# Patient Record
Sex: MALE | Race: WHITE | NOT HISPANIC OR LATINO | Employment: OTHER | URBAN - METROPOLITAN AREA
[De-identification: names, ages, dates, MRNs, and addresses within clinical notes are randomized per-mention and may not be internally consistent; named-entity substitution may affect disease eponyms.]

---

## 2017-04-13 ENCOUNTER — HOSPITAL ENCOUNTER (OUTPATIENT)
Dept: RADIOLOGY | Facility: HOSPITAL | Age: 62
Discharge: HOME | End: 2017-04-13
Attending: UROLOGY
Payer: COMMERCIAL

## 2017-04-13 DIAGNOSIS — C64.1 RENAL CELL CARCINOMA, RIGHT: ICD-10-CM

## 2017-04-13 PROCEDURE — 74176 CT ABD & PELVIS W/O CONTRAST: CPT | Mod: 26,,, | Performed by: RADIOLOGY

## 2017-04-13 PROCEDURE — 71020 XR CHEST PA AND LATERAL: CPT | Mod: TC

## 2017-04-13 PROCEDURE — 71020 XR CHEST PA AND LATERAL: CPT | Mod: 26,,, | Performed by: RADIOLOGY

## 2017-04-13 PROCEDURE — 74176 CT ABD & PELVIS W/O CONTRAST: CPT | Mod: TC

## 2017-04-20 ENCOUNTER — OFFICE VISIT (OUTPATIENT)
Dept: UROLOGY | Facility: CLINIC | Age: 62
End: 2017-04-20
Payer: COMMERCIAL

## 2017-04-20 VITALS
HEIGHT: 67 IN | RESPIRATION RATE: 16 BRPM | DIASTOLIC BLOOD PRESSURE: 70 MMHG | WEIGHT: 253 LBS | BODY MASS INDEX: 39.71 KG/M2 | SYSTOLIC BLOOD PRESSURE: 112 MMHG | HEART RATE: 68 BPM

## 2017-04-20 DIAGNOSIS — R31.0 GROSS HEMATURIA: ICD-10-CM

## 2017-04-20 DIAGNOSIS — N20.0 NEPHROLITHIASIS: ICD-10-CM

## 2017-04-20 DIAGNOSIS — N40.1 BENIGN NON-NODULAR PROSTATIC HYPERPLASIA WITH LOWER URINARY TRACT SYMPTOMS: ICD-10-CM

## 2017-04-20 DIAGNOSIS — C64.1 RENAL CELL CARCINOMA OF RIGHT KIDNEY: Primary | ICD-10-CM

## 2017-04-20 PROCEDURE — 3078F DIAST BP <80 MM HG: CPT | Mod: S$GLB,,, | Performed by: UROLOGY

## 2017-04-20 PROCEDURE — 3074F SYST BP LT 130 MM HG: CPT | Mod: S$GLB,,, | Performed by: UROLOGY

## 2017-04-20 PROCEDURE — 1160F RVW MEDS BY RX/DR IN RCRD: CPT | Mod: S$GLB,,, | Performed by: UROLOGY

## 2017-04-20 PROCEDURE — 99214 OFFICE O/P EST MOD 30 MIN: CPT | Mod: S$GLB,,, | Performed by: UROLOGY

## 2017-04-20 PROCEDURE — 99999 PR PBB SHADOW E&M-EST. PATIENT-LVL III: CPT | Mod: PBBFAC,,, | Performed by: UROLOGY

## 2017-04-20 NOTE — PROGRESS NOTES
Subjective:       Nicolas Flaherty is a 61 y.o. male who is an established patient who was seen for evaluation of nephrolithiasis and hematuria.      Last seen by Dr Christian 7/15. He has a h/o stones and is s/p ESWL and URS in the past. He has had issues with gross hematuria and has been worked up for this. He does take Plavix and ASA regularly. He has significant cardiac issues, especially when off Plavix.     He also has BPH with some LUTS. Nocturia x 4. He takes Flomax and Proscar.     R URS 6/15 for stones. Stone analysis - CaOx mono. 24hr stone risk analysis - hyperoxalaturia. He is taking allopurinol currently. Unsure efficacy of this.    KAREN 4/15 - normal, no hydro. 1.5cm simple cyst RUP. 4mm R MP stone. PVR 51cc.  KAREN 1/16 - no stone seen. 2.9cm lesion in R LP concerning for neoplasm. Not seen on previous study.   CT scan shows 3.2cm enhancing mass in R kidney, endophytic.    He is s/p R URS to r/o UC. No tumor seen therefore now s/p R lap radical nephrectomy on 3/18/16 for renal mass. 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 Cr 1.4 at baseline. Post-op Cr was 2.1 at discharge. He is followed by nephrology (Dr Short).     Initial post-operative check was fine. Staples were removed at last visit. He presents today after reporting some redness and drainage from extraction site. No fevers or other complaints.     Unfortunately he was noted to have R sided hernia at the area of nephrectomy. He has had this repaired by Dr Lewis in 8/16. He has healed well from this.    CT 10/16 is clear of recurrence. Hernia repair noted. Cr 2.1.      The following portions of the patient's history were reviewed and updated as appropriate: allergies, current medications, past family history, past medical history, past social history, past surgical history and problem list.    Review of Systems  Constitutional: no fever or chills  ENT: no nasal congestion or sore throat  Respiratory: no  "cough or shortness of breath  Cardiovascular: no chest pain or palpitations  Gastrointestinal: no nausea or vomiting, tolerating diet  Genitourinary: as per HPI  Hematologic/Lymphatic: no easy bruising or lymphadenopathy  Musculoskeletal: no arthralgias or myalgias  Skin: no rashes or lesions  Neurological: no seizures or tremors  Behavioral/Psych: no auditory or visual hallucinations       Objective:    Vitals:   /70  Pulse 68  Resp 16  Ht 5' 7" (1.702 m)  Wt 114.8 kg (253 lb)  BMI 39.63 kg/m2    Physical Exam   General: well developed, well nourished in no acute distress  Head: normocephalic, atraumatic  Neck: supple, trachea midline, no obvious enlargement of thyroid  HEENT: EOMI, mucus membranes moist, sclera anicteric, no hearing impairment  Lungs: symmetric expansion, non-labored breathing  Cardiovascular: regular rate and rhythm, normal pulses  Abdomen: soft, non tender, non distended, no palpable masses, no hepatosplenomegaly, no hernias, bladder nonpalpable. No CVA tenderness.  Musculoskeletal: no peripheral edema, normal ROM in bilateral upper and lower extremities  Lymphatics: no cervical or inguinal lymphadenopathy  Skin: no rashes or lesions  Neuro: alert and oriented x 3, no gross deficits  Psych: normal judgment and insight, normal mood/affect and non-anxious  Genitourinary: (last visit)  Penis -  circumcised penis without plaques, lesions, or scarring.  Urethra - orthotopic location without stenosis.  Scrotum  - no lesions or rashes, no hydrocele or hernia.  Testes - descended bilaterally, symmetric without masses, non tender.  Epididymides - no cysts or lesions, no spermatocele, symmetric   No evidence of any testicular/scrotal infection   ILDA: patient declined exam    Inc: healing well, small scab in extraction site incision (s/p hernia repair)    Lab Review   Urine analysis today in clinic shows - negative    Lab Results   Component Value Date    WBC 12.29 10/07/2016    HGB 12.3 (L) " 10/07/2016    HCT 38.3 (L) 10/07/2016    MCV 88 10/07/2016     10/07/2016     Lab Results   Component Value Date    CREATININE 2.2 (H) 10/07/2016    BUN 28 (H) 10/07/2016     No results found for: PSA  Lab Results   Component Value Date    PSADIAG 0.23 04/21/2015       Imaging  KAREN/CT reviewed  Reports and images were personally reviewed by me and discussed with the patient today         Assessment/Plan:      1. Renal cell carcinoma    - Baseline Cr 1.4, new baseline 2.2. Glucosuria today.   - s/p R lap nephrectomy 3/18/16, pT3aNx, FG2, ccRCC   - CT a/p and CXR clear 4/17   - CT a/p (non-con) and CXR in 6mths   - Follow up plan: CT q6m x 3y, q1y to year 5. CXR q6m x 5yrs   - s/p hernia repair by Dr Lewis at kidney extraction site (8/16)     2. Nephrolithiasis    - Punctate stone in R LP   - CaOx mono stones   - 24hr urine collection - hyperoxalaturia. Recommend Ca intake when oxalate taken PO. Low oxalate diet.    - He is taking allopurinol per RCA. Unsure efficacy of this. Instructed him he can stop this.    - Discussed low oxalate diet   - No stones on CT 10/16     3. Hematuria, gross    - Negative cysto 6/15, 3/16   - RCC -suspect reason for hematuria     4. Benign non-nodular prostatic hyperplasia with lower urinary tract symptoms    - Continue Flomax           Follow up in 6 months with CT scan/CXR

## 2017-04-20 NOTE — MR AVS SNAPSHOT
[facility]  [address] [facility] [address]  [place] [address]  Phone: [phone]                  Nicolas Flaherty   2017 9:00 AM   Office Visit    Description:  Male : 1955   Provider:  Rosalia Acosta MD   Department:  [facility]           Reason for Visit     Follow-up           Diagnoses this Visit        Comments    Renal cell carcinoma of right kidney    -  Primary            To Do List           Goals (5 Years of Data)     None      Follow-Up and Disposition     Return in about 6 months (around 10/20/2017) for Follow up on symptoms, CT scan/CXR follow up.      [facility]     [facility] Nurse Care Line -  Assistance  Unless otherwise directed by your provider, please contact [facility], our nurse care line that is available for  assistance.     Registered nurses in the [facility] provide: appointment scheduling, clinical advisement, health education, and other advisory services.  Call: [phone] (toll free)               Medications           STOP taking these medications     amlodipine (NORVASC) 10 MG tablet Take 5 mg by mouth every morning.            Verify that the below list of medications is an accurate representation of the medications you are currently taking.  If none reported, the list may be blank. If incorrect, please contact your healthcare provider. Carry this list with you in case of emergency.           Current Medications     aspirin (ECOTRIN) 81 MG EC tablet Take 81 mg by mouth once daily. LAST TAKEN 3/2/16    clopidogrel (PLAVIX) 75 mg tablet Take 75 mg by mouth every morning. LAST DOSE 3/2/16    fenofibrate (TRICOR) 145 MG tablet Take 145 mg by mouth nightly.     finasteride (PROSCAR) 5 mg tablet Take 1 tablet (5 mg total) by mouth every morning.    furosemide (LASIX) 40 MG tablet Take 40 mg by mouth 2 (two) times daily. ALTERNATES 1 TABLET ONE DAY AND 2 TABLETS THE NEXT--ALTERNATING DAYS    gabapentin  "(NEURONTIN) 300 MG capsule     glipiZIDE (GLUCOTROL) 5 MG tablet Take 5 mg by mouth 2 (two) times daily with meals.     hydrocodone-acetaminophen 10-325mg (NORCO)  mg Tab Take 1 tablet by mouth every 4 to 6 hours as needed.    isosorbide mononitrate (IMDUR) 60 MG 24 hr tablet Take 60 mg by mouth every morning.     latanoprost 0.005 % ophthalmic solution Place 1 drop into both eyes every evening.     lisinopril (PRINIVIL,ZESTRIL) 40 MG tablet Take 40 mg by mouth once daily.     metoprolol succinate (TOPROL-XL) 100 MG 24 hr tablet Take 100 mg by mouth every morning.     NITROSTAT 0.4 mg SL tablet Place 0.4 mg under the tongue every 5 (five) minutes as needed.     PROAIR HFA 90 mcg/actuation inhaler INHALE 2 PUFFS DEEP INTO THE LUNGS Q 6 H PRN    rosuvastatin (CRESTOR) 10 MG tablet Take 10 mg by mouth every evening.    tamsulosin (FLOMAX) 0.4 mg Cp24 Take 1 capsule (0.4 mg total) by mouth once daily.    TRAVATAN Z 0.004 % Drop 1 drop every evening.            Clinical Reference Information           Your Vitals Were     BP Pulse Resp Height Weight BMI    112/70 68 16 5' 7" (1.702 m) 114.8 kg (253 lb) 39.63 kg/m2      Blood Pressure          Most Recent Value    BP  112/70      Allergies as of 4/20/2017     No Known Allergies      Immunizations Administered on Date of Encounter - 4/20/2017     None      Orders Placed During Today's Visit     Future Labs/Procedures Expected by Expires    CT Renal Stone Study ABD Pelvis WO  10/20/2017 4/20/2018    X-Ray Chest PA And Lateral  10/20/2017 4/20/2018      [facility] Sign-Up     Activating your [facility] account is as easy as 1-2-3!     1) Visit [email or web address], select Sign Up Now, enter this activation code and your date of birth, then select Next.  Activation code not generated  Current Patient Portal Status: Account disabled      2) Create a username and password to use when you visit [facility] in the future and select a security question in case you lose your password " and select Next.    3) Enter your e-mail address and click Sign Up!    Additional Information  If you have questions, please e-mail [email or web address] or call [phone] to talk to our [facility] staff. Remember, [facility] is NOT to be used for urgent needs. For medical emergencies, dial 911.         Language Assistance Services     ATTENTION: Language assistance services are available, free of charge. Please call [phone].      ATENCIÓN: Si habla chellyingrid, tiene a warren disposición servicios gratuitos de asistencia lingüística. Llame al [phone].     [facility] Ý: N?u b?n nói Ti?ng Vi?t, có các d?ch v? h? tr? ngôn ng? mi?n phí dành cho b?n. G?i s? [phone].         [facility] complies with applicable Federal civil rights laws and does not discriminate on the basis of race, color, national origin, age, disability, or sex.

## 2017-05-01 RX ORDER — TAMSULOSIN HYDROCHLORIDE 0.4 MG/1
CAPSULE ORAL
Qty: 90 CAPSULE | Refills: 3 | Status: SHIPPED | OUTPATIENT
Start: 2017-05-01 | End: 2018-04-24 | Stop reason: SDUPTHER

## 2017-06-22 ENCOUNTER — OFFICE VISIT (OUTPATIENT)
Dept: UROLOGY | Facility: CLINIC | Age: 62
End: 2017-06-22
Payer: COMMERCIAL

## 2017-06-22 VITALS
WEIGHT: 256.19 LBS | HEIGHT: 67 IN | BODY MASS INDEX: 40.21 KG/M2 | DIASTOLIC BLOOD PRESSURE: 76 MMHG | SYSTOLIC BLOOD PRESSURE: 124 MMHG | RESPIRATION RATE: 16 BRPM

## 2017-06-22 DIAGNOSIS — R31.0 GROSS HEMATURIA: ICD-10-CM

## 2017-06-22 DIAGNOSIS — N20.0 NEPHROLITHIASIS: ICD-10-CM

## 2017-06-22 DIAGNOSIS — N40.1 BENIGN NON-NODULAR PROSTATIC HYPERPLASIA WITH LOWER URINARY TRACT SYMPTOMS: Primary | ICD-10-CM

## 2017-06-22 DIAGNOSIS — C64.1 RENAL CELL CARCINOMA OF RIGHT KIDNEY: ICD-10-CM

## 2017-06-22 PROCEDURE — 99214 OFFICE O/P EST MOD 30 MIN: CPT | Mod: S$GLB,,, | Performed by: UROLOGY

## 2017-06-22 PROCEDURE — 99999 PR PBB SHADOW E&M-EST. PATIENT-LVL III: CPT | Mod: PBBFAC,,, | Performed by: UROLOGY

## 2017-06-22 RX ORDER — VALSARTAN 80 MG/1
80 TABLET ORAL DAILY
COMMUNITY
Start: 2017-05-16 | End: 2018-08-24

## 2017-06-22 RX ORDER — NATEGLINIDE 60 MG/1
60 TABLET ORAL
COMMUNITY
Start: 2017-04-19 | End: 2018-07-25 | Stop reason: SDUPTHER

## 2017-06-22 RX ORDER — AMOXICILLIN AND CLAVULANATE POTASSIUM 875; 125 MG/1; MG/1
TABLET, FILM COATED ORAL
COMMUNITY
Start: 2017-06-16 | End: 2017-07-03

## 2017-06-22 NOTE — PROGRESS NOTES
Subjective:       Nicolas Flaherty is a 61 y.o. male who is an established patient who was seen for evaluation of nephrolithiasis and hematuria.      Last seen by Dr Christian 7/15. He has a h/o stones and is s/p ESWL and URS in the past. He has had issues with gross hematuria and has been worked up for this. He does take Plavix and ASA regularly. He has significant cardiac issues, especially when off Plavix.     He also has BPH with some LUTS. Nocturia x 4. He takes Flomax and Proscar.     R URS 6/15 for stones. Stone analysis - CaOx mono. 24hr stone risk analysis - hyperoxalaturia. He is taking allopurinol currently. Unsure efficacy of this.    KAREN 4/15 - normal, no hydro. 1.5cm simple cyst RUP. 4mm R MP stone. PVR 51cc.  KAREN 1/16 - no stone seen. 2.9cm lesion in R LP concerning for neoplasm. Not seen on previous study.   CT scan shows 3.2cm enhancing mass in R kidney, endophytic.    He is s/p R URS to r/o UC. No tumor seen therefore now s/p R lap radical nephrectomy on 3/18/16 for renal mass. 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 Cr 1.4 at baseline. Post-op Cr was 2.1 at discharge. He is followed by nephrology (Dr Short).     Initial post-operative check was fine. Staples were removed at last visit. He presents today after reporting some redness and drainage from extraction site. No fevers or other complaints.     He was noted to have R sided hernia at the area of nephrectomy. He has had this repaired by Dr Lewis in 8/16. He has healed well from this.    CT 10/16 is clear of recurrence. Hernia repair noted. Cr 2.1.    He returns now with c/o pain and swelling at incision site (s/p lap nephrectomy 3/16 and hernia repair 8/16).       The following portions of the patient's history were reviewed and updated as appropriate: allergies, current medications, past family history, past medical history, past social history, past surgical history and problem list.    Review of  "Systems  Constitutional: no fever or chills  ENT: no nasal congestion or sore throat  Respiratory: no cough or shortness of breath  Cardiovascular: no chest pain or palpitations  Gastrointestinal: no nausea or vomiting, tolerating diet  Genitourinary: as per HPI  Hematologic/Lymphatic: no easy bruising or lymphadenopathy  Musculoskeletal: no arthralgias or myalgias  Skin: no rashes or lesions  Neurological: no seizures or tremors  Behavioral/Psych: no auditory or visual hallucinations       Objective:    Vitals:   /76   Resp 16   Ht 5' 7" (1.702 m)   Wt 116.2 kg (256 lb 2.8 oz)   BMI 40.12 kg/m²     Physical Exam   General: well developed, well nourished in no acute distress  Head: normocephalic, atraumatic  Neck: supple, trachea midline, no obvious enlargement of thyroid  HEENT: EOMI, mucus membranes moist, sclera anicteric, no hearing impairment  Lungs: symmetric expansion, non-labored breathing  Cardiovascular: regular rate and rhythm, normal pulses  Abdomen: soft, non tender, non distended, no palpable masses, no hepatosplenomegaly, no hernias, bladder nonpalpable. No CVA tenderness.  Musculoskeletal: no peripheral edema, normal ROM in bilateral upper and lower extremities  Lymphatics: no cervical or inguinal lymphadenopathy  Skin: no rashes or lesions  Neuro: alert and oriented x 3, no gross deficits  Psych: normal judgment and insight, normal mood/affect and non-anxious  Genitourinary: (last visit)  Penis -  circumcised penis without plaques, lesions, or scarring.  Urethra - orthotopic location without stenosis.  Scrotum  - no lesions or rashes, no hydrocele or hernia.  Testes - descended bilaterally, symmetric without masses, non tender.  Epididymides - no cysts or lesions, no spermatocele, symmetric   No evidence of any testicular/scrotal infection   ILDA: patient declined exam    Inc: well healed, s/p hernia repair. No defect in fascia appreciated.     Lab Review   Urine analysis today in clinic " shows - no urine given    Lab Results   Component Value Date    WBC 12.29 10/07/2016    HGB 12.3 (L) 10/07/2016    HCT 38.3 (L) 10/07/2016    MCV 88 10/07/2016     10/07/2016     Lab Results   Component Value Date    CREATININE 2.2 (H) 10/07/2016    BUN 28 (H) 10/07/2016     No results found for: PSA  Lab Results   Component Value Date    PSADIAG 0.23 04/21/2015       Imaging  KAREN/CT reviewed  Reports and images were personally reviewed by me and discussed with the patient today         Assessment/Plan:      1. Renal cell carcinoma    - Baseline Cr 1.4, new baseline 2.2.   - s/p R lap nephrectomy 3/18/16, pT3aNx, FG2, ccRCC   - CT a/p and CXR clear 4/17   - CT a/p (non-con) and CXR in 6mths   - Follow up plan: CT q6m x 3y, q1y to year 5. CXR q6m x 5yrs   - s/p hernia repair by Dr Lewis at kidney extraction site (8/16) - recommend to see Joshua (non-urgently) for recheck of hernia site.      2. Nephrolithiasis    - Punctate stone in R LP   - CaOx mono stones   - 24hr urine collection - hyperoxalaturia. Recommend Ca intake when oxalate taken PO. Low oxalate diet.    - He is taking allopurinol per RCA. Unsure efficacy of this. Instructed him he can stop this.    - Discussed low oxalate diet   - No stones on CT 10/16     3. Hematuria, gross    - Negative cysto 6/15, 3/16   - RCC -suspect reason for hematuria     4. Benign non-nodular prostatic hyperplasia with lower urinary tract symptoms    - Continue Flomax           Follow up in 4 months with CT scan/CXR

## 2017-07-20 RX ORDER — FINASTERIDE 5 MG/1
TABLET, FILM COATED ORAL
Qty: 90 TABLET | Refills: 3 | Status: SHIPPED | OUTPATIENT
Start: 2017-07-20 | End: 2018-07-25 | Stop reason: SDUPTHER

## 2017-07-25 ENCOUNTER — OFFICE VISIT (OUTPATIENT)
Dept: FAMILY MEDICINE | Facility: CLINIC | Age: 62
End: 2017-07-25
Payer: COMMERCIAL

## 2017-07-25 VITALS
TEMPERATURE: 99 F | BODY MASS INDEX: 41.87 KG/M2 | SYSTOLIC BLOOD PRESSURE: 110 MMHG | WEIGHT: 266.75 LBS | HEART RATE: 78 BPM | HEIGHT: 67 IN | OXYGEN SATURATION: 97 % | DIASTOLIC BLOOD PRESSURE: 74 MMHG

## 2017-07-25 DIAGNOSIS — E11.22 CONTROLLED TYPE 2 DIABETES MELLITUS WITH STAGE 3 CHRONIC KIDNEY DISEASE, WITHOUT LONG-TERM CURRENT USE OF INSULIN: ICD-10-CM

## 2017-07-25 DIAGNOSIS — I10 ESSENTIAL HYPERTENSION: ICD-10-CM

## 2017-07-25 DIAGNOSIS — I25.119 CORONARY ARTERY DISEASE INVOLVING NATIVE CORONARY ARTERY OF NATIVE HEART WITH ANGINA PECTORIS: ICD-10-CM

## 2017-07-25 DIAGNOSIS — Z90.5 STATUS POST NEPHRECTOMY: ICD-10-CM

## 2017-07-25 DIAGNOSIS — Z12.11 COLON CANCER SCREENING: Primary | ICD-10-CM

## 2017-07-25 DIAGNOSIS — N18.30 CONTROLLED TYPE 2 DIABETES MELLITUS WITH STAGE 3 CHRONIC KIDNEY DISEASE, WITHOUT LONG-TERM CURRENT USE OF INSULIN: ICD-10-CM

## 2017-07-25 DIAGNOSIS — J30.9 ALLERGIC SINUSITIS: ICD-10-CM

## 2017-07-25 DIAGNOSIS — C64.1 RENAL CELL CARCINOMA OF RIGHT KIDNEY: ICD-10-CM

## 2017-07-25 DIAGNOSIS — N18.30 CKD (CHRONIC KIDNEY DISEASE), STAGE III: ICD-10-CM

## 2017-07-25 PROCEDURE — 99999 PR PBB SHADOW E&M-EST. PATIENT-LVL III: CPT | Mod: PBBFAC,,, | Performed by: FAMILY MEDICINE

## 2017-07-25 PROCEDURE — 99203 OFFICE O/P NEW LOW 30 MIN: CPT | Mod: S$GLB,,, | Performed by: FAMILY MEDICINE

## 2017-07-25 PROCEDURE — 4010F ACE/ARB THERAPY RXD/TAKEN: CPT | Mod: S$GLB,,, | Performed by: FAMILY MEDICINE

## 2017-07-25 RX ORDER — AZELASTINE 1 MG/ML
1 SPRAY, METERED NASAL 2 TIMES DAILY
Qty: 30 ML | Refills: 5 | Status: SHIPPED | OUTPATIENT
Start: 2017-07-25 | End: 2018-02-07

## 2017-07-25 RX ORDER — DOCUSATE SODIUM 100 MG/1
100 CAPSULE, LIQUID FILLED ORAL 2 TIMES DAILY
COMMUNITY
End: 2017-07-27 | Stop reason: SDUPTHER

## 2017-07-25 NOTE — PROGRESS NOTES
Subjective:       Patient ID: Nicolas Flaherty is a 61 y.o. male.    Chief Complaint: Establish Care    Patient is 61 year old male who presents as a new patietn to me today. He has hyperlipidemia, diabetes, chronic kidney disease, TIA, and coronary artery disease. He presents today  for sinus issues. He was given a course of prednsione x 2 and antibiotics. He is treating him with mucinex and coricidin HBP. He has cough with clear phlegm, post nasal drip. He denies fever or chills. [facility] he had his labs drawn for Dr. Campa there.     He has never had a colonoscopy.     He also has CKD and so he can't use contrast. He is followed by nephrology, Dr. Mejia,  and put him off for 6 months.     He sees Dr. Betts, his cardiologist,  every 6 months. He is due for follow-up in November 2017.      He gets his eye exams every 3 months with Dr. Ellen Cortez    Past Medical History:  No date: Anticoagulant long-term use  No date: Arthritis  No date: Cancer of kidney, right  No date: Coronary artery disease  No date: Diabetes mellitus  No date: Hypertension  No date: Kidney stone  No date: Sleep apnea  No date: Slurred speech  No date: Stroke      Comment: MINI STROKE  No date: TIA (transient ischemic attack)  No date: Wears glasses   Past Surgical History:  No date: BACK SURGERY      Comment: lower back  No date: CARDIAC SURGERY  No date: heart stents      Comment: stents 4 total.  2010, 2011 and 2013  No date: HERNIA REPAIR      Comment: incisional  No date: KIDNEY STONE SURGERY  No date: LAPAROSCOPIC NEPHRECTOMY, HAND ASSISTED Right  No date: LITHOTRIPSY  No date: VASCULAR SURGERY  Review of patient's family history indicates:    Heart disease                  Father                    Hypertension                   Mother                    Parkinsonism                   Mother                    Social History    Marital status:              Spouse name:                        "Years of education:                 Number of children:               Occupational History    None on file    Social History Main Topics    Smoking status: Never Smoker                                                                Smokeless tobacco: Current User                      Comment: 20 plus years    Alcohol use: Yes                Comment: occas    Drug use: No              Sexual activity: Yes               Partners with: Female    Other Topics            Concern    None on file    Social History Narrative    None on file            Review of Systems   Constitutional: Negative for fatigue.   Respiratory: Negative for cough, chest tightness, shortness of breath and wheezing.    Cardiovascular: Negative for chest pain, palpitations and leg swelling.   Gastrointestinal: Negative for abdominal pain, nausea and vomiting.   Endocrine: Negative for polydipsia, polyphagia and polyuria.   Neurological: Negative for dizziness, syncope, light-headedness and headaches.       Objective:       Vitals:    07/25/17 0816   BP: 110/74   Pulse: 78   Temp: 99 °F (37.2 °C)   TempSrc: Oral   SpO2: 97%   Weight: 121 kg (266 lb 12.1 oz)   Height: 5' 7" (1.702 m)       Physical Exam   Constitutional: He is oriented to person, place, and time. He appears well-developed and well-nourished. No distress.   HENT:   Head: Normocephalic and atraumatic.   Eyes: Conjunctivae are normal.   Neck: Normal range of motion. Neck supple. Carotid bruit is not present.   Cardiovascular: Normal rate, regular rhythm and normal heart sounds.  Exam reveals no gallop and no friction rub.    No murmur heard.  Pulmonary/Chest: Effort normal and breath sounds normal. No respiratory distress. He has no wheezes. He has no rales.   Musculoskeletal: He exhibits no edema.   Lymphadenopathy:     He has no cervical adenopathy.   Neurological: He is alert and oriented to person, place, and time.   Skin: He is not diaphoretic.   Psychiatric: He has a normal mood " and affect.       Assessment:       1. Colon cancer screening    2. CKD (chronic kidney disease), stage III    3. Coronary artery disease involving native coronary artery of native heart with angina pectoris    4. Controlled type 2 diabetes mellitus with stage 3 chronic kidney disease, without long-term current use of insulin    5. Essential hypertension    6. Renal cell carcinoma of right kidney    7. Status post nephrectomy    8. Allergic sinusitis        Plan:       Nicolas was seen today for establish care.    Diagnoses and all orders for this visit:    Colon cancer screening  -     Occult blood x 1, stool; Future  -     Occult blood x 1, stool; Future  -     Occult blood x 1, stool; Future    CKD (chronic kidney disease), stage III    Coronary artery disease involving native coronary artery of native heart with angina pectoris  Stable on Dr. Betts; will get labs done at [facility]    Controlled type 2 diabetes mellitus with stage 3 chronic kidney disease, without long-term current use of insulin    Essential hypertension  Blood pressure is controlled on valsartan or metoprolol    Renal cell carcinoma of right kidney    Status post nephrectomy    Allergic sinusitis  -     azelastine (ASTELIN) 137 mcg (0.1 %) nasal spray; 1 spray (137 mcg total) by Nasal route 2 (two) times daily.

## 2017-07-27 RX ORDER — DOCUSATE SODIUM 100 MG/1
100 CAPSULE, LIQUID FILLED ORAL 2 TIMES DAILY
Qty: 30 CAPSULE | Refills: 11 | Status: SHIPPED | OUTPATIENT
Start: 2017-07-27 | End: 2017-08-07 | Stop reason: SDUPTHER

## 2017-08-07 ENCOUNTER — TELEPHONE (OUTPATIENT)
Dept: UROLOGY | Facility: CLINIC | Age: 62
End: 2017-08-07

## 2017-08-07 RX ORDER — DOCUSATE SODIUM 100 MG/1
100 CAPSULE, LIQUID FILLED ORAL 2 TIMES DAILY
Qty: 180 CAPSULE | Refills: 3 | Status: SHIPPED | OUTPATIENT
Start: 2017-08-07

## 2017-08-07 NOTE — TELEPHONE ENCOUNTER
Pt is asking for a 90 day supply which would be #180 of  Docusate sodium (stool softener) pt states this will be cheaper for him./yifan

## 2017-10-20 ENCOUNTER — HOSPITAL ENCOUNTER (OUTPATIENT)
Dept: RADIOLOGY | Facility: HOSPITAL | Age: 62
Discharge: HOME | End: 2017-10-20
Attending: UROLOGY
Payer: COMMERCIAL

## 2017-10-20 DIAGNOSIS — C64.1 RENAL CELL CARCINOMA OF RIGHT KIDNEY: ICD-10-CM

## 2017-10-20 PROCEDURE — 74176 CT ABD & PELVIS W/O CONTRAST: CPT | Mod: TC

## 2017-10-20 PROCEDURE — 74176 CT ABD & PELVIS W/O CONTRAST: CPT | Mod: 26,,, | Performed by: RADIOLOGY

## 2017-10-20 PROCEDURE — 71020 XR CHEST PA AND LATERAL: CPT | Mod: 26,,, | Performed by: RADIOLOGY

## 2017-10-20 PROCEDURE — 71020 XR CHEST PA AND LATERAL: CPT | Mod: TC

## 2017-10-26 ENCOUNTER — OFFICE VISIT (OUTPATIENT)
Dept: UROLOGY | Facility: CLINIC | Age: 62
End: 2017-10-26
Payer: COMMERCIAL

## 2017-10-26 VITALS
DIASTOLIC BLOOD PRESSURE: 62 MMHG | BODY MASS INDEX: 42.22 KG/M2 | WEIGHT: 269 LBS | RESPIRATION RATE: 14 BRPM | SYSTOLIC BLOOD PRESSURE: 100 MMHG | HEART RATE: 68 BPM | HEIGHT: 67 IN

## 2017-10-26 DIAGNOSIS — N40.1 BENIGN NON-NODULAR PROSTATIC HYPERPLASIA WITH LOWER URINARY TRACT SYMPTOMS: ICD-10-CM

## 2017-10-26 DIAGNOSIS — R31.0 GROSS HEMATURIA: ICD-10-CM

## 2017-10-26 DIAGNOSIS — C64.1 RENAL CELL CARCINOMA OF RIGHT KIDNEY: Primary | ICD-10-CM

## 2017-10-26 DIAGNOSIS — N20.0 NEPHROLITHIASIS: ICD-10-CM

## 2017-10-26 PROCEDURE — 99214 OFFICE O/P EST MOD 30 MIN: CPT | Mod: S$GLB,,, | Performed by: UROLOGY

## 2017-10-26 PROCEDURE — 99999 PR PBB SHADOW E&M-EST. PATIENT-LVL III: CPT | Mod: PBBFAC,,, | Performed by: UROLOGY

## 2017-10-26 NOTE — PROGRESS NOTES
Subjective:       Nicolas Flaherty is a 62 y.o. male who is an established patient who was seen for evaluation of nephrolithiasis and hematuria.      Last seen by Dr Christian 7/15. He has a h/o stones and is s/p ESWL and URS in the past. He has had issues with gross hematuria and has been worked up for this. He does take Plavix and ASA regularly. He has significant cardiac issues, especially when off Plavix.     He also has BPH with some LUTS. Nocturia x 4. He takes Flomax and Proscar.     R URS 6/15 for stones. Stone analysis - CaOx mono. 24hr stone risk analysis - hyperoxalaturia. He is taking allopurinol currently. Unsure efficacy of this.    KAREN 4/15 - normal, no hydro. 1.5cm simple cyst RUP. 4mm R MP stone. PVR 51cc.  KAREN 1/16 - no stone seen. 2.9cm lesion in R LP concerning for neoplasm. Not seen on previous study.   CT scan shows 3.2cm enhancing mass in R kidney, endophytic.    He is s/p R URS to r/o UC. No tumor seen therefore now s/p R lap radical nephrectomy on 3/18/16 for renal mass. 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 Cr 1.4 at baseline. Post-op Cr was 2.1 at discharge. He is followed by nephrology (Dr Short).     Initial post-operative check was fine. Staples were removed.    He was noted to have R sided hernia at the area of nephrectomy. He has had this repaired by Dr Lewis in 8/16. He has healed well from this.    CT 10/16 is clear of recurrence. Hernia repair noted 8/16. Cr 2.1.    CT and CXR 4/17, 10/17 - clear of recurrence    He continues to c/o R flank pain and swelling.       The following portions of the patient's history were reviewed and updated as appropriate: allergies, current medications, past family history, past medical history, past social history, past surgical history and problem list.    Review of Systems  Constitutional: no fever or chills  ENT: no nasal congestion or sore throat  Respiratory: no cough or shortness of  "breath  Cardiovascular: no chest pain or palpitations  Gastrointestinal: no nausea or vomiting, tolerating diet  Genitourinary: as per HPI  Hematologic/Lymphatic: no easy bruising or lymphadenopathy  Musculoskeletal: no arthralgias or myalgias  Skin: no rashes or lesions  Neurological: no seizures or tremors  Behavioral/Psych: no auditory or visual hallucinations       Objective:    Vitals:   /62   Pulse 68   Resp 14   Ht 5' 7" (1.702 m)   Wt 122 kg (269 lb)   BMI 42.13 kg/m²     Physical Exam   General: well developed, well nourished in no acute distress  Head: normocephalic, atraumatic  Neck: supple, trachea midline, no obvious enlargement of thyroid  HEENT: EOMI, mucus membranes moist, sclera anicteric, no hearing impairment  Lungs: symmetric expansion, non-labored breathing  Cardiovascular: regular rate and rhythm, normal pulses  Abdomen: soft, non tender, non distended, no palpable masses, no hepatosplenomegaly, no hernias, bladder nonpalpable. No CVA tenderness.  Musculoskeletal: no peripheral edema, normal ROM in bilateral upper and lower extremities  Lymphatics: no cervical or inguinal lymphadenopathy  Skin: no rashes or lesions  Neuro: alert and oriented x 3, no gross deficits  Psych: normal judgment and insight, normal mood/affect and non-anxious  Genitourinary: (last visit)  Penis -  circumcised penis without plaques, lesions, or scarring.  Urethra - orthotopic location without stenosis.  Scrotum  - no lesions or rashes, no hydrocele or hernia.  Testes - descended bilaterally, symmetric without masses, non tender.  Epididymides - no cysts or lesions, no spermatocele, symmetric   No evidence of any testicular/scrotal infection   ILDA: patient declined exam    Inc: well healed, s/p hernia repair. No defect in fascia appreciated.     Lab Review   Urine analysis today in clinic shows - no urine given    Lab Results   Component Value Date    WBC 12.29 10/07/2016    HGB 12.3 (L) 10/07/2016    HCT 38.3 " (L) 10/07/2016    MCV 88 10/07/2016     10/07/2016     Lab Results   Component Value Date    CREATININE 2.2 (H) 10/07/2016    BUN 28 (H) 10/07/2016     No results found for: PSA  Lab Results   Component Value Date    PSADIAG 0.23 04/21/2015       Imaging  KAREN/CT reviewed  Reports and images were personally reviewed by me and discussed with the patient today         Assessment/Plan:      1. Renal cell carcinoma    - Baseline Cr 1.4, new baseline 2.2.   - s/p R lap nephrectomy 3/18/16, pT3aNx, FG2, ccRCC   - CT a/p and CXR clear 4/17, 10/17   - CT a/p (non-con) and CXR in 6mths   - Follow up plan: CT q6m x 3y, q1y to year 5 (3/21). CXR q6m x 5yrs   - s/p hernia repair by Dr Lewis at kidney extraction site (8/16) - recommend to see Joshua (non-urgently) for recheck of hernia site.      2. Nephrolithiasis    - Punctate stone in R LP   - CaOx mono stones   - 24hr urine collection - hyperoxalaturia. Recommend Ca intake when oxalate taken PO. Low oxalate diet.    - He is taking allopurinol per RCA. Unsure efficacy of this. Instructed him he can stop this.    - Discussed low oxalate diet   - No stones on CT     3. Hematuria, gross    - Negative cysto 6/15, 3/16   - RCC -suspect reason for hematuria     4. Benign non-nodular prostatic hyperplasia with lower urinary tract symptoms    - Continue Flomax   - PVD - urethral milking           Follow up in 6 months with CT scan/CXR

## 2017-10-30 DIAGNOSIS — E11.9 TYPE 2 DIABETES MELLITUS WITHOUT COMPLICATION: ICD-10-CM

## 2017-11-03 ENCOUNTER — OFFICE VISIT (OUTPATIENT)
Dept: FAMILY MEDICINE | Facility: CLINIC | Age: 62
End: 2017-11-03
Payer: COMMERCIAL

## 2017-11-03 VITALS
HEART RATE: 73 BPM | RESPIRATION RATE: 12 BRPM | OXYGEN SATURATION: 96 % | DIASTOLIC BLOOD PRESSURE: 80 MMHG | TEMPERATURE: 99 F | WEIGHT: 268.31 LBS | HEIGHT: 67 IN | BODY MASS INDEX: 42.11 KG/M2 | SYSTOLIC BLOOD PRESSURE: 112 MMHG

## 2017-11-03 DIAGNOSIS — A08.4 VIRAL ENTERITIS: Primary | ICD-10-CM

## 2017-11-03 PROCEDURE — 99999 PR PBB SHADOW E&M-EST. PATIENT-LVL III: CPT | Mod: PBBFAC,,, | Performed by: FAMILY MEDICINE

## 2017-11-03 PROCEDURE — 99213 OFFICE O/P EST LOW 20 MIN: CPT | Mod: S$GLB,,, | Performed by: FAMILY MEDICINE

## 2017-11-03 NOTE — PROGRESS NOTES
"Subjective:       Patient ID: Nicolas Flaherty is a 62 y.o. male.    Chief Complaint: Diarrhea and Chills    Diarrhea    This is a new problem. The current episode started in the past 7 days (3 days). The problem occurs 5 to 10 times per day. The problem has been unchanged. The stool consistency is described as watery. The patient states that diarrhea awakens him from sleep. Associated symptoms include chills, coughing and sweats. Pertinent negatives include no fever, myalgias or vomiting. There are no known risk factors. He has tried anti-motility drug (immodium) for the symptoms. The treatment provided no relief.   he states his blood sugars have been better and states it was 80 this morning.   Review of Systems   Constitutional: Positive for chills. Negative for fever.   Respiratory: Positive for cough.    Gastrointestinal: Positive for diarrhea. Negative for vomiting.   Musculoskeletal: Negative for myalgias.       Objective:       Vitals:    11/03/17 0802   BP: 112/80   Pulse: 73   Resp: 12   Temp: 98.6 °F (37 °C)   TempSrc: Oral   SpO2: 96%   Weight: 121.7 kg (268 lb 4.8 oz)   Height: 5' 7" (1.702 m)       Physical Exam   Constitutional: He is oriented to person, place, and time. He appears well-developed and well-nourished. No distress.   HENT:   Head: Normocephalic and atraumatic.   Cardiovascular: Normal rate, regular rhythm and normal heart sounds.  Exam reveals no gallop and no friction rub.    No murmur heard.  Pulmonary/Chest: Effort normal and breath sounds normal. No respiratory distress. He has no wheezes. He has no rales.   Abdominal: Soft. Bowel sounds are normal. He exhibits no distension and no mass. There is no tenderness. There is no rebound and no guarding.   Neurological: He is alert and oriented to person, place, and time.   Skin: He is not diaphoretic.       Assessment:       1. Viral enteritis        Plan:       Nicolas was seen today for diarrhea and chills.    Diagnoses and all " orders for this visit:    Viral enteritis      The 'BRAT' diet is suggested, then progress to diet as tolerated as symptoms julio. Call if bloody stools, persistent diarrhea, vomiting, fever or abdominal pain.

## 2018-01-02 NOTE — TELEPHONE ENCOUNTER
----- Message from Coleen Reed sent at 1/2/2018 11:23 AM CST -----  Contact: self  Refill request for--- gabapentin (NEURONTIN) 300 MG capsule--- 90 day supply. To [facility] on [facility]. Pt call back [phone].

## 2018-01-03 RX ORDER — GABAPENTIN 300 MG/1
300 CAPSULE ORAL 3 TIMES DAILY
Qty: 270 CAPSULE | Refills: 0 | Status: SHIPPED | OUTPATIENT
Start: 2018-01-03 | End: 2018-04-11

## 2018-01-09 ENCOUNTER — OFFICE VISIT (OUTPATIENT)
Dept: FAMILY MEDICINE | Facility: CLINIC | Age: 63
End: 2018-01-09
Payer: COMMERCIAL

## 2018-01-09 VITALS
HEART RATE: 67 BPM | OXYGEN SATURATION: 97 % | BODY MASS INDEX: 42.43 KG/M2 | WEIGHT: 270.31 LBS | HEIGHT: 67 IN | TEMPERATURE: 99 F | DIASTOLIC BLOOD PRESSURE: 68 MMHG | SYSTOLIC BLOOD PRESSURE: 124 MMHG

## 2018-01-09 DIAGNOSIS — Z23 NEED FOR PROPHYLACTIC VACCINATION AND INOCULATION AGAINST INFLUENZA: ICD-10-CM

## 2018-01-09 DIAGNOSIS — N18.30 CKD (CHRONIC KIDNEY DISEASE), STAGE III: ICD-10-CM

## 2018-01-09 DIAGNOSIS — R41.82 ALTERED MENTAL STATUS, UNSPECIFIED ALTERED MENTAL STATUS TYPE: Primary | ICD-10-CM

## 2018-01-09 DIAGNOSIS — I25.119 CORONARY ARTERY DISEASE INVOLVING NATIVE CORONARY ARTERY OF NATIVE HEART WITH ANGINA PECTORIS: ICD-10-CM

## 2018-01-09 DIAGNOSIS — E11.22 TYPE 2 DIABETES MELLITUS WITH STAGE 3 CHRONIC KIDNEY DISEASE, WITHOUT LONG-TERM CURRENT USE OF INSULIN: ICD-10-CM

## 2018-01-09 DIAGNOSIS — N18.30 TYPE 2 DIABETES MELLITUS WITH STAGE 3 CHRONIC KIDNEY DISEASE, WITHOUT LONG-TERM CURRENT USE OF INSULIN: ICD-10-CM

## 2018-01-09 PROCEDURE — 99999 PR PBB SHADOW E&M-EST. PATIENT-LVL IV: CPT | Mod: PBBFAC,,, | Performed by: FAMILY MEDICINE

## 2018-01-09 PROCEDURE — 90471 IMMUNIZATION ADMIN: CPT | Mod: S$GLB,,, | Performed by: FAMILY MEDICINE

## 2018-01-09 PROCEDURE — 90670 PCV13 VACCINE IM: CPT | Mod: S$GLB,,, | Performed by: FAMILY MEDICINE

## 2018-01-09 PROCEDURE — 90686 IIV4 VACC NO PRSV 0.5 ML IM: CPT | Mod: S$GLB,,, | Performed by: FAMILY MEDICINE

## 2018-01-09 PROCEDURE — 99214 OFFICE O/P EST MOD 30 MIN: CPT | Mod: 25,S$GLB,, | Performed by: FAMILY MEDICINE

## 2018-01-09 PROCEDURE — 90472 IMMUNIZATION ADMIN EACH ADD: CPT | Mod: S$GLB,,, | Performed by: FAMILY MEDICINE

## 2018-01-09 NOTE — PROGRESS NOTES
Subjective:       Patient ID: Nicolas Flaherty is a 62 y.o. male.    Chief Complaint: Fall (twice) and Dizziness    Patient presents for a fall x 2 at home. He states he feels dizzy and off balance, which causes him to fall. Incidentally, he also feels like his speech has worsened. He has felt that way for about a week.   He has diabetes and has been managed by Dr. Campa. He would like me to take over. His last A1C was 7.2.    He also has a spot on his right hip that is painful. It has been present for years. He would like me to check this. He denies drainage. He states he is only concerned about this now as it has become painful.     Past Medical History:  No date: Anticoagulant long-term use  No date: Arthritis  No date: Cancer of kidney, right  No date: Coronary artery disease  No date: Diabetes mellitus  No date: Hypertension  No date: Kidney stone  No date: Sleep apnea  No date: Slurred speech  No date: Stroke      Comment: MINI STROKE  No date: TIA (transient ischemic attack)  No date: Wears glasses   Past Surgical History:  No date: BACK SURGERY      Comment: lower back  No date: CARDIAC SURGERY  No date: GLAUCOMA SURGERY  No date: heart stents      Comment: stents 4 total.  2010, 2011 and 2013  No date: HERNIA REPAIR      Comment: incisional  2015: KIDNEY STONE SURGERY  No date: LAPAROSCOPIC NEPHRECTOMY, HAND ASSISTED Right  No date: LITHOTRIPSY  No date: VASCULAR SURGERY  Review of patient's family history indicates:    Heart disease                  Father                    Hypertension                   Mother                    Parkinsonism                   Mother                    Social History    Marital status:              Spouse name:                       Years of education:                 Number of children:               Occupational History    None on file    Social History Main Topics    Smoking status: Never Smoker                                                                 Smokeless tobacco: Current User                      Comment: 20 plus years    Alcohol use: Yes                Comment: occas    Drug use: No              Sexual activity: Yes               Partners with: Female    Other Topics            Concern    None on file    Social History Narrative    None on file          Past Medical History:  No date: Anticoagulant long-term use  No date: Arthritis  No date: Cancer of kidney, right  No date: Coronary artery disease  No date: Diabetes mellitus  No date: Hypertension  No date: Kidney stone  No date: Sleep apnea  No date: Slurred speech  No date: Stroke      Comment: MINI STROKE  No date: TIA (transient ischemic attack)  No date: Wears glasses   Past Surgical History:  No date: BACK SURGERY      Comment: lower back  No date: CARDIAC SURGERY  No date: GLAUCOMA SURGERY  No date: heart stents      Comment: stents 4 total.  2010, 2011 and 2013  No date: HERNIA REPAIR      Comment: incisional  2015: KIDNEY STONE SURGERY  No date: LAPAROSCOPIC NEPHRECTOMY, HAND ASSISTED Right  No date: LITHOTRIPSY  No date: VASCULAR SURGERY  Review of patient's family history indicates:    Heart disease                  Father                    Hypertension                   Mother                    Parkinsonism                   Mother                    Social History    Marital status:              Spouse name:                       Years of education:                 Number of children:               Occupational History    None on file    Social History Main Topics    Smoking status: Never Smoker                                                                Smokeless tobacco: Current User                      Comment: 20 plus years    Alcohol use: Yes                Comment: occas    Drug use: No              Sexual activity: Yes               Partners with: Female    Other Topics            Concern    None on file    Social History Narrative    None on file             Fall  "      Review of Systems   Neurological: Positive for dizziness.       Past Medical History:   Diagnosis Date    Anticoagulant long-term use     Arthritis     Cancer of kidney, right     Coronary artery disease     Diabetes mellitus     Hypertension     Kidney stone     Sleep apnea     Slurred speech     Stroke     MINI STROKE    TIA (transient ischemic attack)     Wears glasses       Past Surgical History:   Procedure Laterality Date    BACK SURGERY      lower back    CARDIAC SURGERY      GLAUCOMA SURGERY      heart stents      stents 4 total.  2010, 2011 and 2013    HERNIA REPAIR      incisional    KIDNEY STONE SURGERY  2015    LAPAROSCOPIC NEPHRECTOMY, HAND ASSISTED Right     LITHOTRIPSY      VASCULAR SURGERY       Family History   Problem Relation Age of Onset    Heart disease Father     Hypertension Mother     Parkinsonism Mother      Social History     Social History    Marital status:      Spouse name: N/A    Number of children: N/A    Years of education: N/A     Occupational History    Not on file.     Social History Main Topics    Smoking status: Never Smoker    Smokeless tobacco: Current User      Comment: 20 plus years    Alcohol use Yes      Comment: occas    Drug use: No    Sexual activity: Yes     Partners: Female     Other Topics Concern    Not on file     Social History Narrative    No narrative on file       Objective:       Vitals:    01/09/18 1037   BP: 124/68   Pulse: 67   Temp: 98.7 °F (37.1 °C)   TempSrc: Oral   SpO2: 97%   Weight: 122.6 kg (270 lb 4.5 oz)   Height: 5' 7" (1.702 m)       Physical Exam   Constitutional: He is oriented to person, place, and time. He appears well-developed and well-nourished. No distress.   HENT:   Head: Normocephalic and atraumatic.   Right Ear: External ear normal.   Left Ear: External ear normal.   Mouth/Throat: Oropharynx is clear and moist.   Eyes: Conjunctivae and EOM are normal. Pupils are equal, round, and reactive " to light.   Neck: Normal range of motion. Neck supple. No JVD present. Carotid bruit is not present. No thyromegaly present.   Cardiovascular: Normal rate, regular rhythm and normal heart sounds.  Exam reveals no gallop and no friction rub.    No murmur heard.  Pulmonary/Chest: Effort normal and breath sounds normal. No respiratory distress. He has no wheezes. He has no rales.   Musculoskeletal: He exhibits no edema.   Hand  5/5 bilaterally  ambulates with a walker.    Hip flexion 4/5 right hip and 5/5 left hip   Neurological: He is alert and oriented to person, place, and time.   Skin: He is not diaphoretic.            Assessment:       1. Altered mental status, unspecified altered mental status type    2. Type 2 diabetes mellitus with stage 3 chronic kidney disease, without long-term current use of insulin    3. CKD (chronic kidney disease), stage III    4. Coronary artery disease involving native coronary artery of native heart with angina pectoris    5. Need for prophylactic vaccination and inoculation against influenza        Plan:       Nicolas was seen today for fall and dizziness.    Diagnoses and all orders for this visit:    Altered mental status, unspecified altered mental status type  -     Cancel: Influenza - Quadrivalent (3 years & older)  -     (In Office Administered) Pneumococcal Conjugate Vaccine (13 Valent) (IM)  -     Hemoglobin A1c; Future  -     Ambulatory referral to Optometry  -     Cancel: Comprehensive metabolic panel; Future  -     CBC auto differential; Future  -     Microalbumin/creatinine urine ratio; Future  -     Urinalysis; Future  -     Urine culture; Future  -     TSH; Future  -     CT Head Without Contrast; Future  -     Hemoglobin A1c  -     CBC auto differential  -     Microalbumin/creatinine urine ratio  -     Urinalysis  -     Urine culture  -     TSH  Will order labs AND ct OF HEAD TO ASSESS ALTERED MENTAL STATUS. CHECKING FOR ANOTHER STROKE, ELECTROLYTE IMBALANCES, AND  UTI    Type 2 diabetes mellitus with stage 3 chronic kidney disease, without long-term current use of insulin  -     (In Office Administered) Pneumococcal Conjugate Vaccine (13 Valent) (IM)  -     Hemoglobin A1c; Future  -     Ambulatory referral to Optometry  -     CBC auto differential; Future  -     Microalbumin/creatinine urine ratio; Future  -     Urinalysis; Future  -     Urine culture; Future    CKD (chronic kidney disease), stage III    Coronary artery disease involving native coronary artery of native heart with angina pectoris    Need for prophylactic vaccination and inoculation against influenza  -     Flu Vaccine - Quadrivalent (PF) (3 years & older)

## 2018-01-11 ENCOUNTER — TELEPHONE (OUTPATIENT)
Dept: FAMILY MEDICINE | Facility: CLINIC | Age: 63
End: 2018-01-11

## 2018-01-11 DIAGNOSIS — R41.82 ALTERED MENTAL STATUS, UNSPECIFIED ALTERED MENTAL STATUS TYPE: Primary | ICD-10-CM

## 2018-01-11 LAB
ALBUMIN/CREAT UR: 82.2 MG/G CREAT (ref 0–30)
APPEARANCE UR: CLEAR
BACTERIA UR CULT: NO GROWTH
BACTERIA UR CULT: NORMAL
BASOPHILS # BLD AUTO: 0.1 X10E3/UL (ref 0–0.2)
BASOPHILS NFR BLD AUTO: 2 %
BILIRUB UR QL STRIP: NEGATIVE
COLOR UR: YELLOW
CREAT UR-MCNC: 69.6 MG/DL
EOSINOPHIL # BLD AUTO: 0.8 X10E3/UL (ref 0–0.4)
EOSINOPHIL NFR BLD AUTO: 9 %
ERYTHROCYTE [DISTWIDTH] IN BLOOD BY AUTOMATED COUNT: 14.6 % (ref 12.3–15.4)
GLUCOSE UR QL: NEGATIVE
HBA1C MFR BLD: 7.6 % (ref 4.8–5.6)
HCT VFR BLD AUTO: 41 % (ref 37.5–51)
HGB BLD-MCNC: 13.3 G/DL (ref 13–17.7)
HGB UR QL STRIP: NEGATIVE
IMM GRANULOCYTES # BLD: 0 X10E3/UL (ref 0–0.1)
IMM GRANULOCYTES NFR BLD: 1 %
KETONES UR QL STRIP: NEGATIVE
LEUKOCYTE ESTERASE UR QL STRIP: NEGATIVE
LYMPHOCYTES # BLD AUTO: 2.6 X10E3/UL (ref 0.7–3.1)
LYMPHOCYTES NFR BLD AUTO: 32 %
MCH RBC QN AUTO: 27.1 PG (ref 26.6–33)
MCHC RBC AUTO-ENTMCNC: 32.4 G/DL (ref 31.5–35.7)
MCV RBC AUTO: 84 FL (ref 79–97)
MICRO URNS: NORMAL
MICROALBUMIN UR-MCNC: 57.2 UG/ML
MONOCYTES # BLD AUTO: 0.5 X10E3/UL (ref 0.1–0.9)
MONOCYTES NFR BLD AUTO: 6 %
NEUTROPHILS # BLD AUTO: 4.2 X10E3/UL (ref 1.4–7)
NEUTROPHILS NFR BLD AUTO: 50 %
NITRITE UR QL STRIP: NEGATIVE
PH UR STRIP: 6 [PH] (ref 5–7.5)
PLATELET # BLD AUTO: 232 X10E3/UL (ref 150–379)
PROT UR QL STRIP: NEGATIVE
RBC # BLD AUTO: 4.9 X10E6/UL (ref 4.14–5.8)
SP GR UR: 1.01 (ref 1–1.03)
TSH SERPL DL<=0.005 MIU/L-ACNC: 2.96 UIU/ML (ref 0.45–4.5)
UROBILINOGEN UR STRIP-MCNC: 0.2 MG/DL (ref 0.2–1)
WBC # BLD AUTO: 8.3 X10E3/UL (ref 3.4–10.8)

## 2018-01-11 NOTE — TELEPHONE ENCOUNTER
Need CMP all other labs and urine normal     I ordered it for [facility]. Confirm that is where he goes

## 2018-01-11 NOTE — TELEPHONE ENCOUNTER
Patient informed of lab results and need for CMP.  Patient verbalized understanding, verified that he does go to [facility] for labs.

## 2018-01-12 ENCOUNTER — TELEPHONE (OUTPATIENT)
Dept: FAMILY MEDICINE | Facility: CLINIC | Age: 63
End: 2018-01-12

## 2018-01-12 ENCOUNTER — HOSPITAL ENCOUNTER (OUTPATIENT)
Dept: RADIOLOGY | Facility: HOSPITAL | Age: 63
Discharge: HOME | End: 2018-01-12
Attending: FAMILY MEDICINE
Payer: COMMERCIAL

## 2018-01-12 DIAGNOSIS — R41.82 ALTERED MENTAL STATUS, UNSPECIFIED ALTERED MENTAL STATUS TYPE: ICD-10-CM

## 2018-01-12 PROCEDURE — 70450 CT HEAD/BRAIN W/O DYE: CPT | Mod: 26,,, | Performed by: RADIOLOGY

## 2018-01-12 PROCEDURE — 70450 CT HEAD/BRAIN W/O DYE: CPT | Mod: TC

## 2018-01-12 NOTE — TELEPHONE ENCOUNTER
----- Message from Rossi Herrera sent at 1/12/2018 10:06 AM CST -----  Contact: Ellie (wife)[phone]  Pt was told he needed to do additional blood work but his orders haven't been sent to [facility] Please call  at your earliest convenience.  Thanks !

## 2018-01-19 ENCOUNTER — TELEPHONE (OUTPATIENT)
Dept: FAMILY MEDICINE | Facility: CLINIC | Age: 63
End: 2018-01-19

## 2018-01-19 DIAGNOSIS — R41.82 ALTERED MENTAL STATUS, UNSPECIFIED ALTERED MENTAL STATUS TYPE: Primary | ICD-10-CM

## 2018-01-19 LAB
ALBUMIN SERPL-MCNC: 4.8 G/DL (ref 3.6–4.8)
ALBUMIN/GLOB SERPL: 1.8 {RATIO} (ref 1.2–2.2)
ALP SERPL-CCNC: 40 IU/L (ref 39–117)
ALT SERPL-CCNC: 14 IU/L (ref 0–44)
AST SERPL-CCNC: 19 IU/L (ref 0–40)
BILIRUB SERPL-MCNC: 0.3 MG/DL (ref 0–1.2)
BUN SERPL-MCNC: 25 MG/DL (ref 8–27)
BUN/CREAT SERPL: 14 (ref 10–24)
CALCIUM SERPL-MCNC: 10 MG/DL (ref 8.6–10.2)
CHLORIDE SERPL-SCNC: 97 MMOL/L (ref 96–106)
CO2 SERPL-SCNC: 24 MMOL/L (ref 18–29)
CREAT SERPL-MCNC: 1.84 MG/DL (ref 0.76–1.27)
EGFR IF AFRICAN AMERICAN: 44 ML/MIN/1.73
EST. GFR  (NON AFRICAN AMERICAN): 38 ML/MIN/1.73
GLOBULIN SER CALC-MCNC: 2.7 G/DL (ref 1.5–4.5)
GLUCOSE SERPL-MCNC: 183 MG/DL (ref 65–99)
POTASSIUM SERPL-SCNC: 4.6 MMOL/L (ref 3.5–5.2)
PROT SERPL-MCNC: 7.5 G/DL (ref 6–8.5)
SODIUM SERPL-SCNC: 140 MMOL/L (ref 134–144)

## 2018-01-25 ENCOUNTER — TELEPHONE (OUTPATIENT)
Dept: FAMILY MEDICINE | Facility: CLINIC | Age: 63
End: 2018-01-25

## 2018-01-25 ENCOUNTER — HOSPITAL ENCOUNTER (OUTPATIENT)
Dept: RADIOLOGY | Facility: HOSPITAL | Age: 63
Discharge: HOME | End: 2018-01-25
Attending: PHYSICIAN ASSISTANT
Payer: COMMERCIAL

## 2018-01-25 DIAGNOSIS — G31.9 BRAIN ATROPHY: Primary | ICD-10-CM

## 2018-01-25 DIAGNOSIS — R41.82 ALTERED MENTAL STATUS, UNSPECIFIED ALTERED MENTAL STATUS TYPE: ICD-10-CM

## 2018-01-25 PROCEDURE — 70551 MRI BRAIN STEM W/O DYE: CPT | Mod: 26,,, | Performed by: RADIOLOGY

## 2018-01-25 PROCEDURE — 70551 MRI BRAIN STEM W/O DYE: CPT | Mod: TC

## 2018-01-25 NOTE — TELEPHONE ENCOUNTER
----- Message from Aviva Singh sent at 1/25/2018  3:50 PM CST -----  Contact: laxmi [phone]  Pt is requesting his results. Please call at your earliest convenience.

## 2018-01-25 NOTE — TELEPHONE ENCOUNTER
There is another call. He has old strokes so Dr. Alfredo wanted to know if he has seen a neurologist before. If not we need to send him to one because he does also have some atrophy or shrinkage of part of this brain that could affect his balance

## 2018-01-26 NOTE — TELEPHONE ENCOUNTER
Patient informed of JONATHAN Mejia's note and recommendations regarding MRI results. Patient verbalized understanding, stated he does not remember if he has seen a neurologist in the past.  Requesting to be referred to a neurologist.   Please advise.

## 2018-01-26 NOTE — TELEPHONE ENCOUNTER
Patient informed of referral placed and that Referral Department will call to schedule appointment.

## 2018-02-06 ENCOUNTER — TELEPHONE (OUTPATIENT)
Dept: FAMILY MEDICINE | Facility: CLINIC | Age: 63
End: 2018-02-06

## 2018-02-06 RX ORDER — MECLIZINE HYDROCHLORIDE 25 MG/1
25 TABLET ORAL 3 TIMES DAILY PRN
Qty: 30 TABLET | Refills: 0 | Status: SHIPPED | OUTPATIENT
Start: 2018-02-06 | End: 2018-04-11 | Stop reason: ALTCHOICE

## 2018-02-06 NOTE — TELEPHONE ENCOUNTER
Patient's wife requesting medication for patient for dizziness.  Stated patient has been experiencing dizziness for the past 3 - 4 days.  Wife stated that she cannot think of anything that would cause the patient to be dizziness.  Stated that his blood pressure has been fine.  Please advise.

## 2018-02-06 NOTE — TELEPHONE ENCOUNTER
----- Message from Laverne Strickland sent at 2/6/2018  7:47 AM CST -----  Contact: wife   Ester   [phone]  Pt is requesting a script for being dizzy , Pls call [facility] [phone].  Pls call leonard Argueta [phone] Thanks.....Linn

## 2018-02-15 ENCOUNTER — TELEPHONE (OUTPATIENT)
Dept: FAMILY MEDICINE | Facility: CLINIC | Age: 63
End: 2018-02-15

## 2018-02-15 NOTE — TELEPHONE ENCOUNTER
----- Message from Anibal Gonzales sent at 2/14/2018  3:57 PM CST -----  Contact: SPOUSE [phone]  Calling TO speak with nurse to get lab work results faxed to Cardiologist

## 2018-02-26 ENCOUNTER — HOSPITAL ENCOUNTER (OUTPATIENT)
Dept: PREADMISSION TESTING | Facility: HOSPITAL | Age: 63
Discharge: HOME | End: 2018-02-26
Attending: SURGERY
Payer: COMMERCIAL

## 2018-02-26 VITALS
HEART RATE: 90 BPM | SYSTOLIC BLOOD PRESSURE: 142 MMHG | TEMPERATURE: 97 F | WEIGHT: 269 LBS | DIASTOLIC BLOOD PRESSURE: 74 MMHG | OXYGEN SATURATION: 96 % | RESPIRATION RATE: 18 BRPM | HEIGHT: 67 IN | BODY MASS INDEX: 42.22 KG/M2

## 2018-02-26 DIAGNOSIS — Z01.818 PRE-OP TESTING: Primary | ICD-10-CM

## 2018-02-26 LAB
ANION GAP SERPL CALC-SCNC: 8 MMOL/L
BASOPHILS # BLD AUTO: 0.08 K/UL
BASOPHILS NFR BLD: 1.1 %
BUN SERPL-MCNC: 32 MG/DL
CALCIUM SERPL-MCNC: 9.6 MG/DL
CHLORIDE SERPL-SCNC: 102 MMOL/L
CO2 SERPL-SCNC: 27 MMOL/L
CREAT SERPL-MCNC: 2.3 MG/DL
DIFFERENTIAL METHOD: ABNORMAL
EOSINOPHIL # BLD AUTO: 0.4 K/UL
EOSINOPHIL NFR BLD: 4.9 %
ERYTHROCYTE [DISTWIDTH] IN BLOOD BY AUTOMATED COUNT: 13.3 %
EST. GFR  (AFRICAN AMERICAN): 34 ML/MIN/1.73 M^2
EST. GFR  (NON AFRICAN AMERICAN): 29 ML/MIN/1.73 M^2
GLUCOSE SERPL-MCNC: 282 MG/DL
HCT VFR BLD AUTO: 38.9 %
HGB BLD-MCNC: 13.1 G/DL
LYMPHOCYTES # BLD AUTO: 2.1 K/UL
LYMPHOCYTES NFR BLD: 27.7 %
MCH RBC QN AUTO: 28.1 PG
MCHC RBC AUTO-ENTMCNC: 33.7 G/DL
MCV RBC AUTO: 83 FL
MONOCYTES # BLD AUTO: 0.5 K/UL
MONOCYTES NFR BLD: 6.3 %
NEUTROPHILS # BLD AUTO: 4.4 K/UL
NEUTROPHILS NFR BLD: 59.2 %
PLATELET # BLD AUTO: 221 K/UL
PMV BLD AUTO: 11.2 FL
POTASSIUM SERPL-SCNC: 4.3 MMOL/L
RBC # BLD AUTO: 4.67 M/UL
SODIUM SERPL-SCNC: 137 MMOL/L
WBC # BLD AUTO: 7.5 K/UL

## 2018-02-26 PROCEDURE — 85025 COMPLETE CBC W/AUTO DIFF WBC: CPT

## 2018-02-26 PROCEDURE — 80048 BASIC METABOLIC PNL TOTAL CA: CPT

## 2018-02-26 PROCEDURE — 36415 COLL VENOUS BLD VENIPUNCTURE: CPT

## 2018-02-26 NOTE — DISCHARGE INSTRUCTIONS
Your surgery is scheduled for___3/5/2018______________.    Call [phone] between 2 pm and 5 pm ___3/2/2018_________ to find out your arrival time for the day of surgery.    Report to SAME DAY SURGERY UNIT at _______am on the 2nd floor of the hospital.  Use the front entrance of the hospital before 6 am.  If you need wheelchair assistance, call [phone] from your cell phone,  or call 0 from the courtesy phone in the hospital lobby.    Important instructions:   Do not eat or drink after 12 midnight, including water.  It is okay to brush your teeth.  Do not have gum, candy or mints.     Take only these medications with a small swallow of water on the morning of your surgery.__isosorbide, metoprolol, gabapentin___________         Please shower the night before and the morning of your surgery.       Use Hibiclens soap to your surgery site if instructed by your pre op nurse.    Be sure to rinse off Hibiclens after it is on your skin for several minutes.  Do not use Hibiclens on your face or genitals.      You may wear deodorant only.      Do not wear powder, body lotion or cologne.     Do not wear any jewelry or have any metal on your body.     Please bring any documents given to you by your doctor.     If you are going home on the same day of surgery, you must have arrangements for a ride home.  You will not be able to drive home if you were given anesthesia or sedation.     Do not take any diabetic medication on the morning of surgery unless instructed to do so by your doctor or pre op nurse.     Stop taking Aspirin, Ibuprofen, Motrin and Aleve at least 7 days before your surgery. You may use Tylenol.     Stop taking fish oil and vitamin E for least 7 days before surgery.     Wear loose fitting clothes allowing for bandages.     Please leave money and valuables home.       You may bring your cell phone.     Call the doctor if fever or illness should occur before your surgery.    Call [phone] to  contact us here at [facility] if needed.

## 2018-03-01 ENCOUNTER — OFFICE VISIT (OUTPATIENT)
Dept: NEUROLOGY | Facility: CLINIC | Age: 63
End: 2018-03-01
Payer: COMMERCIAL

## 2018-03-01 VITALS
SYSTOLIC BLOOD PRESSURE: 142 MMHG | DIASTOLIC BLOOD PRESSURE: 70 MMHG | HEIGHT: 67 IN | BODY MASS INDEX: 42.21 KG/M2 | WEIGHT: 268.94 LBS | HEART RATE: 82 BPM

## 2018-03-01 DIAGNOSIS — R42 VERTIGO: Primary | ICD-10-CM

## 2018-03-01 DIAGNOSIS — G11.9 ATAXIA DUE TO CEREBELLAR DEGENERATION: ICD-10-CM

## 2018-03-01 PROCEDURE — 99999 PR PBB SHADOW E&M-EST. PATIENT-LVL III: CPT | Mod: PBBFAC,,, | Performed by: NEUROLOGICAL SURGERY

## 2018-03-01 PROCEDURE — 99204 OFFICE O/P NEW MOD 45 MIN: CPT | Mod: S$GLB,,, | Performed by: NEUROLOGICAL SURGERY

## 2018-03-01 RX ORDER — PROCHLORPERAZINE MALEATE 10 MG
10 TABLET ORAL 3 TIMES DAILY PRN
Qty: 270 TABLET | Refills: 1 | Status: SHIPPED | OUTPATIENT
Start: 2018-03-01 | End: 2018-12-03

## 2018-03-01 NOTE — PROGRESS NOTES
Chief Complaint   Patient presents with    Neurologic Problem     Brain Atrophy         Nicolas Flaherty is a 62 y.o. male with a history of multiple medical diagnoses as listed below that presents for evaluation of an abnormal MRI.  The patient had MRI of the brain which was reported to have some cerebellar atrophy which she was sent to clinic today for further investigation.  He is accompanied today by his wife.  He has had a long-standing history of various difficulties that have been progressive over the years.  The first that they notice has been a change in his speech.  The patient has a very slow methodical speech that has been progressively getting worse over the last several years.  Also during the last several years they have noticed that his handwriting and coordination seems to be getting worse as well.  He has been having difficulty with his walking and seems to be on balance and that he tries to take steps.  The patient has been relying only using a rolling walker or a Rollator in order to ambulate.  He feels unsteady whenever he does not have something in his hand or is not able to hold on.  He has not had any falls because he says that he keeps an assistive device around at all times to keep himself steady.  He says he has no family history of any neurologic diseases.  The patient has no history of stroke, but says he has been told he is has had at least 2 TIAs in the past.    PAST MEDICAL HISTORY:  Past Medical History:   Diagnosis Date    Anticoagulant long-term use     Arthritis     Cancer of kidney, right     Coronary artery disease     Diabetes mellitus     Hypertension     Kidney stone     Sleep apnea     Slurred speech     Stroke     MINI STROKE    TIA (transient ischemic attack)     Wears glasses        PAST SURGICAL HISTORY:  Past Surgical History:   Procedure Laterality Date    BACK SURGERY      lower back    CARDIAC SURGERY      GLAUCOMA SURGERY      heart stents       stents 4 total.  2010, 2011 and 2013    HERNIA REPAIR      incisional    KIDNEY STONE SURGERY  2015    LAPAROSCOPIC NEPHRECTOMY, HAND ASSISTED Right     LITHOTRIPSY      VASCULAR SURGERY         SOCIAL HISTORY:  Social History     Social History    Marital status:      Spouse name: N/A    Number of children: N/A    Years of education: N/A     Occupational History    Not on file.     Social History Main Topics    Smoking status: Never Smoker    Smokeless tobacco: Current User     Types: Snuff      Comment: 20 plus years    Alcohol use Yes      Comment: occas    Drug use: No    Sexual activity: Yes     Partners: Female     Other Topics Concern    Not on file     Social History Narrative    No narrative on file       FAMILY HISTORY:  Family History   Problem Relation Age of Onset    Heart disease Father     Hypertension Mother     Parkinsonism Mother        ALLERGIES AND MEDICATIONS: updated and reviewed.  Review of patient's allergies indicates:  No Known Allergies  Current Outpatient Prescriptions   Medication Sig Dispense Refill    aspirin (ECOTRIN) 81 MG EC tablet Take 81 mg by mouth once daily. LAST TAKEN 3/2/16      clopidogrel (PLAVIX) 75 mg tablet Take 75 mg by mouth every morning. LAST DOSE 3/2/16      docusate sodium (STOOL SOFTENER) 100 MG capsule Take 1 capsule (100 mg total) by mouth 2 (two) times daily. 180 capsule 3    fenofibrate (TRICOR) 145 MG tablet Take 145 mg by mouth nightly.       finasteride (PROSCAR) 5 mg tablet TAKE ONE TABLET BY MOUTH ONCE DAILY IN THE MORNING 90 tablet 3    furosemide (LASIX) 40 MG tablet Take 40 mg by mouth 2 (two) times daily. ALTERNATES 1 TABLET ONE DAY AND 2 TABLETS THE NEXT--ALTERNATING DAYS      gabapentin (NEURONTIN) 300 MG capsule Take 1 capsule (300 mg total) by mouth 3 (three) times daily. 270 capsule 0    glipiZIDE (GLUCOTROL) 5 MG tablet Take 5 mg by mouth 2 (two) times daily with meals.       isosorbide mononitrate (IMDUR)  60 MG 24 hr tablet Take 60 mg by mouth every morning.       meclizine (ANTIVERT) 25 mg tablet Take 1 tablet (25 mg total) by mouth 3 (three) times daily as needed. 30 tablet 0    metoprolol succinate (TOPROL-XL) 100 MG 24 hr tablet Take 100 mg by mouth every morning.       nateglinide (STARLIX) 60 MG tablet 60 mg 2 (two) times daily before meals.       NITROSTAT 0.4 mg SL tablet Place 0.4 mg under the tongue every 5 (five) minutes as needed.       prochlorperazine (COMPAZINE) 10 MG tablet Take 1 tablet (10 mg total) by mouth 3 (three) times daily as needed. 270 tablet 1    rosuvastatin (CRESTOR) 10 MG tablet Take 10 mg by mouth every evening.      tamsulosin (FLOMAX) 0.4 mg Cp24 TAKE ONE CAPSULE BY MOUTH ONCE DAILY 90 capsule 3    TRAVATAN Z 0.004 % Drop 1 drop every evening.       valsartan (DIOVAN) 80 MG tablet        Current Facility-Administered Medications   Medication Dose Route Frequency Provider Last Rate Last Dose    lidocaine (PF) 10 mg/ml (1%) injection 10 mg  1 mL Intradermal Once Jose Lewis MD           Review of Systems   Constitutional: Negative for activity change, fatigue and unexpected weight change.   HENT: Negative for trouble swallowing and voice change.    Eyes: Negative for photophobia, pain and visual disturbance.   Respiratory: Negative for apnea and shortness of breath.    Cardiovascular: Negative for chest pain and palpitations.   Gastrointestinal: Negative for constipation, nausea and vomiting.   Genitourinary: Negative for difficulty urinating.   Musculoskeletal: Positive for back pain and gait problem. Negative for arthralgias, myalgias and neck pain.   Skin: Negative for color change and rash.   Neurological: Positive for speech difficulty and weakness. Negative for dizziness, seizures, syncope, light-headedness, numbness and headaches.   Psychiatric/Behavioral: Negative for agitation, behavioral problems and confusion.       Neurologic Exam     Mental Status    Oriented to person, place, and time.   Registration: recalls 3 of 3 objects.   Attention: normal. Concentration: normal.   Speech: slurred   Level of consciousness: alert  Knowledge: good.     Cranial Nerves     CN II   Visual fields full to confrontation.   Right visual field deficit: none  Left visual field deficit: none     CN III, IV, VI   Pupils are equal, round, and reactive to light.  Extraocular motions are normal.   Right pupil: Size: 3 mm. Shape: regular. Accommodation: intact.   Left pupil: Size: 3 mm. Shape: regular. Accommodation: intact.   CN III: no CN III palsy  CN VI: no CN VI palsy  Nystagmus: none   Diplopia: none  Ophthalmoparesis: none  Upgaze: normal  Downgaze: normal  Conjugate gaze: present    CN V   Facial sensation intact.   Right facial sensation deficit: none  Left facial sensation deficit: none    CN VII   Facial expression full, symmetric.   Right facial weakness: none  Left facial weakness: none    CN VIII   CN VIII normal.     CN IX, X   CN IX normal.   CN X normal.   Palate: symmetric    CN XI   CN XI normal.   Right sternocleidomastoid strength: normal  Left sternocleidomastoid strength: normal  Right trapezius strength: normal  Left trapezius strength: normal    CN XII   CN XII normal.   Tongue deviation: none    Motor Exam   Muscle bulk: normal  Overall muscle tone: normal  Right arm tone: normal  Left arm tone: normal  Right leg tone: normal  Left leg tone: normal    Strength   Strength 5/5 throughout.     Sensory Exam   Right arm light touch: normal  Left arm light touch: normal  Right leg light touch: normal  Left leg light touch: normal  Right arm vibration: normal  Left arm vibration: normal  Right leg vibration: normal  Left leg vibration: normal  Right arm proprioception: normal  Left arm proprioception: normal  Right leg proprioception: normal  Left leg proprioception: normal  Right arm pinprick: normal  Left arm pinprick: normal  Right leg pinprick: normal  Left leg  "pinprick: normal    Gait, Coordination, and Reflexes     Gait  Gait: wide-based    Coordination   Romberg: positive  Finger to nose coordination: abnormal  Heel to shin coordination: abnormal  Tandem walking coordination: abnormal    Tremor   Resting tremor: absent    Reflexes   Right brachioradialis: 2+  Left brachioradialis: 2+  Right biceps: 2+  Left biceps: 2+  Right triceps: 2+  Left triceps: 2+  Right patellar: 2+  Left patellar: 2+  Right achilles: 2+  Left achilles: 2+  Right plantar: normal  Left plantar: normalPatient is ataxic speech.       Physical Exam   Constitutional: He is oriented to person, place, and time. He appears well-developed and well-nourished.   HENT:   Head: Normocephalic and atraumatic.   Eyes: EOM are normal. Pupils are equal, round, and reactive to light.   Cardiovascular: Intact distal pulses.    Pulmonary/Chest: Effort normal. No respiratory distress.   Musculoskeletal: Normal range of motion.   Neurological: He is alert and oriented to person, place, and time. He has normal strength. He has an abnormal Finger-Nose-Finger Test, an abnormal Heel to Olea Test, an abnormal Romberg Test and an abnormal Tandem Gait Test.   Reflex Scores:       Tricep reflexes are 2+ on the right side and 2+ on the left side.       Bicep reflexes are 2+ on the right side and 2+ on the left side.       Brachioradialis reflexes are 2+ on the right side and 2+ on the left side.       Patellar reflexes are 2+ on the right side and 2+ on the left side.       Achilles reflexes are 2+ on the right side and 2+ on the left side.  Skin: Skin is warm and dry.   Psychiatric: He has a normal mood and affect. His behavior is normal. His speech is slurred.       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         Current Assessment & Plan     Continue symptomatic treatment         Relevant Medications    prochlorperazine (COMPAZINE) 10 MG tablet      Other Visit Diagnoses     Vertigo    -  Primary    Relevant Medications    prochlorperazine (COMPAZINE) 10 MG tablet          Follow-up: Follow-up in about 3 months (around 6/1/2018).  More than 50% of this 45 minute encounter was spent in counseling and coordinating care.

## 2018-03-01 NOTE — LETTER
March 1, 2018      JONATHAN Severino  [address] [address]           [facility] [address] [facility] [address]  [facility] [address]  Phone: [phone]  Fax: [phone]          Patient: Nicolas Flaherty   MR Number: [id number]   YOB: 1955   Date of Visit: 3/1/2018       Dear Karen Mejia:    Thank you for referring Nicolas Flaherty to me for evaluation. Attached you will find relevant portions of my assessment and plan of care.    If you have questions, please do not hesitate to call me. I look forward to following Nicolas Flaherty along with you.    Sincerely,    Roge Arora MD    Enclosure  CC:  No Recipients    If you would like to receive this communication electronically, please contact [email or web address] or [phone] to request more information on [facility] access.    For providers and/or their staff who would like to refer a patient to [facility], please contact us through our one-stop-shop provider referral line, [facility] , at [phone].    If you feel you have received this communication in error or would no longer like to receive these types of communications, please e-mail [email or web address]

## 2018-03-04 ENCOUNTER — ANESTHESIA EVENT (OUTPATIENT)
Dept: SURGERY | Facility: HOSPITAL | Age: 63
End: 2018-03-04
Payer: COMMERCIAL

## 2018-03-05 ENCOUNTER — HOSPITAL ENCOUNTER (OUTPATIENT)
Facility: HOSPITAL | Age: 63
Discharge: HOME | End: 2018-03-05
Attending: SURGERY | Admitting: SURGERY
Payer: COMMERCIAL

## 2018-03-05 ENCOUNTER — SURGERY (OUTPATIENT)
Age: 63
End: 2018-03-05

## 2018-03-05 ENCOUNTER — ANESTHESIA (OUTPATIENT)
Dept: SURGERY | Facility: HOSPITAL | Age: 63
End: 2018-03-05
Payer: COMMERCIAL

## 2018-03-05 VITALS
HEIGHT: 67 IN | SYSTOLIC BLOOD PRESSURE: 136 MMHG | BODY MASS INDEX: 42.22 KG/M2 | DIASTOLIC BLOOD PRESSURE: 79 MMHG | OXYGEN SATURATION: 97 % | TEMPERATURE: 98 F | HEART RATE: 67 BPM | RESPIRATION RATE: 18 BRPM | WEIGHT: 269 LBS

## 2018-03-05 DIAGNOSIS — R22.41 MASS OF THIGH, RIGHT: Primary | ICD-10-CM

## 2018-03-05 LAB — POCT GLUCOSE: 157 MG/DL (ref 70–110)

## 2018-03-05 PROCEDURE — 37000008 HC ANESTHESIA 1ST 15 MINUTES: Performed by: SURGERY

## 2018-03-05 PROCEDURE — 88305 TISSUE EXAM BY PATHOLOGIST: CPT | Mod: 26,,, | Performed by: PATHOLOGY

## 2018-03-05 PROCEDURE — 27201423 OPTIME MED/SURG SUP & DEVICES STERILE SUPPLY: Performed by: SURGERY

## 2018-03-05 PROCEDURE — 71000015 HC POSTOP RECOV 1ST HR: Performed by: SURGERY

## 2018-03-05 PROCEDURE — D9220A PRA ANESTHESIA: Mod: ,,, | Performed by: ANESTHESIOLOGY

## 2018-03-05 PROCEDURE — 37000009 HC ANESTHESIA EA ADD 15 MINS: Performed by: SURGERY

## 2018-03-05 PROCEDURE — 63600175 PHARM REV CODE 636 W HCPCS: Performed by: NURSE ANESTHETIST, CERTIFIED REGISTERED

## 2018-03-05 PROCEDURE — 25000003 PHARM REV CODE 250: Performed by: ANESTHESIOLOGY

## 2018-03-05 PROCEDURE — 36000706: Performed by: SURGERY

## 2018-03-05 PROCEDURE — 63600175 PHARM REV CODE 636 W HCPCS: Performed by: SURGERY

## 2018-03-05 PROCEDURE — 36000707: Performed by: SURGERY

## 2018-03-05 PROCEDURE — 25000003 PHARM REV CODE 250: Performed by: SURGERY

## 2018-03-05 PROCEDURE — 82962 GLUCOSE BLOOD TEST: CPT | Performed by: SURGERY

## 2018-03-05 PROCEDURE — 88305 TISSUE EXAM BY PATHOLOGIST: CPT | Performed by: PATHOLOGY

## 2018-03-05 RX ORDER — SODIUM CHLORIDE 9 MG/ML
INJECTION, SOLUTION INTRAVENOUS CONTINUOUS
Status: DISCONTINUED | OUTPATIENT
Start: 2018-03-05 | End: 2018-03-05 | Stop reason: HOSPADM

## 2018-03-05 RX ORDER — MORPHINE SULFATE 4 MG/ML
3 INJECTION, SOLUTION INTRAMUSCULAR; INTRAVENOUS
Status: DISCONTINUED | OUTPATIENT
Start: 2018-03-05 | End: 2018-03-05 | Stop reason: HOSPADM

## 2018-03-05 RX ORDER — ACETAMINOPHEN 10 MG/ML
1000 INJECTION, SOLUTION INTRAVENOUS ONCE
Status: DISCONTINUED | OUTPATIENT
Start: 2018-03-05 | End: 2018-03-05 | Stop reason: HOSPADM

## 2018-03-05 RX ORDER — LIDOCAINE HYDROCHLORIDE 10 MG/ML
1 INJECTION, SOLUTION EPIDURAL; INFILTRATION; INTRACAUDAL; PERINEURAL ONCE
Status: DISCONTINUED | OUTPATIENT
Start: 2018-03-05 | End: 2018-03-05 | Stop reason: HOSPADM

## 2018-03-05 RX ORDER — DIPHENHYDRAMINE HYDROCHLORIDE 50 MG/ML
25 INJECTION INTRAMUSCULAR; INTRAVENOUS EVERY 6 HOURS PRN
Status: DISCONTINUED | OUTPATIENT
Start: 2018-03-05 | End: 2018-03-05 | Stop reason: HOSPADM

## 2018-03-05 RX ORDER — FENTANYL CITRATE 50 UG/ML
INJECTION, SOLUTION INTRAMUSCULAR; INTRAVENOUS
Status: DISCONTINUED | OUTPATIENT
Start: 2018-03-05 | End: 2018-03-05

## 2018-03-05 RX ORDER — LIDOCAINE HCL/PF 100 MG/5ML
SYRINGE (ML) INTRAVENOUS
Status: DISCONTINUED | OUTPATIENT
Start: 2018-03-05 | End: 2018-03-05

## 2018-03-05 RX ORDER — HYDROMORPHONE HYDROCHLORIDE 2 MG/ML
0.2 INJECTION, SOLUTION INTRAMUSCULAR; INTRAVENOUS; SUBCUTANEOUS EVERY 5 MIN PRN
Status: DISCONTINUED | OUTPATIENT
Start: 2018-03-05 | End: 2018-03-05 | Stop reason: HOSPADM

## 2018-03-05 RX ORDER — SODIUM CHLORIDE 0.9 % (FLUSH) 0.9 %
3 SYRINGE (ML) INJECTION
Status: DISCONTINUED | OUTPATIENT
Start: 2018-03-05 | End: 2018-03-05 | Stop reason: HOSPADM

## 2018-03-05 RX ORDER — CEFAZOLIN SODIUM 2 G/50ML
2 SOLUTION INTRAVENOUS
Status: COMPLETED | OUTPATIENT
Start: 2018-03-05 | End: 2018-03-05

## 2018-03-05 RX ORDER — METOCLOPRAMIDE HYDROCHLORIDE 5 MG/ML
10 INJECTION INTRAMUSCULAR; INTRAVENOUS EVERY 10 MIN PRN
Status: DISCONTINUED | OUTPATIENT
Start: 2018-03-05 | End: 2018-03-05 | Stop reason: HOSPADM

## 2018-03-05 RX ORDER — ONDANSETRON 2 MG/ML
4 INJECTION INTRAMUSCULAR; INTRAVENOUS EVERY 6 HOURS PRN
Status: DISCONTINUED | OUTPATIENT
Start: 2018-03-05 | End: 2018-03-05 | Stop reason: HOSPADM

## 2018-03-05 RX ORDER — HYDROCODONE BITARTRATE AND ACETAMINOPHEN 5; 325 MG/1; MG/1
1 TABLET ORAL EVERY 4 HOURS PRN
Status: DISCONTINUED | OUTPATIENT
Start: 2018-03-05 | End: 2018-03-05 | Stop reason: HOSPADM

## 2018-03-05 RX ORDER — OXYCODONE AND ACETAMINOPHEN 5; 325 MG/1; MG/1
1 TABLET ORAL EVERY 4 HOURS PRN
Qty: 30 TABLET | Refills: 0 | Status: SHIPPED | OUTPATIENT
Start: 2018-03-05 | End: 2018-08-24

## 2018-03-05 RX ORDER — SODIUM CHLORIDE, SODIUM LACTATE, POTASSIUM CHLORIDE, CALCIUM CHLORIDE 600; 310; 30; 20 MG/100ML; MG/100ML; MG/100ML; MG/100ML
INJECTION, SOLUTION INTRAVENOUS CONTINUOUS
Status: DISCONTINUED | OUTPATIENT
Start: 2018-03-05 | End: 2018-03-05 | Stop reason: HOSPADM

## 2018-03-05 RX ORDER — LIDOCAINE HYDROCHLORIDE 10 MG/ML
INJECTION, SOLUTION EPIDURAL; INFILTRATION; INTRACAUDAL; PERINEURAL
Status: DISCONTINUED | OUTPATIENT
Start: 2018-03-05 | End: 2018-03-05 | Stop reason: HOSPADM

## 2018-03-05 RX ORDER — PROPOFOL 10 MG/ML
VIAL (ML) INTRAVENOUS
Status: DISCONTINUED | OUTPATIENT
Start: 2018-03-05 | End: 2018-03-05

## 2018-03-05 RX ORDER — BUPIVACAINE HYDROCHLORIDE 2.5 MG/ML
INJECTION, SOLUTION EPIDURAL; INFILTRATION; INTRACAUDAL
Status: DISCONTINUED | OUTPATIENT
Start: 2018-03-05 | End: 2018-03-05 | Stop reason: HOSPADM

## 2018-03-05 RX ORDER — MEPERIDINE HYDROCHLORIDE 50 MG/ML
12.5 INJECTION INTRAMUSCULAR; INTRAVENOUS; SUBCUTANEOUS ONCE AS NEEDED
Status: DISCONTINUED | OUTPATIENT
Start: 2018-03-05 | End: 2018-03-05 | Stop reason: HOSPADM

## 2018-03-05 RX ADMIN — CEFAZOLIN SODIUM 2 G: 2 SOLUTION INTRAVENOUS at 07:03

## 2018-03-05 RX ADMIN — PROPOFOL 20 MG: 10 INJECTION, EMULSION INTRAVENOUS at 07:03

## 2018-03-05 RX ADMIN — SODIUM CHLORIDE, SODIUM LACTATE, POTASSIUM CHLORIDE, AND CALCIUM CHLORIDE: .6; .31; .03; .02 INJECTION, SOLUTION INTRAVENOUS at 06:03

## 2018-03-05 RX ADMIN — FENTANYL CITRATE 50 MCG: 50 INJECTION INTRAMUSCULAR; INTRAVENOUS at 07:03

## 2018-03-05 RX ADMIN — LIDOCAINE HYDROCHLORIDE 60 MG: 20 INJECTION, SOLUTION INTRAVENOUS at 07:03

## 2018-03-05 RX ADMIN — BUPIVACAINE HYDROCHLORIDE 30 ML: 2.5 INJECTION, SOLUTION EPIDURAL; INFILTRATION; INTRACAUDAL; PERINEURAL at 07:03

## 2018-03-05 RX ADMIN — LIDOCAINE HYDROCHLORIDE 30 ML: 10 INJECTION, SOLUTION EPIDURAL; INFILTRATION; INTRACAUDAL; PERINEURAL at 07:03

## 2018-03-05 NOTE — OP NOTE
DATE OF PROCEDURE:  03/05/2018    PREOPERATIVE DIAGNOSIS:  Right thigh mass.    POSTOPERATIVE DIAGNOSIS:  Right thigh mass.    PROCEDURE PERFORMED:  Excision of right thigh mass.    SURGEON:  Jose Lewis M.D.    ASSISTANT:  Ovidio.    ANESTHESIA:  Local with MAC.    ESTIMATED BLOOD LOSS:  Minimal.    DESCRIPTION OF OPERATION:  The patient was  brought to the Operating Room,   placed on operating table in supine position.  Under adequate IV sedation,   prepped and draped around his right upper thigh in the usual sterile fashion.    Incision was made overlying this area all the way down to the mass, which was   circumferentially excised, removed and sent to Pathology for analysis.    Hemostasis was obtained with cautery and then the wound was closed in layers   with absorbable sutures.  Steri-Strips were applied as well as bandage.  The   patient was awakened and transported to his room in satisfactory condition.      RONAN/IN  dd: 03/05/2018 07:53:39 (CST)  td: 03/05/2018 08:11:53 (CST)  Doc ID   #[id number]  Job ID #[id number]    CC:

## 2018-03-05 NOTE — BRIEF OP NOTE
[facility]  Brief Operative Note     SUMMARY     Surgery Date: 3/5/2018     Surgeon(s) and Role:     * Jose Lewis MD - Primary     * Jass Nolan MD - Resident - Assisting        Pre-op Diagnosis:  Lump of thigh, right [R22.41]    Post-op Diagnosis:  Post-Op Diagnosis Codes:     * Lump of thigh, right [R22.41]    Procedure(s) (LRB):  EXCISION-MASS-THIGH (Right)    Anesthesia: Monitor Anesthesia Care    Description of the findings of the procedure: mass of upper thigh    Findings/Key Components: same    Estimated Blood Loss: minimal         Specimens:   Specimen (12h ago through future)    Start     Ordered    03/05/18 0749  Specimen to Pathology - Surgery  Once     Comments:  Right Thigh Mass      03/05/18 0748          Discharge Note    SUMMARY     Admit Date: 3/5/2018    Discharge Date and Time:  03/05/2018 7:50 AM    Hospital Course (synopsis of major diagnoses, care, treatment, and services provided during the course of the hospital stay): uneventful post op course     Final Diagnosis: Post-Op Diagnosis Codes:     * Lump of thigh, right [R22.41]    Disposition: Home or Self Care    Follow Up/Patient Instructions:     Medications:  Reconciled Home Medications:   Current Discharge Medication List      START taking these medications    Details   oxyCODONE-acetaminophen (PERCOCET) 5-325 mg per tablet Take 1 tablet by mouth every 4 (four) hours as needed for Pain.  Qty: 30 tablet, Refills: 0         CONTINUE these medications which have NOT CHANGED    Details   docusate sodium (STOOL SOFTENER) 100 MG capsule Take 1 capsule (100 mg total) by mouth 2 (two) times daily.  Qty: 180 capsule, Refills: 3      fenofibrate (TRICOR) 145 MG tablet Take 145 mg by mouth nightly.       finasteride (PROSCAR) 5 mg tablet TAKE ONE TABLET BY MOUTH ONCE DAILY IN THE MORNING  Qty: 90 tablet, Refills: 3      furosemide (LASIX) 40 MG tablet Take 40 mg by mouth 2 (two) times daily. ALTERNATES 1 TABLET ONE DAY AND  2 TABLETS THE NEXT--ALTERNATING DAYS      gabapentin (NEURONTIN) 300 MG capsule Take 1 capsule (300 mg total) by mouth 3 (three) times daily.  Qty: 270 capsule, Refills: 0      glipiZIDE (GLUCOTROL) 5 MG tablet Take 5 mg by mouth 2 (two) times daily with meals.       isosorbide mononitrate (IMDUR) 60 MG 24 hr tablet Take 60 mg by mouth every morning.       meclizine (ANTIVERT) 25 mg tablet Take 1 tablet (25 mg total) by mouth 3 (three) times daily as needed.  Qty: 30 tablet, Refills: 0      metoprolol succinate (TOPROL-XL) 100 MG 24 hr tablet Take 100 mg by mouth every morning.       nateglinide (STARLIX) 60 MG tablet 60 mg 2 (two) times daily before meals.       prochlorperazine (COMPAZINE) 10 MG tablet Take 1 tablet (10 mg total) by mouth 3 (three) times daily as needed.  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      tamsulosin (FLOMAX) 0.4 mg Cp24 TAKE ONE CAPSULE BY MOUTH ONCE DAILY  Qty: 90 capsule, Refills: 3      TRAVATAN Z 0.004 % Drop 1 drop every evening.       valsartan (DIOVAN) 80 MG tablet       aspirin (ECOTRIN) 81 MG EC tablet Take 81 mg by mouth once daily. LAST TAKEN 3/2/16      clopidogrel (PLAVIX) 75 mg tablet Take 75 mg by mouth every morning. LAST DOSE 3/2/16      NITROSTAT 0.4 mg SL tablet Place 0.4 mg under the tongue every 5 (five) minutes as needed.              Discharge Procedure Orders  Diet general     Activity as tolerated     Shower on day dressing removed (No bath)     Call MD for:  temperature >100.4     Call MD for:  persistent nausea and vomiting     Call MD for:  severe uncontrolled pain     Call MD for:  difficulty breathing, headache or visual disturbances     Call MD for:  redness, tenderness, or signs of infection (pain, swelling, redness, odor or green/yellow discharge around incision site)     Call MD for:  hives     Remove dressing in 24  hours       Follow-up Information     Jose Lewis MD In 1 week.    Specialty:  General Surgery  Contact information:  [address] [place] [address]  [id number]  [place] [address] [phone]

## 2018-03-05 NOTE — TRANSFER OF CARE
"Anesthesia Transfer of Care Note    Patient: Nicolas Flaherty    Procedure(s) Performed: Procedure(s) (LRB):  EXCISION-MASS-THIGH (Right)    Patient location: [facility]    Anesthesia Type: MAC    Transport from OR: Transported from OR on room air with adequate spontaneous ventilation    Post pain: adequate analgesia    Post assessment: no apparent anesthetic complications and tolerated procedure well    Post vital signs: stable    Level of consciousness: awake, alert and oriented    Nausea/Vomiting: no nausea/vomiting    Complications: none    Transfer of care protocol was followed      Last vitals:   Visit Vitals  /74 (BP Location: Left arm)   Pulse 67   Temp 36.4 °C (97.5 °F) (Oral)   Resp 18   Ht 5' 7" (1.702 m)   Wt 122 kg (269 lb)   SpO2 97%   BMI 42.13 kg/m²     "

## 2018-03-05 NOTE — H&P (VIEW-ONLY)
Subjective:       Patient ID: Nicolas Flaherty is a 62 y.o. male.    Chief Complaint: Cyst (right leg)    HPI 61 yo male with history of a hernia repair and now with a mass of his R buttock and thigh  Review of Systems   Constitutional: Negative.    HENT: Negative.    Eyes: Negative.    Respiratory: Negative.    Cardiovascular: Negative.    Gastrointestinal: Negative.    Endocrine: Negative.    Musculoskeletal: Negative.    Skin: Negative.    Allergic/Immunologic: Negative.    Neurological: Negative.    Hematological: Negative.    Psychiatric/Behavioral: Negative.    All other systems reviewed and are negative.      Objective:      Physical Exam   Constitutional: He is oriented to person, place, and time. He appears well-developed and well-nourished.   HENT:   Head: Normocephalic and atraumatic.   Right Ear: External ear normal.   Left Ear: External ear normal.   Nose: Nose normal.   Mouth/Throat: Oropharynx is clear and moist.   Eyes: Conjunctivae and EOM are normal. Pupils are equal, round, and reactive to light.   Neck: Normal range of motion. Neck supple.   Cardiovascular: Normal rate, regular rhythm, normal heart sounds and intact distal pulses.    Pulmonary/Chest: Effort normal and breath sounds normal.   Abdominal: Soft. Bowel sounds are normal.   Musculoskeletal: Normal range of motion.   Neurological: He is alert and oriented to person, place, and time. He has normal reflexes.   Skin: Skin is warm and dry.        Psychiatric: He has a normal mood and affect. His behavior is normal. Thought content normal.   Vitals reviewed.      Assessment:       1. Mass of thigh, right        Plan:       To the OR for excision of right thigh mass.  He will need clearance from cardiology and he agrees to proceed

## 2018-03-05 NOTE — DISCHARGE INSTRUCTIONS
Dr. Lewis   Office # [phone]     Discharge Instructions for Same Day Surgery     Call the office for and appointment if one has not already been made.     Diet: Drink plenty of fluids the first 48 hours and you may resume your   usual diet.     Activity: No heavy lifting (over 10 pounds), pushing or pulling until your   post op visit. Your doctor's office may have told you to limit your lifting to less weight, or even no weight.  Be sure to follow those instructions.    Note: You may ride in a car and you may drive when comfortable.     Do not drive, drink alcohol, or sign legal documents for 24 hours, or if taking narcotic pain medication.    Dressings: Remove the dressing 24 hours after surgery. You may shower 24 hours after surgery and you may wash your hair.     If you have steri strips ( appears to be strips of white tape) on   your incision, leave them on.     Drains: If you have a drain, measure and record the drainage. Bring this information with you on your office visit.     Medical: Call the doctor for any of the following problems: fever above 101,   severe pain, bleeding, or abdominal distention (swelling).   If constipated you may take any stool softener you choose.     Occasionally small areas of skin numbness or an unpleasant skin sensation can result. Also, you may find that your incision is swollen and tender for a few days.  Some redness around sutures and staples is a normal reaction, but if the discomfort persists or worsens, call you doctor.     Fall Prevention  Millions of people fall every year and injure themselves. You may have had anesthesia or sedation which may increase your risk of falling. You may have health issues that put you at an increased risk of falling.     Here are ways to reduce your risk of falling.  ·   · Make your home safe by keeping walkways clear of objects you may trip over.  · Use non-slip pads under rugs. Do not use area rugs or small throw rugs.  · Use non-slip  mats in bathtubs and showers.  · Install handrails and lights on staircases.  · Do not walk in poorly lit areas.  · Do not stand on chairs or wobbly ladders.  · Use caution when reaching overhead or looking upward. This position can cause a loss of balance.  · Be sure your shoes fit properly, have non-slip bottoms and are in good condition.   · Wear shoes both inside and out. Avoid going barefoot or wearing slippers.  · Be cautious when going up and down stairs, curbs, and when walking on uneven sidewalks.  · If your balance is poor, consider using a cane or walker.  · If your fall was related to alcohol use, stop or limit alcohol intake.   · If your fall was related to use of sleeping medicines, talk to your doctor about this. You may need to reduce your dosage at bedtime if you awaken during the night to go to the bathroom.    · To reduce the need for nighttime bathroom trips:  ¨ Avoid drinking fluids for several hours before going to bed  ¨ Empty your bladder before going to bed  ¨ Men can keep a urinal at the bedside  · Stay as active as you can. Balance, flexibility, strength, and endurance all come from exercise. They all play a role in preventing falls. Ask your healthcare provider which types of activity are right for you.  · Get your vision checked on a regular basis.  · If you have pets, know where they are before you stand up or walk so you don't trip over them.  · Use night lights.

## 2018-03-05 NOTE — ANESTHESIA PREPROCEDURE EVALUATION
03/05/2018  Nicolas Flaherty is a 62 y.o., male.    Anesthesia Evaluation    I have reviewed the Patient Summary Reports.     I have reviewed the Medications.     Review of Systems  Anesthesia Hx:  No problems with previous Anesthesia   Denies Personal Hx of Anesthesia complications.   Hematology/Oncology:  Hematology Normal   Oncology Normal     EENT/Dental:EENT/Dental Normal   Cardiovascular:   Exercise tolerance: good Hypertension CAD   Angina    Pulmonary:   Sleep Apnea    Renal/:   Chronic Renal Disease    Hepatic/GI:  Hepatic/GI Normal    Musculoskeletal:  Musculoskeletal Normal    Neurological:   TIA, CVA    Endocrine:   Diabetes    Dermatological:  Skin Normal    Psych:  Psychiatric Normal           Physical Exam  General:  Well nourished, Obesity    Airway/Jaw/Neck:  Airway Findings: Mouth Opening: Normal Tongue: Normal  Mallampati: III      Dental:  Dental Findings: In tact, Edentulous   Chest/Lungs:  Chest/Lungs Clear    Heart/Vascular:  Heart Findings: Normal Heart murmur: negative       Mental Status:  Mental Status Findings:  Cooperative, Alert and Oriented         Anesthesia Plan  Type of Anesthesia, risks & benefits discussed:  Anesthesia Type:  general, MAC, regional  Patient's Preference:   Intra-op Monitoring Plan: standard ASA monitors  Intra-op Monitoring Plan Comments:   Post Op Pain Control Plan: multimodal analgesia  Post Op Pain Control Plan Comments:   Induction:   IV  Beta Blocker:  Patient is not currently on a Beta-Blocker (No further documentation required).       Informed Consent: Patient understands risks and agrees with Anesthesia plan.  Questions answered. Anesthesia consent signed with patient.  ASA Score: 3     Day of Surgery Review of History & Physical:            Ready For Surgery From Anesthesia Perspective.

## 2018-03-06 NOTE — ANESTHESIA POSTPROCEDURE EVALUATION
"Anesthesia Post Evaluation    Patient: Nicolas Flaherty    Procedure(s) Performed: Procedure(s) (LRB):  EXCISION-MASS-THIGH (Right)    Final Anesthesia Type: general  Patient location during evaluation: PACU  Patient participation: Yes- Able to Participate  Level of consciousness: awake and alert, oriented and awake  Post-procedure vital signs: reviewed and stable  Pain management: adequate  Airway patency: patent  PONV status at discharge: No PONV  Anesthetic complications: no      Cardiovascular status: blood pressure returned to baseline  Respiratory status: unassisted and spontaneous ventilation  Hydration status: euvolemic  Follow-up not needed.        Visit Vitals  /79 (BP Location: Right arm, Patient Position: Sitting)   Pulse 67   Temp 36.6 °C (97.8 °F) (Oral)   Resp 18   Ht 5' 7" (1.702 m)   Wt 122 kg (269 lb)   SpO2 97%   BMI 42.13 kg/m²       Pain/Christiano Score: Pain Assessment Performed: Yes (3/5/2018  9:00 AM)  Presence of Pain: denies (3/5/2018  9:00 AM)  Christiano Score: 10 (3/5/2018  9:00 AM)  Modified Christiano Score: 20 (3/5/2018  9:00 AM)      "

## 2018-04-11 ENCOUNTER — OFFICE VISIT (OUTPATIENT)
Dept: FAMILY MEDICINE | Facility: CLINIC | Age: 63
End: 2018-04-11
Payer: COMMERCIAL

## 2018-04-11 VITALS
HEIGHT: 67 IN | DIASTOLIC BLOOD PRESSURE: 80 MMHG | WEIGHT: 273.38 LBS | SYSTOLIC BLOOD PRESSURE: 120 MMHG | BODY MASS INDEX: 42.91 KG/M2 | HEART RATE: 79 BPM | OXYGEN SATURATION: 97 % | TEMPERATURE: 99 F

## 2018-04-11 DIAGNOSIS — N18.4 CKD (CHRONIC KIDNEY DISEASE) STAGE 4, GFR 15-29 ML/MIN: ICD-10-CM

## 2018-04-11 DIAGNOSIS — J01.00 ACUTE NON-RECURRENT MAXILLARY SINUSITIS: ICD-10-CM

## 2018-04-11 DIAGNOSIS — R25.2 LEG CRAMPS: ICD-10-CM

## 2018-04-11 PROCEDURE — 99214 OFFICE O/P EST MOD 30 MIN: CPT | Mod: S$GLB,,, | Performed by: FAMILY MEDICINE

## 2018-04-11 PROCEDURE — 99999 PR PBB SHADOW E&M-EST. PATIENT-LVL IV: CPT | Mod: PBBFAC,,, | Performed by: FAMILY MEDICINE

## 2018-04-11 RX ORDER — LEVOCETIRIZINE DIHYDROCHLORIDE 5 MG/1
5 TABLET, FILM COATED ORAL NIGHTLY
Qty: 30 TABLET | Refills: 5 | Status: SHIPPED | OUTPATIENT
Start: 2018-04-11 | End: 2018-08-24

## 2018-04-11 RX ORDER — GABAPENTIN 600 MG/1
600 TABLET ORAL 3 TIMES DAILY
Qty: 270 TABLET | Refills: 3 | Status: SHIPPED | OUTPATIENT
Start: 2018-04-11 | End: 2018-07-25 | Stop reason: SDUPTHER

## 2018-04-11 RX ORDER — GABAPENTIN 600 MG/1
600 TABLET ORAL 3 TIMES DAILY
Qty: 90 TABLET | Refills: 11 | Status: SHIPPED | OUTPATIENT
Start: 2018-04-11 | End: 2018-04-11 | Stop reason: SDUPTHER

## 2018-04-11 RX ORDER — CEPHALEXIN 500 MG/1
500 CAPSULE ORAL EVERY 12 HOURS
Qty: 14 CAPSULE | Refills: 0 | Status: SHIPPED | OUTPATIENT
Start: 2018-04-11 | End: 2018-04-18

## 2018-04-11 NOTE — PROGRESS NOTES
"Subjective:       Patient ID: Nicolas Flaherty is a 62 y.o. male.    Chief Complaint: Sinus Problem and Discuss Medications    Sinusitis   This is a new problem. The current episode started 1 to 4 weeks ago (3 weeks). The problem is unchanged. There has been no fever. He is experiencing no pain. Associated symptoms include coughing, headaches, sinus pressure and sneezing. Pertinent negatives include no ear pain or sore throat. Past treatments include nothing. The treatment provided no relief.     Review of Systems   HENT: Positive for sinus pressure and sneezing. Negative for ear pain and sore throat.    Respiratory: Positive for cough.    Neurological: Positive for headaches.       Objective:       Vitals:    04/11/18 1106   BP: 120/80   Pulse: 79   Temp: 98.9 °F (37.2 °C)   TempSrc: Oral   SpO2: 97%   Weight: 124 kg (273 lb 5.9 oz)   Height: 5' 7" (1.702 m)       Physical Exam   Constitutional: He is oriented to person, place, and time. He appears well-developed and well-nourished. No distress.   HENT:   Head: Normocephalic and atraumatic.   Right Ear: External ear normal.   Left Ear: External ear normal.   Mouth/Throat: Oropharynx is clear and moist. No oropharyngeal exudate.   Neck: Normal range of motion. Neck supple.   Cardiovascular: Normal rate, regular rhythm and normal heart sounds.  Exam reveals no gallop and no friction rub.    No murmur heard.  Pulmonary/Chest: Effort normal and breath sounds normal. No respiratory distress. He has no wheezes. He has no rales. He exhibits no tenderness.   Lymphadenopathy:     He has cervical adenopathy.   Neurological: He is alert and oriented to person, place, and time.   Skin: He is not diaphoretic.       Assessment:       1. Uncontrolled type 2 diabetes mellitus with diabetic neuropathy, with long-term current use of insulin    2. Leg cramps    3. Acute non-recurrent maxillary sinusitis    4. CKD (chronic kidney disease) stage 4, GFR 15-29 ml/min        Plan: "       Nicolas was seen today for sinus problem and discuss medications.    Diagnoses and all orders for this visit:    Uncontrolled type 2 diabetes mellitus with diabetic neuropathy, with long-term current use of insulin  -     Hemoglobin A1c; Future  -     CK; Future  -     Comprehensive metabolic panel; Future  -     Hemoglobin A1c; Future  -     CK; Future  -     Vitamin B12; Future  -     Folate; Future  -     Lipid panel; Future  -     Microalbumin/creatinine urine ratio; Future  -     Comprehensive metabolic panel  -     Hemoglobin A1c  -     CK  -     Vitamin B12  -     Folate  -     Lipid panel  -     Microalbumin/creatinine urine ratio  Due for labs.   Leg cramps  -     CK; Future  -     CK; Future  -     Vitamin B12; Future  -     Folate; Future  -     CK  -     Vitamin B12  -     Folate    Acute non-recurrent maxillary sinusitis  -     cephALEXin (KEFLEX) 500 MG capsule; Take 1 capsule (500 mg total) by mouth every 12 (twelve) hours.  -     levocetirizine (XYZAL) 5 MG tablet; Take 1 tablet (5 mg total) by mouth every evening.  Given keflex and nd xyzal for his sinuses    CKD (chronic kidney disease) stage 4, GFR 15-29 ml/min  Avoiding nephrotoxic antibiotics  Other orders  -     Discontinue: gabapentin (NEURONTIN) 600 MG tablet; Take 1 tablet (600 mg total) by mouth 3 (three) times daily.

## 2018-04-11 NOTE — TELEPHONE ENCOUNTER
----- Message from Rossi Herrera sent at 4/11/2018  2:40 PM CDT -----  Contact: [phone]   Pt states his refill for the gabapentin (NEURONTIN) 600 MG tablet was supposed to be for a 90 day supply pt would like another script sent over for a 90 day supply Please call pt at your earliest convenience.  Thanks !

## 2018-04-12 DIAGNOSIS — E11.9 DIABETES MELLITUS WITHOUT COMPLICATION: ICD-10-CM

## 2018-04-17 LAB
ALBUMIN SERPL-MCNC: 4.7 G/DL (ref 3.6–4.8)
ALBUMIN/CREAT UR: 98.7 MG/G CREAT (ref 0–30)
ALBUMIN/GLOB SERPL: 1.7 {RATIO} (ref 1.2–2.2)
ALP SERPL-CCNC: 45 IU/L (ref 39–117)
ALT SERPL-CCNC: 22 IU/L (ref 0–44)
AST SERPL-CCNC: 21 IU/L (ref 0–40)
BILIRUB SERPL-MCNC: 0.2 MG/DL (ref 0–1.2)
BUN SERPL-MCNC: 22 MG/DL (ref 8–27)
BUN/CREAT SERPL: 11 (ref 10–24)
CALCIUM SERPL-MCNC: 10 MG/DL (ref 8.6–10.2)
CHLORIDE SERPL-SCNC: 99 MMOL/L (ref 96–106)
CHOLEST SERPL-MCNC: 180 MG/DL (ref 100–199)
CK SERPL-CCNC: 159 U/L (ref 24–204)
CO2 SERPL-SCNC: 25 MMOL/L (ref 18–29)
CREAT SERPL-MCNC: 2.09 MG/DL (ref 0.76–1.27)
CREAT UR-MCNC: 31.6 MG/DL
FOLATE SERPL-MCNC: 7.3 NG/ML
GFR SERPLBLD CREATININE-BSD FMLA CKD-EPI: 33 ML/MIN/1.73
GFR SERPLBLD CREATININE-BSD FMLA CKD-EPI: 38 ML/MIN/1.73
GLOBULIN SER CALC-MCNC: 2.8 G/DL (ref 1.5–4.5)
GLUCOSE SERPL-MCNC: 173 MG/DL (ref 65–99)
HBA1C MFR BLD: 9.2 % (ref 4.8–5.6)
HDLC SERPL-MCNC: 24 MG/DL
LDLC SERPL CALC-MCNC: ABNORMAL MG/DL (ref 0–99)
MICROALBUMIN UR-MCNC: 31.2 UG/ML
POTASSIUM SERPL-SCNC: 4.9 MMOL/L (ref 3.5–5.2)
PROT SERPL-MCNC: 7.5 G/DL (ref 6–8.5)
SODIUM SERPL-SCNC: 144 MMOL/L (ref 134–144)
TRIGL SERPL-MCNC: 552 MG/DL (ref 0–149)
VIT B12 SERPL-MCNC: 497 PG/ML (ref 232–1245)
VLDLC SERPL CALC-MCNC: ABNORMAL MG/DL (ref 5–40)

## 2018-04-18 ENCOUNTER — OFFICE VISIT (OUTPATIENT)
Dept: NEUROLOGY | Facility: CLINIC | Age: 63
End: 2018-04-18
Payer: COMMERCIAL

## 2018-04-18 VITALS
WEIGHT: 273.38 LBS | DIASTOLIC BLOOD PRESSURE: 82 MMHG | HEIGHT: 67 IN | SYSTOLIC BLOOD PRESSURE: 146 MMHG | BODY MASS INDEX: 42.91 KG/M2 | HEART RATE: 92 BPM

## 2018-04-18 DIAGNOSIS — G11.9 ATAXIA DUE TO CEREBELLAR DEGENERATION: Primary | ICD-10-CM

## 2018-04-18 PROCEDURE — 99999 PR PBB SHADOW E&M-EST. PATIENT-LVL III: CPT | Mod: PBBFAC,,, | Performed by: NEUROLOGICAL SURGERY

## 2018-04-18 PROCEDURE — 99214 OFFICE O/P EST MOD 30 MIN: CPT | Mod: S$GLB,,, | Performed by: NEUROLOGICAL SURGERY

## 2018-04-19 ENCOUNTER — TELEPHONE (OUTPATIENT)
Dept: FAMILY MEDICINE | Facility: CLINIC | Age: 63
End: 2018-04-19

## 2018-04-19 NOTE — TELEPHONE ENCOUNTER
----- Message from Patrica York sent at 4/19/2018  7:07 AM CDT -----  Contact: self  Patient requesting lab results. Please call [phone].    Thanks!

## 2018-04-19 NOTE — TELEPHONE ENCOUNTER
Advised patient that lab results have not been received in office.  Advised that office will call patient when results are received.  Patient verbalized understanding.

## 2018-04-20 NOTE — PROGRESS NOTES
Chief Complaint   Patient presents with    Fall        Nicolaschristopher Flaherty is a 62 y.o. male with a history of multiple medical diagnoses as listed below that presents for evaluation of an abnormal MRI.  The patient had MRI of the brain which was reported to have some cerebellar atrophy which she was sent to clinic today for further investigation.  He is accompanied today by his wife.  He has had a long-standing history of various difficulties that have been progressive over the years.  The first that they notice has been a change in his speech.  The patient has a very slow methodical speech that has been progressively getting worse over the last several years.  Also during the last several years they have noticed that his handwriting and coordination seems to be getting worse as well.  He has been having difficulty with his walking and seems to be on balance and that he tries to take steps.  The patient has been relying only using a rolling walker or a Rollator in order to ambulate.  He feels unsteady whenever he does not have something in his hand or is not able to hold on.  He has not had any falls because he says that he keeps an assistive device around at all times to keep himself steady.  He says he has no family history of any neurologic diseases.  The patient has no history of stroke, but says he has been told he is has had at least 2 TIAs in the past.    Interval History  04/18/2018  He has been getting worse since he was last seen in clinic.  He is accompanied to today's visit by his wife will supervise some of the history.  He says that he has had several falls since he was last seen in clinic and has been feeling that his gait has been getting progressively worse.  He feels unsteady on his feet.  His speaking has been getting progressively worse as well.    PAST MEDICAL HISTORY:  Past Medical History:   Diagnosis Date    Anticoagulant long-term use     Arthritis     Cancer of kidney, right      Coronary artery disease     Diabetes mellitus     Hypertension     Kidney stone     Sleep apnea     Slurred speech     Stroke     MINI STROKE    TIA (transient ischemic attack)     Wears glasses        PAST SURGICAL HISTORY:  Past Surgical History:   Procedure Laterality Date    BACK SURGERY      lower back    CARDIAC SURGERY      excision right thigh mass      GLAUCOMA SURGERY      heart stents      stents 4 total.  2010, 2011 and 2013    HERNIA REPAIR      incisional    KIDNEY STONE SURGERY  2015    LAPAROSCOPIC NEPHRECTOMY, HAND ASSISTED Right     LITHOTRIPSY      VASCULAR SURGERY         SOCIAL HISTORY:  Social History     Social History    Marital status:      Spouse name: N/A    Number of children: N/A    Years of education: N/A     Occupational History    Not on file.     Social History Main Topics    Smoking status: Never Smoker    Smokeless tobacco: Current User     Types: Snuff      Comment: 20 plus years    Alcohol use Yes      Comment: occas    Drug use: No    Sexual activity: Yes     Partners: Female     Other Topics Concern    Not on file     Social History Narrative    No narrative on file       FAMILY HISTORY:  Family History   Problem Relation Age of Onset    Heart disease Father     Hypertension Mother     Parkinsonism Mother        ALLERGIES AND MEDICATIONS: updated and reviewed.  Review of patient's allergies indicates:  No Known Allergies  Current Outpatient Prescriptions   Medication Sig Dispense Refill    aspirin (ECOTRIN) 81 MG EC tablet Take 81 mg by mouth once daily. LAST TAKEN 3/2/16      clopidogrel (PLAVIX) 75 mg tablet Take 75 mg by mouth every morning. LAST DOSE 3/2/16      docusate sodium (STOOL SOFTENER) 100 MG capsule Take 1 capsule (100 mg total) by mouth 2 (two) times daily. 180 capsule 3    fenofibrate (TRICOR) 145 MG tablet Take 145 mg by mouth nightly.       finasteride (PROSCAR) 5 mg tablet TAKE ONE TABLET BY MOUTH ONCE DAILY IN THE  MORNING 90 tablet 3    furosemide (LASIX) 40 MG tablet Take 40 mg by mouth 2 (two) times daily. ALTERNATES 1 TABLET ONE DAY AND 2 TABLETS THE NEXT--ALTERNATING DAYS      gabapentin (NEURONTIN) 600 MG tablet Take 1 tablet (600 mg total) by mouth 3 (three) times daily. 270 tablet 3    glipiZIDE (GLUCOTROL) 5 MG tablet Take 5 mg by mouth 2 (two) times daily with meals.       isosorbide mononitrate (IMDUR) 60 MG 24 hr tablet Take 60 mg by mouth every morning.       levocetirizine (XYZAL) 5 MG tablet Take 1 tablet (5 mg total) by mouth every evening. 30 tablet 5    metoprolol succinate (TOPROL-XL) 100 MG 24 hr tablet Take 100 mg by mouth every morning.       nateglinide (STARLIX) 60 MG tablet 60 mg 2 (two) times daily before meals.       NITROSTAT 0.4 mg SL tablet Place 0.4 mg under the tongue every 5 (five) minutes as needed.       oxyCODONE-acetaminophen (PERCOCET) 5-325 mg per tablet Take 1 tablet by mouth every 4 (four) hours as needed for Pain. 30 tablet 0    prochlorperazine (COMPAZINE) 10 MG tablet Take 1 tablet (10 mg total) by mouth 3 (three) times daily as needed. 270 tablet 1    rosuvastatin (CRESTOR) 10 MG tablet Take 10 mg by mouth every evening.      tamsulosin (FLOMAX) 0.4 mg Cp24 TAKE ONE CAPSULE BY MOUTH ONCE DAILY 90 capsule 3    TRAVATAN Z 0.004 % Drop 1 drop every evening.       valsartan (DIOVAN) 80 MG tablet        Current Facility-Administered Medications   Medication Dose Route Frequency Provider Last Rate Last Dose    lidocaine (PF) 10 mg/ml (1%) injection 10 mg  1 mL Intradermal Once Jose Lewis MD           Review of Systems   Constitutional: Negative for activity change, fatigue and unexpected weight change.   HENT: Negative for trouble swallowing and voice change.    Eyes: Negative for photophobia, pain and visual disturbance.   Respiratory: Negative for apnea and shortness of breath.    Cardiovascular: Negative for chest pain and palpitations.   Gastrointestinal:  Negative for constipation, nausea and vomiting.   Genitourinary: Negative for difficulty urinating.   Musculoskeletal: Positive for back pain and gait problem. Negative for arthralgias, myalgias and neck pain.   Skin: Negative for color change and rash.   Neurological: Positive for speech difficulty and weakness. Negative for dizziness, seizures, syncope, light-headedness, numbness and headaches.   Psychiatric/Behavioral: Negative for agitation, behavioral problems and confusion.       Neurologic Exam     Mental Status   Oriented to person, place, and time.   Registration: recalls 3 of 3 objects.   Attention: normal. Concentration: normal.   Speech: slurred   Level of consciousness: alert  Knowledge: good.     Cranial Nerves     CN II   Visual fields full to confrontation.   Right visual field deficit: none  Left visual field deficit: none     CN III, IV, VI   Pupils are equal, round, and reactive to light.  Extraocular motions are normal.   Right pupil: Size: 3 mm. Shape: regular. Accommodation: intact.   Left pupil: Size: 3 mm. Shape: regular. Accommodation: intact.   CN III: no CN III palsy  CN VI: no CN VI palsy  Nystagmus: none   Diplopia: none  Ophthalmoparesis: none  Upgaze: normal  Downgaze: normal  Conjugate gaze: present    CN V   Facial sensation intact.   Right facial sensation deficit: none  Left facial sensation deficit: none    CN VII   Facial expression full, symmetric.   Right facial weakness: none  Left facial weakness: none    CN VIII   CN VIII normal.     CN IX, X   CN IX normal.   CN X normal.   Palate: symmetric    CN XI   CN XI normal.   Right sternocleidomastoid strength: normal  Left sternocleidomastoid strength: normal  Right trapezius strength: normal  Left trapezius strength: normal    CN XII   CN XII normal.   Tongue deviation: none    Motor Exam   Muscle bulk: normal  Overall muscle tone: normal  Right arm tone: normal  Left arm tone: normal  Right leg tone: normal  Left leg tone:  normal    Strength   Strength 5/5 throughout.     Sensory Exam   Right arm light touch: normal  Left arm light touch: normal  Right leg light touch: normal  Left leg light touch: normal  Right arm vibration: normal  Left arm vibration: normal  Right leg vibration: normal  Left leg vibration: normal  Right arm proprioception: normal  Left arm proprioception: normal  Right leg proprioception: normal  Left leg proprioception: normal  Right arm pinprick: normal  Left arm pinprick: normal  Right leg pinprick: normal  Left leg pinprick: normal    Gait, Coordination, and Reflexes     Gait  Gait: wide-based    Coordination   Romberg: positive  Finger to nose coordination: abnormal  Heel to shin coordination: abnormal  Tandem walking coordination: abnormal    Tremor   Resting tremor: absent    Reflexes   Right brachioradialis: 2+  Left brachioradialis: 2+  Right biceps: 2+  Left biceps: 2+  Right triceps: 2+  Left triceps: 2+  Right patellar: 2+  Left patellar: 2+  Right achilles: 2+  Left achilles: 2+  Right plantar: normal  Left plantar: normalPatient is ataxic speech.       Physical Exam   Constitutional: He is oriented to person, place, and time. He appears well-developed and well-nourished.   HENT:   Head: Normocephalic and atraumatic.   Eyes: EOM are normal. Pupils are equal, round, and reactive to light.   Cardiovascular: Intact distal pulses.    Pulmonary/Chest: Effort normal. No respiratory distress.   Musculoskeletal: Normal range of motion.   Neurological: He is alert and oriented to person, place, and time. He has normal strength. He has an abnormal Finger-Nose-Finger Test, an abnormal Heel to Olea Test, an abnormal Romberg Test and an abnormal Tandem Gait Test.   Reflex Scores:       Tricep reflexes are 2+ on the right side and 2+ on the left side.       Bicep reflexes are 2+ on the right side and 2+ on the left side.       Brachioradialis reflexes are 2+ on the right side and 2+ on the left side.       Patellar  "reflexes are 2+ on the right side and 2+ on the left side.       Achilles reflexes are 2+ on the right side and 2+ on the left side.  Skin: Skin is warm and dry.   Psychiatric: He has a normal mood and affect. His behavior is normal. His speech is slurred.       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 Tongue fasciculations are commonly seen in bulbar palsy as well, which is a diagnostic consideration.         Relevant Orders    Ambulatory Referral to Physical/Occupational Therapy          Follow-up: No Follow-up on file.  More than 50% of this 25 minute encounter was spent in counseling and coordinating care.    "

## 2018-04-20 NOTE — TELEPHONE ENCOUNTER
----- Message from Rayna Anglin sent at 4/20/2018  3:03 PM CDT -----  Contact: Self   Patient is requesting bloodwork results. Please call [phone].

## 2018-04-24 ENCOUNTER — TELEPHONE (OUTPATIENT)
Dept: FAMILY MEDICINE | Facility: CLINIC | Age: 63
End: 2018-04-24

## 2018-04-24 RX ORDER — TAMSULOSIN HYDROCHLORIDE 0.4 MG/1
CAPSULE ORAL
Qty: 90 CAPSULE | Refills: 3 | Status: SHIPPED | OUTPATIENT
Start: 2018-04-24 | End: 2018-07-25 | Stop reason: SDUPTHER

## 2018-04-24 NOTE — TELEPHONE ENCOUNTER
----- Message from Rayna Anglin sent at 4/24/2018 10:25 AM CDT -----  Contact: Self   Patient is calling to speak with someone about his lab results, Please call at [phone].

## 2018-04-26 ENCOUNTER — TELEPHONE (OUTPATIENT)
Dept: FAMILY MEDICINE | Facility: CLINIC | Age: 63
End: 2018-04-26

## 2018-04-26 ENCOUNTER — HOSPITAL ENCOUNTER (OUTPATIENT)
Dept: RADIOLOGY | Facility: HOSPITAL | Age: 63
Discharge: HOME | End: 2018-04-26
Attending: UROLOGY
Payer: COMMERCIAL

## 2018-04-26 DIAGNOSIS — C64.1 RENAL CELL CARCINOMA OF RIGHT KIDNEY: ICD-10-CM

## 2018-04-26 DIAGNOSIS — E78.1 HYPERGLYCERIDEMIA: Primary | ICD-10-CM

## 2018-04-26 DIAGNOSIS — N18.30 STAGE 3 CHRONIC KIDNEY DISEASE: ICD-10-CM

## 2018-04-26 PROCEDURE — 71046 X-RAY EXAM CHEST 2 VIEWS: CPT | Mod: 26,,, | Performed by: RADIOLOGY

## 2018-04-26 PROCEDURE — 74176 CT ABD & PELVIS W/O CONTRAST: CPT | Mod: TC

## 2018-04-26 PROCEDURE — 71046 X-RAY EXAM CHEST 2 VIEWS: CPT | Mod: TC,FY

## 2018-04-26 PROCEDURE — 74176 CT ABD & PELVIS W/O CONTRAST: CPT | Mod: 26,,, | Performed by: RADIOLOGY

## 2018-04-26 RX ORDER — GLIMEPIRIDE 4 MG/1
4 TABLET ORAL
Qty: 90 TABLET | Refills: 3 | Status: SHIPPED | OUTPATIENT
Start: 2018-04-26 | End: 2018-08-03 | Stop reason: SDUPTHER

## 2018-04-26 RX ORDER — AZITHROMYCIN 250 MG/1
TABLET, FILM COATED ORAL
Qty: 6 TABLET | Refills: 0 | Status: SHIPPED | OUTPATIENT
Start: 2018-04-26 | End: 2018-05-01

## 2018-04-26 RX ORDER — GEMFIBROZIL 600 MG/1
600 TABLET, FILM COATED ORAL
Qty: 180 TABLET | Refills: 3 | Status: SHIPPED | OUTPATIENT
Start: 2018-04-26 | End: 2018-07-25 | Stop reason: SDUPTHER

## 2018-04-26 NOTE — TELEPHONE ENCOUNTER
----- Message from Shyanne Alfredo MD sent at 4/26/2018  2:17 PM CDT -----  YES  ----- Message -----  From: JONATHAN Severino  Sent: 4/26/2018  11:08 AM  To: Shyanne Alfredo MD    Get him back in?

## 2018-04-26 NOTE — TELEPHONE ENCOUNTER
Spoke with patient/ states that he spoke with  today during his wife's office visit and was told to see her in 1 month/ please advise if this is correct

## 2018-04-30 ENCOUNTER — OFFICE VISIT (OUTPATIENT)
Dept: UROLOGY | Facility: CLINIC | Age: 63
End: 2018-04-30
Payer: COMMERCIAL

## 2018-04-30 VITALS
RESPIRATION RATE: 14 BRPM | BODY MASS INDEX: 43.25 KG/M2 | WEIGHT: 275.56 LBS | HEART RATE: 68 BPM | HEIGHT: 67 IN | SYSTOLIC BLOOD PRESSURE: 108 MMHG | DIASTOLIC BLOOD PRESSURE: 60 MMHG

## 2018-04-30 DIAGNOSIS — C64.1 RENAL CELL CARCINOMA OF RIGHT KIDNEY: Primary | ICD-10-CM

## 2018-04-30 DIAGNOSIS — N40.1 BENIGN NON-NODULAR PROSTATIC HYPERPLASIA WITH LOWER URINARY TRACT SYMPTOMS: ICD-10-CM

## 2018-04-30 DIAGNOSIS — N20.0 NEPHROLITHIASIS: ICD-10-CM

## 2018-04-30 DIAGNOSIS — K43.2 INCISIONAL HERNIA, WITHOUT OBSTRUCTION OR GANGRENE: ICD-10-CM

## 2018-04-30 DIAGNOSIS — R31.0 GROSS HEMATURIA: ICD-10-CM

## 2018-04-30 PROCEDURE — 99214 OFFICE O/P EST MOD 30 MIN: CPT | Mod: S$GLB,,, | Performed by: UROLOGY

## 2018-04-30 PROCEDURE — 99999 PR PBB SHADOW E&M-EST. PATIENT-LVL III: CPT | Mod: PBBFAC,,, | Performed by: UROLOGY

## 2018-04-30 NOTE — PROGRESS NOTES
Subjective:       Nicolas Flaherty is a 62 y.o. male who is an established patient who was seen for evaluation of nephrolithiasis and hematuria.      Last seen by Dr Christian 7/15. He has a h/o stones and is s/p ESWL and URS in the past. He has had issues with gross hematuria and has been worked up for this. He does take Plavix and ASA regularly. He has significant cardiac issues, especially when off Plavix.     He also has BPH with some LUTS. Nocturia x 4. He takes Flomax and Proscar.     R URS 6/15 for stones. Stone analysis - CaOx mono. 24hr stone risk analysis - hyperoxalaturia. He is taking allopurinol currently. Unsure efficacy of this.    KAREN 4/15 - normal, no hydro. 1.5cm simple cyst RUP. 4mm R MP stone. PVR 51cc.  KAREN 1/16 - no stone seen. 2.9cm lesion in R LP concerning for neoplasm. Not seen on previous study.   CT scan shows 3.2cm enhancing mass in R kidney, endophytic.    He is s/p R URS to r/o UC. No tumor seen therefore now s/p R lap radical nephrectomy on 3/18/16 for renal mass. 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 Cr 1.4 at baseline. Post-op Cr was 2.1 at discharge. He is followed by nephrology (Dr Short).     Initial post-operative check was fine. Staples were removed.    He was noted to have R sided hernia at the area of nephrectomy. He has had this repaired by Dr Lewis in 8/16. He has healed well from this.    CT 10/16 is clear of recurrence. Hernia repair noted 8/16. Cr 2.1.    CT and CXR 4/17, 10/17, 4/18 - clear of recurrence, Cr 4/18 - 2.0    He continues to c/o R flank pain and swelling.       The following portions of the patient's history were reviewed and updated as appropriate: allergies, current medications, past family history, past medical history, past social history, past surgical history and problem list.    Review of Systems  Constitutional: no fever or chills  ENT: no nasal congestion or sore throat  Respiratory: no cough or shortness of  "breath  Cardiovascular: no chest pain or palpitations  Gastrointestinal: no nausea or vomiting, tolerating diet  Genitourinary: as per HPI  Hematologic/Lymphatic: no easy bruising or lymphadenopathy  Musculoskeletal: no arthralgias or myalgias  Skin: no rashes or lesions  Neurological: no seizures or tremors  Behavioral/Psych: no auditory or visual hallucinations       Objective:    Vitals:   /60   Pulse 68   Resp 14   Ht 5' 7" (1.702 m)   Wt 125 kg (275 lb 9.2 oz)   BMI 43.16 kg/m²     Physical Exam   General: well developed, well nourished in no acute distress  Head: normocephalic, atraumatic  Neck: supple, trachea midline, no obvious enlargement of thyroid  HEENT: EOMI, mucus membranes moist, sclera anicteric, no hearing impairment  Lungs: symmetric expansion, non-labored breathing  Cardiovascular: regular rate and rhythm, normal pulses  Abdomen: soft, non tender, non distended, no palpable masses, no hepatosplenomegaly, no hernias, bladder nonpalpable. No CVA tenderness.  Musculoskeletal: no peripheral edema, normal ROM in bilateral upper and lower extremities  Lymphatics: no cervical or inguinal lymphadenopathy  Skin: no rashes or lesions  Neuro: alert and oriented x 3, no gross deficits  Psych: normal judgment and insight, normal mood/affect and non-anxious  Genitourinary: (last visit)  Penis -  circumcised penis without plaques, lesions, or scarring.  Urethra - orthotopic location without stenosis.  Scrotum  - no lesions or rashes, no hydrocele or hernia.  Testes - descended bilaterally, symmetric without masses, non tender.  Epididymides - no cysts or lesions, no spermatocele, symmetric   No evidence of any testicular/scrotal infection   ILDA: patient declined exam    Inc: well healed, s/p hernia repair. No defect in fascia appreciated.     Lab Review   Urine analysis today in clinic shows - negative    Lab Results   Component Value Date    WBC 7.50 02/26/2018    HGB 13.1 (L) 02/26/2018    HCT 38.9 " (L) 02/26/2018    MCV 83 02/26/2018     02/26/2018     Lab Results   Component Value Date    CREATININE 2.09 (H) 04/16/2018    BUN 22 04/16/2018     No results found for: PSA  Lab Results   Component Value Date    PSADIAG 0.23 04/21/2015       Imaging  KAREN/CT reviewed  Reports and images were personally reviewed by me and discussed with the patient today         Assessment/Plan:      1. Renal cell carcinoma    - Baseline Cr 1.4, new baseline 2.2.   - s/p R lap nephrectomy 3/18/16, pT3aNx, FG2, ccRCC   - CT a/p and CXR clear 4/17, 10/17, 4/18   - CT a/p (non-con) and CXR in 6mths   - Follow up plan: CT q6m x 3y (3/19), q1y to year 5 (3/21). CXR q6m x 5yrs   - s/p hernia repair by Dr Lewis at kidney extraction site (8/16)      2. Nephrolithiasis    - Punctate stone in R LP   - CaOx mono stones   - 24hr urine collection - hyperoxalaturia. Recommend Ca intake when oxalate taken PO. Low oxalate diet.    - He is taking allopurinol per RCA. Unsure efficacy of this. Instructed him he can stop this.    - Discussed low oxalate diet   - No stones on CT     3. Hematuria, gross    - Negative cysto 6/15, 3/16   - RCC - suspect reason for hematuria     4. Benign non-nodular prostatic hyperplasia with lower urinary tract symptoms    - Continue Flomax   - PVD - urethral milking           Follow up in 6 months with CT scan/CXR

## 2018-05-03 ENCOUNTER — TELEPHONE (OUTPATIENT)
Dept: FAMILY MEDICINE | Facility: CLINIC | Age: 63
End: 2018-05-03

## 2018-05-03 NOTE — TELEPHONE ENCOUNTER
----- Message from Coleen Reed sent at 5/3/2018  2:49 PM CDT -----  Contact: spouse- Ellie  Pt's spouse is asking for medication to be called in for sinus. She asked for appt tomorrow but schedule is booked until Monday. Pt declined to see another provider.    Pharmacy is [facility].    [phone].

## 2018-05-04 RX ORDER — AZELASTINE 1 MG/ML
1 SPRAY, METERED NASAL 2 TIMES DAILY
Qty: 30 ML | Refills: 2 | Status: SHIPPED | OUTPATIENT
Start: 2018-05-04 | End: 2018-06-17

## 2018-05-11 ENCOUNTER — CLINICAL SUPPORT (OUTPATIENT)
Dept: REHABILITATION | Facility: HOSPITAL | Age: 63
End: 2018-05-11
Attending: NEUROLOGICAL SURGERY
Payer: COMMERCIAL

## 2018-05-11 ENCOUNTER — TELEPHONE (OUTPATIENT)
Dept: FAMILY MEDICINE | Facility: CLINIC | Age: 63
End: 2018-05-11

## 2018-05-11 DIAGNOSIS — R27.8 DECREASED COORDINATION: ICD-10-CM

## 2018-05-11 DIAGNOSIS — Z74.09 IMPAIRED FUNCTIONAL MOBILITY, BALANCE, GAIT, AND ENDURANCE: ICD-10-CM

## 2018-05-11 DIAGNOSIS — M62.81 MUSCLE WEAKNESS OF LOWER EXTREMITY: ICD-10-CM

## 2018-05-11 DIAGNOSIS — G11.9 ATAXIA DUE TO CEREBELLAR DEGENERATION: Primary | ICD-10-CM

## 2018-05-11 PROCEDURE — G8979 MOBILITY GOAL STATUS: HCPCS | Mod: CK,PN

## 2018-05-11 PROCEDURE — 97161 PT EVAL LOW COMPLEX 20 MIN: CPT | Mod: PN

## 2018-05-11 PROCEDURE — G8978 MOBILITY CURRENT STATUS: HCPCS | Mod: CL,PN

## 2018-05-11 NOTE — TELEPHONE ENCOUNTER
----- Message from Roseann Naik sent at 5/11/2018 12:34 PM CDT -----  Contact: Adal/Spouse/[phone]  Adal would like to know if the patient need a lab appointment. Thank you.

## 2018-05-11 NOTE — PROGRESS NOTES
Physical Therapy Evaluation    [facility]  [facility] Number: [id number]      Diagnosis:   Encounter Diagnoses   Name Primary?    Impaired functional mobility, balance, gait, and endurance     Muscle weakness of lower extremity     Decreased coordination      Physician: Roge Arora MD  Treatment Orders: PT Eval and Treat    Past Medical History:   Diagnosis Date    Anticoagulant long-term use     Arthritis     Cancer of kidney, right     Coronary artery disease     Diabetes mellitus     Hypertension     Kidney stone     Sleep apnea     Slurred speech     Stroke     MINI STROKE    TIA (transient ischemic attack)     Wears glasses      Current Outpatient Prescriptions   Medication Sig    aspirin (ECOTRIN) 81 MG EC tablet Take 81 mg by mouth once daily. LAST TAKEN 3/2/16    azelastine (ASTELIN) 137 mcg (0.1 %) nasal spray 1 spray (137 mcg total) by Nasal route 2 (two) times daily.    clopidogrel (PLAVIX) 75 mg tablet Take 75 mg by mouth every morning. LAST DOSE 3/2/16    docusate sodium (STOOL SOFTENER) 100 MG capsule Take 1 capsule (100 mg total) by mouth 2 (two) times daily.    dulaglutide (TRULICITY) 1.5 mg/0.5 mL PnIj Inject 1.5 mg into the skin every 7 days.    finasteride (PROSCAR) 5 mg tablet TAKE ONE TABLET BY MOUTH ONCE DAILY IN THE MORNING    furosemide (LASIX) 40 MG tablet Take 40 mg by mouth 2 (two) times daily. ALTERNATES 1 TABLET ONE DAY AND 2 TABLETS THE NEXT--ALTERNATING DAYS    gabapentin (NEURONTIN) 600 MG tablet Take 1 tablet (600 mg total) by mouth 3 (three) times daily.    gemfibrozil (LOPID) 600 MG tablet Take 1 tablet (600 mg total) by mouth 2 (two) times daily before meals.    glimepiride (AMARYL) 4 MG tablet Take 1 tablet (4 mg total) by mouth before breakfast.    isosorbide mononitrate (IMDUR) 60 MG 24 hr tablet Take 60 mg by mouth every morning.     levocetirizine (XYZAL) 5 MG tablet Take 1 tablet (5 mg total) by mouth every evening.     "metoprolol succinate (TOPROL-XL) 100 MG 24 hr tablet Take 100 mg by mouth every morning.     nateglinide (STARLIX) 60 MG tablet 60 mg 2 (two) times daily before meals.     NITROSTAT 0.4 mg SL tablet Place 0.4 mg under the tongue every 5 (five) minutes as needed.     oxyCODONE-acetaminophen (PERCOCET) 5-325 mg per tablet Take 1 tablet by mouth every 4 (four) hours as needed for Pain.    prochlorperazine (COMPAZINE) 10 MG tablet Take 1 tablet (10 mg total) by mouth 3 (three) times daily as needed.    rosuvastatin (CRESTOR) 10 MG tablet Take 10 mg by mouth every evening.    tamsulosin (FLOMAX) 0.4 mg Cp24 TAKE ONE CAPSULE BY MOUTH ONCE DAILY    TRAVATAN Z 0.004 % Drop 1 drop every evening.     valsartan (DIOVAN) 80 MG tablet      Current Facility-Administered Medications   Medication    lidocaine (PF) 10 mg/ml (1%) injection 10 mg     Review of patient's allergies indicates:  No Known Allergies  Precautions: uncontrolled DM, fall risk    Evaluation Date: 18  Visit # authorized: 20  Authorization period: 18    Royal Valenzuela is a 62 y.o. 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     Pt reports having heart attack about 8 years ago  Falls about 1 every 2 weeks  Pt having difficulty swallowing and speech and decreased strength and coordination in UEs    Patient c/o: imbalance and difficulty walking  Onset: diagnosed with cerebellar degeneration about 1 year ago   Pain Scale: back pain currently 0/10, worst at 10/10, best at 0/10  Aggravating factors: standing  Relieving factors: sitting  Previous treatment: PT about 3 years ago at [facility] for back pain with little improvements  Imagin18 brain MRI revealed: "1. Cerebellar atrophy.  2. Periventricular white matter changes most likely chronic microvascular ischemia.  3. No acute intracranial processes."  18 head CT revealed: "No acute intracranial abnormality."  Past " "surgical history: 4 stent placements (last being 5 years ago), kidney removal about 2 years ago, lumbar surgery about 10 years ago  Functional deficits: walking long distances, prolonged standing, ADLs, household chores, stair ambulation  Prior level of function: independent with all ADLs  Occupation: retired  Environment: 1 story trailer with ramp steps to enter, has rollator, RW, and shower chair, lives with wife, 2 grandchildren  No cultural or spiritual barriers identified to treatment or learning.  Patient's goals: "to improve my walking"    Objective     Observation: pleasant and cooperative, dysphagia    Posture Alignment :slouched posture  Dominant hand:  left     ROM:   UPPER EXTREMITY--AROM/PROM  (R) UE: WNLs  (L) UE: WNLs     Lower Extremity Strength  Right LE  Left LE    Hip flexion:  5/5 Hip flexion: 4+/5   Knee extension: 5/5 Knee extension: 4+/5   Knee flexion: 5/5 Knee flexion: 5/5   Ankle dorsiflexion:  5/5 Ankle dorsiflexion: 5/5   Ankle plantarflexion:  5/5 Ankle plantarflexion: 5/5   Hip abduction: 4/5 Hip abduction: 4/5   Hip adduction: 4-/5 Hip adduction 5/5   Hip extension: 4/5 Hip extension: 4-/5     Upper extremity strength:     Right Left   Shoulder Flexion: 4+/5 5/5   Shoulder Abduction: 4+/5 4+/5   Elbow Flexion: 5/5 5/5   Elbow Extension: 5/5 5/5    5/5 5/5     30 sec sit to stand: 7 with use of UEs on chair    Bed mobility: mod I with all for increased time    Transfers: sit <> stand with CGA and use of UE support    TUG w RW and UE use for sit to stand: 37 seconds    SANDERS Assessment  1. Sitting to Standin - able to stand using hands after several tries  2. Standing Unsupported: 1 - needs several tries to stand 30 sec unsupported  3. Sitting Unsupported: 4 - able to sit safely and securely 2 minutes  4. Standing to Sittin - uses back of legs against chair to control descent  5. Pivot Transfer: 2 - able to transfer with verbal cuing and/or supervision  6. Standing with Eyes " Closed: 3 - able to stand 10 seconds with supervision  7. Standing with Feet Together: 0 - needs help to attain position and unable to hold for 15 seconds  8. Reaching Forward with Outstretched Arm: 3 - can reach forward 12 cm/5 inches safely  9. Retrieving Object from Floor: 0 - loses balance while trying, requires external support  10. Turning to Look Behind: 1 - needs supervision when turning  11. Turning 360 Degrees: 0 - needs assistance while turning  12. Placing Alternate Foot on Step: 0 - needs assist to keep from falling/unable to try  13. Standing with One Foot in Front: 0 - Loses balance while stepping or standing  14. Standing on One Foot: 0 - unable to try or needs assistance to prevent fall  Total: 18  Maximum: 56    Coordination:   Point to Point:    -Finger to Nose: B mildly impaired   -Drawing square w foot: B mildly impaired    Balance Assessment:-Single Leg Stance:    Right : 0 seconds   Left:  0 seconds    Flexibility:    Ely's test: B WNL   Hamstring length 90/90: R = -25 degrees ; L = -25 degrees    Sensation: B LEs grossly intact to light touch    Gait Assessment:   · AD used: RW; Assistance: SBA; Distance: 100'    · GAIT DEVIATIONS:   [employer] displays the following deviations with ambulation:    Impairments contributing to deviations: impaired motor control, excessive forward trunk lean, decreased B heel strike, decreased B hip and knee flexion    Functional Limitations Reports:  CMS Impairment/Limitation/Restriction for FOTO Cerebrovascular Disorders Survey  Status Limitation G-Code CMS Severity Modifier  Intake 34% 66% Current Status CL - At least 60 percent but less than 80 percent  Predicted 49% 51% Goal Status+ CK - At least 40 percent but less than 60 percent  +Based on FOTO predicted change score    PT Evaluation Completed? Yes  Discussed Plan of Care with patient: Yes    Assessment     This is a 62 y.o. 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 Pt presents to clinic with LE and UE weakness, decreased coordination, postural imbalance, muscle tightness, and impaired gait, balance, functional mobility, and endurance. Quiroz balance score 18/56 indicating high fall risk, TUG w RW 37 seconds, and 30 second sit to stand test score 7. Pt will benefit from physcial therapy services in order to maximize functional independence. The following goals were discussed with the patient and patient is in agreement with them as to be addressed in the treatment plan. Pt demonstrating dysphagia and UE weakness/decreased coordination and could benefit from OT and speech referral.     History  Co-morbidities and personal factors that may impact the plan of care Examination  Body Structures and Functions, activity limitations and participation restrictions that may impact the plan of care Clinical Presentation   Decision Making/ Complexity Score   Co-morbidities:     BMI    CAD    Uncontrolled DM    Personal Factors:     Sedentary lifestyle Body Regions: B LEs and UEs    Body Systems: musculoskeletal (muscle weakness, muscle tightness, postural imbalance)    Neuromuscular (decreased coordination, impaired gait, balance, functional mobility, and endurance)    Activity limitations: walking long distances, prolonged standing, ADLs, household chores, stair ambulation    Participation Restrictions: ADLs, IADLs, domestic duties   Stable and predictable   Low     Prognosis: fair to good    Anticipated barriers to physical therapy: none    Medical necessity is demonstrated by the following IMPAIRMENTS/PROBLEM LIST:   1) Impaired functional mobility/balance   2) LE weakness   3) Difficulty walking   4) Decreased standing endurance   5) Lack of HEP    GOALS: Short Term Goals:  6 weeks  1. Pt will be able to perform sit <> stand transfers without use of UE support from elevated surface to  improve functional mobility and transfers.  2. Pt will be able to tolerate multi-directional LE strengthening in order to improve ability to perform household chores.  3. Pt will report 50% improvement in ability to walk long distances since start of care to indicate improved functional mobility.   4. Pt will be able to stand 1 minute without LOB and no UE support to indicate improved standing endurance.   5. Pt to tolerate HEP to improve ROM and independence with ADL's    Long Term Goals: 12 weeks  1. Pt will improved Quiroz score to 30/56 to indicate improved static and dynamic balance and decreased fall risk.   2. Pt will be able to perform 2 x 10 multi-directional LE strengthening without fatigue in order to improve ability to perform household chores.  3. Pt will report 80% improvement in ability to walk long distances since start of care to indicate improved functional mobility.   4. Pt will be able to perform therapeutic exercises in standing for 25% of treatment to indicate improved standing endurance.  5. Pt to be Independent with HEP to improve ROM and independence with ADL's    Plan     Pt will be treated by physical therapy 1-3 times a week for 12 weeks for Pt Education, HEP, therapeutic exercises, neuromuscular re-education, joint mobilizations, modalities prn to achieve established goals. Nicolas may at times be seen by a PTA as part of the Rehab Team. Cont PT for 12 weeks.     I certify the need for these services furnished under this plan of treatment and while under my care.______________________________ Physician/Referring Practitioner  Date of Signature

## 2018-05-11 NOTE — PLAN OF CARE
Physical Therapy Evaluation    [facility]  [facility] Number: [id number]      Diagnosis:   Encounter Diagnoses   Name Primary?    Impaired functional mobility, balance, gait, and endurance     Muscle weakness of lower extremity     Decreased coordination      Physician: Roge Arora MD  Treatment Orders: PT Eval and Treat    Past Medical History:   Diagnosis Date    Anticoagulant long-term use     Arthritis     Cancer of kidney, right     Coronary artery disease     Diabetes mellitus     Hypertension     Kidney stone     Sleep apnea     Slurred speech     Stroke     MINI STROKE    TIA (transient ischemic attack)     Wears glasses      Current Outpatient Prescriptions   Medication Sig    aspirin (ECOTRIN) 81 MG EC tablet Take 81 mg by mouth once daily. LAST TAKEN 3/2/16    azelastine (ASTELIN) 137 mcg (0.1 %) nasal spray 1 spray (137 mcg total) by Nasal route 2 (two) times daily.    clopidogrel (PLAVIX) 75 mg tablet Take 75 mg by mouth every morning. LAST DOSE 3/2/16    docusate sodium (STOOL SOFTENER) 100 MG capsule Take 1 capsule (100 mg total) by mouth 2 (two) times daily.    dulaglutide (TRULICITY) 1.5 mg/0.5 mL PnIj Inject 1.5 mg into the skin every 7 days.    finasteride (PROSCAR) 5 mg tablet TAKE ONE TABLET BY MOUTH ONCE DAILY IN THE MORNING    furosemide (LASIX) 40 MG tablet Take 40 mg by mouth 2 (two) times daily. ALTERNATES 1 TABLET ONE DAY AND 2 TABLETS THE NEXT--ALTERNATING DAYS    gabapentin (NEURONTIN) 600 MG tablet Take 1 tablet (600 mg total) by mouth 3 (three) times daily.    gemfibrozil (LOPID) 600 MG tablet Take 1 tablet (600 mg total) by mouth 2 (two) times daily before meals.    glimepiride (AMARYL) 4 MG tablet Take 1 tablet (4 mg total) by mouth before breakfast.    isosorbide mononitrate (IMDUR) 60 MG 24 hr tablet Take 60 mg by mouth every morning.     levocetirizine (XYZAL) 5 MG tablet Take 1 tablet (5 mg total) by mouth every evening.     "metoprolol succinate (TOPROL-XL) 100 MG 24 hr tablet Take 100 mg by mouth every morning.     nateglinide (STARLIX) 60 MG tablet 60 mg 2 (two) times daily before meals.     NITROSTAT 0.4 mg SL tablet Place 0.4 mg under the tongue every 5 (five) minutes as needed.     oxyCODONE-acetaminophen (PERCOCET) 5-325 mg per tablet Take 1 tablet by mouth every 4 (four) hours as needed for Pain.    prochlorperazine (COMPAZINE) 10 MG tablet Take 1 tablet (10 mg total) by mouth 3 (three) times daily as needed.    rosuvastatin (CRESTOR) 10 MG tablet Take 10 mg by mouth every evening.    tamsulosin (FLOMAX) 0.4 mg Cp24 TAKE ONE CAPSULE BY MOUTH ONCE DAILY    TRAVATAN Z 0.004 % Drop 1 drop every evening.     valsartan (DIOVAN) 80 MG tablet      Current Facility-Administered Medications   Medication    lidocaine (PF) 10 mg/ml (1%) injection 10 mg     Review of patient's allergies indicates:  No Known Allergies  Precautions: uncontrolled DM, fall risk    Evaluation Date: 18  Visit # authorized: 20  Authorization period: 18    Royal Valenzuela is a 62 y.o. 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     Pt reports having heart attack about 8 years ago  Falls about 1 every 2 weeks  Pt having difficulty swallowing and speech and decreased strength and coordination in UEs    Patient c/o: imbalance and difficulty walking  Onset: diagnosed with cerebellar degeneration about 1 year ago   Pain Scale: back pain currently 0/10, worst at 10/10, best at 0/10  Aggravating factors: standing  Relieving factors: sitting  Previous treatment: PT about 3 years ago at [facility] for back pain with little improvements  Imagin18 brain MRI revealed: "1. Cerebellar atrophy.  2. Periventricular white matter changes most likely chronic microvascular ischemia.  3. No acute intracranial processes."  18 head CT revealed: "No acute intracranial abnormality."  Past " "surgical history: 4 stent placements (last being 5 years ago), kidney removal about 2 years ago, lumbar surgery about 10 years ago  Functional deficits: walking long distances, prolonged standing, ADLs, household chores, stair ambulation  Prior level of function: independent with all ADLs  Occupation: retired  Environment: 1 story trailer with ramp steps to enter, has rollator, RW, and shower chair, lives with wife, 2 grandchildren  No cultural or spiritual barriers identified to treatment or learning.  Patient's goals: "to improve my walking"    Objective     Observation: pleasant and cooperative, dysphagia    Posture Alignment :slouched posture  Dominant hand:  left     ROM:   UPPER EXTREMITY--AROM/PROM  (R) UE: WNLs  (L) UE: WNLs     Lower Extremity Strength  Right LE  Left LE    Hip flexion:  5/5 Hip flexion: 4+/5   Knee extension: 5/5 Knee extension: 4+/5   Knee flexion: 5/5 Knee flexion: 5/5   Ankle dorsiflexion:  5/5 Ankle dorsiflexion: 5/5   Ankle plantarflexion:  5/5 Ankle plantarflexion: 5/5   Hip abduction: 4/5 Hip abduction: 4/5   Hip adduction: 4-/5 Hip adduction 5/5   Hip extension: 4/5 Hip extension: 4-/5     Upper extremity strength:     Right Left   Shoulder Flexion: 4+/5 5/5   Shoulder Abduction: 4+/5 4+/5   Elbow Flexion: 5/5 5/5   Elbow Extension: 5/5 5/5    5/5 5/5     30 sec sit to stand: 7 with use of UEs on chair    Bed mobility: mod I with all for increased time    Transfers: sit <> stand with CGA and use of UE support    TUG w RW and UE use for sit to stand: 37 seconds    SANDERS Assessment  1. Sitting to Standin - able to stand using hands after several tries  2. Standing Unsupported: 1 - needs several tries to stand 30 sec unsupported  3. Sitting Unsupported: 4 - able to sit safely and securely 2 minutes  4. Standing to Sittin - uses back of legs against chair to control descent  5. Pivot Transfer: 2 - able to transfer with verbal cuing and/or supervision  6. Standing with Eyes " Closed: 3 - able to stand 10 seconds with supervision  7. Standing with Feet Together: 0 - needs help to attain position and unable to hold for 15 seconds  8. Reaching Forward with Outstretched Arm: 3 - can reach forward 12 cm/5 inches safely  9. Retrieving Object from Floor: 0 - loses balance while trying, requires external support  10. Turning to Look Behind: 1 - needs supervision when turning  11. Turning 360 Degrees: 0 - needs assistance while turning  12. Placing Alternate Foot on Step: 0 - needs assist to keep from falling/unable to try  13. Standing with One Foot in Front: 0 - Loses balance while stepping or standing  14. Standing on One Foot: 0 - unable to try or needs assistance to prevent fall  Total: 18  Maximum: 56    Coordination:   Point to Point:    -Finger to Nose: B mildly impaired   -Drawing square w foot: B mildly impaired    Balance Assessment:-Single Leg Stance:    Right : 0 seconds   Left:  0 seconds    Flexibility:    Ely's test: B WNL   Hamstring length 90/90: R = -25 degrees ; L = -25 degrees    Sensation: B LEs grossly intact to light touch    Gait Assessment:   · AD used: RW; Assistance: SBA; Distance: 100'    · GAIT DEVIATIONS:   Smallaa displays the following deviations with ambulation:    Impairments contributing to deviations: impaired motor control, excessive forward trunk lean, decreased B heel strike, decreased B hip and knee flexion    Functional Limitations Reports:  CMS Impairment/Limitation/Restriction for FOTO Cerebrovascular Disorders Survey  Status Limitation G-Code CMS Severity Modifier  Intake 34% 66% Current Status CL - At least 60 percent but less than 80 percent  Predicted 49% 51% Goal Status+ CK - At least 40 percent but less than 60 percent  +Based on FOTO predicted change score    PT Evaluation Completed? Yes  Discussed Plan of Care with patient: Yes    Assessment     This is a 62 y.o. 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 Pt presents to clinic with LE and UE weakness, decreased coordination, postural imbalance, muscle tightness, and impaired gait, balance, functional mobility, and endurance. Quiroz balance score 18/56 indicating high fall risk, TUG w RW 37 seconds, and 30 second sit to stand test score 7. Pt will benefit from physcial therapy services in order to maximize functional independence. The following goals were discussed with the patient and patient is in agreement with them as to be addressed in the treatment plan. Pt demonstrating dysphagia and UE weakness/decreased coordination and could benefit from OT and speech referral.     History  Co-morbidities and personal factors that may impact the plan of care Examination  Body Structures and Functions, activity limitations and participation restrictions that may impact the plan of care Clinical Presentation   Decision Making/ Complexity Score   Co-morbidities:     BMI    CAD    Uncontrolled DM    Personal Factors:     Sedentary lifestyle Body Regions: B LEs and UEs    Body Systems: musculoskeletal (muscle weakness, muscle tightness, postural imbalance)    Neuromuscular (decreased coordination, impaired gait, balance, functional mobility, and endurance)    Activity limitations: walking long distances, prolonged standing, ADLs, household chores, stair ambulation    Participation Restrictions: ADLs, IADLs, domestic duties   Stable and predictable   Low     Prognosis: fair to good    Anticipated barriers to physical therapy: none    Medical necessity is demonstrated by the following IMPAIRMENTS/PROBLEM LIST:   1) Impaired functional mobility/balance   2) LE weakness   3) Difficulty walking   4) Decreased standing endurance   5) Lack of HEP    GOALS: Short Term Goals:  6 weeks  1. Pt will be able to perform sit <> stand transfers without use of UE support from elevated surface to  improve functional mobility and transfers.  2. Pt will be able to tolerate multi-directional LE strengthening in order to improve ability to perform household chores.  3. Pt will report 50% improvement in ability to walk long distances since start of care to indicate improved functional mobility.   4. Pt will be able to stand 1 minute without LOB and no UE support to indicate improved standing endurance.   5. Pt to tolerate HEP to improve ROM and independence with ADL's    Long Term Goals: 12 weeks  1. Pt will improved Quiroz score to 30/56 to indicate improved static and dynamic balance and decreased fall risk.   2. Pt will be able to perform 2 x 10 multi-directional LE strengthening without fatigue in order to improve ability to perform household chores.  3. Pt will report 80% improvement in ability to walk long distances since start of care to indicate improved functional mobility.   4. Pt will be able to perform therapeutic exercises in standing for 25% of treatment to indicate improved standing endurance.  5. Pt to be Independent with HEP to improve ROM and independence with ADL's    Plan     Pt will be treated by physical therapy 1-3 times a week for 12 weeks for Pt Education, HEP, therapeutic exercises, neuromuscular re-education, joint mobilizations, modalities prn to achieve established goals. Nicolas may at times be seen by a PTA as part of the Rehab Team. Cont PT for 12 weeks.     I certify the need for these services furnished under this plan of treatment and while under my care.______________________________ Physician/Referring Practitioner  Date of Signature

## 2018-05-11 NOTE — TELEPHONE ENCOUNTER
Returned call and informed patient that no labs are due as far as Health Maintenance but not to say that labs are not needed after the visit. Informed patient to just wait until she sees the provider to see if labs are needed. Voiced understanding.

## 2018-05-22 ENCOUNTER — CLINICAL SUPPORT (OUTPATIENT)
Dept: REHABILITATION | Facility: HOSPITAL | Age: 63
End: 2018-05-22
Attending: NEUROLOGICAL SURGERY
Payer: COMMERCIAL

## 2018-05-22 DIAGNOSIS — R27.8 DECREASED COORDINATION: ICD-10-CM

## 2018-05-22 DIAGNOSIS — M62.81 MUSCLE WEAKNESS OF LOWER EXTREMITY: ICD-10-CM

## 2018-05-22 DIAGNOSIS — Z74.09 IMPAIRED FUNCTIONAL MOBILITY, BALANCE, GAIT, AND ENDURANCE: Primary | ICD-10-CM

## 2018-05-22 PROCEDURE — 97116 GAIT TRAINING THERAPY: CPT | Mod: PN

## 2018-05-22 PROCEDURE — 97112 NEUROMUSCULAR REEDUCATION: CPT | Mod: PN

## 2018-05-22 PROCEDURE — 97110 THERAPEUTIC EXERCISES: CPT | Mod: PN

## 2018-05-22 NOTE — PROGRESS NOTES
Physical Therapy Daily Note     Name: Nicolas Flaherty  Clinic Number: [id number]  Diagnosis:   Encounter Diagnoses   Name Primary?    Impaired functional mobility, balance, gait, and endurance Yes    Muscle weakness of lower extremity     Decreased coordination      Physician: Roge Arora MD  Precautions: fall   Visit #: 2 of 20  PTA Visit #: 1  Time In: 0800  Time Out: 0856  Total Treatment Time:  56' ( 1:1 with PTA for duration of session)     Evaluation Date: 5/11/18  Visit # authorized: 20  Authorization period: 12/31/18    Subjective     Pt reports: that he is okay today.    Pain Scale: Nicolas rates pain on a scale of 0-10 to be 0 currently.    Objective     Nicolas received individual therapeutic exercises to develop strength, endurance, ROM, flexibility, posture and core stabilization for 35 minutes including:    Nustep x 8'   Standing HR 2 x 10   Standing marches x 20   Standing Hip abduction 1 x 10   Standing Hip extension 1 x 15   Bridges 2 x 10   BFKO 2 x 10   HL hip adduction 3' x 3''     Sit<>stand 2 x 5     Nicolas received the following Neuromuscular Reeducation for 20 minutes including:    NBOS 3 x 30''  Tandem Stance 2 x 30''  Modified SLS 3 x 30'' with unilat UE support  DLS 3 x 30''   Cone Taps 1 x 10       The patient received the following Gait training x 5'       100' /w RW and close SBA       Written Home Exercises Provided: none today  Pt demo good understanding of the education provided. Nicolas demonstrated good return demonstration of activities.     Education provided re:  Nicolas verbalized good understanding of education provided.   No spiritual or educational barriers to learning provided    Assessment         Patient tolerated treatment session well today.  Patient demonstrated poor- fair balance with most difficulty noted with decreased base of support activities.  Pt require BUE and unilateral UE assist to  maintain balance.  Patient with decreased neuromuscular control of BLE R>L with supine exercises.  Pt required CGA with all balance activities.  Plan to progress with core and hip strengthening, gait training and balance activities.   This is a 62 y.o. 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 Pt prognosis is Fair. Pt will continue to benefit from skilled outpatient physical therapy to address the deficits listed in the problem list, provide pt/family education and to maximize pt's level of independence in the home and community environment.     Goals as follows:  GOALS: Short Term Goals:  6 weeks  1. Pt will be able to perform sit <> stand transfers without use of UE support from elevated surface to improve functional mobility and transfers.  2. Pt will be able to tolerate multi-directional LE strengthening in order to improve ability to perform household chores.  3. Pt will report 50% improvement in ability to walk long distances since start of care to indicate improved functional mobility.   4. Pt will be able to stand 1 minute without LOB and no UE support to indicate improved standing endurance.   5. Pt to tolerate HEP to improve ROM and independence with ADL's     Plan     Continue with established Plan of Care towards PT goals.    Therapist: Johanne Herrera, PTA  5/22/2018

## 2018-05-23 ENCOUNTER — HOSPITAL ENCOUNTER (OUTPATIENT)
Dept: RADIOLOGY | Facility: HOSPITAL | Age: 63
Discharge: HOME | End: 2018-05-23
Attending: FAMILY MEDICINE
Payer: COMMERCIAL

## 2018-05-23 ENCOUNTER — OFFICE VISIT (OUTPATIENT)
Dept: FAMILY MEDICINE | Facility: CLINIC | Age: 63
End: 2018-05-23
Payer: COMMERCIAL

## 2018-05-23 VITALS
OXYGEN SATURATION: 96 % | HEIGHT: 67 IN | BODY MASS INDEX: 42.56 KG/M2 | WEIGHT: 271.19 LBS | HEART RATE: 86 BPM | DIASTOLIC BLOOD PRESSURE: 64 MMHG | SYSTOLIC BLOOD PRESSURE: 100 MMHG | TEMPERATURE: 99 F

## 2018-05-23 DIAGNOSIS — T17.320A CHOKING DUE TO FOOD (REGURGITATED), INITIAL ENCOUNTER: Primary | ICD-10-CM

## 2018-05-23 DIAGNOSIS — K21.9 GASTROESOPHAGEAL REFLUX DISEASE, ESOPHAGITIS PRESENCE NOT SPECIFIED: ICD-10-CM

## 2018-05-23 DIAGNOSIS — R29.818 FOCAL NEUROLOGICAL DEFICIT: ICD-10-CM
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
## 2018-05-23 PROCEDURE — 99214 OFFICE O/P EST MOD 30 MIN: CPT | Mod: S$GLB,,, | Performed by: FAMILY MEDICINE

## 2018-05-23 PROCEDURE — 70450 CT HEAD/BRAIN W/O DYE: CPT | Mod: TC

## 2018-05-23 PROCEDURE — 70450 CT HEAD/BRAIN W/O DYE: CPT | Mod: 26,,, | Performed by: RADIOLOGY

## 2018-05-23 PROCEDURE — 99999 PR PBB SHADOW E&M-EST. PATIENT-LVL III: CPT | Mod: PBBFAC,,, | Performed by: FAMILY MEDICINE

## 2018-05-23 NOTE — PROGRESS NOTES
"Subjective:       Patient ID: Nicolas Flaherty is a 62 y.o. male.    Chief Complaint: 1 month follow up; Discuss SOB/ Cough; and Diarrhea    62 year-old male with a history of stroke and ataxia, CKD STAGE 3, DIABETES MELLITUS TYPE 2, hypertension  states when he eats it feels like his food is getting stuck, he starts choking and his food comes up. He states he has been taking a sinus medication and sinus spray, but it doesn't help when he is eating. He is sneezing while he is eating as well. He chokes on liquids as well. He doesn't choke on pills.     He has diarrhea sine last night. He has taken immodium and it stopped. He had diarrhea again and now has diarrhea.       Review of Systems    Objective:     \  Vitals:    05/23/18 0945   BP: 100/64   Pulse: 86   Temp: 98.6 °F (37 °C)   TempSrc: Oral   SpO2: 96%   Weight: 123 kg (271 lb 2.7 oz)   Height: 5' 7" (1.702 m)       Physical Exam   Constitutional: He is oriented to person, place, and time. He appears well-developed and well-nourished. No distress.   HENT:   Head: Normocephalic and atraumatic.   Eyes: Conjunctivae are normal.   Neck: Normal range of motion. Neck supple.   Cardiovascular: Normal rate, regular rhythm and normal heart sounds.  Exam reveals no gallop and no friction rub.    No murmur heard.  Pulmonary/Chest: Effort normal and breath sounds normal. No respiratory distress. He has no wheezes. He has no rales.   Neurological: He is alert and oriented to person, place, and time.   Skin: He is not diaphoretic.       Assessment:       1. Choking due to food (regurgitated), initial encounter    2. Focal neurological deficit    3. Gastroesophageal reflux disease, esophagitis presence not specified        Plan:       Nicolas was seen today for 1 month follow up, discuss sob/ cough and diarrhea.    Diagnoses and all orders for this visit:    Choking due to food (regurgitated), initial encounter  -     Fl Modified Barium Swallow Speech; Future  -     " SLP video swallow; Future  Ordering swallow study to make sure devaughn the is not aspirating    Focal neurological deficit  -     CT Head Without Contrast; Future    Gastroesophageal reflux disease, esophagitis presence not specified  -     ranitidine (ZANTAC) 150 MG tablet; Take 1 tablet (150 mg total) by mouth 2 (two) times daily.  Ordering zantac as needed for reflux.

## 2018-05-25 ENCOUNTER — CLINICAL SUPPORT (OUTPATIENT)
Dept: REHABILITATION | Facility: HOSPITAL | Age: 63
End: 2018-05-25
Attending: NEUROLOGICAL SURGERY
Payer: COMMERCIAL

## 2018-05-25 DIAGNOSIS — M62.81 MUSCLE WEAKNESS OF LOWER EXTREMITY: ICD-10-CM

## 2018-05-25 DIAGNOSIS — Z74.09 IMPAIRED FUNCTIONAL MOBILITY, BALANCE, GAIT, AND ENDURANCE: ICD-10-CM

## 2018-05-25 DIAGNOSIS — R27.8 DECREASED COORDINATION: ICD-10-CM

## 2018-05-25 PROCEDURE — 97110 THERAPEUTIC EXERCISES: CPT | Mod: PN

## 2018-05-25 PROCEDURE — 97112 NEUROMUSCULAR REEDUCATION: CPT | Mod: PN

## 2018-05-25 NOTE — PROGRESS NOTES
"                                                    Physical Therapy Daily Note     Name: Nicolas Flaherty  Clinic Number: [id number]  Diagnosis:   Encounter Diagnoses   Name Primary?    Impaired functional mobility, balance, gait, and endurance     Muscle weakness of lower extremity     Decreased coordination      Physician: Roge Arora MD  Precautions: fall   Visit #: 3 of 20  PTA Visit #: 0  Time In: 0800  Time Out: 0850  Total Treatment Time:  50' ( 1:1 with PT for duration of session)     Evaluation Date: 5/11/18  Visit # authorized: 20  Authorization period: 12/31/18     Subjective     Pt reports: that he is okay today.  Patient indicates that he did not take his medication this morning due to having to take a fluid pill and he would be running back and forth to the bathroom during his session, which he does not want.     Pain Scale: Nicolas rates pain on a scale of 0-10 to be 0 currently.    Objective     Nicolas received individual therapeutic exercises to develop strength, endurance, ROM, flexibility, posture and core stabilization for 30 minutes including:    Nustep x 8'   Standing HR 2 x 10   Standing marches x 20   Standing Hip abduction 1 x 10   Standing Hip extension 1 x 15   Bridges 2 x 10   BFKO 2 x 10   HL hip adduction 3' x 3''   +Step ups 4" step BUE in // bars   Sit<>stand 2 x 5     Nicolas received the following Neuromuscular Reeducation for 15 minutes including:    NBOS 3 x 30''  Tandem Stance 2 x 30''  Modified SLS 3 x 30'' with unilat UE support  DLS 3 x 30''   Cone Taps 1 x 10     The patient received the following Gait training x 5'       100' /w RW and close SBA       Written Home Exercises Provided: none today  Pt demo good understanding of the education provided. Nicolas demonstrated good return demonstration of activities.     Education provided re:  Nicolas verbalized good understanding of education provided.   No spiritual or educational barriers to learning " provided    Assessment         Patient tolerated treatment session well today.  Patient continues to require CGA to min A with balance activities performed in clinic today. Patient able to engage in new task during today's treatment to improve strength and coordination. Patient demonstrated good strength with activity on his left LE when stepping up with concentric control but noted discrepancy with his right LE stepping up. Patient was able to achieve proper therapeutic effect with today's training session.      This is a 62 y.o. 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 Pt prognosis is Fair. Pt will continue to benefit from skilled outpatient physical therapy to address the deficits listed in the problem list, provide pt/family education and to maximize pt's level of independence in the home and community environment.     Goals as follows:  GOALS: Short Term Goals:  6 weeks  1. Pt will be able to perform sit <> stand transfers without use of UE support from elevated surface to improve functional mobility and transfers.  2. Pt will be able to tolerate multi-directional LE strengthening in order to improve ability to perform household chores.  3. Pt will report 50% improvement in ability to walk long distances since start of care to indicate improved functional mobility.   4. Pt will be able to stand 1 minute without LOB and no UE support to indicate improved standing endurance.   5. Pt to tolerate HEP to improve ROM and independence with ADL's     Plan     Continue with established Plan of Care towards PT goals.    Therapist: Wero Turner, PT  5/25/2018

## 2018-05-28 ENCOUNTER — TELEPHONE (OUTPATIENT)
Dept: FAMILY MEDICINE | Facility: CLINIC | Age: 63
End: 2018-05-28

## 2018-05-28 ENCOUNTER — CLINICAL SUPPORT (OUTPATIENT)
Dept: REHABILITATION | Facility: HOSPITAL | Age: 63
End: 2018-05-28
Attending: NEUROLOGICAL SURGERY
Payer: COMMERCIAL

## 2018-05-28 DIAGNOSIS — G11.9 ATAXIA DUE TO CEREBELLAR DEGENERATION: Primary | ICD-10-CM

## 2018-05-28 DIAGNOSIS — M62.81 MUSCLE WEAKNESS OF LOWER EXTREMITY: ICD-10-CM

## 2018-05-28 DIAGNOSIS — Z74.09 IMPAIRED FUNCTIONAL MOBILITY, BALANCE, GAIT, AND ENDURANCE: Primary | ICD-10-CM

## 2018-05-28 DIAGNOSIS — R27.8 DECREASED COORDINATION: ICD-10-CM

## 2018-05-28 PROCEDURE — 97112 NEUROMUSCULAR REEDUCATION: CPT | Mod: PN

## 2018-05-28 PROCEDURE — G8988 SELF CARE GOAL STATUS: HCPCS | Mod: CI,PN

## 2018-05-28 PROCEDURE — 97110 THERAPEUTIC EXERCISES: CPT | Mod: PN

## 2018-05-28 PROCEDURE — G8987 SELF CARE CURRENT STATUS: HCPCS | Mod: CI,PN

## 2018-05-28 PROCEDURE — G8989 SELF CARE D/C STATUS: HCPCS | Mod: CI,PN

## 2018-05-28 PROCEDURE — 97165 OT EVAL LOW COMPLEX 30 MIN: CPT | Mod: PN

## 2018-05-28 NOTE — TELEPHONE ENCOUNTER
----- Message from Tresa Ricardo sent at 5/28/2018 11:48 AM CDT -----  Contact: Self  Pt requesting CAT SCAN results. [phone].

## 2018-05-28 NOTE — PROGRESS NOTES
"                                                    Physical Therapy Daily Note     Name: Nicolas Flaherty  Clinic Number: [id number]  Diagnosis:   Encounter Diagnoses   Name Primary?    Impaired functional mobility, balance, gait, and endurance Yes    Muscle weakness of lower extremity     Decreased coordination      Physician: Roge Arora MD  Precautions: fall   Visit #: 4 of 20  PTA Visit #: 1  Time In: 1055  Time Out: 1155  Total Treatment Time:  60' ( 1:1 with PTA for duration of session)     Evaluation Date: 5/11/18  Visit # authorized: 20  Authorization period: 12/31/18     Subjective     Pt reports: that he is sore from a fall he had yesterday when he was trying to turn around.  Pt states that he has a bruise on his knee but he is ok.     Pain Scale: Nicolas rates pain on a scale of 0-10 to be 0 currently.    Objective     Nicolas received individual therapeutic exercises to develop strength, endurance, ROM, flexibility, posture and core stabilization for 40 minutes including:    Nustep x 8'   Standing HR 2 x 10   Standing marches x 20   Standing Hip abduction 1 x 10   Standing Hip extension 1 x 15   Bridges 2 x 10   BFKO 2 x 10   HL hip adduction 3' x 3''   Step ups 4" step BUE in // bars   Sit<>stand 2 x 5     Nicolas received the following Neuromuscular Reeducation for 15 minutes including:    NBOS 3 x 30''  Tandem Stance 2 x 30''  Modified SLS 3 x 30'' with unilat UE support  DLS 3 x 30''   Cone Taps 1 x 10     The patient received the following Gait training x 5'       100' /w RW and close SBA       Written Home Exercises Provided: none today  Pt demo good understanding of the education provided. Nicolas demonstrated good return demonstration of activities.     Education provided re:  Nicolas verbalized good understanding of education provided.   No spiritual or educational barriers to learning provided    Assessment     Patient tolerated treatment session well today.  Patient " responded fairly well to exercises with slight difficulty performing isolated hip strengthening exercises without compensation.  Patient continues to required CGA with balance activities, particularly with decreased base of support.  Plan to progress patient with strengthening and balance activities as tolerated.    This is a 62 y.o. 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 Pt prognosis is Fair. Pt will continue to benefit from skilled outpatient physical therapy to address the deficits listed in the problem list, provide pt/family education and to maximize pt's level of independence in the home and community environment.     Goals as follows:  GOALS: Short Term Goals:  6 weeks  1. Pt will be able to perform sit <> stand transfers without use of UE support from elevated surface to improve functional mobility and transfers.  2. Pt will be able to tolerate multi-directional LE strengthening in order to improve ability to perform household chores.  3. Pt will report 50% improvement in ability to walk long distances since start of care to indicate improved functional mobility.   4. Pt will be able to stand 1 minute without LOB and no UE support to indicate improved standing endurance.   5. Pt to tolerate HEP to improve ROM and independence with ADL's     Plan     Continue with established Plan of Care towards PT goals.    Therapist: Johanne Herrera, PTA  5/28/2018

## 2018-05-28 NOTE — PROGRESS NOTES
Occupational Therapy Evaluation    Name: Nicolas Flaherty  MRN: [id number]   Physician: Roge Arora MD     Diagnosis:   Encounter Diagnosis   Name Primary?    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 Yes      Onset date: this year    Date of service: 5/28/18  Start time: 3:05 pm  End time: 4:10 pm  Total treatment time 65 min    Orders: Eval and Treat    Subjective:  Patient goals: not sure  Chief Complaint: Sometimes can't get hands to hold onto things.  Sometimes he has problems with replacing bottle caps.  Chief Complaint   Patient presents with    OT Initial Evaluation      Past Medical History:   Diagnosis Date    Anticoagulant long-term use     Arthritis     Cancer of kidney, right     Coronary artery disease     Diabetes mellitus     Hypertension     Kidney stone     Sleep apnea     Slurred speech     Stroke     MINI STROKE    TIA (transient ischemic attack)     Wears glasses       Current Outpatient Prescriptions   Medication Sig    aspirin (ECOTRIN) 81 MG EC tablet Take 81 mg by mouth once daily. LAST TAKEN 3/2/16    azelastine (ASTELIN) 137 mcg (0.1 %) nasal spray 1 spray (137 mcg total) by Nasal route 2 (two) times daily.    clopidogrel (PLAVIX) 75 mg tablet Take 75 mg by mouth every morning. LAST DOSE 3/2/16    docusate sodium (STOOL SOFTENER) 100 MG capsule Take 1 capsule (100 mg total) by mouth 2 (two) times daily.    dulaglutide (TRULICITY) 1.5 mg/0.5 mL PnIj Inject 1.5 mg into the skin every 7 days.    finasteride (PROSCAR) 5 mg tablet TAKE ONE TABLET BY MOUTH ONCE DAILY IN THE MORNING    furosemide (LASIX) 40 MG tablet Take 40 mg by mouth 2 (two) times daily. ALTERNATES 1 TABLET ONE DAY AND 2 TABLETS THE NEXT--ALTERNATING DAYS    gabapentin (NEURONTIN) 600 MG tablet Take 1 tablet (600 mg total) by mouth 3 (three) times daily.    gemfibrozil (LOPID) 600 MG tablet Take 1 tablet (600 mg total) by mouth 2 (two) times daily before meals.    glimepiride (AMARYL) 4 MG tablet  Take 1 tablet (4 mg total) by mouth before breakfast.    isosorbide mononitrate (IMDUR) 60 MG 24 hr tablet Take 60 mg by mouth every morning.     levocetirizine (XYZAL) 5 MG tablet Take 1 tablet (5 mg total) by mouth every evening.    metoprolol succinate (TOPROL-XL) 100 MG 24 hr tablet Take 100 mg by mouth every morning.     nateglinide (STARLIX) 60 MG tablet 60 mg 2 (two) times daily before meals.     NITROSTAT 0.4 mg SL tablet Place 0.4 mg under the tongue every 5 (five) minutes as needed.     oxyCODONE-acetaminophen (PERCOCET) 5-325 mg per tablet Take 1 tablet by mouth every 4 (four) hours as needed for Pain.    prochlorperazine (COMPAZINE) 10 MG tablet Take 1 tablet (10 mg total) by mouth 3 (three) times daily as needed.    ranitidine (ZANTAC) 150 MG tablet Take 1 tablet (150 mg total) by mouth 2 (two) times daily.    rosuvastatin (CRESTOR) 10 MG tablet Take 10 mg by mouth every evening.    tamsulosin (FLOMAX) 0.4 mg Cp24 TAKE ONE CAPSULE BY MOUTH ONCE DAILY    TRAVATAN Z 0.004 % Drop 1 drop every evening.    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    Precautions:  Pain: pt is not having any pain in his UE's  Dominant hand: left     Occupation/hobbies/homemaking: playing on his phone and watch television  Job description retired [employer]   Driving status: inactive    Objective  Cognitive Exam  Oriented: Person, Place, Time and Situation  Behaviors: appropriate for situation  Follows  Commands/attention: ability to follow all directives during evaluation  Communication: slurred speech  Memory: not tested  Safety awareness/insight to disability: awareness  Coping skills/emotional control: Appropriate to situation    Visual/perceptual:  Tracking: intact  Acuity: wears glasses  R/L discrimination: intact  Visual field: intact  Motor Planning Praxis: intact  Comments:     Physical Exam:    Postural examination/scapula alignment: Rounded shoulder and Head forward  Joint integrity: intact   Skin integrity: Bruising of forearms and elbows as well as skin tears.  Edema: none noted    Sensation: Nicolas reports no numbness or tingling in UE's    Joint Evaluation AROM    Left and RIGHt   Shoulder flex 0-180 WFL   Shoulder Abd 0-180 WFL   Shoulder ER 0-90 WFL   Shoulder IR 0-90 WFL   Shoulder Extension 0-80 WFL   Shoulder Horizontal adduction 0-90 WFL   Elbow flex/ext 0-150 WFL   Wrist flex 0-80 WFL   Wrist ext 0-70 WFL   Supination 0-80 WFL   Pronation 0-80 WFL     Fist: able to make complete fist and thumb opposition to all tips       Strength      Left Right   Shoulder flex G G   Shoulder abd G G   Shoulder ER G G   Shoulder IR N N   Shoulder Extension G G   Shoulder Horizontal adduction N N   Elbow flex N N   Elbow ext N N   Wrist flex N N   Wrist ext N N   Supination N N   Pronation N N        Strength:  Jori setting #2 avg of 3 trials                   Right 65, 60, 70 avg 65#                   Left 100, 100, 100 avg 100#    Lateral Pinch strength: right 17,16,16 avg  16.3 psi                                        Left  24, 24, 24 avg 24 psi  3 point pinch strength: right 13, 14,12 avg 13 psi                                       Left 14, 14, 13 avg 13.6 psi      Fine motor coordination:  9 hole peg test: right hand 26 seconds no drops                          Left hand 24 seconds no drops  Gross motor coordination:    Tone:  Modified Ada Scale:   0 - No increase in muscle  tone    Comments:     Balance:   Static Sit  Normal   Dynamic sit normal  Static Stand  Not tested  Dynamic stand not tested    Functional Mobility:  Bed mobility: I  Supine to sit: I  Sit to supine: I  Transfers to bed: I  Transfers to toilet: Mod I raised toilet  Car transfers: I  Wheelchair mobility:n/a    ADL's:  Feeding: I  Grooming: I  Hygiene: I  UB Dressing: Mod I has worn pullovers for a long time  LB Dressing: Mod I has worn pull ons for a long time  Toileting: I  Bathing: Mod I shower chair    IADL's:  Homecare: helps with dishes some  Cooking: I  Laundry: does his own laundry, fold and put away  Yard work: stopped that years ago  Use of telephone: I  Money management: I  Medication management: I  Comments:     TODAY'S TREATMENT    1. Nicolas performed and was provided with a written copy of exercises to perform for hand / pinch strengthening. Exercises were reviewed and Nicolas was able to demonstrate them prior to the end of the session.     Treatment today also included: evaluation and provision of HEP.    ASSESSMENT:  OT diagnosis: ataxia    Assessment  Nicolas Flaherty is a 62 y.o. male with a medical diagnosis of   Encounter Diagnosis   Name Primary?   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 He presents with no interfering tremors or ataxia with activities at the present time. Pt's  manipulation skills are grossly symmetric, pt is dominant left hand presenting with greater left then right .  Pt is not presenting with any interference with personal or household tasks at this time. .      DASH G Code    Self care  Current Score  =  16% impaired   Goal at Discharge Score  16% impaired   Status at discharge  16% impaired   Score interpretation is as follows:     TEST SCORE  Modifier  Impairment Limitation Restriction    0%  CH  0 % impaired, limited or restricted    1-19%  CI  @ least 1% but less than 20% impaired,  limited or restricted    20-39%  CJ  @ least 20%<40% impaired, limited or restricted    40-59%  CK  @ least 40%<60% impaired, limited or restricted    60-79%  CL  @ least 60% <80% impaired, limited or restricted    80-99%  CM  @ least 80%<100% impaired limited or restricted    100%  CN  100% impaired, limited or restricted     PLAN:    Occupational therapy is not needed at this time.  Pt was instructed to contact therapist during other therapy treatments if any issues arise.

## 2018-05-28 NOTE — PATIENT INSTRUCTIONS
Lateral Pinch Strengthening (Resistive Putty)        Squeeze between thumb and side of each finger in turn.  Repeat _20___ times. Do __2__ sessions per day.    Copyright © I. All rights reserved.   Three Jaw Eduardo Pinch Strengthening (Resistive Putty)        Pull putty, using thumb, index and middle fingers.  Repeat __20__ times. Do __2__ sessions per day.    Copyright © I. All rights reserved.    Strengthening (Resistive Putty)        Squeeze putty using thumb and all fingers.  Repeat __20__ times. Do __2__ sessions per day.    Copyright © I. All rights reserved.

## 2018-05-30 ENCOUNTER — CLINICAL SUPPORT (OUTPATIENT)
Dept: REHABILITATION | Facility: HOSPITAL | Age: 63
End: 2018-05-30
Attending: NEUROLOGICAL SURGERY
Payer: COMMERCIAL

## 2018-05-30 ENCOUNTER — TELEPHONE (OUTPATIENT)
Dept: FAMILY MEDICINE | Facility: CLINIC | Age: 63
End: 2018-05-30

## 2018-05-30 ENCOUNTER — HOSPITAL ENCOUNTER (OUTPATIENT)
Dept: RADIOLOGY | Facility: HOSPITAL | Age: 63
Discharge: HOME | End: 2018-05-30
Attending: FAMILY MEDICINE
Payer: COMMERCIAL
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
## 2018-05-30 DIAGNOSIS — R27.8 DECREASED COORDINATION: ICD-10-CM

## 2018-05-30 DIAGNOSIS — R13.10 DYSPHAGIA, UNSPECIFIED TYPE: Primary | ICD-10-CM

## 2018-05-30 DIAGNOSIS — Z74.09 IMPAIRED FUNCTIONAL MOBILITY, BALANCE, GAIT, AND ENDURANCE: ICD-10-CM

## 2018-05-30 DIAGNOSIS — T17.320A CHOKING DUE TO FOOD (REGURGITATED), INITIAL ENCOUNTER: ICD-10-CM

## 2018-05-30 DIAGNOSIS — M62.81 MUSCLE WEAKNESS OF LOWER EXTREMITY: ICD-10-CM

## 2018-05-30 DIAGNOSIS — G11.9 ATAXIA DUE TO CEREBELLAR DEGENERATION: Primary | ICD-10-CM

## 2018-05-30 PROCEDURE — 92611 MOTION FLUOROSCOPY/SWALLOW: CPT

## 2018-05-30 PROCEDURE — G8997 SWALLOW GOAL STATUS: HCPCS | Mod: CI

## 2018-05-30 PROCEDURE — 74230 X-RAY XM SWLNG FUNCJ C+: CPT | Mod: 26,,, | Performed by: RADIOLOGY

## 2018-05-30 PROCEDURE — 97112 NEUROMUSCULAR REEDUCATION: CPT | Mod: PN

## 2018-05-30 PROCEDURE — 97110 THERAPEUTIC EXERCISES: CPT | Mod: PN

## 2018-05-30 PROCEDURE — 74230 X-RAY XM SWLNG FUNCJ C+: CPT | Mod: TC

## 2018-05-30 PROCEDURE — G8998 SWALLOW D/C STATUS: HCPCS | Mod: CI

## 2018-05-30 PROCEDURE — G8996 SWALLOW CURRENT STATUS: HCPCS | Mod: CI

## 2018-05-30 NOTE — PROGRESS NOTES
"                                                    Physical Therapy Daily Note     Name: Nicolas Flaherty  Clinic Number: [id number]  Diagnosis:   Encounter Diagnoses   Name Primary?   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     Pain Scale: Nicolas rates pain on a scale of 0-10 to be 0 currently.    Objective     Nicolas received individual therapeutic exercises to develop strength, endurance, ROM, flexibility, posture and core stabilization for 42 minutes including:    Nustep x 8'   Standing HR 2 x 10   Standing marches x 20   Standing Hip abduction 2 x 10   Standing Hip extension 2 x 10   Step ups 4" step BUE in // bars x 20   + Seated LAQ 1 x 15 x 3'' ea LE   Bridges 2 x 10   BFKO 2 x 10   HL hip adduction 3' x 3''   HL hip abduction with RTB 3'   Sit<>stand 2 x 10    Nicolas received the following Neuromuscular Reeducation for 15 minutes including:    NBOS 3 x 30''  Tandem Stance 2 x 30''  Modified SLS 3 x 30'' with unilat UE support  DLS 3 x 30'' - np   Cone Taps 1 x 10     The patient received the following Gait training x 5'     100' /w RW and close SBA       Written Home Exercises Provided: none today  Pt demo good understanding of the education provided. Nicolas demonstrated good return demonstration of activities.     Education provided re:  Nicolas verbalized good understanding of education provided.   No spiritual or educational barriers to learning provided    Assessment     Patient tolerated treatment session well today.  Patient " responded fairly well to exercises with slight difficulty performing isolated hip strengthening exercises without compensation.  Patient continues to required CGA with balance activities, particularly with decreased base of support.  Plan to progress patient with strengthening and balance activities as tolerated.    This is a 62 y.o. 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 Pt prognosis is Fair. Pt will continue to benefit from skilled outpatient physical therapy to address the deficits listed in the problem list, provide pt/family education and to maximize pt's level of independence in the home and community environment.     Goals as follows:  GOALS: Short Term Goals:  6 weeks  1. Pt will be able to perform sit <> stand transfers without use of UE support from elevated surface to improve functional mobility and transfers.  2. Pt will be able to tolerate multi-directional LE strengthening in order to improve ability to perform household chores.  3. Pt will report 50% improvement in ability to walk long distances since start of care to indicate improved functional mobility.   4. Pt will be able to stand 1 minute without LOB and no UE support to indicate improved standing endurance.   5. Pt to tolerate HEP to improve ROM and independence with ADL's     Plan     Continue with established Plan of Care towards PT goals.    Therapist: Johanne Herrera, PTA  5/30/2018

## 2018-05-30 NOTE — PROCEDURES
"Modified Barium Swallow    Patient Name:  Nicolas Flaherty   MRN:  [id number]      Recommendations:     Recommendations:                General Recommendations:  GI evaluation  Diet recommendations:  Regular, Thin   Aspiration Precautions: HOB to 90 degrees, Small bites/sips and Standard aspiration precautions   General Precautions: Standard,    Communication strategies:  none    Referral     Reason for Referral  Patient was referred for a Modified Barium Swallow Study to assess the efficiency of his/her swallow function, rule out aspiration and make recommendations regarding safe dietary consistencies, effective compensatory strategies, and safe eating environment. Pt reportedly feeling like food gets "stuck" and pt reporting "coughing up liquids." When asked to localize globus sensation, pt pointed to sternum/chest indicating esophageal region.     History:     Past Medical History:   Diagnosis Date    Anticoagulant long-term use     Arthritis     Cancer of kidney, right     Coronary artery disease     Diabetes mellitus     Hypertension     Kidney stone     Sleep apnea     Slurred speech     Stroke     MINI STROKE    TIA (transient ischemic attack)     Wears glasses        Objective:     Current Respiratory Status: 05/30/18    Alert: yes    Cooperative: yes    Follows Directions: yes    Visualization  · Patient was seen in the lateral view   · Pt's shoulder obstructing view of upper esophageal region    Oral Peripheral Examination  · Oral Musculature: WFL, general weakness  · Dentition: present and adequate  · Secretion Management: adequate  · Mucosal Quality: adequate  · Mandibular Strength and Mobility: WFL  · Oral Labial Strength and Mobility: WFL, impaired coordination  · Lingual Strength and Mobility: WFL  · Buccal Strength and Mobility: WFL    Consistencies Assessed  · Thin (x2 self-fed/self-regulated cup sips, x1 self-fed/self-regulated straw sip)  · Puree (x2 tsp bites)  · Solids (x1 1/2 " piece of scott cracker)    Oral Preparation/Oral Phase  · WFL- Pt with adequate bolus acceptance, containment, control and timely A-P transfer across consistencies     Pharyngeal Phase   WFL- Pt presenting with timely trigger of swallow and hyolaryngeal elevation/excursion. Piecemeal deglutition noted with purees and regular solids found to be WFL. Pt presenting with mild vallecular and pyriform residue after swallowing thin liquids--pt exhibited spontaneous multiple swallows independently clearing all pharyngeal residue.    Cervical Esophageal Phase  · UES appeared to accommodate all bolus types without stasis or retrograde movement observed during limited MBSS    Assessment:     Impressions  ·  Patient with oral and pharyngeal phases of swallowing deemed WFL    Prognosis: Excellent    Barriers:  · None    Plan  Given results of MBSS and pt's reported swallowing difficulties, recommend GI consultation in order to rule out esophageal component to reported dysphagia.    Education  Results were discussed with patient. Results were discussed with Medical Team who was in agreement with plan.       Time Tracking:   SLP Treatment Date:   05/30/18  Speech Start Time:  1045  Speech Stop Time:  1105     Speech Total Time (min):  20 min    Esther Morejon CCC-SLP  05/30/2018

## 2018-05-31 ENCOUNTER — TELEPHONE (OUTPATIENT)
Dept: FAMILY MEDICINE | Facility: CLINIC | Age: 63
End: 2018-05-31

## 2018-05-31 NOTE — TELEPHONE ENCOUNTER
----- Message from Roseann Naik sent at 5/31/2018 12:48 PM CDT -----  Contact: Ellie/Spouse/[phone]  Ellie would like to speak to the staff about patient's Swallow Study. Thank you.

## 2018-06-04 ENCOUNTER — CLINICAL SUPPORT (OUTPATIENT)
Dept: REHABILITATION | Facility: HOSPITAL | Age: 63
End: 2018-06-04
Attending: NEUROLOGICAL SURGERY
Payer: COMMERCIAL

## 2018-06-04 DIAGNOSIS — Z74.09 IMPAIRED FUNCTIONAL MOBILITY, BALANCE, GAIT, AND ENDURANCE: ICD-10-CM

## 2018-06-04 DIAGNOSIS — M62.81 MUSCLE WEAKNESS OF LOWER EXTREMITY: ICD-10-CM

## 2018-06-04 DIAGNOSIS — R27.8 DECREASED COORDINATION: ICD-10-CM

## 2018-06-04 PROCEDURE — 97112 NEUROMUSCULAR REEDUCATION: CPT | Mod: PN

## 2018-06-04 PROCEDURE — 97116 GAIT TRAINING THERAPY: CPT | Mod: PN

## 2018-06-04 PROCEDURE — 97110 THERAPEUTIC EXERCISES: CPT | Mod: PN

## 2018-06-04 NOTE — PROGRESS NOTES
"                                                    Physical Therapy Daily Note     Name: Nicolas Flaherty  Clinic Number: [id number]  Diagnosis:   Encounter Diagnoses   Name Primary?    Impaired functional mobility, balance, gait, and endurance     Muscle weakness of lower extremity     Decreased coordination      Physician: Roge Arora MD  Precautions: fall   Eval date: 5/11/18  Last PN: NA  Visit #: 6 of 20  PTA Visit #: NA  Time In: 0734  Time Out: 0836  Total Treatment Time: 62'( 1:1 with PT for duration of session)     Evaluation Date: 5/11/18  Visit # authorized: 20  Authorization period: 12/31/18     Subjective     Pt reports: he is falling to the R a lot. Pt fell a couple days ago while trying to get out of bed resulting a L elbow abrasion. He is feeling "alright."     Pain Scale: Nicolas rates pain on a scale of 0-10 to be 0 currently.    Objective     BOLD= performed today    Nicolas received individual therapeutic exercises to develop strength, endurance, ROM, flexibility, posture and core stabilization for 42 minutes including:    Nustep x 8' level 2  Standing HR 2 x 10   Standing marches w 1 UE x 20 ea   Standing Hip abduction 2 x 10   Standing Hip extension 2 x 10   Step ups 4" step BUE in // bars x 20   +Mini squats in // bars x 20  +SLR x 20 ea  +SL clams x 20 ea  Seated LAQ 1 x 15 x 3'' ea LE   Bridges 2 x 10   BFKO 2 x 10   HL hip adduction 3' x 3''   HL hip abduction with RTB 3'   Sit<>stand 2 x 10    Nicolas received the following Neuromuscular Reeducation for 10 minutes including:    NBOS 3 x 30''  Tandem Stance 3 x 30''  Modified SLS 3 x 30'' with unilat UE support and red disc  DLS 3 x 30'' - np   +Rockerboard x 20 ea way  Cone Taps 1 x 10     The patient received the following Gait training x 10'     +Sidestepping in // bars x 4 laps  2 x 100' w RW and close SBA     Written Home Exercises Provided: bridges, SLR, squats, LAQ, standing hip extension, standing hip abduction, " standing marches  Pt demo good understanding of the education provided. Nicolas demonstrated good return demonstration of activities.     Education provided re: use of vision when ambulating and importance to proper upright posture.   Nicolas verbalized good understanding of education provided.   No spiritual or educational barriers to learning provided    Assessment     Nicolas had good tolerance to treatment today with no adverse effects. Pt with tendency for increased forward trunk lean when ambulating with RW and in static standing requiring frequent verbal cueing for correction. He also required a need for manual assist with sidestepping to increase weight shift on standing limb for control with stepping limb. Pt was challenged with progression to modified SLS on red disc and decreased use of UEs with balance training.      This is a 62 y.o. 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 Pt prognosis is Fair. Pt will continue to benefit from skilled outpatient physical therapy to address the deficits listed in the problem list, provide pt/family education and to maximize pt's level of independence in the home and community environment.     Goals as follows:  GOALS: Short Term Goals:  6 weeks  1. Pt will be able to perform sit <> stand transfers without use of UE support from elevated surface to improve functional mobility and transfers.  2. Pt will be able to tolerate multi-directional LE strengthening in order to improve ability to perform household chores.  3. Pt will report 50% improvement in ability to walk long distances since start of care to indicate improved functional mobility.   4. Pt will be able to stand 1 minute without LOB and no UE support to indicate improved standing endurance.   5. Pt to tolerate HEP to improve ROM and independence with ADL's.    Long  Term Goals: 12 weeks  1. Pt will improved Quiroz score to 30/56 to indicate improved static and dynamic balance and decreased fall risk.   2. Pt will be able to perform 2 x 10 multi-directional LE strengthening without fatigue in order to improve ability to perform household chores.  3. Pt will report 80% improvement in ability to walk long distances since start of care to indicate improved functional mobility.   4. Pt will be able to perform therapeutic exercises in standing for 25% of treatment to indicate improved standing endurance.  5. Pt to be Independent with HEP to improve ROM and independence with ADL's.     Plan     Continue with established Plan of Care towards PT goals.    Therapist: Angelica Mejia, PT  6/4/2018

## 2018-06-05 ENCOUNTER — TELEPHONE (OUTPATIENT)
Dept: ADMINISTRATIVE | Facility: HOSPITAL | Age: 63
End: 2018-06-05

## 2018-06-06 ENCOUNTER — CLINICAL SUPPORT (OUTPATIENT)
Dept: REHABILITATION | Facility: HOSPITAL | Age: 63
End: 2018-06-06
Attending: NEUROLOGICAL SURGERY
Payer: COMMERCIAL

## 2018-06-06 DIAGNOSIS — G11.9 ATAXIA DUE TO CEREBELLAR DEGENERATION: ICD-10-CM

## 2018-06-06 DIAGNOSIS — Z74.09 IMPAIRED FUNCTIONAL MOBILITY, BALANCE, GAIT, AND ENDURANCE: ICD-10-CM

## 2018-06-06 DIAGNOSIS — R27.8 DECREASED COORDINATION: ICD-10-CM

## 2018-06-06 DIAGNOSIS — M62.81 MUSCLE WEAKNESS OF LOWER EXTREMITY: ICD-10-CM

## 2018-06-06 PROCEDURE — 97116 GAIT TRAINING THERAPY: CPT | Mod: PN

## 2018-06-06 PROCEDURE — 92507 TX SP LANG VOICE COMM INDIV: CPT | Mod: PN

## 2018-06-06 PROCEDURE — 97110 THERAPEUTIC EXERCISES: CPT | Mod: PN

## 2018-06-06 PROCEDURE — 92522 EVALUATE SPEECH PRODUCTION: CPT | Mod: PN

## 2018-06-06 NOTE — PROGRESS NOTES
"                                                    Physical Therapy Daily Note     Name: Nicolas Flaherty  Clinic Number: [id number]  Diagnosis:   Encounter Diagnoses   Name Primary?    Impaired functional mobility, balance, gait, and endurance     Muscle weakness of lower extremity     Decreased coordination      Physician: Roge Arora MD  Precautions: fall   Eval date: 5/11/18  Last PN: NA  Visit #: 7 of 20  PTA Visit #: NA  Time In: 1300  Time Out: 1357  Total Treatment Time: 57' (1:1 with PT for duration of session)     Evaluation Date: 5/11/18  Visit # authorized: 20  Authorization period: 12/31/18     Subjective     Pt reports: feeling worked after last treatment but he is recovered. He is complaint with HEP. Pt has appointment with GI MD on Tuesday.    Pain Scale: Nicolas rates pain on a scale of 0-10 to be 0 currently.    Objective     BOLD= performed today    Nicolas received individual therapeutic exercises to develop strength, endurance, ROM, flexibility, posture and core stabilization for 40 minutes including:    Nustep x 8' level 2  Standing HR 2 x 10   Standing marches w 1 UE x 20 ea   Standing Hip abduction 2 x 10   Standing Hip extension 2 x 10   Step ups 4" step BUE in // bars x 20   Mini squats in // bars x 20  SLR 2# x 20 ea    SL clams x 20 ea  Seated LAQ 2# x 20 ea  Bridges x 20   BFKO 2 x 10   HL hip adduction 3' x 3''   HL hip abduction with RTB 3'    Sit<>stand 2 x 10    Nicolas received the following Neuromuscular Reeducation for 5 minutes including:    NBOS 3 x 30''  Tandem Stance 3 x 30''  Modified SLS 3 x 30'' with unilat UE support and red disc  DLS 3 x 30'' - np   Rockerboard x 20 ea way  Cone Taps 1 x 10     The patient received the following Gait training x 15'     Sidestepping in // bars w ladder x 3 laps  +Fwd walking in // bars w ladder x 3 laps  2 x 100' w RW and close SBA     Written Home Exercises Provided: bridges, SLR, squats, LAQ, standing hip extension, " standing hip abduction, standing marches  Pt demo good understanding of the education provided. Nicolas demonstrated good return demonstration of activities.     Education provided re: use of vision when ambulating and importance to proper upright posture.   Nicolas verbalized good understanding of education provided.   No spiritual or educational barriers to learning provided    Assessment     Nicolas had good tolerance to treatment today with no adverse effects. Appropriate B LE fatigue achieved by end of session. Pt able to perform all standing therapeutic exercises and balance training in beginning of session with decreased rest breaks. He was challenged with ladder exercises emphasizing increased step length.      This is a 62 y.o. 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 Pt prognosis is Fair. Pt will continue to benefit from skilled outpatient physical therapy to address the deficits listed in the problem list, provide pt/family education and to maximize pt's level of independence in the home and community environment.     Goals as follows:  GOALS: Short Term Goals:  6 weeks  1. Pt will be able to perform sit <> stand transfers without use of UE support from elevated surface to improve functional mobility and transfers.  2. Pt will be able to tolerate multi-directional LE strengthening in order to improve ability to perform household chores.  3. Pt will report 50% improvement in ability to walk long distances since start of care to indicate improved functional mobility.   4. Pt will be able to stand 1 minute without LOB and no UE support to indicate improved standing endurance.   5. Pt to tolerate HEP to improve ROM and independence with ADL's.    Long Term Goals: 12 weeks  1. Pt will improved Quiroz score to 30/56 to indicate improved static and dynamic balance and  decreased fall risk.   2. Pt will be able to perform 2 x 10 multi-directional LE strengthening without fatigue in order to improve ability to perform household chores.  3. Pt will report 80% improvement in ability to walk long distances since start of care to indicate improved functional mobility.   4. Pt will be able to perform therapeutic exercises in standing for 25% of treatment to indicate improved standing endurance.  5. Pt to be Independent with HEP to improve ROM and independence with ADL's.     Plan     Continue with established Plan of Care towards PT goals.    Therapist: Angelica Mejia, PT  6/6/2018

## 2018-06-06 NOTE — PROGRESS NOTES
See Initial Evaluation.     VALENCIA Vilchis., CCC-SLP  Speech-Language Pathologist  06/06/2018

## 2018-06-06 NOTE — PLAN OF CARE
"Outpatient Neurological Rehabilitation  Speech and Language Therapy Evaluation  Date: 6/6/2018   Start Time:  1055  Stop Time:  1130     Name: Nicolas Flaherty   MRN: [id number]    Therapy Diagnosis: Ataxic Dysarthria  Physician: Roge Arora MD  Physician Orders: amb referral to speech therapy  Medical Diagnosis: Cerebellar Degeneration    Visit #:  1  Visits Authorized: 20  Date of Evaluation:  6/618  Insurance Authorization Period: 05/29/2018-12/31/2018   Plan of Care Certification:   6/6/2018-7/25/2018     Procedure Min.   Motor Speech Evaluation    35   Total Minutes: 35  Total Untimed Units: 1  Charges Billed/# of units: 1    Precautions: Standard and Fall  Subjective   Date of Onset: a couple of years ago per patient report  History of Current Condition:   Pt reports that people often tell him that they can not understand him when he speaks. Prior to the cerebellar degeneration, patient reports no difficulties with speech. In regards to his current speech level he stated "I think it could be better but I don't know because it is in and out." He expressed no difficulty with memory, attention, or comprehension. His only concern at this time is his expression.   Past Medical History: Nicolas Flaherty   has a past medical history of Anticoagulant long-term use; Arthritis; Cancer of kidney, right; Coronary artery disease; Diabetes mellitus; Hypertension; Kidney stone; Sleep apnea; Slurred speech; Stroke; TIA (transient ischemic attack); and Wears glasses.  Nicolas Flaherty  has a past surgical history that includes Lithotripsy; Kidney stone surgery (2015); heart stents; Back surgery; Cardiac surgery; Vascular surgery; Laparoscopic nephrectomy, hand assisted (Right); Hernia repair; Glaucoma surgery; and excision right thigh mass.  Medical Hx and Allergies: Nicolas has a current medication list which includes the following prescription(s): aspirin, azelastine, clopidogrel, docusate sodium, " dulaglutide, finasteride, furosemide, gabapentin, gemfibrozil, glimepiride, isosorbide mononitrate, levocetirizine, metoprolol succinate, nateglinide, nitrostat, oxycodone-acetaminophen, prochlorperazine, ranitidine, rosuvastatin, tamsulosin, travatan z, and valsartan, and the following Facility-Administered Medications: lidocaine (pf) 10 mg/ml (1%). Review of patient's allergies indicates:  No Known Allergies.  Imaging: CT scan completed 05/23/18, MBSS completed 05/30/18   Prior Level of Function: Independent  Current Level of Function: Assistance - patient requires walker to ambulate  Prior Therapy:  No prior ST reported by patient. Pt currently being seen by outpatient PT.  Pain:   0/10  Pain Location / Description: n/a  Nutrition:  Regular, thin  Social History:  Lives with his wife and two grandchildren.  Patient Therapy Goals:  Make speech clearer  Objective   Oral Motor Exam:    Oral Musculature: Generalized weakness  Structure Abnormalities: WFL  Dentition: present and adequate  Secretion Management: Pt reported minimal drooling occasionally.  Mucosal Quality: WFL  Mandibular Strength and Mobility: mild  Rest: WFL   Lateralization: Pt compensated for lingual weakness with jaw movements   Protrusion: Generalized weakness; Pt unable to protrude tongue without labial assistance  Retraction:  WFL  Involuntary Movement: absent  Oral Labial Strength and Mobility: mild; strength WFL, however, ROM was decreased  Rest: WFL   Pucker: WFL  Retraction: WFL  Alternating Pucker / Retraction: limited movement noted due to coordination, decreased ROM noted  Involuntary Movement: absent   Lingual Strength and Mobility: mild   Rest: WFL  Protrustion: Pt unable to protrude tongue without labial assistance  Retraction: WFL  Lateralization:  Pt compensated for lingual weakness with jaw movements  Involuntary Movement: absent   Velar Elevation: WFL  Rest: WFL  Symmetry: WFL  Elevation: WFL  Sustained Elevation: WFL  Involuntary  Movement: absent   Buccal Strength and Mobility: WFL  Volitional Cough: WFL  Volitional Swallow: WFL  Voice Prior to PO Intake: clear    Diadochokinetic (DDK) rates for rapid repetition of 1 - 3 syllable utterances were not WNL. Rates were slow and labored.      Speech Intelligibility:   The [place] Dysarthria Assessment-2nd Edition was administered to Mr. Valenzeula to assess his motor speech skills. This test assesses the patient's reflexes, respiration, lips, palate, laryngeal function, tongue, and overall intelligibility. Results are described in the following tables:    Reflexes Respiration Lips Palate Laryngeal Tongue Intelligibility   Normal for Age X X  X      Mild Abnormality   X    X   Abnormality obvious but can perform task with reasonable approximation     X X    Some production of task poor in quality, unable to sustain, or extremely labored          Unable to undertake task/ movement/ sound            Description: Mr. Valenzuela presented with no difficulty with cough and swallow reflex. Pt presented with clavicular breathing, however, did not effect speech production or vocal quality. Pt demonstrated appropriate labial seal at rest and ROM (i.e.retraction and protrusion) however, a decreased ROM and coordination was noted during structured alteranting vowel sounds and in conversation. Pt's palate was WFL in regards to ROM and function for speech production. Pt demonstrated abnormalities in laryngeal function c/b intermittent pitch breaks and limited change in volume when requested. Pt presented with decreased lingual coordination and ROM c/b labored and decreased ability to elevate and lateralize of tongue when requested and in conversation. No deviation or fasciculations were noted. Pt's intelligibility was interpreted by the clinician with 90% acc at the word level and 60% intelligible at the sentence level not in context. Within context the pt was intelligible 75% despite the occasional distorted  speech.     Awareness / Strategy Use:   Pt demonstrates adequate awareness of motor speech impairment. Pt was able to use strategies to improve intelligibility with minimal cues. Strategies introduced included: SOS: Speak Slowly, Over-articulate, Speak Loudly    Impact of Motor Speech Impairment on Functioning:   Comment on: general tasks and demands, household tasks, interpersonal interactions, education, employment, community, other. Pt reported that family members expressed concerns about his change in intelligibility effecting interpersonal interactions. He said that his motor speech deficits do not effect him completing necessary tasks.   Safety Risks: Pt utilizes walker to ambulate. Fall Risk.    Education: POC was discussed with pt. Pt educated on proper oral hygiene. Patient and family members expressed understanding.     Assessment     Nicolas Flaherty presents to [facility] s/p medical diagnosis of  Cerebellar degeneration. Demonstrates impairments including limitations as described in the problem list. Positive prognostic factors include family support. Negative prognostic factors include continued degeneration of the cerebellum. He presents with Ataxia of speech c/b slurred speech, inconsistent consonants production, decreased lingua ROM and coordination. Barriers to progress include trasnportation. Pt's speech was judged to be 90% intelligible at the word level and 60% intelligible at the sentence level in unknown contexts and 75% in known contexts. Patient will benefit from skilled, outpatient neurological rehabilitation speech therapy.    Rehab Potential: fair  Environmental Concerns/Cultural/Spiritual/Developmental/Educational Needs: Pt's spiritual, cultural and educational needs considered and pt agreeable to plan of care and goals.    Short Term Goals (4 weeks):   1. Pt will recall 3/4 motor speech strategies ind'ly.   2. Pt will complete OMEs 10x each 1-2x per  "session given supervision.  3. Pt will utilize motor speech strategies when reading multi-syllabic words with various phonemic contexts with 100% acc ind'ly.   4. Pt will utilize motor speech strategies while reading 5-7 word sentences with various phonemic contexts with 90% acc ind'ly.   5. Pt will utilize motor speech strategies during expressive speech tasks with 90% acc given min cues.   6. During spontaneous conversations, pt will utilize motor speech strategies ind'ly with 90% intelligibility.     Long Term Goals (8 weeks):   1. Pt will develop functional and intelligible speech and utilize compensatory strategies through the use of adequate labial and lingual function, increased articulatory precision and speech prosody.    Plan     Plan of Care Certification Period: 6/6/2018-7/25/2018    Recommended Treatment Plan:  Patient will participate in the [facility] neurological rehabilitation program for speech therapy 2 times per week to address his  Motor Speech deficits, to educate patient and their family, and to participate in a home exercise program.    Other Recommendations: Follow-up with PCP.     Therapist's Name:   DMITRY Davis.   Clinician  Speech-Language Pathology    "LUIS Doyle, CCC-SLP certify that I was present in the room directing the student and service delivery and guiding them using my skilled judgement. As the co-signing therapist, I have reviewed the student's documentation, and am responsible for the treatment, assessment and plan."     LUIS Vilchis, CCC-SLP  Speech-Language Pathologist   6/6/2018       "

## 2018-06-11 ENCOUNTER — CLINICAL SUPPORT (OUTPATIENT)
Dept: REHABILITATION | Facility: HOSPITAL | Age: 63
End: 2018-06-11
Attending: NEUROLOGICAL SURGERY
Payer: COMMERCIAL

## 2018-06-11 DIAGNOSIS — Z74.09 IMPAIRED FUNCTIONAL MOBILITY, BALANCE, GAIT, AND ENDURANCE: ICD-10-CM

## 2018-06-11 DIAGNOSIS — R27.8 DECREASED COORDINATION: ICD-10-CM

## 2018-06-11 DIAGNOSIS — M62.81 MUSCLE WEAKNESS OF LOWER EXTREMITY: ICD-10-CM

## 2018-06-11 PROCEDURE — 97110 THERAPEUTIC EXERCISES: CPT | Mod: PN

## 2018-06-11 PROCEDURE — G8978 MOBILITY CURRENT STATUS: HCPCS | Mod: CK,PN

## 2018-06-11 PROCEDURE — 97112 NEUROMUSCULAR REEDUCATION: CPT | Mod: PN

## 2018-06-11 PROCEDURE — G8979 MOBILITY GOAL STATUS: HCPCS | Mod: CK,PN

## 2018-06-11 NOTE — PROGRESS NOTES
"                                                    Physical Therapy Daily Note     Name: Nicolas Flaherty  Clinic Number: [id number]  Diagnosis:   Encounter Diagnoses   Name Primary?    Impaired functional mobility, balance, gait, and endurance     Muscle weakness of lower extremity     Decreased coordination      Physician: Roge Arora MD  Precautions: fall   Eval date: 5/11/18  Last PN: 6/11/18 (visit 8)  Visit #: 8 of 20  PTA Visit #: NA  Time In: 1057  Time Out: 1157  Total Treatment Time: 60' (1:1 with PT for 35' of session)     Evaluation Date: 5/11/18  Visit # authorized: 20  Authorization period: 12/31/18     Subjective     Pt reports: he sees his GI doctor tomorrow and eye doctor Wednesday. No new complaints. Still off balance on R. 80% improvement in ability to walk long distances since start of care.     Pain Scale: Nicolas rates pain on a scale of 0-10 to be 0 currently.    Objective     BOLD= performed today    Nicolas received individual therapeutic exercises to develop strength, endurance, ROM, flexibility, posture and core stabilization for 40 minutes including:    Nustep x 8' level 2  Standing HR 2 x 10   Standing marches w 1 UE x 20 ea   Standing Hip abduction w 1 UE 2 x 10   Standing Hip extension w 1 UE 2 x 10 ea  Step ups 4" step BUE in // bars x 20   Mini squats in // bars x 20  SLR 2# x 20 ea    SL clams x 20 ea   Seated LAQ 2# x 20 ea  Bridges x 20   BFKO 2 x 10   HL hip adduction 3' x 3''   HL hip abduction with RTB 3'    Sit<>stand 2 x 10  +Qpd alt UEs x 20 ea    Nicolas received the following Neuromuscular Reeducation for 10 minutes including:    NBOS 3 x 30''  Tandem Stance 3 x 30''  Modified SLS 3 x 30'' with unilat UE support and red disc  DLS 3 x 30'' - np   Rockerboard x 20 ea way  [facility] Taps 1 x 10     The patient received the following Gait training x 10'     Sidestepping in // bars w ladder x 3 laps  Fwd walking in // bars w ladder x 3 laps  2 x 100' w RW and close " SBA     Written Home Exercises Provided: continue with current HEP (bridges, SLR, squats, LAQ, standing hip extension, standing hip abduction, standing marches)  Pt demo good understanding of the education provided. Nicolas demonstrated good return demonstration of activities.     Education provided re: use of vision when ambulating and importance to proper upright posture.   Nicolas verbalized good understanding of education provided.   No spiritual or educational barriers to learning provided    Functional Limitation Report- G-CODE:  CMS Impairment/Limitation/Restriction for FOTO Cerebrovascular Disorders Survey  Status Limitation G-Code CMS Severity Modifier  Intake 34% 66%  Predicted 49% 51% Goal Status+ CK - At least 40 percent but less than 60 percent  6/11/2018 51% 49% Current Status CK - At least 40 percent but less than 60 percent  +Based on FOTO predicted change score    Assessment     Nicolas was re-assessed today with 5/5 STGs being met indicating improvements in functional strength, tolerance for B LE strengthening and HEP, ability to walk long distances, and static standing endurance since start of care. He continues with B LE weakness, decreased coordination, and impaired gait, balance, functional mobility, and endurance. Pt could benefit from continued physical therapy services to address deficits.     He had good tolerance to treatment today with no adverse effects. Appropriate post-treatment muscle fatigue achieved. He continues to be challenged with balance training and performing standing LE strengthening with decreased UE support. Good response to quadruped exercise. Will continue to progress strengthening and balance training to tolerance.      This is a 62 y.o. 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 Pt prognosis is Fair. Pt will continue to  benefit from skilled outpatient physical therapy to address the deficits listed in the problem list, provide pt/family education and to maximize pt's level of independence in the home and community environment.     Goals as follows:  GOALS: Short Term Goals:  6 weeks  1. Pt will be able to perform sit <> stand transfers without use of UE support from elevated surface to improve functional mobility and transfers.- met 6/11/18  2. Pt will be able to tolerate multi-directional LE strengthening in order to improve ability to perform household chores.- met 6/11/18  3. Pt will report 50% improvement in ability to walk long distances since start of care to indicate improved functional mobility.- met 6/11/18   4. Pt will be able to stand 1 minute without LOB and no UE support to indicate improved standing endurance.- met 6/11/18   5. Pt to tolerate HEP to improve ROM and independence with ADL's.- met 6/11/18    Long Term Goals: 12 weeks  1. Pt will improved Quiroz score to 30/56 to indicate improved static and dynamic balance and decreased fall risk.   2. Pt will be able to perform 2 x 10 multi-directional LE strengthening without fatigue in order to improve ability to perform household chores.  3. Pt will report 80% improvement in ability to walk long distances since start of care to indicate improved functional mobility.   4. Pt will be able to perform therapeutic exercises in standing for 25% of treatment to indicate improved standing endurance.  5. Pt to be Independent with HEP to improve ROM and independence with ADL's.     Plan     Continue with established Plan of Care towards PT goals.    Therapist: Angelica Mejia, PT  6/11/2018

## 2018-06-13 ENCOUNTER — CLINICAL SUPPORT (OUTPATIENT)
Dept: REHABILITATION | Facility: HOSPITAL | Age: 63
End: 2018-06-13
Attending: NEUROLOGICAL SURGERY
Payer: COMMERCIAL

## 2018-06-13 DIAGNOSIS — Z74.09 IMPAIRED FUNCTIONAL MOBILITY, BALANCE, GAIT, AND ENDURANCE: ICD-10-CM

## 2018-06-13 DIAGNOSIS — M62.81 MUSCLE WEAKNESS OF LOWER EXTREMITY: ICD-10-CM

## 2018-06-13 DIAGNOSIS — R27.8 DECREASED COORDINATION: ICD-10-CM

## 2018-06-13 PROCEDURE — 97116 GAIT TRAINING THERAPY: CPT | Mod: PN

## 2018-06-13 PROCEDURE — 97110 THERAPEUTIC EXERCISES: CPT | Mod: PN

## 2018-06-13 PROCEDURE — 97112 NEUROMUSCULAR REEDUCATION: CPT | Mod: PN

## 2018-06-13 NOTE — PROGRESS NOTES
"                                                    Physical Therapy Daily Note     Name: Nicolas Flaherty  Clinic Number: [id number]  Diagnosis:   Encounter Diagnoses   Name Primary?    Impaired functional mobility, balance, gait, and endurance     Muscle weakness of lower extremity     Decreased coordination      Physician: Roge Arora MD  Precautions: fall   Eval date: 5/11/18  Last PN: 6/11/18 (visit 8)  Visit #: 9 of 20  PTA Visit #: NA  Time In: 1100  Time Out: 1155  Total Treatment Time: 55' (1:1 with PT for 55' of session)     Evaluation Date: 5/11/18  Visit # authorized: 20  Authorization period: 12/31/18     Subjective     Pt reports: no recent falls and no new complaints. He plans to have an endoscopy and colonoscopy soon but needs clearance from his cardiologist.    Pain Scale: Nicolas rates pain on a scale of 0-10 to be 0 currently.    Objective     BOLD= performed today    Nicolas received individual therapeutic exercises to develop strength, endurance, ROM, flexibility, posture and core stabilization for 35 minutes including:    Nustep x 8' level 2  Standing HR 2 x 10   Standing marches w 1 UE x 20 ea   Standing hip abduction w 1 UE 2 x 10   Standing Hip extension w 1 UE 2 x 10 ea  Step ups 6" step BUE in // bars x 20   Mini squats in // bars x 20  SLR 2# x 20 ea    SL clams x 20 ea   Seated LAQ 2# x 20 ea  Bridges w GTB x 20   +Supine clams w GTB x 20  BFKO 2 x 10   HL hip adduction 3' x 3''   Sit<>stand 2 x 10  Qpd alt UEs x 20 ea    Nicolas received the following Neuromuscular Reeducation for 10 minutes including:    NBOS 3 x 30''  Tandem Stance 3 x 30''  Modified SLS 3 x 30'' with unilat UE support and red disc  DLS 3 x 30'' - np   Rockerboard x 20 ea way  [facility] Taps 1 x 10     The patient received the following Gait training x 10'     Sidestepping in // bars w ladder x 3 laps  Fwd walking in // bars w ladder x 3 laps  2 x 100' w RW and close SBA     Written Home Exercises " Provided: continue with current HEP (bridges, SLR, squats, LAQ, standing hip extension, standing hip abduction, standing marches)  Pt demo good understanding of the education provided. Nicolas demonstrated good return demonstration of activities.     Education provided re: use of vision when ambulating and importance to proper upright posture.   Nicolas verbalized good understanding of education provided.   No spiritual or educational barriers to learning provided    Assessment     Nicolas tolerated today's treatment well with no adverse effects. Pt was challenged with performing therapeutic exercises with hip control. He continues with tendency for forward trunk lean with gait training requiring frequent verbal reminders. Able to progress to 6 inches with step ups without difficulty. Improvement in technique and stability with sit <> stand from elevated surface.      This is a 62 y.o. 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 Pt prognosis is Fair. Pt will continue to benefit from skilled outpatient physical therapy to address the deficits listed in the problem list, provide pt/family education and to maximize pt's level of independence in the home and community environment.     Goals as follows:  GOALS: Short Term Goals:  6 weeks  1. Pt will be able to perform sit <> stand transfers without use of UE support from elevated surface to improve functional mobility and transfers.- met 6/11/18  2. Pt will be able to tolerate multi-directional LE strengthening in order to improve ability to perform household chores.- met 6/11/18  3. Pt will report 50% improvement in ability to walk long distances since start of care to indicate improved functional mobility.- met 6/11/18   4. Pt will be able to stand 1 minute without LOB and no UE support to indicate improved standing  endurance.- met 6/11/18   5. Pt to tolerate HEP to improve ROM and independence with ADL's.- met 6/11/18    Long Term Goals: 12 weeks  1. Pt will improved Quiroz score to 30/56 to indicate improved static and dynamic balance and decreased fall risk.   2. Pt will be able to perform 2 x 10 multi-directional LE strengthening without fatigue in order to improve ability to perform household chores.  3. Pt will report 80% improvement in ability to walk long distances since start of care to indicate improved functional mobility.   4. Pt will be able to perform therapeutic exercises in standing for 25% of treatment to indicate improved standing endurance.  5. Pt to be Independent with HEP to improve ROM and independence with ADL's.     Plan     Continue with established Plan of Care towards PT goals.    Therapist: Angelica Mejia, PT  6/13/2018

## 2018-06-17 ENCOUNTER — HOSPITAL ENCOUNTER (EMERGENCY)
Facility: HOSPITAL | Age: 63
Discharge: HOME | End: 2018-06-17
Attending: EMERGENCY MEDICINE
Payer: COMMERCIAL

## 2018-06-17 VITALS
WEIGHT: 268 LBS | HEIGHT: 67 IN | RESPIRATION RATE: 20 BRPM | OXYGEN SATURATION: 96 % | SYSTOLIC BLOOD PRESSURE: 134 MMHG | DIASTOLIC BLOOD PRESSURE: 72 MMHG | BODY MASS INDEX: 42.06 KG/M2 | TEMPERATURE: 98 F | HEART RATE: 83 BPM

## 2018-06-17 DIAGNOSIS — M25.561 RIGHT KNEE PAIN: ICD-10-CM

## 2018-06-17 DIAGNOSIS — S80.01XA CONTUSION OF RIGHT KNEE, INITIAL ENCOUNTER: Primary | ICD-10-CM

## 2018-06-17 LAB — POCT GLUCOSE: 250 MG/DL (ref 70–110)

## 2018-06-17 PROCEDURE — 82962 GLUCOSE BLOOD TEST: CPT

## 2018-06-17 PROCEDURE — 99283 EMERGENCY DEPT VISIT LOW MDM: CPT | Mod: 25

## 2018-06-17 NOTE — DISCHARGE INSTRUCTIONS
Your knee is bruised.     Please rest, apply ice intermittently, wear an ace wrap for compression and elevate your leg as much as possible.    You can take Tylenol as needed for pain.    Please continue going to physical therapy.    The can follow up with their primary care provider as needed.    Return to the emergency department for any new or concerning symptoms.

## 2018-06-17 NOTE — ED TRIAGE NOTES
Pt fell a month ago and hit his right knee. About two weeks ago the pt was getting out of bed and hit his right knee again which is causing the pain and swelling. The pt is currently receiving PT for his balance on Mondays and Wednesday.

## 2018-06-17 NOTE — ED PROVIDER NOTES
Encounter Date: 6/17/2018    SCRIBE #1 NOTE: IJoselo, am scribing for, and in the presence of,  Ana Velasco PA-C. I have scribed the following portions of the note - Other sections scribed: HPI, ROS.       History     Chief Complaint   Patient presents with    Knee Pain     fell 1 month ago, rolled out of bed 1 wk ago and reaggravated injury; goes to physical therapy     CC: Knee Pain    HPI: This 62 y.o. Male with arthritis, CAD, diabetes mellitus, HTN, kidney stone, sleep apnea and stroke presents to the ED c/o R knee pain after a fall x1 month ago and rolling out of bed x1 week ago. Pt reports going to physical therapy with relief. He has pain with bending his R knee. Pt walks with a walker. He complies with at home meds and has not taken any meds for his symptoms. Pt denies fever.      The history is provided by the patient. No  was used.     Review of patient's allergies indicates:  No Known Allergies  Past Medical History:   Diagnosis Date    Anticoagulant long-term use     Arthritis     Cancer of kidney, right     Coronary artery disease     Diabetes mellitus     Hypertension     Kidney stone     Sleep apnea     Slurred speech     Stroke     MINI STROKE    TIA (transient ischemic attack)     Wears glasses      Past Surgical History:   Procedure Laterality Date    BACK SURGERY      lower back    CARDIAC SURGERY      excision right thigh mass      heart stents      stents 4 total.  2010, 2011 and 2013    HERNIA REPAIR      incisional    KIDNEY STONE SURGERY  2015    LAPAROSCOPIC NEPHRECTOMY, HAND ASSISTED Right     LITHOTRIPSY      VASCULAR SURGERY       Family History   Problem Relation Age of Onset    Heart disease Father     Hypertension Mother     Parkinsonism Mother      Social History   Substance Use Topics    Smoking status: Never Smoker    Smokeless tobacco: Current User     Types: Snuff      Comment: 20 plus years    Alcohol use Yes       Comment: occas     Review of Systems   Constitutional: Negative for chills and fever.   HENT: Negative for ear pain, rhinorrhea and sore throat.    Eyes: Negative for redness.   Respiratory: Negative for shortness of breath.    Cardiovascular: Negative for chest pain.   Gastrointestinal: Negative for abdominal pain, diarrhea, nausea and vomiting.   Genitourinary: Negative for dysuria and hematuria.   Musculoskeletal: Positive for arthralgias (R knee). Negative for back pain and neck pain.   Skin: Negative for rash.   Neurological: Negative for weakness, numbness and headaches.   Hematological: Does not bruise/bleed easily.       Physical Exam     Initial Vitals [06/17/18 1633]   BP Pulse Resp Temp SpO2   (!) 170/95 96 20 98.3 °F (36.8 °C) 96 %      MAP       --         Physical Exam    Nursing note and vitals reviewed.  Constitutional: Vital signs are normal. He appears well-developed and well-nourished. He is not diaphoretic. He is cooperative.  Non-toxic appearance. He does not have a sickly appearance. He does not appear ill. No distress.   HENT:   Head: Normocephalic and atraumatic.   Right Ear: Tympanic membrane, external ear and ear canal normal.   Left Ear: Tympanic membrane, external ear and ear canal normal.   Nose: Nose normal.   Mouth/Throat: Uvula is midline, oropharynx is clear and moist and mucous membranes are normal. No trismus in the jaw. No uvula swelling. No oropharyngeal exudate, posterior oropharyngeal edema or posterior oropharyngeal erythema.   Eyes: Conjunctivae, EOM and lids are normal. Pupils are equal, round, and reactive to light.   Neck: Trachea normal, normal range of motion, full passive range of motion without pain and phonation normal. Neck supple.   Cardiovascular: Normal rate, regular rhythm, normal heart sounds and intact distal pulses. Exam reveals no gallop and no friction rub.    No murmur heard.  Pulses:       Dorsalis pedis pulses are 2+ on the right side, and 2+ on the left  side.   Capillary refill less than 2 sec in bilateral lower extremities.   Pulmonary/Chest: Effort normal and breath sounds normal. No respiratory distress. He has no decreased breath sounds. He has no wheezes. He has no rhonchi. He has no rales.   Abdominal: Soft. Normal appearance and bowel sounds are normal. He exhibits no distension. There is no tenderness.   Musculoskeletal: Normal range of motion.        Right knee: He exhibits swelling. He exhibits normal range of motion, no effusion, no ecchymosis, no deformity, no laceration, no erythema, normal alignment, no LCL laxity, normal patellar mobility, no bony tenderness, normal meniscus and no MCL laxity.        Left knee: Normal.        Legs:  There is subcutaneous swelling over the right patella consistent with contusion. No effusion. Full ROM of the right lower extremity including the knee. Patient ambulates with a walker. No bruising or ecchymosis. No obvious bony deformity. No ligament laxity. No erythema, warmth or drainage.   Neurological: He is alert and oriented to person, place, and time. He has normal strength. No cranial nerve deficit or sensory deficit. GCS eye subscore is 4. GCS verbal subscore is 5. GCS motor subscore is 6.   Skin: Skin is warm and dry. Capillary refill takes less than 2 seconds. No abrasion, no bruising, no burn, no ecchymosis, no laceration, no lesion, no rash and no abscess noted. No erythema.   Psychiatric: He has a normal mood and affect. His speech is normal and behavior is normal. Judgment and thought content normal. Cognition and memory are normal.         ED Course   Procedures  Labs Reviewed   POCT GLUCOSE - Abnormal; Notable for the following:        Result Value    POCT Glucose 250 (*)     All other components within normal limits   POCT GLUCOSE MONITORING CONTINUOUS          X-Ray Knee 3 View Right   Final Result      1. No acute displaced fracture or dislocation of the knee, noting mild subcutaneous edema in the  region of the patella.         Electronically signed by: Zackery Callahan MD   Date:    06/17/2018   Time:    17:50                 APC / Resident Notes:   This is an evaluation of a 62 y.o. Male with DM, HTN ataxia, unsteady gait, TIA, single kidney that presents to the Emergency Department for knee pain. Patient reports pain and swelling of the right knee following physical therapy several days ago. He reports prior injury 1 month ago secondary to falling out of bed onto right knee, but denies any prior imaging or assessment. He denies head injury or LOC from fall 1 month ago. He reports taking Tylenol prn pain. He denies any further symptoms.     Physical Exam shows a non-toxic, afebrile, and well appearing male. There is subcutaneous swelling over the patella consistent with contusion. No effusion. Full ROM of the right lower extremity including the knee. Patient ambulates with a walker. No bruising or ecchymosis. No obvious bony deformity. No ligament laxity. No erythema, warmth or drainage. +2 DP pulses bilaterally. Capillary refill < 2 seconds in bilateral lower extremities. Sensation and strength intact bilaterally.     Vital Signs Are Reassuring. If available, previous records reviewed.   RESULTS: POCT glucose 250.  X-ray right knee unrevealing for acute displaced fracture or dislocation.  There is mild subcutaneous edema in the region of the patella.    My overall impression is right knee contusion.  DDx: knee pain, contusion, effusion, fracture, dislocation, bursitis, other  I do not suspect emergent process at this time.     ED Course: ace wrap, ice.  I feel is patient supine for discharge. I will recommend rice therapy and Tylenol as needed.  The diagnosis, treatment plan, instructions for follow-up and reevaluation with PCP, as well as ED return precautions were discussed and understanding was verbalized. All questions or concerns have been addressed. Patient was discharged home with an instructional  sheet which gave not only information regarding the most likely diagnoses but also information regarding when to return to the emergency department for alarming symptoms and when to seek further care.    This case was discussed with and the patient has been examined by Dr. Asencio who is in agreement with my assessment and plan.     Ana Velasco PA-C         Scribe Attestation:   Scribe #1: I performed the above scribed service and the documentation accurately describes the services I performed. I attest to the accuracy of the note.    Attending Attestation:     Physician Attestation Statement for NP/PA:   I have conducted a face to face encounter with this patient in addition to the NP/PA, due to NP/PA Request    Other NP/PA Attestation Additions:    History of Present Illness: The patient presents with persistent pain to the anterior surface of the right knee.  The pain began following a mechanical fall on to the extremity.   Physical Exam: Mild swelling over the superior portion of the right patella with mild tenderness. No erythema, abrasion, laceration.  No joint effusion.  The joint is stable.  The patient has full active range of motion without difficulty or discomfort.   Medical Decision Making: Presentation and exam are consistent with a minor contusion to the anterior surface of the right knee.  I agree with the history, examination, assessment and plan as documented by the midlevel provider       Physician Attestation for Scribe:  Physician Attestation Statement for Scribe #1: I, Ana Velasco PA-C, reviewed documentation, as scribed by Joselo Guillory in my presence, and it is both accurate and complete.                    Clinical Impression:   The primary encounter diagnosis was Contusion of right knee, initial encounter. A diagnosis of Right knee pain was also pertinent to this visit.      Disposition:   Disposition: Discharged  Condition: Stable                        Ana Velasco  JERAD  06/17/18 1829       Adelso Asencio III, MD  06/29/18 [id number]

## 2018-06-18 ENCOUNTER — CLINICAL SUPPORT (OUTPATIENT)
Dept: REHABILITATION | Facility: HOSPITAL | Age: 63
End: 2018-06-18
Attending: NEUROLOGICAL SURGERY
Payer: COMMERCIAL

## 2018-06-18 DIAGNOSIS — M62.81 MUSCLE WEAKNESS OF LOWER EXTREMITY: ICD-10-CM

## 2018-06-18 DIAGNOSIS — R27.8 DECREASED COORDINATION: ICD-10-CM

## 2018-06-18 DIAGNOSIS — Z74.09 IMPAIRED FUNCTIONAL MOBILITY, BALANCE, GAIT, AND ENDURANCE: ICD-10-CM

## 2018-06-18 PROCEDURE — 97110 THERAPEUTIC EXERCISES: CPT | Mod: PN

## 2018-06-18 PROCEDURE — 97112 NEUROMUSCULAR REEDUCATION: CPT | Mod: PN

## 2018-06-18 PROCEDURE — 97116 GAIT TRAINING THERAPY: CPT | Mod: PN

## 2018-06-18 NOTE — PROGRESS NOTES
"                                                    Physical Therapy Daily Note     Name: Nicolas Flaherty  Clinic Number: [id number]  Diagnosis:   Encounter Diagnoses   Name Primary?    Impaired functional mobility, balance, gait, and endurance     Muscle weakness of lower extremity     Decreased coordination      Physician: Roge Arora MD  Precautions: fall   Eval date: 5/11/18  Last PN: 6/11/18 (visit 8)  Visit #: 10 of 20  PTA Visit #1  Time In: 0800  Time Out: 0900  Total Treatment Time: 60'  (1:1 with PTA for duration of session)     Evaluation Date: 5/11/18  Visit # authorized: 20  Authorization period: 12/31/18     Subjective     Pt reports: that he is doing okay today.  Pt states that he had increased knee pain and swelling after quadruped exercises last treatment session.  Patient reports that he went to the ER where they told him he had a contusion.      Pain Scale: Nicolas rates pain on a scale of 0-10 to be 0 currently.    Objective     BOLD= performed today    Nicolas received individual therapeutic exercises to develop strength, endurance, ROM, flexibility, posture and core stabilization for 40 minutes including:    Nustep x 8' level 2  Standing HR 2 x 10   Standing marches w 1 UE x 20 ea   Standing hip abduction w 1 UE 2 x 10   Standing Hip extension w 1 UE 2 x 10 ea  Step ups 6" step BUE in // bars x 20   Mini squats in // bars x 20  SLR 2# x 20 ea    SL clams x 20 ea   Seated LAQ 2# x 20 ea  Bridges w GTB x 20   Supine clams w GTB x 20  BFKO 2 x 10   HL hip adduction 3' x 3''   Sit<>stand 2 x 10  Qpd alt UEs x 20 ea    Nicolas received the following Neuromuscular Reeducation for 10 minutes including:    NBOS 3 x 30''  Tandem Stance 3 x 30''  Modified SLS 3 x 30'' with unilat UE support and red disc  DLS 3 x 30'' - np   Rockerboard x 20 ea way  Cone Taps 1 x 10     The patient received the following Gait training x 10'     Sidestepping in // bars w ladder x 3 laps  Fwd walking in // " bars w ladder x 3 laps  2 x 100' w RW and close SBA     Written Home Exercises Provided: continue with current HEP (bridges, SLR, squats, LAQ, standing hip extension, standing hip abduction, standing marches)  Pt demo good understanding of the education provided. Nicolas demonstrated good return demonstration of activities.     Education provided re: use of vision when ambulating and importance to proper upright posture.   Nicolas verbalized good understanding of education provided.   No spiritual or educational barriers to learning provided    Assessment     Nioclas tolerated treatment session well today.  Patient with Ace wrap on right knee this session due to exacerbation of knee pain.  Patient able to perform all exercises without complaints.  Patient continues with decreased core, trunk and hip weakness and lacks neuromuscular control of bilateral lower extremities.  Patient continues to require verbal cueing to decrease forward flexed posture.     This is a 62 y.o. 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 Pt prognosis is Fair. Pt will continue to benefit from skilled outpatient physical therapy to address the deficits listed in the problem list, provide pt/family education and to maximize pt's level of independence in the home and community environment.     Goals as follows:  GOALS: Short Term Goals:  6 weeks  1. Pt will be able to perform sit <> stand transfers without use of UE support from elevated surface to improve functional mobility and transfers.- met 6/11/18  2. Pt will be able to tolerate multi-directional LE strengthening in order to improve ability to perform household chores.- met 6/11/18  3. Pt will report 50% improvement in ability to walk long distances since start of care to indicate improved functional mobility.- met 6/11/18   4. Pt will be able  to stand 1 minute without LOB and no UE support to indicate improved standing endurance.- met 6/11/18   5. Pt to tolerate HEP to improve ROM and independence with ADL's.- met 6/11/18    Long Term Goals: 12 weeks  1. Pt will improved Quiroz score to 30/56 to indicate improved static and dynamic balance and decreased fall risk.   2. Pt will be able to perform 2 x 10 multi-directional LE strengthening without fatigue in order to improve ability to perform household chores.  3. Pt will report 80% improvement in ability to walk long distances since start of care to indicate improved functional mobility.   4. Pt will be able to perform therapeutic exercises in standing for 25% of treatment to indicate improved standing endurance.  5. Pt to be Independent with HEP to improve ROM and independence with ADL's.     Plan     Continue with established Plan of Care towards PT goals.    Therapist: Johanne Herrera, JIN  6/18/2018

## 2018-06-20 ENCOUNTER — CLINICAL SUPPORT (OUTPATIENT)
Dept: REHABILITATION | Facility: HOSPITAL | Age: 63
End: 2018-06-20
Attending: NEUROLOGICAL SURGERY
Payer: COMMERCIAL

## 2018-06-20 DIAGNOSIS — Z74.09 IMPAIRED FUNCTIONAL MOBILITY, BALANCE, GAIT, AND ENDURANCE: Primary | ICD-10-CM

## 2018-06-20 DIAGNOSIS — G11.9 ATAXIA DUE TO CEREBELLAR DEGENERATION: ICD-10-CM

## 2018-06-20 DIAGNOSIS — R47.1 ATAXIC DYSARTHRIA: ICD-10-CM

## 2018-06-20 DIAGNOSIS — R27.8 DECREASED COORDINATION: ICD-10-CM

## 2018-06-20 DIAGNOSIS — M62.81 MUSCLE WEAKNESS OF LOWER EXTREMITY: ICD-10-CM

## 2018-06-20 PROCEDURE — 97110 THERAPEUTIC EXERCISES: CPT | Mod: PN

## 2018-06-20 PROCEDURE — 92507 TX SP LANG VOICE COMM INDIV: CPT | Mod: PN

## 2018-06-20 NOTE — PROGRESS NOTES
Outpatient [facility] Rehabilitation   Speech and Language Therapy Daily Note  Date:  6/20/2018     Start Time:  0855  Stop Time:  0930       Name: Nicolas Flaherty   MRN: [id number]             Therapy Diagnosis: Ataxic Dysarthria  Physician: Roge Arora MD  Physician Orders: amb referral to speech therapy  Medical Diagnosis: Cerebellar Degeneration     Visit #:  2  Visits Authorized: 20  Visits Cancelled: 0  Visits No Showed:0  Date of Evaluation:  6/618  Insurance Authorization Period: 05/29/2018-12/31/2018    Plan of Care Certification:   6/6/2018-7/25/2018  *  G-CODE   2 /10    Precautions: Standard and Fall  History:   Current Medical History: Nicolas Flaherty presents to the [facility] with minimally impaired speech skills secondary to the medical diagnosis of cerebellar degeneration.  Subjective:   Pt reports: Pt reports that everything is going well at home and that his speech skills are relatively the same. Pt was compliant to home exercise program.   Response to previous treatment: first follow-up session   Pain Scale:  0/10 on VAS currently.   Pain Location: n/a  Objective:   UNTIMED  Procedure Min.   Motor Speech    35   Total Minutes: 35  Total Untimed Units: 1  Charges Billed/# of units: 1    Short Term Goals: (4 weeks) Current Progress:   1. Pt will recall 3/4 motor speech strategies ind'ly.   Motor speech strategies were reviewed with pt.    2. Pt will complete OMEs 10x each 1-2x per session given supervision. Not assessed during today's session.   3. Pt will utilize motor speech strategies when reading multi-syllabic words with various phonemic contexts with 100% acc ind'ly. Pt utilized motor speech strategies when reading multi-syllabic words with various phonemic contexts with 64% acc ind'ly, 88% acc given min cues.   4. Pt will utilize motor speech strategies while reading 5-7 word sentences with various phonemic contexts with  "90% acc ind'ly.  Pt utilized motor speech strategies when reading 5-7 word sentences with various phonemic contexts with 73% acc ind'ly, 93% acc given min cues.   5. Pt will utili.ze motor speech strategies during expressive speech tasks with 90% acc given min cues.   Pt utilized motor speech strategies during expressive speech tasks with 67% acc given min cues.   6. During spontaneous conversations, pt will utilize motor speech strategies ind'ly with 90% intelligibility.  Pt utilized motor speech strategies during spontaneous conversations ind'ly with 60% intelligibility.        Patient Education/Response:   Pt educated on progress and POC.  Home Program: n/a  Assessment:   Nicolas is progressing well towards his goals. Pt participated in all activities today utilizing the motor speech strategies when redirected by the SLP. Current goals remain appropriate. Goals to be updated as necessary.   Pt prognosis is Fair. Pt will continue to benefit from skilled outpatient speech and language therapy to address the deficits listed in the problem list on initial evaluation, provide pt/family education and to maximize pt's level of independence in the home and community environment.     Medical necessity is demonstrated by the following IMPAIRMENTS:  Improve pt's ability to utilize clear speech in order to express wants and needs for social and medical use.  Barriers to Therapy: transportation  Pt's spiritual, cultural and educational needs considered and pt agreeable to plan of care and goals.      Plan:   Continue POC with focus on increasing speech clarity.    DMITRY Davis.   Clinician  Speech-Language Pathology     "I VALENCIA Vilchis., CCC-SLP certify that I was present in the room directing the student and service delivery and guiding them using my skilled judgement. As the co-signing therapist, I have reviewed the student's documentation, and am responsible for the treatment, " "assessment and plan."     VALENCIA Vilchis., CCC-SLP  Speech-Language Pathologist    6/20/2018           "

## 2018-06-20 NOTE — PROGRESS NOTES
"                                                    Physical Therapy Daily Note     Name: Nicolas Flaherty  Clinic Number: [id number]  Diagnosis:   Encounter Diagnoses   Name Primary?   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       Pain Scale: Nicolas rates pain on a scale of 0-10 to be 0 currently.    Objective     BOLD= performed today    Nicolas received individual therapeutic exercises to develop strength, endurance, ROM, flexibility, posture and core stabilization for 40 minutes including:    Nustep x 8' level 2  Standing HR 2 x 10   Standing marches w 1 UE x 20 ea   Standing hip abduction w 1 UE 2 x 10   Standing Hip extension w 1 UE 2 x 10 ea  Step ups 6" step BUE in // bars x 20   Mini squats in // bars x 20  SLR 2# x 20 ea    SL clams x 20 ea   Seated LAQ 2# x 20 ea  Bridges w GTB x 20   Supine clams w GTB x 20  BFKO 2 x 10   HL hip adduction 3' x 3''   Sit<>stand 2 x 10  Qpd alt UEs x 20 ea - np     Nicolas received the following Neuromuscular Reeducation for 0 minutes including:    NBOS 3 x 30''  Tandem Stance 3 x 30''  Modified SLS 3 x 30'' with unilat UE support and red disc  DLS 3 x 30'' - np   Rockerboard x 20 ea way  Cone Taps 1 x 10     The patient received the following Gait training x 0'     Sidestepping in // bars w ladder x 3 laps  Fwd walking in // bars w ladder x 3 laps  2 x 100' w RW and close SBA     Written Home Exercises Provided: continue " with current HEP (bridges, SLR, squats, LAQ, standing hip extension, standing hip abduction, standing marches)  Pt demo good understanding of the education provided. Nicolas demonstrated good return demonstration of activities.     Education provided re: use of vision when ambulating and importance to proper upright posture.   Nicolas verbalized good understanding of education provided.   No spiritual or educational barriers to learning provided    Assessment     Nicolas tolerated treatment session fairly well today.  Patient with good tolerance to strengthening today, however patient continues with global weakness.  Patient also with decreased neuromuscular control of bilateral lower extremities.    This is a 62 y.o. 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 Pt prognosis is Fair. Pt will continue to benefit from skilled outpatient physical therapy to address the deficits listed in the problem list, provide pt/family education and to maximize pt's level of independence in the home and community environment.     Goals as follows:  GOALS: Short Term Goals:  6 weeks  1. Pt will be able to perform sit <> stand transfers without use of UE support from elevated surface to improve functional mobility and transfers.- met 6/11/18  2. Pt will be able to tolerate multi-directional LE strengthening in order to improve ability to perform household chores.- met 6/11/18  3. Pt will report 50% improvement in ability to walk long distances since start of care to indicate improved functional mobility.- met 6/11/18   4. Pt will be able to stand 1 minute without LOB and no UE support to indicate improved standing endurance.- met 6/11/18   5. Pt to tolerate HEP to improve ROM and independence with ADL's.- met 6/11/18    Long Term Goals: 12 weeks  1. Pt will improved Quiroz score to 30/56 to  indicate improved static and dynamic balance and decreased fall risk.   2. Pt will be able to perform 2 x 10 multi-directional LE strengthening without fatigue in order to improve ability to perform household chores.  3. Pt will report 80% improvement in ability to walk long distances since start of care to indicate improved functional mobility.   4. Pt will be able to perform therapeutic exercises in standing for 25% of treatment to indicate improved standing endurance.  5. Pt to be Independent with HEP to improve ROM and independence with ADL's.     Plan     Continue with established Plan of Care towards PT goals.    Therapist: Johanne Herrera, PTA  6/20/2018

## 2018-06-25 ENCOUNTER — CLINICAL SUPPORT (OUTPATIENT)
Dept: REHABILITATION | Facility: HOSPITAL | Age: 63
End: 2018-06-25
Attending: NEUROLOGICAL SURGERY
Payer: COMMERCIAL

## 2018-06-25 DIAGNOSIS — M62.81 MUSCLE WEAKNESS OF LOWER EXTREMITY: ICD-10-CM

## 2018-06-25 DIAGNOSIS — Z74.09 IMPAIRED FUNCTIONAL MOBILITY, BALANCE, GAIT, AND ENDURANCE: ICD-10-CM

## 2018-06-25 DIAGNOSIS — R27.8 DECREASED COORDINATION: ICD-10-CM

## 2018-06-25 DIAGNOSIS — R47.1 ATAXIC DYSARTHRIA: ICD-10-CM

## 2018-06-25 PROCEDURE — 92507 TX SP LANG VOICE COMM INDIV: CPT | Mod: PN

## 2018-06-25 PROCEDURE — 97110 THERAPEUTIC EXERCISES: CPT | Mod: PN

## 2018-06-25 NOTE — PROGRESS NOTES
"                                                    Physical Therapy Daily Note     Name: Nicolas Flaherty  Clinic Number: [id number]  Diagnosis:   Encounter Diagnoses   Name Primary?    Impaired functional mobility, balance, gait, and endurance     Muscle weakness of lower extremity     Decreased coordination      Physician: Roge Arora MD  Precautions: fall   Eval date: 5/11/18  Last PN: 6/11/18 (visit 8)  Visit #: 12 of 20  PTA Visit # NA  Time In: 1100  Time Out: 1140  Total Treatment Time: 40' (1:1 with PT for duration of session)   Authorization period: 12/31/18     Subjective     Pt reports: he fell about a month ago when getting out of his chair. His R knee is still bothering him. It feels better when he elevates but hurts when he rolls onto it. He plans to see PCP concerning knee pain. Pt is not icing knee at home. He denies fever and chills. History of gout about 20 years ago. Increase in pain when lifting knee up.     Pain Scale: Nicolas rates R knee pain on a scale of 0-10 to be 3 currently.    Objective     Taken 6/25/18:  Palpation: R knee mild-mod swelling (localized to lateral aspect), warm to touch    BOLD= performed today    Nicolas received individual therapeutic exercises to develop strength, endurance, ROM, flexibility, posture and core stabilization for 30 minutes including:    Nustep x 8' level 1  +Heel prop x 5 min w ankle pumps  +SAQ x 30  Standing HR 2 x 10   Standing marches w 1 UE x 20 ea   Standing hip abduction w 1 UE 2 x 10   Standing Hip extension w 1 UE 2 x 10 ea  Step ups 6" step BUE in // bars x 20   Mini squats in // bars x 20  SLR 2# x 20 ea    SL clams x 20 ea   Seated LAQ 2# x 20 ea  Bridges w GTB x 20   Supine clams w GTB x 20  BFKO 2 x 10   HL hip adduction 3' x 3''   Sit<>stand 2 x 10  Qpd alt UEs x 20 ea - np     Nicolas received the following Neuromuscular Reeducation for 0 minutes including:    NBOS 3 x 30''  Tandem Stance 3 x 30''  Modified SLS 3 x 30'' " with unilat UE support and red disc  DLS 3 x 30'' - np   Rockerboard x 20 ea way  Cone Taps 1 x 10     The patient received the following Gait training x 0'     Sidestepping in // bars w ladder x 3 laps  Fwd walking in // bars w ladder x 3 laps  2 x 100' w RW and close SBA     Pt received cryotherapy to R knee in supine with bolster for 10 minutes.    Written Home Exercises Provided: continue with current HEP (bridges, SLR, squats, LAQ, standing hip extension, standing hip abduction, standing marches)  Pt demo good understanding of the education provided. Nicolas demonstrated good return demonstration of activities.     Education provided re: use of vision when ambulating and importance to proper upright posture.   Nicolas verbalized good understanding of education provided.   No spiritual or educational barriers to learning provided    Assessment     Nicolas had good tolerance to shortened treatment today with no adverse effects. Treatment modified due to R knee swelling and pain. Pt presents to clinic with mild-mod R knee swelling, tenderness to touch, and increased warmth. Suspected gout considering pt's past history. Pt advised to schedule appointment with PCP or specialist. Slight improvement in symptoms following cryotherapy.    This is a 62 y.o. 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 Pt prognosis is Fair. Pt will continue to benefit from skilled outpatient physical therapy to address the deficits listed in the problem list, provide pt/family education and to maximize pt's level of independence in the home and community environment.     Goals as follows:  GOALS: Short Term Goals:  6 weeks  1. Pt will be able to perform sit <> stand transfers without use of UE support from elevated surface to improve functional mobility and transfers.- met 6/11/18  2. Pt will be  able to tolerate multi-directional LE strengthening in order to improve ability to perform household chores.- met 6/11/18  3. Pt will report 50% improvement in ability to walk long distances since start of care to indicate improved functional mobility.- met 6/11/18   4. Pt will be able to stand 1 minute without LOB and no UE support to indicate improved standing endurance.- met 6/11/18   5. Pt to tolerate HEP to improve ROM and independence with ADL's.- met 6/11/18    Long Term Goals: 12 weeks  1. Pt will improved Quiroz score to 30/56 to indicate improved static and dynamic balance and decreased fall risk.   2. Pt will be able to perform 2 x 10 multi-directional LE strengthening without fatigue in order to improve ability to perform household chores.  3. Pt will report 80% improvement in ability to walk long distances since start of care to indicate improved functional mobility.   4. Pt will be able to perform therapeutic exercises in standing for 25% of treatment to indicate improved standing endurance.  5. Pt to be Independent with HEP to improve ROM and independence with ADL's.     Plan     Continue with established Plan of Care towards PT goals.    Therapist: Angelica Mejia, PT  6/25/2018

## 2018-06-25 NOTE — PROGRESS NOTES
Outpatient Neurological Rehabilitation   Speech and Language Therapy Daily Note  Date:  6/25/2018     Start Time:  1345  Stop Time:  1430       Name: Nicolas Flaherty   MRN: [id number]             Therapy Diagnosis: Ataxic Dysarthria  Physician: Roge Arora MD  Physician Orders: amb referral to speech therapy  Medical Diagnosis: Cerebellar Degeneration     Visit #:  3  Visits Authorized: 20  Visits Cancelled: 0  Visits No Showed:0  Date of Evaluation:  6/618  Insurance Authorization Period: 05/29/2018-12/31/2018    Plan of Care Certification:   6/6/2018-7/25/2018  G-CODE   3 /10    Precautions: Standard and Fall  History:   Current Medical History: Nicolas Flaherty presents to the [facility] with minimally impaired speech skills secondary to the medical diagnosis of cerebellar degeneration.  Subjective:   Pt reports: Pt reports dizziness, leg pain, and swallowing problems. He had clear MBSS, but awaiting a GI appointment at [place]. Switching to Medicaid as primary insurance. Voice amplifier discussed.  Response to previous treatment: Did not recall motor speech strategies.  Pain Scale:  4/10 on VAS currently.   Pain Location: right knee  Objective:   UNTIMED  Procedure Min.   Motor Speech    45   Total Minutes: 45  Total Untimed Units: 1  Charges Billed/# of units: 1    Short Term Goals: (4 weeks) Current Progress:   1. Pt will recall 3/4 motor speech strategies ind'ly.  Recalled 1/4 motor speech strategies. Motor speech strategies were reviewed    2. Pt will complete OMEs 10x each 1-2x per session given supervision. Not assessed during today's session.   3. Pt will utilize motor speech strategies when reading multi-syllabic words with various phonemic contexts with 100% acc ind'ly. Pt utilized motor speech strategies when reading multi-syllabic initial /g/ words with 80% acc ind'ly, 88% acc given min cues.    Pt utilized motor speech  strategies when reading multi-syllabic initial /t/ words with 100% acc ind'ly, 92% acc given min cues.    Pt utilized motor speech strategies when reading multi-syllabic initial /d/ words with 100% acc ind'ly, 92% acc given min cues.    Pt utilized motor speech strategies when reading multi-syllabic initial /g/ words with 88% acc ind'ly, 96% acc given min cues.    Pt utilized motor speech strategies when reading multi-syllabic initial /l/ words with 92% acc ind'ly, 100% acc given min cues.   4. Pt will utilize motor speech strategies while reading 5-7 word sentences with various phonemic contexts with 90% acc ind'ly.  Pt utilized motor speech strategies when reading 5-7 /t/ word sentences with 80% acc ind'ly, 90% acc given min cues.    Pt utilized motor speech strategies when reading 5-7 /g/ word sentences with 60% acc ind'ly, 100% acc given min cues.    Pt utilized motor speech strategies when reading 5-7 /d/ word sentences with 90% acc ind'ly, 100% acc given min cues.   5. Pt will utili.ze motor speech strategies during expressive speech tasks with 90% acc given min cues.   Not formally addressed.   6. During spontaneous conversations, pt will utilize motor speech strategies ind'ly with 90% intelligibility.  Not formally addressed.       Patient Education/Response:   Pt educated on motor speech strategies and dysphagia. Pt verbalized understanding.  Home Program: n/a  Assessment:   Nicolas is progressing well towards his goals. Pt showed improved articulation with prompting on reading tasks. Focus for part of the session was on /v/, until it was seen that he was missing 4 front teeth, impeding his ability to make the buzz. Current goals remain appropriate. Goals to be updated as necessary.   Pt prognosis is Fair. Pt will continue to benefit from skilled outpatient speech and language therapy to address the deficits listed in the problem list on initial evaluation, provide pt/family education and to maximize pt's  level of independence in the home and community environment.     Medical necessity is demonstrated by the following IMPAIRMENTS:  Improve pt's ability to utilize clear speech in order to express wants and needs for social and medical use.  Barriers to Therapy: transportation  Pt's spiritual, cultural and educational needs considered and pt agreeable to plan of care and goals.      Plan:   Continue POC with focus on increasing speech intelligibility.    Christy Pablo  Master of Science Candidate  Communication Science and Disorders  [facility]    6/25/2018

## 2018-06-26 ENCOUNTER — OFFICE VISIT (OUTPATIENT)
Dept: FAMILY MEDICINE | Facility: CLINIC | Age: 63
End: 2018-06-26
Payer: COMMERCIAL

## 2018-06-26 VITALS
WEIGHT: 275.38 LBS | BODY MASS INDEX: 43.22 KG/M2 | DIASTOLIC BLOOD PRESSURE: 88 MMHG | HEART RATE: 85 BPM | HEIGHT: 67 IN | TEMPERATURE: 99 F | SYSTOLIC BLOOD PRESSURE: 126 MMHG | OXYGEN SATURATION: 97 %

## 2018-06-26 DIAGNOSIS — Z11.59 NEED FOR HEPATITIS C SCREENING TEST: ICD-10-CM

## 2018-06-26 DIAGNOSIS — I10 ESSENTIAL HYPERTENSION: ICD-10-CM

## 2018-06-26 DIAGNOSIS — M70.51 SUPRAPATELLAR BURSITIS OF RIGHT KNEE: Primary | ICD-10-CM

## 2018-06-26 PROCEDURE — 99999 PR PBB SHADOW E&M-EST. PATIENT-LVL III: CPT | Mod: PBBFAC,,, | Performed by: INTERNAL MEDICINE

## 2018-06-26 PROCEDURE — 99214 OFFICE O/P EST MOD 30 MIN: CPT | Mod: S$GLB,,, | Performed by: INTERNAL MEDICINE

## 2018-06-26 RX ORDER — DICLOFENAC SODIUM 10 MG/G
GEL TOPICAL
Qty: 100 G | Refills: 5 | Status: SHIPPED | OUTPATIENT
Start: 2018-06-26 | End: 2018-08-24

## 2018-06-26 NOTE — PROGRESS NOTES
SUBJECTIVE     Chief Complaint   Patient presents with    Fall     3 times in the last month    Knee Pain     R knee throbbing pain. Swelling       HPI  Nicolas Flaherty is a 62 y.o. male with multiple medical diagnoses as listed in the medical history and problem list that presents for evaluation of R knee pain after a fall 1.5 months ago. Pt reports pain is throbbing, sharp, and aching at a 10/10 and intermittent in nature. He has not been taking any meds for his symptoms. Pain is better with rest and worsens with activity. He is undergoing PT currently, but was unable to do it yesterday because of edema and increased warmth to touch.     PAST MEDICAL HISTORY:  Past Medical History:   Diagnosis Date    Anticoagulant long-term use     Arthritis     Cancer of kidney, right     Coronary artery disease     Diabetes mellitus     Hypertension     Kidney stone     Sleep apnea     Slurred speech     Stroke     MINI STROKE    TIA (transient ischemic attack)     Wears glasses        PAST SURGICAL HISTORY:  Past Surgical History:   Procedure Laterality Date    BACK SURGERY      lower back    CARDIAC SURGERY      excision right thigh mass      heart stents      stents 4 total.  2010, 2011 and 2013    HERNIA REPAIR      incisional    KIDNEY STONE SURGERY  2015    LAPAROSCOPIC NEPHRECTOMY, HAND ASSISTED Right     LITHOTRIPSY      VASCULAR SURGERY         SOCIAL HISTORY:  Social History     Social History    Marital status:      Spouse name: N/A    Number of children: N/A    Years of education: N/A     Occupational History    Not on file.     Social History Main Topics    Smoking status: Never Smoker    Smokeless tobacco: Current User     Types: Snuff      Comment: 20 plus years    Alcohol use Yes      Comment: occas    Drug use: No    Sexual activity: Yes     Partners: Female     Other Topics Concern    Not on file     Social History Narrative    No narrative on file       FAMILY  HISTORY:  Family History   Problem Relation Age of Onset    Heart disease Father     Hypertension Mother     Parkinsonism Mother        ALLERGIES AND MEDICATIONS: updated and reviewed.  Review of patient's allergies indicates:  No Known Allergies  Current Outpatient Prescriptions   Medication Sig Dispense Refill    aspirin (ECOTRIN) 81 MG EC tablet Take 81 mg by mouth once daily. LAST TAKEN 3/2/16      clopidogrel (PLAVIX) 75 mg tablet Take 75 mg by mouth every morning. LAST DOSE 3/2/16      docusate sodium (STOOL SOFTENER) 100 MG capsule Take 1 capsule (100 mg total) by mouth 2 (two) times daily. 180 capsule 3    dulaglutide (TRULICITY) 1.5 mg/0.5 mL PnIj Inject 1.5 mg into the skin every 7 days. 2 mL 11    finasteride (PROSCAR) 5 mg tablet TAKE ONE TABLET BY MOUTH ONCE DAILY IN THE MORNING 90 tablet 3    furosemide (LASIX) 40 MG tablet Take 40 mg by mouth 2 (two) times daily. ALTERNATES 1 TABLET ONE DAY AND 2 TABLETS THE NEXT--ALTERNATING DAYS      gabapentin (NEURONTIN) 600 MG tablet Take 1 tablet (600 mg total) by mouth 3 (three) times daily. 270 tablet 3    gemfibrozil (LOPID) 600 MG tablet Take 1 tablet (600 mg total) by mouth 2 (two) times daily before meals. 180 tablet 3    glimepiride (AMARYL) 4 MG tablet Take 1 tablet (4 mg total) by mouth before breakfast. 90 tablet 3    isosorbide mononitrate (IMDUR) 60 MG 24 hr tablet Take 60 mg by mouth every morning.       levocetirizine (XYZAL) 5 MG tablet Take 1 tablet (5 mg total) by mouth every evening. 30 tablet 5    metoprolol succinate (TOPROL-XL) 100 MG 24 hr tablet Take 100 mg by mouth every morning.       nateglinide (STARLIX) 60 MG tablet 60 mg 2 (two) times daily before meals.       NITROSTAT 0.4 mg SL tablet Place 0.4 mg under the tongue every 5 (five) minutes as needed.       oxyCODONE-acetaminophen (PERCOCET) 5-325 mg per tablet Take 1 tablet by mouth every 4 (four) hours as needed for Pain. 30 tablet 0    prochlorperazine (COMPAZINE)  10 MG tablet Take 1 tablet (10 mg total) by mouth 3 (three) times daily as needed. 270 tablet 1    rosuvastatin (CRESTOR) 10 MG tablet Take 10 mg by mouth every evening.      tamsulosin (FLOMAX) 0.4 mg Cp24 TAKE ONE CAPSULE BY MOUTH ONCE DAILY 90 capsule 3    TRAVATAN Z 0.004 % Drop 1 drop every evening.       valsartan (DIOVAN) 80 MG tablet Take 80 mg by mouth once daily.       diclofenac sodium (VOLTAREN) 1 % Gel Lower extremities: Apply the gel (4 g) to the affected area 4 times daily. Do not apply more than 16 g daily to any one affected joint of the lower extremities. Upper extremities: Apply the gel (2 g) to the affected area 4 times daily. Do not apply more than 8 g daily to any one affected joint of the upper extremities. Total dose should not exceed 32 g per day, overall affected joints. 100 g 5     Current Facility-Administered Medications   Medication Dose Route Frequency Provider Last Rate Last Dose    lidocaine (PF) 10 mg/ml (1%) injection 10 mg  1 mL Intradermal Once Jose Lewis MD           ROS  Review of Systems   Constitutional: Negative for chills and fever.   HENT: Negative for hearing loss and sore throat.    Eyes: Negative for visual disturbance.   Respiratory: Negative for cough and shortness of breath.    Cardiovascular: Negative for chest pain, palpitations and leg swelling.   Gastrointestinal: Negative for abdominal pain, constipation, diarrhea, nausea and vomiting.   Genitourinary: Negative for dysuria, frequency and urgency.   Musculoskeletal: Positive for arthralgias (R knee pain) and joint swelling (R knee pain). Negative for myalgias.   Skin: Negative for rash and wound.   Neurological: Negative for headaches.   Psychiatric/Behavioral: Negative for agitation and confusion. The patient is not nervous/anxious.          OBJECTIVE     Physical Exam  Vitals:    06/26/18 0849   BP: 126/88   Pulse: 85   Temp: 98.8 °F (37.1 °C)    Body mass index is 43.13 kg/m².  Weight: 124.9 kg  "(275 lb 5.7 oz)   Height: 5' 7" (170.2 cm)     Physical Exam   Constitutional: He is oriented to person, place, and time. He appears well-developed and well-nourished. No distress.   HENT:   Head: Normocephalic and atraumatic.   Right Ear: External ear normal.   Left Ear: External ear normal.   Nose: Nose normal.   Mouth/Throat: Oropharynx is clear and moist.   Eyes: Conjunctivae and EOM are normal. Right eye exhibits no discharge. Left eye exhibits no discharge. No scleral icterus.   Neck: Normal range of motion. Neck supple. No JVD present. No tracheal deviation present.   Cardiovascular: Normal rate, regular rhythm, normal heart sounds and intact distal pulses.  Exam reveals no gallop and no friction rub.    No murmur heard.  Pulmonary/Chest: Effort normal and breath sounds normal. No respiratory distress. He has no wheezes.   Abdominal: Soft. Bowel sounds are normal. He exhibits no distension and no mass. There is no tenderness. There is no rebound and no guarding.   Musculoskeletal: Normal range of motion. He exhibits edema (non-pitting edema to R knee) and tenderness (R knee). He exhibits no deformity.   Neurological: He is alert and oriented to person, place, and time. He exhibits normal muscle tone. Coordination normal.   Skin: Skin is warm and dry. No rash noted. No erythema.   Psychiatric: He has a normal mood and affect. His behavior is normal. Judgment and thought content normal.         Health Maintenance       Date Due Completion Date    Hepatitis C Screening 1955 ---    Eye Exam 09/09/1964 ---    Colonoscopy 09/09/2004 ---    Pneumococcal PPSV23 (High Risk) (1) 03/06/2018 ---    Foot Exam 07/25/2018 7/25/2017 (Done)    Override on 7/25/2017: Done (done with Dr. Montaño)    Zoster Vaccine 05/25/2019 (Originally 9/9/2014) ---    TETANUS VACCINE 06/29/2019 (Originally 9/9/1972) ---    Influenza Vaccine 08/01/2018 1/9/2018    Hemoglobin A1c 10/16/2018 4/16/2018    Lipid Panel 04/16/2019 4/16/2018    " High Dose Statin 06/17/2019 6/17/2018            ASSESSMENT     62 y.o. male with     1. Suprapatellar bursitis of right knee    2. Essential hypertension    3. BMI 40.0-44.9, adult    4. Need for hepatitis C screening test        PLAN:     1. Suprapatellar bursitis of right knee  - Pt encouraged to apply ice packs 2-3 times daily at 10 minute intervals x 72 hours, then okay to change to heating compress with care not to burn his self; he  voiced understanding   - Pt advised to rest and elevate RLE  - diclofenac sodium (VOLTAREN) 1 % Gel; Lower extremities: Apply the gel (4 g) to the affected area 4 times daily. Do not apply more than 16 g daily to any one affected joint of the lower extremities. Upper extremities: Apply the gel (2 g) to the affected area 4 times daily. Do not apply more than 8 g daily to any one affected joint of the upper extremities. Total dose should not exceed 32 g per day, overall affected joints.  Dispense: 100 g; Refill: 5    2. Essential hypertension  - BP well controlled; at goal of <140/90  - The current medical regimen is effective;  continue present plan and medications.    3. BMI 40.0-44.9, adult  - Pt aware of importance of eating a prudent diet and exercising    4. Need for hepatitis C screening test  - Hepatitis C antibody; Future        RTC in 2 weeks for repeat assessment of current treatment plan       Gloria Sanchez MD  06/26/2018 9:11 AM        No Follow-up on file.

## 2018-06-27 ENCOUNTER — CLINICAL SUPPORT (OUTPATIENT)
Dept: REHABILITATION | Facility: HOSPITAL | Age: 63
End: 2018-06-27
Attending: NEUROLOGICAL SURGERY
Payer: COMMERCIAL

## 2018-06-27 DIAGNOSIS — R47.1 ATAXIC DYSARTHRIA: Primary | ICD-10-CM

## 2018-06-27 DIAGNOSIS — R27.8 DECREASED COORDINATION: ICD-10-CM

## 2018-06-27 DIAGNOSIS — G11.9 ATAXIA DUE TO CEREBELLAR DEGENERATION: ICD-10-CM

## 2018-06-27 DIAGNOSIS — Z74.09 IMPAIRED FUNCTIONAL MOBILITY, BALANCE, GAIT, AND ENDURANCE: ICD-10-CM

## 2018-06-27 DIAGNOSIS — M62.81 MUSCLE WEAKNESS OF LOWER EXTREMITY: ICD-10-CM

## 2018-06-27 PROCEDURE — 92507 TX SP LANG VOICE COMM INDIV: CPT | Mod: PN

## 2018-06-27 PROCEDURE — 97110 THERAPEUTIC EXERCISES: CPT | Mod: PN

## 2018-06-27 NOTE — PROGRESS NOTES
"                                                    Physical Therapy Daily Note     Name: Nicolas Flaherty  Clinic Number: [id number]  Diagnosis:   Encounter Diagnoses   Name Primary?   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  Pt reports that he has a pain cream that has helped but he still has some swelling.     Pain Scale: Nicolas rates R knee pain on a scale of 0-10 to be 3 currently.    Objective     Taken 6/25/18:  Palpation: R knee mild-mod swelling (localized to lateral aspect), warm to touch    BOLD= performed today    Nicolas received individual therapeutic exercises to develop strength, endurance, ROM, flexibility, posture and core stabilization for 45 minutes including:    Nustep x 8' level 1  Heel prop x 5 min w ankle pumps  SAQ x 30  SLR 2# x 20 ea    SL clams x 20 ea   Seated LAQ 2# x 20 ea  Bridges w GTB x 20   Supine clams w GTB x 20  BFKO 2 x 10   HL hip adduction 3' x 3''         Not performed:   Sit<>stand 2 x 10  Standing HR 2 x 10   Standing marches w 1 UE x 20 ea   Standing hip abduction w 1 UE 2 x 10   Standing Hip extension w 1 UE 2 x 10 ea  Step ups 6" step BUE in // bars x 20   Mini squats in // bars x 20   Qpd alt UEs x 20 ea - np     Nicolas received the following Neuromuscular Reeducation for 0 minutes including:    NBOS 3 x 30''  Tandem Stance 3 x 30''  Modified SLS 3 x 30'' with unilat UE support and red disc  DLS 3 x 30'' - np "   Rockerboard x 20 ea way  Cone Taps 1 x 10     The patient received the following Gait training x 0'     Sidestepping in // bars w ladder x 3 laps  Fwd walking in // bars w ladder x 3 laps  2 x 100' w RW and close SBA     Pt received cryotherapy to R knee in supine with wedge to elevate for 10 minutes.    Written Home Exercises Provided: continue with current HEP (bridges, SLR, squats, LAQ, standing hip extension, standing hip abduction, standing marches)  Pt demo good understanding of the education provided. Nicolas demonstrated good return demonstration of activities.     Education provided re: use of vision when ambulating and importance to proper upright posture.   Nicolas verbalized good understanding of education provided.   No spiritual or educational barriers to learning provided    Assessment     Nicolas received a modified treatment session due to MD recommendations for rest.  Patient with decreased neuromuscular control of bilateral lower extremities due to weakness.  Patient will benefit from continued therapy to increased strength and functional mobility.   This is a 62 y.o. 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 Pt prognosis is Fair. Pt will continue to benefit from skilled outpatient physical therapy to address the deficits listed in the problem list, provide pt/family education and to maximize pt's level of independence in the home and community environment.     Goals as follows:  GOALS: Short Term Goals:  6 weeks  1. Pt will be able to perform sit <> stand transfers without use of UE support from elevated surface to improve functional mobility and transfers.- met 6/11/18  2. Pt will be able to tolerate multi-directional LE strengthening in order to improve ability to perform household chores.- met 6/11/18  3. Pt will report 50% improvement in ability  to walk long distances since start of care to indicate improved functional mobility.- met 6/11/18   4. Pt will be able to stand 1 minute without LOB and no UE support to indicate improved standing endurance.- met 6/11/18   5. Pt to tolerate HEP to improve ROM and independence with ADL's.- met 6/11/18    Long Term Goals: 12 weeks  1. Pt will improved Quiroz score to 30/56 to indicate improved static and dynamic balance and decreased fall risk.   2. Pt will be able to perform 2 x 10 multi-directional LE strengthening without fatigue in order to improve ability to perform household chores.  3. Pt will report 80% improvement in ability to walk long distances since start of care to indicate improved functional mobility.   4. Pt will be able to perform therapeutic exercises in standing for 25% of treatment to indicate improved standing endurance.  5. Pt to be Independent with HEP to improve ROM and independence with ADL's.     Plan     Continue with established Plan of Care towards PT goals.    Therapist: Johanne Herrera, PTA  6/27/2018

## 2018-06-27 NOTE — PROGRESS NOTES
Outpatient Neurological Rehabilitation   Speech and Language Therapy Daily Note  Date:  6/27/2018     Start Time:  0745  Stop Time:  0830       Name: Nicolas Flaherty   MRN: [id number]             Therapy Diagnosis: Ataxic Dysarthria  Physician: Roge Arora MD  Physician Orders: amb referral to speech therapy  Medical Diagnosis: Cerebellar Degeneration     Visit #:  3  Visits Authorized: 20  Visits Cancelled: 0  Visits No Showed:0  Date of Evaluation:  6/618  Insurance Authorization Period: 05/29/2018-12/31/2018    Plan of Care Certification:   6/6/2018-7/25/2018  *  G-CODE   3 /10    Precautions: Standard and Fall  History:   Current Medical History: Nicolas Flaherty presents to the [facility] with minimally impaired speech skills secondary to the medical diagnosis of cerebellar degeneration.  Subjective:   Pt reports: Pt reports that everything is going well at home and that his speech skills are relatively the same. Pt reports that his nephew has the most difficulty understanding him. Pt was compliant to home exercise program.   Response to previous treatment: first follow-up session   Pain Scale:  0/10 on VAS currently.   Pain Location: n/a  Objective:   UNTIMED  Procedure Min.   Motor Speech    45   Total Minutes: 45  Total Untimed Units: 1  Charges Billed/# of units: 1    Short Term Goals: (4 weeks) Current Progress:   1. Pt will recall 3/4 motor speech strategies ind'ly.   Pt recalled 2/3 motor speech strategies ind'ly.   2. Pt will complete OMEs 10x each 1-2x per session given supervision. Pt completed OMEs 15x each 1x given supervision.   3. Pt will utilize motor speech strategies when reading multi-syllabic words with various phonemic contexts with 100% acc ind'ly. Pt utilized motor speech strategies when reading multi-syllabic words with various phonemic contexts with 66% acc ind'ly, 93% acc given min cues.   4. Pt will utilize motor speech  strategies while reading 5-7 word sentences with various phonemic contexts with 90% acc ind'ly.  Pt utilized motor speech strategies when reading 5-7 word sentences with various phonemic contexts with 70% acc ind'ly, 93% acc given min cues.   5. Pt will utili.ze motor speech strategies during expressive speech tasks with 90% acc given min cues.   Pt utilized motor speech strategies during expressive speech tasks with 60% acc given min cues.   6. During spontaneous conversations, pt will utilize motor speech strategies ind'ly with 90% intelligibility.  Pt utilized motor speech strategies during spontaneous conversations ind'ly with 60% intelligibility.        Patient Education/Response:   Pt educated on progress and POC.  Home Program: n/a  Assessment:   Nicolas is progressing well towards his goals. Pt participated in all activities today utilizing the motor speech strategies when redirected by the SLP. Current goals remain appropriate. Goals to be updated as necessary.   Pt prognosis is Fair. Pt will continue to benefit from skilled outpatient speech and language therapy to address the deficits listed in the problem list on initial evaluation, provide pt/family education and to maximize pt's level of independence in the home and community environment.     Medical necessity is demonstrated by the following IMPAIRMENTS:  Improve pt's ability to utilize clear speech in order to express wants and needs for social and medical use.  Barriers to Therapy: transportation  Pt's spiritual, cultural and educational needs considered and pt agreeable to plan of care and goals.      Plan:   Continue POC with focus on increasing speech clarity.    DMITRY Davis.   Clinician  Speech-Language Pathology   6/27/2018

## 2018-07-02 ENCOUNTER — CLINICAL SUPPORT (OUTPATIENT)
Dept: REHABILITATION | Facility: HOSPITAL | Age: 63
End: 2018-07-02
Attending: NEUROLOGICAL SURGERY
Payer: COMMERCIAL

## 2018-07-02 DIAGNOSIS — Z74.09 IMPAIRED FUNCTIONAL MOBILITY, BALANCE, GAIT, AND ENDURANCE: ICD-10-CM

## 2018-07-02 DIAGNOSIS — M62.81 MUSCLE WEAKNESS OF LOWER EXTREMITY: ICD-10-CM

## 2018-07-02 DIAGNOSIS — R27.8 DECREASED COORDINATION: ICD-10-CM

## 2018-07-02 PROCEDURE — 97112 NEUROMUSCULAR REEDUCATION: CPT | Mod: PN

## 2018-07-02 PROCEDURE — 97110 THERAPEUTIC EXERCISES: CPT | Mod: PN

## 2018-07-02 NOTE — PROGRESS NOTES
"                                                    Physical Therapy Daily Note     Name: Nicolas Flaherty  Clinic Number: [id number]  Diagnosis:   Encounter Diagnoses   Name Primary?    Impaired functional mobility, balance, gait, and endurance     Muscle weakness of lower extremity     Decreased coordination      Physician: Roge Arora MD  Precautions: fall   Eval date: 5/11/18  Last PN: 6/11/18 (visit 8)  Visit #: 13 of 20  PTA Visit # 1    Time In: 0800  Time Out: 0900  Total Treatment Time: 60'  (1:1 with PTA for duration of session)   Authorization period: 12/31/18     Subjective     Pt reports: that he is knee is feeling much better.  Patient states coming up to therapy is starting to take a toll on his wife and financially.  Pt reports that     Pain Scale: Nicolas rates R knee pain on a scale of 0-10 to be 3 currently.    Objective     Taken 6/25/18:  Palpation: R knee mild-mod swelling (localized to lateral aspect), warm to touch    BOLD= performed today    Nicolas received individual therapeutic exercises to develop strength, endurance, ROM, flexibility, posture and core stabilization for 45 minutes including:    Nustep x 8' level 1  Heel prop x 5 min w ankle pumps  SAQ x 30 x 2#   SLR 2# x 20 ea    SL clams x 20 ea   Seated LAQ 2# x 20 ea  Bridges w GTB x 20   Supine clams w GTB x 20  BFKO 2 x 10   HL hip adduction 3' x 3''         Sit<>stand 2 x 10  Standing HR 2 x 10   Standing marches w 1 UE x 20 ea   Standing hip abduction w 1 UE 2 x 10   Standing Hip extension w 1 UE 2 x 10 ea  Step ups 6" step BUE in // bars x 20   Mini squats in // bars x 20   Qpd alt UEs x 20 ea - np     Nicolas received the following Neuromuscular Reeducation for 15 minutes including:    NBOS 3 x 30''  Tandem Stance 3 x 30''  Modified SLS 3 x 30'' with unilat UE support and red disc  DLS 3 x 30'' - np   Rockerboard x 20 ea way  Cone Taps 1 x 10     The patient received the following Gait training x 0' "     Sidestepping in // bars w ladder x 3 laps  Fwd walking in // bars w ladder x 3 laps  2 x 100' w RW and close SBA     Pt received cryotherapy to R knee in supine with wedge to elevate for 10 minutes.    Written Home Exercises Provided: of exercises listed above  Pt demo good understanding of the education provided. Nicolas demonstrated good return demonstration of activities.     Education provided re: use of vision when ambulating and importance to proper upright posture.   Nicolas verbalized good understanding of education provided.   No spiritual or educational barriers to learning provided    Assessment     Nicolas tolerated treatment session well today.  Patient requests to hold therapy due to financial constrictions.  Patient will start going to the [facility] by his home.  Patient continues with fair balance with increased postural sway and required moderate external support to maintain balance.    This is a 62 y.o. 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 Pt prognosis is Fair. Pt will continue to benefit from skilled outpatient physical therapy to address the deficits listed in the problem list, provide pt/family education and to maximize pt's level of independence in the home and community environment.     Goals as follows:  GOALS: Short Term Goals:  6 weeks  1. Pt will be able to perform sit <> stand transfers without use of UE support from elevated surface to improve functional mobility and transfers.- met 6/11/18  2. Pt will be able to tolerate multi-directional LE strengthening in order to improve ability to perform household chores.- met 6/11/18  3. Pt will report 50% improvement in ability to walk long distances since start of care to indicate improved functional mobility.- met 6/11/18   4. Pt will be able to stand 1 minute without LOB and no UE support to  indicate improved standing endurance.- met 6/11/18   5. Pt to tolerate HEP to improve ROM and independence with ADL's.- met 6/11/18    Long Term Goals: 12 weeks  1. Pt will improved Qiuroz score to 30/56 to indicate improved static and dynamic balance and decreased fall risk.   2. Pt will be able to perform 2 x 10 multi-directional LE strengthening without fatigue in order to improve ability to perform household chores.  3. Pt will report 80% improvement in ability to walk long distances since start of care to indicate improved functional mobility.   4. Pt will be able to perform therapeutic exercises in standing for 25% of treatment to indicate improved standing endurance.  5. Pt to be Independent with HEP to improve ROM and independence with ADL's.     Plan     Continue with established Plan of Care towards PT goals.    Therapist: Johanne Herrera, PTA  7/2/2018

## 2018-07-13 ENCOUNTER — TELEPHONE (OUTPATIENT)
Dept: ADMINISTRATIVE | Facility: HOSPITAL | Age: 63
End: 2018-07-13

## 2018-07-13 NOTE — TELEPHONE ENCOUNTER
Received EGD/Colonoscopy report from [facility], updated health maintenance and scanned into chart.

## 2018-07-25 ENCOUNTER — TELEPHONE (OUTPATIENT)
Dept: UROLOGY | Facility: CLINIC | Age: 63
End: 2018-07-25

## 2018-07-25 DIAGNOSIS — N18.30 STAGE 3 CHRONIC KIDNEY DISEASE: ICD-10-CM

## 2018-07-25 DIAGNOSIS — E78.1 HYPERGLYCERIDEMIA: ICD-10-CM

## 2018-07-25 RX ORDER — GEMFIBROZIL 600 MG/1
600 TABLET, FILM COATED ORAL
Qty: 180 TABLET | Refills: 3 | Status: SHIPPED | OUTPATIENT
Start: 2018-07-25 | End: 2018-08-07 | Stop reason: SDUPTHER

## 2018-07-25 RX ORDER — GABAPENTIN 600 MG/1
600 TABLET ORAL 3 TIMES DAILY
Qty: 270 TABLET | Refills: 3 | Status: ON HOLD | OUTPATIENT
Start: 2018-07-25 | End: 2018-12-02 | Stop reason: SDUPTHER

## 2018-07-25 RX ORDER — TAMSULOSIN HYDROCHLORIDE 0.4 MG/1
1 CAPSULE ORAL DAILY
Qty: 90 CAPSULE | Refills: 3 | Status: SHIPPED | OUTPATIENT
Start: 2018-07-25 | End: 2018-10-30 | Stop reason: SDUPTHER

## 2018-07-25 RX ORDER — NATEGLINIDE 60 MG/1
60 TABLET ORAL
Qty: 90 TABLET | Refills: 1 | Status: SHIPPED | OUTPATIENT
Start: 2018-07-25 | End: 2018-10-08 | Stop reason: SDUPTHER

## 2018-07-25 RX ORDER — FINASTERIDE 5 MG/1
5 TABLET, FILM COATED ORAL EVERY MORNING
Qty: 90 TABLET | Refills: 3 | Status: SHIPPED | OUTPATIENT
Start: 2018-07-25 | End: 2018-10-30 | Stop reason: SDUPTHER

## 2018-07-25 NOTE — TELEPHONE ENCOUNTER
LOV  6/26/2018 Daniel  Last refill  Gabapentin 4/11/2018                   Nateglinide  Historical Med                   Dulaglutide  4/26/2018    Patient needs scripts sent to [facility].

## 2018-07-25 NOTE — TELEPHONE ENCOUNTER
----- Message from Roseann Naik sent at 7/25/2018  8:53 AM CDT -----  Contact: Rhonda/Howard/[phone]  Refill:  gabapentin (NEURONTIN) 600 MG tablet  Refill:  nateglinide (STARLIX) 60 MG tablet  Refill:  dulaglutide (TRULICITY) 1.5 mg/0.5 mL [facility] - [place], [place] - [address]  [address]  [facility] [address]  Phone: [phone] Fax: [phone]    Thank you.

## 2018-07-25 NOTE — TELEPHONE ENCOUNTER
----- Message from Ivet Mayberry sent at 7/25/2018  8:50 AM CDT -----  Can the clinic reply in [facility]: no      Please refill the medication(s) listed below. Please call the patient when the prescription(s) is ready for  at this phone number   [phone]        Medication #1 gemfibrozil (LOPID) 600 MG tablet    Medication #2 finasteride (PROSCAR) 5 mg tablet    Medication # tamsulosin (FLOMAX) 0.4 mg Cp24      Preferred Pharmacy: [facility] [address]  #

## 2018-07-27 PROBLEM — M62.81 MUSCLE WEAKNESS OF LOWER EXTREMITY: Status: RESOLVED | Noted: 2018-05-11 | Resolved: 2018-07-27

## 2018-07-27 PROBLEM — R27.8 DECREASED COORDINATION: Status: RESOLVED | Noted: 2018-05-11 | Resolved: 2018-07-27

## 2018-07-27 PROBLEM — Z74.09 IMPAIRED FUNCTIONAL MOBILITY, BALANCE, GAIT, AND ENDURANCE: Status: RESOLVED | Noted: 2018-05-11 | Resolved: 2018-07-27

## 2018-07-30 RX ORDER — DEXTROSE 4 G
TABLET,CHEWABLE ORAL
Qty: 1 EACH | Refills: 0 | Status: SHIPPED | OUTPATIENT
Start: 2018-07-30 | End: 2018-10-10 | Stop reason: SDUPTHER

## 2018-07-30 NOTE — TELEPHONE ENCOUNTER
LOV  6/26/2018  Last refill  4/26/2018  amaryl                    5/23/2018 ranitidine    HgA1c   9.2  4/16/2018

## 2018-07-30 NOTE — TELEPHONE ENCOUNTER
----- Message from Coleen gayatrijoséjon sent at 7/30/2018 10:48 AM CDT -----  Contact: spouse- Mrs. Flaherty  Refill request for----glimepiride (AMARYL) 4 MG tablet--- and ---- Ranitidine------- along with a --- Meter ---- and --- Test Strips---      Pharmacy is [facility]    She is asking that primary pharmacy is updated to [facility]    Call back # is [phone].

## 2018-08-03 DIAGNOSIS — N18.30 STAGE 3 CHRONIC KIDNEY DISEASE: ICD-10-CM

## 2018-08-03 NOTE — TELEPHONE ENCOUNTER
Last ov 05/23/18     Need prescription sent to [facility] instead of [facility]      Hemoglobin A1c   Order: [id number]   Status:  Final result   Visible to patient:  No (Not Released)   Next appt:  10/24/2018 at 09:00 AM in Radiology ([facility] CT1 LIMIT 400 LBS)   Notes recorded by JONATHAN Severino on 4/26/2018 at 11:08 AM CDT  Get him back in?    Ref Range & Units 3mo ago   Hemoglobin A1C 4.8 - 5.6 % 9.2     Comment:          Pre-diabetes: 5.7 - 6.4            Diabetes: >6.4            Glycemic control for adults with diabetes: <7.0    Resulting Agency  [facility]   Narrative     Performed at:  01 - [facility] [address]  [id number]  : Vance Olmstead MD, Phone:  [phone]      Specimen Collected: 04/16/18 08:13   Last Resulted: 04/17/18 14:12   Lab Flowsheet Order Details View Encounter Lab and Collection Details Routing               Result Notes     Notes recorded by JONATHAN Severino on 4/26/2018 at 11:08 AM CDT  Get him back in?          Reviewed By Arlene Alfredo MD on 4/26/2018 14:17   JONATHAN Sveerino on 4/26/2018 11:08    Lab Inquiry View Complete Results   Blood Administration      View: 72 Hours 4 Days Encounter Long term Sort by: Product Time Expand All  Collapse All         None          Order-Level Documents:     There are no order-level documents.   View tritrue Info     HEMOGLOBIN A1C (Order #[id number]) on 4/16/18   Chart Review Routing History     Recipient Method Report Sent By Sent Filed   Heber Aguirre MD   Fax: [phone]   Phone: [phone]    Fax Visit Summary Dionte Christian MD [[id number]] 3/10/2015  4:10 PM 03/10/2015   Myra Mejia MD   Phone: [phone]    In Basket Visit Summary Dionte Christian MD [[id number]] 3/10/2015  4:10 PM 03/10/2015   Heber Aguirre MD   Fax: [phone]   Phone: [phone]    Fax Visit Summary Dionte Christian MD [[id number]] 6/1/2015  2:50 PM 06/01/2015   Heber Aguirre MD   Fax:  [phone]   Phone: [phone]    Fax IP Encounter Summary Dionte Christian MD [[id number]] 6/16/2015  8:27 AM 06/16/2015   Heber Aguirre MD   Fax: [phone]   Phone: [phone]    Fax Visit Summary Dionte Christian MD [[id number]] 7/27/2015  1:27 PM 07/27/2015   Myra Mejia MD   Phone: [phone]    In Basket Visit Summary Dionte Christian MD [[id number]] 7/27/2015  1:27 PM 07/27/2015   Heber Aguirre MD   Fax: [phone]   Phone: [phone]    Fax ED Encounter Summary Rosalia Morales MD [[id number]] 3/26/2016  9:20 PM 03/26/2016   Heber Aguirre MD   Fax: [phone]   Phone: [phone]    Fax ED Encounter Summary Blanka Martin MD [[id number]] 6/27/2016 12:03 PM 06/27/2016   Shyanne Alfredo MD   Phone: [phone]    In Basket IP Auto Routed Notes Adelso Asencio III, MD [[id number]] 6/29/2018 11:04 PM 06/29/2018   Order Provider Info         Office phone Pager E-mail   Ordering User Dalton Guzman -- -- --   Authorizing Provider Shyanne Alfredo -[phone] -- --

## 2018-08-05 RX ORDER — GLIMEPIRIDE 4 MG/1
4 TABLET ORAL
Qty: 90 TABLET | Refills: 3 | Status: SHIPPED | OUTPATIENT
Start: 2018-08-05 | End: 2018-11-07

## 2018-08-07 ENCOUNTER — TELEPHONE (OUTPATIENT)
Dept: FAMILY MEDICINE | Facility: CLINIC | Age: 63
End: 2018-08-07

## 2018-08-07 DIAGNOSIS — E78.1 HYPERGLYCERIDEMIA: ICD-10-CM

## 2018-08-07 DIAGNOSIS — N18.30 STAGE 3 CHRONIC KIDNEY DISEASE: ICD-10-CM

## 2018-08-07 RX ORDER — GEMFIBROZIL 600 MG/1
600 TABLET, FILM COATED ORAL
Qty: 180 TABLET | Refills: 3 | Status: SHIPPED | OUTPATIENT
Start: 2018-08-07 | End: 2019-02-21 | Stop reason: SDUPTHER

## 2018-08-07 NOTE — TELEPHONE ENCOUNTER
----- Message from Jean Pierre Nash sent at 8/6/2018  4:05 PM CDT -----  Contact: Ade wife/[phone]  Patient wife called in stating [facility] refuse to fill the medication gemfibrozil (LOPID) 600 MG tablet because it's interacting with another medication. The patient wife would like someone to contact her regarding this message.    Thank you

## 2018-08-08 NOTE — TELEPHONE ENCOUNTER
----- Message from Laverne Strickland sent at 8/8/2018  9:34 AM CDT -----  Contact: wife  Ellie  [phone]  Pt wife Ellie is requesting to speak to you regarding pt meds. Pls call Ellie [phone]. Thanks.......Linn

## 2018-08-08 NOTE — TELEPHONE ENCOUNTER
"Patient's wife requesting that Dr. Alfredo calls [facility] to clarify issue with patient's medication.  Stated patient was informed by [facility] that his medication gemfibrozil interacts with another medication he is taking.  Wife also stated that medication regimen has been "screwed up" by a [facility] representative.  Stated [facility] rep advised that no medication will be refilled until they speak with patient's PCP.  Please advise.     Wife requesting 90 day supply refills on medications, per [facility].    [facility]  [phone]  "

## 2018-08-08 NOTE — TELEPHONE ENCOUNTER
Ellie/patient's wife called to inform Dr. Alfredo that [facility] will not fill medications due to Starlix and Glimepiride interactions.  Stated they need to speak with Dr. Alfredo prior to refills.  Please advise.

## 2018-08-14 DIAGNOSIS — K21.9 GASTROESOPHAGEAL REFLUX DISEASE, ESOPHAGITIS PRESENCE NOT SPECIFIED: ICD-10-CM

## 2018-08-14 NOTE — TELEPHONE ENCOUNTER
----- Message from Aviva Singh sent at 8/14/2018 11:28 AM CDT -----  Contact: LEVI [phone]  REFILL: RANITIDINE (#90 DAY SUPPLY#)    PHARMACY: [facility]

## 2018-08-24 ENCOUNTER — OFFICE VISIT (OUTPATIENT)
Dept: FAMILY MEDICINE | Facility: CLINIC | Age: 63
End: 2018-08-24
Payer: MEDICARE

## 2018-08-24 VITALS
WEIGHT: 270.06 LBS | TEMPERATURE: 99 F | SYSTOLIC BLOOD PRESSURE: 104 MMHG | HEART RATE: 96 BPM | OXYGEN SATURATION: 96 % | DIASTOLIC BLOOD PRESSURE: 70 MMHG | HEIGHT: 67 IN | BODY MASS INDEX: 42.39 KG/M2 | RESPIRATION RATE: 16 BRPM

## 2018-08-24 DIAGNOSIS — L03.90 CELLULITIS, UNSPECIFIED CELLULITIS SITE: Primary | ICD-10-CM

## 2018-08-24 PROCEDURE — 3008F BODY MASS INDEX DOCD: CPT | Mod: CPTII,S$GLB,, | Performed by: FAMILY MEDICINE

## 2018-08-24 PROCEDURE — 99999 PR PBB SHADOW E&M-EST. PATIENT-LVL IV: CPT | Mod: PBBFAC,,, | Performed by: FAMILY MEDICINE

## 2018-08-24 PROCEDURE — 3074F SYST BP LT 130 MM HG: CPT | Mod: CPTII,S$GLB,, | Performed by: FAMILY MEDICINE

## 2018-08-24 PROCEDURE — 99214 OFFICE O/P EST MOD 30 MIN: CPT | Mod: S$GLB,,, | Performed by: FAMILY MEDICINE

## 2018-08-24 PROCEDURE — 3078F DIAST BP <80 MM HG: CPT | Mod: CPTII,S$GLB,, | Performed by: FAMILY MEDICINE

## 2018-08-24 RX ORDER — LOSARTAN POTASSIUM 50 MG/1
50 TABLET ORAL DAILY
COMMUNITY
End: 2020-03-13 | Stop reason: SDUPTHER

## 2018-08-24 RX ORDER — DOXYCYCLINE HYCLATE 100 MG
100 TABLET ORAL EVERY 12 HOURS
Qty: 14 TABLET | Refills: 0 | Status: SHIPPED | OUTPATIENT
Start: 2018-08-24 | End: 2018-10-04 | Stop reason: SDUPTHER

## 2018-08-24 RX ORDER — ALLOPURINOL 100 MG/1
100 TABLET ORAL DAILY
COMMUNITY
End: 2020-03-13 | Stop reason: SDUPTHER

## 2018-08-24 RX ORDER — DOXYCYCLINE HYCLATE 100 MG
100 TABLET ORAL EVERY 12 HOURS
Qty: 14 TABLET | Refills: 0 | Status: SHIPPED | OUTPATIENT
Start: 2018-08-24 | End: 2018-08-24 | Stop reason: SDUPTHER

## 2018-08-24 NOTE — PROGRESS NOTES
Subjective:       Patient ID: Nicolas Flaherty is a 62 y.o. male.    Chief Complaint: Leg Pain (left calf with redness x 1 week)    Patient with gout, BPH, ataxia, dysarthria, diabetes type 2, hyperlipidemia presents for redness and tenderness to touch on his left lower leg. He has had redness for about a week. He has no fever or chills.     He was admitted to the hospital after his colonoscopy due to rectal bleeding after his colonoscopy. He had no procedures done. He had this done at [place]. His bleeding stopped and he is doing well. He is eating and drinking normal foods.     He is also seeing  specialist due to his esophagus as they are concerned about the peristalsis in his esophagus.       Past Medical History:  No date: Anticoagulant long-term use  No date: Arthritis  No date: Cancer of kidney, right  No date: Coronary artery disease  No date: Diabetes mellitus  No date: Hypertension  No date: Kidney stone  No date: Sleep apnea  No date: Slurred speech  No date: Stroke      Comment:  MINI STROKE  No date: TIA (transient ischemic attack)  No date: Wears glasses   Past Surgical History:  No date: BACK SURGERY      Comment:  lower back  No date: CARDIAC SURGERY  No date: excision right thigh mass  No date: heart stents      Comment:  stents 4 total.  2010, 2011 and 2013  No date: HERNIA REPAIR      Comment:  incisional  2015: KIDNEY STONE SURGERY  No date: LAPAROSCOPIC NEPHRECTOMY, HAND ASSISTED; Right  No date: LITHOTRIPSY  No date: VASCULAR SURGERY  Review of patient's family history indicates:  Problem: Heart disease      Relation: Father          Age of Onset: (Not Specified)  Problem: Hypertension      Relation: Mother          Age of Onset: (Not Specified)  Problem: Parkinsonism      Relation: Mother          Age of Onset: (Not Specified)    Social History    Socioeconomic History      Marital status:       Spouse name: Not on file      Number of children: Not on file      Years of  "education: Not on file      Highest education level: Not on file    Social Needs      Financial resource strain: Not on file      Food insecurity - worry: Not on file      Food insecurity - inability: Not on file      Transportation needs - medical: Not on file      Transportation needs - non-medical: Not on file    Occupational History      Not on file    Tobacco Use      Smoking status: Never Smoker      Smokeless tobacco: Current User        Types: Snuff      Tobacco comment: 20 plus years    Substance and Sexual Activity      Alcohol use: Yes        Comment: occas      Drug use: No      Sexual activity: Yes        Partners: Female    Other Topics      Concerns:        Not on file    Social History Narrative      Not on file          Review of Systems    Objective:       Vitals:    08/24/18 1327   BP: 104/70   Pulse: 96   Resp: 16   Temp: 98.5 °F (36.9 °C)   TempSrc: Oral   SpO2: 96%   Weight: 122.5 kg (270 lb 1 oz)   Height: 5' 7" (1.702 m)       Physical Exam   Constitutional: He is oriented to person, place, and time. He appears well-developed and well-nourished. No distress.   HENT:   Head: Normocephalic and atraumatic.   Cardiovascular: Normal rate, regular rhythm and normal heart sounds. Exam reveals no gallop and no friction rub.   No murmur heard.  Pulmonary/Chest: Effort normal and breath sounds normal. No stridor. No respiratory distress. He has no wheezes. He has no rales.   Musculoskeletal: He exhibits no edema.   Neurological: He is alert and oriented to person, place, and time.   Skin: Rash noted. He is not diaphoretic.            Assessment:       1. Cellulitis, unspecified cellulitis site        Plan:       Nicolas was seen today for leg pain.    Diagnoses and all orders for this visit:    Cellulitis, unspecified cellulitis site  -     doxycycline (VIBRA-TABS) 100 MG tablet; Take 1 tablet (100 mg total) by mouth every 12 (twelve) hours.           "

## 2018-08-28 ENCOUNTER — TELEPHONE (OUTPATIENT)
Dept: FAMILY MEDICINE | Facility: CLINIC | Age: 63
End: 2018-08-28

## 2018-08-28 DIAGNOSIS — L03.90 CELLULITIS, UNSPECIFIED CELLULITIS SITE: Primary | ICD-10-CM

## 2018-08-28 RX ORDER — CLINDAMYCIN HYDROCHLORIDE 150 MG/1
150 CAPSULE ORAL EVERY 12 HOURS
Qty: 14 CAPSULE | Refills: 0 | Status: SHIPPED | OUTPATIENT
Start: 2018-08-28 | End: 2018-10-30 | Stop reason: ALTCHOICE

## 2018-08-28 NOTE — TELEPHONE ENCOUNTER
----- Message from Coleen Reed sent at 8/28/2018  9:00 AM CDT -----  Contact: self / [phone]  Pt calling to speak to staff. He states Dr. Alfredo told him to call if his leg does not get better. He states it is now more red. He is asking for medication to be called to...       [facility] [place] [facility] [facility] [address] [address]  [address] [address] [address]  Phone: [phone] Fax: [phone]

## 2018-09-12 ENCOUNTER — TELEPHONE (OUTPATIENT)
Dept: GASTROENTEROLOGY | Facility: CLINIC | Age: 63
End: 2018-09-12

## 2018-09-12 NOTE — TELEPHONE ENCOUNTER
----- Message from Yohana Sheffield sent at 9/10/2018  9:43 AM CDT -----  Contact: Self- [phone]  [place]- pt called to check the status of his np appt- being referred by Dr. Sharp for motility issues- please contact pt at [phone]

## 2018-09-12 NOTE — TELEPHONE ENCOUNTER
Spoke with pt wife. Let her know that his records are up for review by Ophelia Ames NP and that I will contact it once I get them back.

## 2018-09-21 ENCOUNTER — OFFICE VISIT (OUTPATIENT)
Dept: FAMILY MEDICINE | Facility: CLINIC | Age: 63
End: 2018-09-21
Payer: MEDICARE

## 2018-09-21 VITALS
BODY MASS INDEX: 41.78 KG/M2 | DIASTOLIC BLOOD PRESSURE: 70 MMHG | SYSTOLIC BLOOD PRESSURE: 120 MMHG | OXYGEN SATURATION: 95 % | HEART RATE: 92 BPM | TEMPERATURE: 99 F | WEIGHT: 266.75 LBS

## 2018-09-21 DIAGNOSIS — M62.830 LUMBAR PARASPINAL MUSCLE SPASM: Primary | ICD-10-CM

## 2018-09-21 DIAGNOSIS — Z11.59 ENCOUNTER FOR HEPATITIS C SCREENING TEST FOR LOW RISK PATIENT: ICD-10-CM

## 2018-09-21 PROCEDURE — 3074F SYST BP LT 130 MM HG: CPT | Mod: CPTII,,, | Performed by: FAMILY MEDICINE

## 2018-09-21 PROCEDURE — 90686 IIV4 VACC NO PRSV 0.5 ML IM: CPT | Mod: PBBFAC,PO

## 2018-09-21 PROCEDURE — 99999 PR PBB SHADOW E&M-EST. PATIENT-LVL IV: CPT | Mod: PBBFAC,,, | Performed by: FAMILY MEDICINE

## 2018-09-21 PROCEDURE — 99214 OFFICE O/P EST MOD 30 MIN: CPT | Mod: PBBFAC,PO,25 | Performed by: FAMILY MEDICINE

## 2018-09-21 PROCEDURE — G0009 ADMIN PNEUMOCOCCAL VACCINE: HCPCS | Mod: PBBFAC,PO

## 2018-09-21 PROCEDURE — 3046F HEMOGLOBIN A1C LEVEL >9.0%: CPT | Mod: CPTII,,, | Performed by: FAMILY MEDICINE

## 2018-09-21 PROCEDURE — 3078F DIAST BP <80 MM HG: CPT | Mod: CPTII,,, | Performed by: FAMILY MEDICINE

## 2018-09-21 PROCEDURE — 3008F BODY MASS INDEX DOCD: CPT | Mod: CPTII,,, | Performed by: FAMILY MEDICINE

## 2018-09-21 PROCEDURE — 99214 OFFICE O/P EST MOD 30 MIN: CPT | Mod: S$PBB,,, | Performed by: FAMILY MEDICINE

## 2018-09-21 RX ORDER — CYCLOBENZAPRINE HCL 5 MG
5 TABLET ORAL 3 TIMES DAILY PRN
Qty: 45 TABLET | Refills: 0 | Status: SHIPPED | OUTPATIENT
Start: 2018-09-21 | End: 2018-10-01

## 2018-09-21 NOTE — PROGRESS NOTES
Subjective:       Patient ID: Nicolas Flaherty is a 63 y.o. male.    Chief Complaint: ER Follow Up    Patient presentts for follow-up. He was seen at urgent care as he had a lesion on his leg that he scratched and it wouldn't stop bleeding. He states he went to urgent care and was given an epinephrine shot to stop this.     He also has lower back pain. He states it is on the right side and it is painful and feels like he is having spasms. It is non-radiating. He has not been stretching. He has CKD so can't take NSAID's. He is taking tylenoll for his pain.       Review of Systems    Objective:       Vitals:    09/21/18 0854   BP: 120/70   Pulse: 92   Temp: 98.6 °F (37 °C)   TempSrc: Oral   SpO2: 95%   Weight: 121 kg (266 lb 12.1 oz)       Physical Exam   Constitutional: He is oriented to person, place, and time. He appears well-developed and well-nourished. No distress.   HENT:   Head: Normocephalic and atraumatic.   Neck: Normal range of motion. Neck supple.   Cardiovascular: Normal rate, regular rhythm and normal heart sounds. Exam reveals no gallop and no friction rub.   No murmur heard.  Pulmonary/Chest: Effort normal and breath sounds normal. No stridor. No respiratory distress. He has no wheezes. He has no rales.   Musculoskeletal:        Lumbar back: He exhibits decreased range of motion, tenderness, pain and spasm. He exhibits no bony tenderness, no swelling, no deformity and no laceration.        Back:    AMBULATES WITH A WALKER.    Neurological: He is alert and oriented to person, place, and time.   Skin: He is not diaphoretic.       Assessment:       1. Lumbar paraspinal muscle spasm    2. Uncontrolled type 2 diabetes mellitus with stage 3 chronic kidney disease, with long-term current use of insulin    3. Encounter for hepatitis C screening test for low risk patient        Plan:       Nicolas was seen today for er follow up.    Diagnoses and all orders for this visit:    Lumbar paraspinal muscle  spasm  -     cyclobenzaprine (FLEXERIL) 5 MG tablet; Take 1 tablet (5 mg total) by mouth 3 (three) times daily as needed for Muscle spasms.  Stable. Refilled meds.     Uncontrolled type 2 diabetes mellitus with stage 3 chronic kidney disease, with long-term current use of insulin  -     Hemoglobin A1c; Future  -     Comprehensive metabolic panel; Future  -     Lipid panel; Future  -     Microalbumin/creatinine urine ratio; Future  -     CBC auto differential; Future  -     Hemoglobin A1c  -     Comprehensive metabolic panel  -     Lipid panel  -     Microalbumin/creatinine urine ratio  -     CBC auto differential  Due for labs    Encounter for hepatitis C screening test for low risk patient  -     Hepatitis C antibody; Future  -     Hepatitis C antibody    Other orders  -     Influenza - Quadrivalent (3 years & older) (PF)  -     Pneumococcal Polysaccharide Vaccine (23 Valent) (SQ/IM)

## 2018-09-24 ENCOUNTER — TELEPHONE (OUTPATIENT)
Dept: GASTROENTEROLOGY | Facility: CLINIC | Age: 63
End: 2018-09-24

## 2018-09-24 NOTE — TELEPHONE ENCOUNTER
----- Message from Adina Dias sent at 9/19/2018  1:14 PM CDT -----  Contact: Holly clark/ Alycia SUNG [phone]  Holly called stating she sent a referral over for pt above to see Dr. Treviño and would like to know if Dr. Treviño received it    Contact: Holly clark/ Alycia SUNG [phone]

## 2018-09-24 NOTE — TELEPHONE ENCOUNTER
Attempted to call Ani SUNG at [phone]. No ext given so worker was not sure who I was looking for.

## 2018-10-04 ENCOUNTER — OFFICE VISIT (OUTPATIENT)
Dept: FAMILY MEDICINE | Facility: CLINIC | Age: 63
End: 2018-10-04
Payer: MEDICARE

## 2018-10-04 VITALS
OXYGEN SATURATION: 97 % | HEART RATE: 90 BPM | SYSTOLIC BLOOD PRESSURE: 118 MMHG | TEMPERATURE: 99 F | BODY MASS INDEX: 42.44 KG/M2 | WEIGHT: 270.94 LBS | DIASTOLIC BLOOD PRESSURE: 80 MMHG

## 2018-10-04 DIAGNOSIS — T14.8XXA ABRASION: ICD-10-CM

## 2018-10-04 DIAGNOSIS — L81.9 BLEEDING PIGMENTED SKIN LESION: ICD-10-CM

## 2018-10-04 DIAGNOSIS — D48.9 NEOPLASM, UNCERTAIN WHETHER BENIGN OR MALIGNANT: Primary | ICD-10-CM

## 2018-10-04 PROCEDURE — 3074F SYST BP LT 130 MM HG: CPT | Mod: CPTII,,, | Performed by: FAMILY MEDICINE

## 2018-10-04 PROCEDURE — 3079F DIAST BP 80-89 MM HG: CPT | Mod: CPTII,,, | Performed by: FAMILY MEDICINE

## 2018-10-04 PROCEDURE — 99999 PR PBB SHADOW E&M-EST. PATIENT-LVL V: CPT | Mod: PBBFAC,,, | Performed by: FAMILY MEDICINE

## 2018-10-04 PROCEDURE — 99215 OFFICE O/P EST HI 40 MIN: CPT | Mod: PBBFAC,PO | Performed by: FAMILY MEDICINE

## 2018-10-04 PROCEDURE — 99214 OFFICE O/P EST MOD 30 MIN: CPT | Mod: 25,S$PBB,, | Performed by: FAMILY MEDICINE

## 2018-10-04 PROCEDURE — 3008F BODY MASS INDEX DOCD: CPT | Mod: CPTII,,, | Performed by: FAMILY MEDICINE

## 2018-10-04 PROCEDURE — 88305 TISSUE EXAM BY PATHOLOGIST: CPT | Mod: 26,,, | Performed by: PATHOLOGY

## 2018-10-04 PROCEDURE — 88305 TISSUE EXAM BY PATHOLOGIST: CPT | Performed by: PATHOLOGY

## 2018-10-04 RX ORDER — DOXYCYCLINE 100 MG/1
CAPSULE ORAL
Refills: 0 | COMMUNITY
Start: 2018-08-24 | End: 2018-10-30 | Stop reason: ALTCHOICE

## 2018-10-04 RX ORDER — MUPIROCIN 20 MG/G
OINTMENT TOPICAL 3 TIMES DAILY
Qty: 30 G | Refills: 0 | Status: SHIPPED | OUTPATIENT
Start: 2018-10-04 | End: 2018-12-03

## 2018-10-04 NOTE — PROGRESS NOTES
Subjective:       Patient ID: Nicolas Flaherty is a 63 y.o. male with multiple medical diagnoses as listed in the medical history and problem list that presents for Spot (on L lower leg and knee)  .    Chief Complaint: Spot (on L lower leg and knee)      HPI   Pt here to follow up on lesion of his right lower leg. He was seen at [facility] and got a shot. Per Dr. Alfredo note epinephrine. He states last night he may have hit it and since then it continues to bleed. It is slow if he is laying down but a lot if he is sitting.   He has been using an ace wrap for compression. He is on both ASA and Plavix.     Review of Systems   Constitutional: Negative for chills and fever.   Skin: Positive for wound.   Neurological: Negative for dizziness, light-headedness and headaches.   Hematological: Bruises/bleeds easily.         PAST MEDICAL HISTORY:  Past Medical History:   Diagnosis Date    Anticoagulant long-term use     Arthritis     Cancer of kidney, right     Coronary artery disease     Diabetes mellitus     Hypertension     Kidney stone     Sleep apnea     Slurred speech     Stroke     MINI STROKE    TIA (transient ischemic attack)     Wears glasses        SOCIAL HISTORY:  Social History     Socioeconomic History    Marital status:      Spouse name: Not on file    Number of children: Not on file    Years of education: Not on file    Highest education level: Not on file   Social Needs    Financial resource strain: Not on file    Food insecurity - worry: Not on file    Food insecurity - inability: Not on file    Transportation needs - medical: Not on file    Transportation needs - non-medical: Not on file   Occupational History    Not on file   Tobacco Use    Smoking status: Never Smoker    Smokeless tobacco: Current User     Types: Snuff    Tobacco comment: 20 plus years   Substance and Sexual Activity    Alcohol use: Yes     Comment: occas    Drug use: No    Sexual  activity: Yes     Partners: Female   Other Topics Concern    Not on file   Social History Narrative    Not on file       ALLERGIES AND MEDICATIONS: updated and reviewed.  Review of patient's allergies indicates:  No Known Allergies  Current Outpatient Medications   Medication Sig Dispense Refill    allopurinol (ZYLOPRIM) 100 MG tablet Take 100 mg by mouth once daily.      aspirin (ECOTRIN) 81 MG EC tablet Take 81 mg by mouth once daily. LAST TAKEN 3/2/16      clindamycin (CLEOCIN) 150 MG capsule Take 1 capsule (150 mg total) by mouth every 12 (twelve) hours. 14 capsule 0    clopidogrel (PLAVIX) 75 mg tablet Take 75 mg by mouth every morning. LAST DOSE 3/2/16      docusate sodium (STOOL SOFTENER) 100 MG capsule Take 1 capsule (100 mg total) by mouth 2 (two) times daily. 180 capsule 3    doxycycline (VIBRAMYCIN) 100 MG Cap TAKE 1 CAPSULE(S) BY MOUTH EVERY 12 HOURS UNTIL FINISHED  0    dulaglutide (TRULICITY) 1.5 mg/0.5 mL PnIj Inject 1.5 mg into the skin every 7 days. 6 mL 3    finasteride (PROSCAR) 5 mg tablet Take 1 tablet (5 mg total) by mouth every morning. 90 tablet 3    furosemide (LASIX) 40 MG tablet Take 40 mg by mouth 2 (two) times daily. ALTERNATES 1 TABLET ONE DAY AND 2 TABLETS THE NEXT--ALTERNATING DAYS      gabapentin (NEURONTIN) 600 MG tablet Take 1 tablet (600 mg total) by mouth 3 (three) times daily. 270 tablet 3    gemfibrozil (LOPID) 600 MG tablet Take 1 tablet (600 mg total) by mouth 2 (two) times daily before meals. 180 tablet 3    glimepiride (AMARYL) 4 MG tablet Take 1 tablet (4 mg total) by mouth before breakfast. 90 tablet 3    isosorbide mononitrate (IMDUR) 60 MG 24 hr tablet Take 60 mg by mouth every morning.       losartan (COZAAR) 50 MG tablet Take 50 mg by mouth once daily.      metoprolol succinate (TOPROL-XL) 100 MG 24 hr tablet Take 100 mg by mouth every morning.       nateglinide (STARLIX) 60 MG tablet Take 1 tablet (60 mg total) by mouth 2 (two) times daily before  meals. 90 tablet 1    NITROSTAT 0.4 mg SL tablet Place 0.4 mg under the tongue every 5 (five) minutes as needed.       prochlorperazine (COMPAZINE) 10 MG tablet Take 1 tablet (10 mg total) by mouth 3 (three) times daily as needed. 270 tablet 1    ranitidine (ZANTAC) 150 MG tablet Take 1 tablet (150 mg total) by mouth 2 (two) times daily. 60 tablet 5    rosuvastatin (CRESTOR) 10 MG tablet Take 10 mg by mouth every evening.      tamsulosin (FLOMAX) 0.4 mg Cap Take 1 capsule (0.4 mg total) by mouth once daily. 90 capsule 3    TRAVATAN Z 0.004 % Drop 1 drop every evening.       blood sugar diagnostic Strp 1 strip by Misc.(Non-Drug; Combo Route) route 2 (two) times daily. 200 each 2    blood-glucose meter Misc Check BID 1 each 0    mupirocin (BACTROBAN) 2 % ointment Apply topically 3 (three) times daily. 30 g 0     Current Facility-Administered Medications   Medication Dose Route Frequency Provider Last Rate Last Dose    lidocaine (PF) 10 mg/ml (1%) injection 10 mg  1 mL Intradermal Once Jose Lewis MD             Objective:   /80   Pulse 90   Temp 98.6 °F (37 °C) (Oral)   Wt 122.9 kg (270 lb 15.1 oz)   SpO2 97%   BMI 42.44 kg/m²      Physical Exam      Verbal consent for silver nitrate. After old dressing removed, nitrate stick applied several times without success to stop bleeding.     Assessment:       1. Abrasion    2. Bleeding pigmented skin lesion        Plan:       Abrasion  -     mupirocin (BACTROBAN) 2 % ointment; Apply topically 3 (three) times daily.  Dispense: 30 g; Refill: 0    Bleeding pigmented skin lesion  -     Tissue Specimen To Pathology, Dermatology          See procedure note, unclear lesion  No Follow-up on file.

## 2018-10-04 NOTE — PROCEDURES
"Excision of Lesion  Date/Time: 10/4/2018 3:35 PM  Performed by: Elian Ulrich MD  Authorized by: Elian Ulrich MD     Consent Done?:  Yes (Verbal)  Time out: Immediately prior to procedure a "time out" was called to verify the correct patient, procedure, equipment, support staff and site/side marked as required.      STAFF:  Assistants?: Yes    List of assistants:  Karen Moreno was present for the entire procedure.  INDICATIONS:: 3 mm skin lesion, abnormal shape, nodular.  LOCATION:  Body area:  Lower leg / ankleleft    PREP:  Patient was prepped and draped in the normal sterile fashion.Position:  Supine    ANESTHESIA:  Anesthesia:  Local infiltration  Local anesthetic:  Lidocaine 1% with epinephrine    PROCEDURE DETAILS:  Excision type:  Skin  Excision depth:  Subcutaneous  Malignancy:  Malignant  Excision size (cm):  0.3  Scalpel size: dermal blade.  Incision type:  Single straight (followed by hyfrecator destruction to normal underlying tissue)  Specimens?: Yes    Specimens submitted to pathology.  Hemostasis was obtained.  Estimated blood loss (cc):  5  Sterile dressings:  Gauze  Patient tolerated the procedure well with no immediate complications.  Patient was discharged and will follow up for wound check and pathology results.      "

## 2018-10-08 RX ORDER — NATEGLINIDE 60 MG/1
TABLET ORAL
Qty: 180 TABLET | Refills: 0 | Status: SHIPPED | OUTPATIENT
Start: 2018-10-08 | End: 2018-10-30 | Stop reason: SDUPTHER

## 2018-10-12 ENCOUNTER — TELEPHONE (OUTPATIENT)
Dept: FAMILY MEDICINE | Facility: CLINIC | Age: 63
End: 2018-10-12

## 2018-10-12 NOTE — TELEPHONE ENCOUNTER
----- Message from Elizabeth Cheema sent at 10/12/2018  8:45 AM CDT -----  Contact: Self  Patient calling to discuss result of test on 10/04/2018. Please call to discuss at [phone]

## 2018-10-12 NOTE — TELEPHONE ENCOUNTER
Pt calling for results of biopsy done 10/4/18, informed him letter was sent, read him results from letter; pt verbalized understanding

## 2018-10-24 ENCOUNTER — HOSPITAL ENCOUNTER (OUTPATIENT)
Dept: RADIOLOGY | Facility: HOSPITAL | Age: 63
Discharge: HOME | End: 2018-10-24
Attending: UROLOGY
Payer: MEDICARE

## 2018-10-24 DIAGNOSIS — C64.1 RENAL CELL CARCINOMA OF RIGHT KIDNEY: ICD-10-CM

## 2018-10-24 PROCEDURE — 71046 X-RAY EXAM CHEST 2 VIEWS: CPT | Mod: TC,FY

## 2018-10-24 PROCEDURE — 74176 CT ABD & PELVIS W/O CONTRAST: CPT | Mod: 26,,, | Performed by: RADIOLOGY

## 2018-10-24 PROCEDURE — 71046 X-RAY EXAM CHEST 2 VIEWS: CPT | Mod: 26,,, | Performed by: RADIOLOGY

## 2018-10-24 PROCEDURE — 74176 CT ABD & PELVIS W/O CONTRAST: CPT | Mod: TC

## 2018-10-30 ENCOUNTER — OFFICE VISIT (OUTPATIENT)
Dept: UROLOGY | Facility: CLINIC | Age: 63
End: 2018-10-30
Payer: MEDICARE

## 2018-10-30 VITALS
HEART RATE: 60 BPM | WEIGHT: 268.94 LBS | RESPIRATION RATE: 14 BRPM | SYSTOLIC BLOOD PRESSURE: 122 MMHG | DIASTOLIC BLOOD PRESSURE: 78 MMHG | BODY MASS INDEX: 42.21 KG/M2 | HEIGHT: 67 IN

## 2018-10-30 DIAGNOSIS — R31.0 GROSS HEMATURIA: ICD-10-CM

## 2018-10-30 DIAGNOSIS — N20.0 NEPHROLITHIASIS: ICD-10-CM

## 2018-10-30 DIAGNOSIS — N40.1 BENIGN NON-NODULAR PROSTATIC HYPERPLASIA WITH LOWER URINARY TRACT SYMPTOMS: ICD-10-CM

## 2018-10-30 DIAGNOSIS — C64.1 RENAL CELL CARCINOMA OF RIGHT KIDNEY: Primary | ICD-10-CM

## 2018-10-30 DIAGNOSIS — K21.9 GASTROESOPHAGEAL REFLUX DISEASE, ESOPHAGITIS PRESENCE NOT SPECIFIED: ICD-10-CM

## 2018-10-30 PROCEDURE — 99213 OFFICE O/P EST LOW 20 MIN: CPT | Mod: PBBFAC | Performed by: UROLOGY

## 2018-10-30 PROCEDURE — 3074F SYST BP LT 130 MM HG: CPT | Mod: CPTII,,, | Performed by: UROLOGY

## 2018-10-30 PROCEDURE — 3008F BODY MASS INDEX DOCD: CPT | Mod: CPTII,,, | Performed by: UROLOGY

## 2018-10-30 PROCEDURE — 99999 PR PBB SHADOW E&M-EST. PATIENT-LVL III: CPT | Mod: PBBFAC,,, | Performed by: UROLOGY

## 2018-10-30 PROCEDURE — 3078F DIAST BP <80 MM HG: CPT | Mod: CPTII,,, | Performed by: UROLOGY

## 2018-10-30 PROCEDURE — 81002 URINALYSIS NONAUTO W/O SCOPE: CPT | Mod: PBBFAC | Performed by: UROLOGY

## 2018-10-30 PROCEDURE — 99213 OFFICE O/P EST LOW 20 MIN: CPT | Mod: S$PBB,25,, | Performed by: UROLOGY

## 2018-10-30 RX ORDER — TAMSULOSIN HYDROCHLORIDE 0.4 MG/1
1 CAPSULE ORAL DAILY
Qty: 90 CAPSULE | Refills: 3 | Status: SHIPPED | OUTPATIENT
Start: 2018-10-30 | End: 2019-01-22 | Stop reason: SDUPTHER

## 2018-10-30 RX ORDER — FINASTERIDE 5 MG/1
5 TABLET, FILM COATED ORAL EVERY MORNING
Qty: 90 TABLET | Refills: 3 | Status: SHIPPED | OUTPATIENT
Start: 2018-10-30 | End: 2019-01-22 | Stop reason: SDUPTHER

## 2018-10-30 RX ORDER — NATEGLINIDE 60 MG/1
TABLET ORAL
Qty: 180 TABLET | Refills: 0 | Status: SHIPPED | OUTPATIENT
Start: 2018-10-30 | End: 2018-11-29 | Stop reason: SDUPTHER

## 2018-10-30 NOTE — PROGRESS NOTES
Subjective:       Nicolas Flaherty is a 63 y.o. male who is an established patient who was seen for evaluation of nephrolithiasis and hematuria.      Last seen by Dr Christian 7/15. He has a h/o stones and is s/p ESWL and URS in the past. He has had issues with gross hematuria and has been worked up for this. He does take Plavix and ASA regularly. He has significant cardiac issues, especially when off Plavix.     He also has BPH with some LUTS. Nocturia x 4. He takes Flomax and Proscar.     R URS 6/15 for stones. Stone analysis - CaOx mono. 24hr stone risk analysis - hyperoxalaturia. He is taking allopurinol currently. Unsure efficacy of this.    KAREN 4/15 - normal, no hydro. 1.5cm simple cyst RUP. 4mm R MP stone. PVR 51cc.  KAREN 1/16 - no stone seen. 2.9cm lesion in R LP concerning for neoplasm. Not seen on previous study.   CT scan shows 3.2cm enhancing mass in R kidney, endophytic.    He is s/p R URS to r/o UC. No tumor seen therefore now s/p R lap radical nephrectomy on 3/18/16 for renal mass. 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 Cr 1.4 at baseline. Post-op Cr was 2.1 at discharge. He is followed by nephrology (Dr Short).     Initial post-operative check was fine. Staples were removed.    He was noted to have R sided hernia at the area of nephrectomy. He has had this repaired by Dr Lewis in 8/16. He has healed well from this.    CT 10/16 is clear of recurrence. Hernia repair noted 8/16. Cr 2.1.    CT and CXR 4/17, 10/17, 4/18 - clear of recurrence, Cr 4/18 - 2.0    CT and CXR 10/18 - essentially clear though increase in pulm nodules.     He continues to c/o R flank pain and swelling.       The following portions of the patient's history were reviewed and updated as appropriate: allergies, current medications, past family history, past medical history, past social history, past surgical history and problem list.    Review of Systems  Constitutional: no fever or chills  ENT:  "no nasal congestion or sore throat  Respiratory: no cough or shortness of breath  Cardiovascular: no chest pain or palpitations  Gastrointestinal: no nausea or vomiting, tolerating diet  Genitourinary: as per HPI  Hematologic/Lymphatic: no easy bruising or lymphadenopathy  Musculoskeletal: no arthralgias or myalgias  Skin: no rashes or lesions  Neurological: no seizures or tremors  Behavioral/Psych: no auditory or visual hallucinations       Objective:    Vitals:   /78   Pulse 60   Resp 14   Ht 5' 7" (1.702 m)   Wt 122 kg (268 lb 15.4 oz)   BMI 42.13 kg/m²     Physical Exam   General: well developed, well nourished in no acute distress  Head: normocephalic, atraumatic  Neck: supple, trachea midline, no obvious enlargement of thyroid  HEENT: EOMI, mucus membranes moist, sclera anicteric, no hearing impairment  Lungs: symmetric expansion, non-labored breathing  Cardiovascular: regular rate and rhythm, normal pulses  Abdomen: soft, non tender, non distended, no palpable masses, no hepatosplenomegaly, no hernias, bladder nonpalpable. No CVA tenderness.  Musculoskeletal: no peripheral edema, normal ROM in bilateral upper and lower extremities  Lymphatics: no cervical or inguinal lymphadenopathy  Skin: no rashes or lesions  Neuro: alert and oriented x 3, no gross deficits  Psych: normal judgment and insight, normal mood/affect and non-anxious  Genitourinary: (last visit)  Penis -  circumcised penis without plaques, lesions, or scarring.  Urethra - orthotopic location without stenosis.  Scrotum  - no lesions or rashes, no hydrocele or hernia.  Testes - descended bilaterally, symmetric without masses, non tender.  Epididymides - no cysts or lesions, no spermatocele, symmetric   No evidence of any testicular/scrotal infection   ILDA: patient declined exam    Inc: well healed, s/p hernia repair. No defect in fascia appreciated.     Lab Review   Urine analysis today in clinic shows - negative    Lab Results "   Component Value Date    WBC 7.50 02/26/2018    HGB 13.1 (L) 02/26/2018    HCT 38.9 (L) 02/26/2018    MCV 83 02/26/2018     02/26/2018     Lab Results   Component Value Date    CREATININE 2.09 (H) 04/16/2018    BUN 22 04/16/2018     No results found for: PSA  Lab Results   Component Value Date    PSADIAG 0.23 04/21/2015       Imaging  KAREN/CT reviewed  Reports and images were personally reviewed by me and discussed with the patient today         Assessment/Plan:      1. Renal cell carcinoma    - Baseline Cr 1.4, new baseline 2.2.   - s/p R lap nephrectomy 3/18/16, pT3aNx, FG2, ccRCC   - CT a/p and CXR clear 4/17, 10/17, 4/18   - Increased pulm nodules on CT, will do CT chest 6mths   - CT a/p (non-con) and CT chest in 6mths   - Follow up plan: CT q6m x 3y (3/19), q1y to year 5 (3/21). CXR q6m x 5yrs   - s/p hernia repair by Dr Lewis at kidney extraction site (8/16)      2. Nephrolithiasis    - Punctate stone in R LP   - CaOx mono stones   - 24hr urine collection - hyperoxalaturia. Recommend Ca intake when oxalate taken PO. Low oxalate diet.    - He is taking allopurinol per RCA. Unsure efficacy of this. Instructed him he can stop this.    - Discussed low oxalate diet   - No stones on CT     3. Hematuria, gross    - Negative cysto 6/15, 3/16   - RCC - suspect reason for hematuria     4. Benign non-nodular prostatic hyperplasia with lower urinary tract symptoms    - Continue Flomax/Proscar   - PVD - urethral milking           Follow up in 6 months with CT scan c/a/p

## 2018-10-31 LAB
BILIRUB SERPL-MCNC: NORMAL MG/DL
BLOOD URINE, POC: NORMAL
COLOR, POC UA: YELLOW
GLUCOSE UR QL STRIP: NORMAL
KETONES UR QL STRIP: NORMAL
LEUKOCYTE ESTERASE URINE, POC: NORMAL
NITRITE, POC UA: NORMAL
PH, POC UA: 6
PROTEIN, POC: NORMAL
SPECIFIC GRAVITY, POC UA: 1000
UROBILINOGEN, POC UA: NORMAL

## 2018-11-01 LAB
ALBUMIN SERPL-MCNC: 4.5 G/DL (ref 3.6–5.1)
ALBUMIN/CREAT UR: 251 MCG/MG CREAT
ALBUMIN/GLOB SERPL: 1.6 (CALC) (ref 1–2.5)
ALP SERPL-CCNC: 60 U/L (ref 40–115)
ALT SERPL-CCNC: 15 U/L (ref 9–46)
AST SERPL-CCNC: 16 U/L (ref 10–35)
BASOPHILS # BLD AUTO: 141 CELLS/UL (ref 0–200)
BASOPHILS NFR BLD AUTO: 2.2 %
BILIRUB SERPL-MCNC: 0.3 MG/DL (ref 0.2–1.2)
BUN SERPL-MCNC: 35 MG/DL (ref 7–25)
BUN/CREAT SERPL: 20 (CALC) (ref 6–22)
CALCIUM SERPL-MCNC: 9.4 MG/DL (ref 8.6–10.3)
CHLORIDE SERPL-SCNC: 104 MMOL/L (ref 98–110)
CHOLEST SERPL-MCNC: 174 MG/DL
CHOLEST/HDLC SERPL: 7 (CALC)
CO2 SERPL-SCNC: 26 MMOL/L (ref 20–32)
CREAT SERPL-MCNC: 1.72 MG/DL (ref 0.7–1.25)
CREAT UR-MCNC: 68 MG/DL (ref 20–320)
EOSINOPHIL # BLD AUTO: 698 CELLS/UL (ref 15–500)
EOSINOPHIL NFR BLD AUTO: 10.9 %
ERYTHROCYTE [DISTWIDTH] IN BLOOD BY AUTOMATED COUNT: 14.5 % (ref 11–15)
GFR SERPL CREATININE-BSD FRML MDRD: 41 ML/MIN/1.73M2
GLOBULIN SER CALC-MCNC: 2.8 G/DL (CALC) (ref 1.9–3.7)
GLUCOSE SERPL-MCNC: 175 MG/DL (ref 65–99)
HBA1C MFR BLD: 8.4 % OF TOTAL HGB
HCT VFR BLD AUTO: 38.9 % (ref 38.5–50)
HCV AB S/CO SERPL IA: 0.01
HCV AB SERPL QL IA: NORMAL
HDLC SERPL-MCNC: 25 MG/DL
HGB BLD-MCNC: 13 G/DL (ref 13.2–17.1)
LDLC SERPL CALC-MCNC: ABNORMAL MG/DL (CALC)
LYMPHOCYTES # BLD AUTO: 1389 CELLS/UL (ref 850–3900)
LYMPHOCYTES NFR BLD AUTO: 21.7 %
MCH RBC QN AUTO: 27.5 PG (ref 27–33)
MCHC RBC AUTO-ENTMCNC: 33.4 G/DL (ref 32–36)
MCV RBC AUTO: 82.4 FL (ref 80–100)
MICROALBUMIN UR-MCNC: 17.1 MG/DL
MONOCYTES # BLD AUTO: 627 CELLS/UL (ref 200–950)
MONOCYTES NFR BLD AUTO: 9.8 %
NEUTROPHILS # BLD AUTO: 3546 CELLS/UL (ref 1500–7800)
NEUTROPHILS NFR BLD AUTO: 55.4 %
NONHDLC SERPL-MCNC: 149 MG/DL (CALC)
PLATELET # BLD AUTO: 212 THOUSAND/UL (ref 140–400)
PMV BLD REES-ECKER: 11.6 FL (ref 7.5–12.5)
POTASSIUM SERPL-SCNC: 4.4 MMOL/L (ref 3.5–5.3)
PROT SERPL-MCNC: 7.3 G/DL (ref 6.1–8.1)
RBC # BLD AUTO: 4.72 MILLION/UL (ref 4.2–5.8)
SODIUM SERPL-SCNC: 141 MMOL/L (ref 135–146)
TRIGL SERPL-MCNC: 510 MG/DL
WBC # BLD AUTO: 6.4 THOUSAND/UL (ref 3.8–10.8)

## 2018-11-07 ENCOUNTER — OFFICE VISIT (OUTPATIENT)
Dept: FAMILY MEDICINE | Facility: CLINIC | Age: 63
End: 2018-11-07
Payer: MEDICARE

## 2018-11-07 VITALS
TEMPERATURE: 98 F | OXYGEN SATURATION: 96 % | SYSTOLIC BLOOD PRESSURE: 130 MMHG | DIASTOLIC BLOOD PRESSURE: 80 MMHG | BODY MASS INDEX: 42.4 KG/M2 | WEIGHT: 270.75 LBS | HEART RATE: 105 BPM

## 2018-11-07 DIAGNOSIS — G11.9 CEREBELLAR ATAXIA: ICD-10-CM

## 2018-11-07 DIAGNOSIS — R91.1 PULMONARY NODULE: Primary | ICD-10-CM

## 2018-11-07 DIAGNOSIS — E78.5 HYPERLIPIDEMIA, UNSPECIFIED HYPERLIPIDEMIA TYPE: ICD-10-CM

## 2018-11-07 DIAGNOSIS — N18.30 STAGE 3 CHRONIC KIDNEY DISEASE: ICD-10-CM

## 2018-11-07 PROCEDURE — 99214 OFFICE O/P EST MOD 30 MIN: CPT | Mod: HCNC,S$GLB,, | Performed by: FAMILY MEDICINE

## 2018-11-07 PROCEDURE — 3045F PR MOST RECENT HEMOGLOBIN A1C LEVEL 7.0-9.0%: CPT | Mod: CPTII,HCNC,S$GLB, | Performed by: FAMILY MEDICINE

## 2018-11-07 PROCEDURE — 3079F DIAST BP 80-89 MM HG: CPT | Mod: CPTII,HCNC,S$GLB, | Performed by: FAMILY MEDICINE

## 2018-11-07 PROCEDURE — 3008F BODY MASS INDEX DOCD: CPT | Mod: CPTII,HCNC,S$GLB, | Performed by: FAMILY MEDICINE

## 2018-11-07 PROCEDURE — 99999 PR PBB SHADOW E&M-EST. PATIENT-LVL II: CPT | Mod: PBBFAC,HCNC,, | Performed by: FAMILY MEDICINE

## 2018-11-07 PROCEDURE — 3075F SYST BP GE 130 - 139MM HG: CPT | Mod: CPTII,HCNC,S$GLB, | Performed by: FAMILY MEDICINE

## 2018-11-07 RX ORDER — ROSUVASTATIN CALCIUM 20 MG/1
20 TABLET, COATED ORAL DAILY
Qty: 90 TABLET | Refills: 3 | Status: SHIPPED | OUTPATIENT
Start: 2018-11-07 | End: 2019-07-10

## 2018-11-07 RX ORDER — GLIMEPIRIDE 4 MG/1
4 TABLET ORAL 2 TIMES DAILY
Qty: 180 TABLET | Refills: 1 | Status: SHIPPED | OUTPATIENT
Start: 2018-11-07 | End: 2019-02-21 | Stop reason: SDUPTHER

## 2018-11-07 NOTE — PROGRESS NOTES
Subjective:       Patient ID: Nicolas Flaherty is a 63 y.o. male.    Chief Complaint: Discuss frequent falls and Discuss medications and speech concerns    Patient presents with conernsa outg his gabapetnin. He states he feels it is making hsi speech slurred and making him more off balance. He has had a stroke that affects his part of the brain and he will alswasy have balance isues.       He also had a pyogenic granulomas that was cauterized and states it is healing well      He has diabetes. His labs are in and his a1c is elevated at 8.4, but improved from 9.2. He is on glimepiride 4 mg daily, trulicity 1.5 mg weekly, and starlix daily. His blood sugars are normally 120's in the morning. His sugars during the day are rising up to 200's. He states he has been snacking on wheat crackers and peanut butter.       Review of Systems    Objective:       Vitals:    11/07/18 1538   BP: 130/80   Pulse: 105   Temp: 97.7 °F (36.5 °C)   TempSrc: Oral   SpO2: 96%   Weight: 122.8 kg (270 lb 11.6 oz)       Physical Exam   Constitutional: He is oriented to person, place, and time. He appears well-developed and well-nourished. No distress.   HENT:   Head: Normocephalic and atraumatic.   Eyes: Conjunctivae are normal.   Neck: Normal range of motion. Neck supple. Carotid bruit is not present.   Cardiovascular: Normal rate, regular rhythm and normal heart sounds. Exam reveals no gallop and no friction rub.   No murmur heard.  Pulmonary/Chest: Effort normal and breath sounds normal. No stridor. No respiratory distress. He has no wheezes. He has no rales.   Musculoskeletal: He exhibits no edema.   Lymphadenopathy:     He has no cervical adenopathy.   Neurological: He is alert and oriented to person, place, and time.   Skin: He is not diaphoretic.   Psychiatric: He has a normal mood and affect.       Assessment:       1. Pulmonary nodule    2. Cerebellar ataxia    3. Hyperlipidemia, unspecified hyperlipidemia type    4. Stage 3  chronic kidney disease    5. Uncontrolled type 2 diabetes mellitus with stage 3 chronic kidney disease, without long-term current use of insulin        Plan:            Nicolas was seen today for discuss frequent falls and discuss medications and speech concerns.    Diagnoses and all orders for this visit:    Pulmonary nodule    Cerebellar ataxia    Hyperlipidemia, unspecified hyperlipidemia type  -     rosuvastatin (CRESTOR) 20 MG tablet; Take 1 tablet (20 mg total) by mouth once daily.  -     Comprehensive metabolic panel; Future  -     Lipid panel; Future  Cholesterol is elevated and increase crestor from 10 to 20 mg daily    Stage 3 chronic kidney disease  -     glimepiride (AMARYL) 4 MG tablet; Take 1 tablet (4 mg total) by mouth 2 (two) times daily.  Adding 1/2 tablet of glimepiride in the evening. Continue entire pill during the day, trulicity and starlix.    Uncontrolled type 2 diabetes mellitus with stage 3 chronic kidney disease, without long-term current use of insulin  -     glimepiride (AMARYL) 4 MG tablet; Take 1 tablet (4 mg total) by mouth 2 (two) times daily.

## 2018-11-13 ENCOUNTER — TELEPHONE (OUTPATIENT)
Dept: FAMILY MEDICINE | Facility: CLINIC | Age: 63
End: 2018-11-13

## 2018-11-13 NOTE — TELEPHONE ENCOUNTER
----- Message from Adina Montiel sent at 11/13/2018  7:59 AM CST -----  Contact: Shasha with [facility]/ [phone]/ [phone] Ext [phone]  Shasha calling to notify possible drug interaction between ROSUVASTATIN 20mg and GEMFIBROZIL 600mg. Please call to advise. Thank you.

## 2018-11-29 DIAGNOSIS — K21.9 GASTROESOPHAGEAL REFLUX DISEASE, ESOPHAGITIS PRESENCE NOT SPECIFIED: ICD-10-CM

## 2018-11-30 RX ORDER — NATEGLINIDE 60 MG/1
TABLET ORAL
Qty: 180 TABLET | Refills: 0 | Status: SHIPPED | OUTPATIENT
Start: 2018-11-30 | End: 2019-11-15 | Stop reason: SDUPTHER

## 2018-12-02 ENCOUNTER — HOSPITAL ENCOUNTER (OUTPATIENT)
Facility: HOSPITAL | Age: 63
Discharge: HOME | End: 2018-12-02
Attending: EMERGENCY MEDICINE | Admitting: EMERGENCY MEDICINE
Payer: MEDICARE

## 2018-12-02 VITALS
RESPIRATION RATE: 18 BRPM | HEART RATE: 76 BPM | SYSTOLIC BLOOD PRESSURE: 145 MMHG | OXYGEN SATURATION: 96 % | WEIGHT: 272.94 LBS | TEMPERATURE: 98 F | HEIGHT: 67 IN | DIASTOLIC BLOOD PRESSURE: 86 MMHG | BODY MASS INDEX: 42.84 KG/M2

## 2018-12-02 DIAGNOSIS — R07.9 CHEST PAIN: ICD-10-CM

## 2018-12-02 DIAGNOSIS — N18.30 CONTROLLED TYPE 2 DIABETES MELLITUS WITH STAGE 3 CHRONIC KIDNEY DISEASE, WITHOUT LONG-TERM CURRENT USE OF INSULIN: ICD-10-CM

## 2018-12-02 DIAGNOSIS — E11.22 CONTROLLED TYPE 2 DIABETES MELLITUS WITH STAGE 3 CHRONIC KIDNEY DISEASE, WITHOUT LONG-TERM CURRENT USE OF INSULIN: ICD-10-CM

## 2018-12-02 DIAGNOSIS — I20.0 UNSTABLE ANGINA: Primary | ICD-10-CM

## 2018-12-02 DIAGNOSIS — N18.30 CKD (CHRONIC KIDNEY DISEASE), STAGE III: ICD-10-CM

## 2018-12-02 DIAGNOSIS — I10 ESSENTIAL HYPERTENSION: ICD-10-CM

## 2018-12-02 DIAGNOSIS — R07.9 CHEST PAIN AT REST: ICD-10-CM

## 2018-12-02 LAB
ALBUMIN SERPL BCP-MCNC: 4 G/DL
ALP SERPL-CCNC: 67 U/L
ALT SERPL W/O P-5'-P-CCNC: 21 U/L
ANION GAP SERPL CALC-SCNC: 12 MMOL/L
AORTIC ROOT ANNULUS: 3.21 CM
AORTIC VALVE CUSP SEPERATION: 1.29 CM
ASCENDING AORTA: 3.23 CM
AST SERPL-CCNC: 18 U/L
AV INDEX (PROSTH): 0.37
AV MEAN GRADIENT: 12.32 MMHG
AV PEAK GRADIENT: 21.34 MMHG
AV VALVE AREA: 1.29 CM2
BASOPHILS # BLD AUTO: 0.1 K/UL
BASOPHILS NFR BLD: 1.5 %
BILIRUB SERPL-MCNC: 0.3 MG/DL
BNP SERPL-MCNC: 25 PG/ML
BSA FOR ECHO PROCEDURE: 2.46 M2
BUN SERPL-MCNC: 35 MG/DL
CALCIUM SERPL-MCNC: 10.2 MG/DL
CHLORIDE SERPL-SCNC: 107 MMOL/L
CO2 SERPL-SCNC: 21 MMOL/L
CREAT SERPL-MCNC: 1.8 MG/DL
CV ECHO LV RWT: 0.36 CM
DIFFERENTIAL METHOD: ABNORMAL
DOP CALC AO PEAK VEL: 2.31 M/S
DOP CALC AO VTI: 50.64 CM
DOP CALC LVOT AREA: 3.53 CM2
DOP CALC LVOT DIAMETER: 2.12 CM
DOP CALC LVOT STROKE VOLUME: 65.23 CM3
DOP CALCLVOT PEAK VEL VTI: 18.49 CM
E WAVE DECELERATION TIME: 154.74 MSEC
E/A RATIO: 0.73
E/E' RATIO: 11.82
ECHO LV POSTERIOR WALL: 0.95 CM (ref 0.6–1.1)
EOSINOPHIL # BLD AUTO: 0.6 K/UL
EOSINOPHIL NFR BLD: 9.1 %
ERYTHROCYTE [DISTWIDTH] IN BLOOD BY AUTOMATED COUNT: 14.8 %
EST. GFR  (AFRICAN AMERICAN): 45 ML/MIN/1.73 M^2
EST. GFR  (NON AFRICAN AMERICAN): 39 ML/MIN/1.73 M^2
FRACTIONAL SHORTENING: 24 % (ref 28–44)
GLUCOSE SERPL-MCNC: 158 MG/DL
HCT VFR BLD AUTO: 39.1 %
HGB BLD-MCNC: 13.1 G/DL
INTERVENTRICULAR SEPTUM: 1.07 CM (ref 0.6–1.1)
IVRT: 0.09 MSEC
LA MAJOR: 4.75 CM
LA MINOR: 4.86 CM
LA WIDTH: 4.17 CM
LEFT ATRIUM SIZE: 3.43 CM
LEFT ATRIUM VOLUME INDEX: 23.7 ML/M2
LEFT ATRIUM VOLUME: 58.41 CM3
LEFT INTERNAL DIMENSION IN SYSTOLE: 4.07 CM (ref 2.1–4)
LEFT VENTRICLE DIASTOLIC VOLUME INDEX: 55.75 ML/M2
LEFT VENTRICLE DIASTOLIC VOLUME: 137.14 ML
LEFT VENTRICLE MASS INDEX: 83.3 G/M2
LEFT VENTRICLE SYSTOLIC VOLUME INDEX: 29.6 ML/M2
LEFT VENTRICLE SYSTOLIC VOLUME: 72.75 ML
LEFT VENTRICULAR INTERNAL DIMENSION IN DIASTOLE: 5.33 CM (ref 3.5–6)
LEFT VENTRICULAR MASS: 204.98 G
LV LATERAL E/E' RATIO: 10.83
LV SEPTAL E/E' RATIO: 13
LYMPHOCYTES # BLD AUTO: 2.1 K/UL
LYMPHOCYTES NFR BLD: 31.8 %
MAGNESIUM SERPL-MCNC: 2.8 MG/DL
MCH RBC QN AUTO: 28.4 PG
MCHC RBC AUTO-ENTMCNC: 33.5 G/DL
MCV RBC AUTO: 85 FL
MONOCYTES # BLD AUTO: 0.6 K/UL
MONOCYTES NFR BLD: 8.4 %
MV PEAK A VEL: 0.89 M/S
MV PEAK E VEL: 0.65 M/S
NEUTROPHILS # BLD AUTO: 3.3 K/UL
NEUTROPHILS NFR BLD: 49.2 %
PISA TR MAX VEL: 2.44 M/S
PLATELET # BLD AUTO: 195 K/UL
PMV BLD AUTO: 11.3 FL
POTASSIUM SERPL-SCNC: 4 MMOL/L
PROT SERPL-MCNC: 8.1 G/DL
PULM VEIN S/D RATIO: 1.59
PV PEAK D VEL: 0.44 M/S
PV PEAK S VEL: 0.7 M/S
PV PEAK VELOCITY: 1.56 CM/S
RA MAJOR: 3.91 CM
RA PRESSURE: 3 MMHG
RA WIDTH: 3.8 CM
RBC # BLD AUTO: 4.62 M/UL
RIGHT VENTRICULAR END-DIASTOLIC DIMENSION: 3.8 CM
RV TISSUE DOPPLER FREE WALL SYSTOLIC VELOCITY 1 (APICAL 4 CHAMBER VIEW): 10.23 M/S
SINUS: 3.47 CM
SODIUM SERPL-SCNC: 140 MMOL/L
STJ: 2.53 CM
TDI LATERAL: 0.06
TDI SEPTAL: 0.05
TDI: 0.06
TR MAX PG: 23.81 MMHG
TRICUSPID ANNULAR PLANE SYSTOLIC EXCURSION: 2.47 CM
TROPONIN I SERPL DL<=0.01 NG/ML-MCNC: 0.01 NG/ML
TROPONIN I SERPL DL<=0.01 NG/ML-MCNC: 0.01 NG/ML
TV REST PULMONARY ARTERY PRESSURE: 26.81 MMHG
WBC # BLD AUTO: 6.7 K/UL

## 2018-12-02 PROCEDURE — 25000003 PHARM REV CODE 250: Mod: HCNC | Performed by: EMERGENCY MEDICINE

## 2018-12-02 PROCEDURE — 36415 COLL VENOUS BLD VENIPUNCTURE: CPT | Mod: HCNC

## 2018-12-02 PROCEDURE — S0028 INJECTION, FAMOTIDINE, 20 MG: HCPCS | Mod: HCNC | Performed by: EMERGENCY MEDICINE

## 2018-12-02 PROCEDURE — 83880 ASSAY OF NATRIURETIC PEPTIDE: CPT | Mod: HCNC

## 2018-12-02 PROCEDURE — 85025 COMPLETE CBC W/AUTO DIFF WBC: CPT | Mod: HCNC

## 2018-12-02 PROCEDURE — 84484 ASSAY OF TROPONIN QUANT: CPT | Mod: 91,HCNC

## 2018-12-02 PROCEDURE — 93005 ELECTROCARDIOGRAM TRACING: CPT | Mod: HCNC

## 2018-12-02 PROCEDURE — 96372 THER/PROPH/DIAG INJ SC/IM: CPT | Mod: HCNC

## 2018-12-02 PROCEDURE — G0378 HOSPITAL OBSERVATION PER HR: HCPCS | Mod: HCNC

## 2018-12-02 PROCEDURE — 84484 ASSAY OF TROPONIN QUANT: CPT | Mod: HCNC

## 2018-12-02 PROCEDURE — 63600175 PHARM REV CODE 636 W HCPCS: Mod: HCNC | Performed by: EMERGENCY MEDICINE

## 2018-12-02 PROCEDURE — 99285 EMERGENCY DEPT VISIT HI MDM: CPT | Mod: 25,HCNC

## 2018-12-02 PROCEDURE — 80053 COMPREHEN METABOLIC PANEL: CPT | Mod: HCNC

## 2018-12-02 PROCEDURE — 93010 ELECTROCARDIOGRAM REPORT: CPT | Mod: HCNC,,, | Performed by: INTERNAL MEDICINE

## 2018-12-02 PROCEDURE — 96374 THER/PROPH/DIAG INJ IV PUSH: CPT | Mod: 59

## 2018-12-02 PROCEDURE — 25000003 PHARM REV CODE 250: Mod: HCNC | Performed by: NURSE PRACTITIONER

## 2018-12-02 PROCEDURE — 83735 ASSAY OF MAGNESIUM: CPT | Mod: HCNC

## 2018-12-02 PROCEDURE — 99220 PR INITIAL OBSERVATION CARE,LEVL III: CPT | Mod: HCNC,,, | Performed by: INTERNAL MEDICINE

## 2018-12-02 RX ORDER — ASPIRIN 81 MG/1
81 TABLET ORAL DAILY
Status: DISCONTINUED | OUTPATIENT
Start: 2018-12-02 | End: 2018-12-02 | Stop reason: HOSPADM

## 2018-12-02 RX ORDER — TAMSULOSIN HYDROCHLORIDE 0.4 MG/1
1 CAPSULE ORAL DAILY
Status: DISCONTINUED | OUTPATIENT
Start: 2018-12-02 | End: 2018-12-02 | Stop reason: HOSPADM

## 2018-12-02 RX ORDER — GABAPENTIN 300 MG/1
600 CAPSULE ORAL DAILY
Status: DISCONTINUED | OUTPATIENT
Start: 2018-12-02 | End: 2018-12-02 | Stop reason: HOSPADM

## 2018-12-02 RX ORDER — NITROGLYCERIN 0.4 MG/1
0.4 TABLET SUBLINGUAL EVERY 5 MIN PRN
Status: DISCONTINUED | OUTPATIENT
Start: 2018-12-02 | End: 2018-12-02

## 2018-12-02 RX ORDER — ONDANSETRON 2 MG/ML
4 INJECTION INTRAMUSCULAR; INTRAVENOUS EVERY 8 HOURS PRN
Status: DISCONTINUED | OUTPATIENT
Start: 2018-12-02 | End: 2018-12-02 | Stop reason: HOSPADM

## 2018-12-02 RX ORDER — GABAPENTIN 600 MG/1
600 TABLET ORAL 2 TIMES DAILY
Qty: 270 TABLET | Refills: 3
Start: 2018-12-02 | End: 2019-02-28 | Stop reason: SDUPTHER

## 2018-12-02 RX ORDER — ROSUVASTATIN CALCIUM 10 MG/1
20 TABLET, COATED ORAL DAILY
Status: DISCONTINUED | OUTPATIENT
Start: 2018-12-02 | End: 2018-12-02 | Stop reason: HOSPADM

## 2018-12-02 RX ORDER — LOSARTAN POTASSIUM 25 MG/1
50 TABLET ORAL DAILY
Status: DISCONTINUED | OUTPATIENT
Start: 2018-12-02 | End: 2018-12-02 | Stop reason: HOSPADM

## 2018-12-02 RX ORDER — ISOSORBIDE MONONITRATE 30 MG/1
60 TABLET, EXTENDED RELEASE ORAL EVERY MORNING
Status: DISCONTINUED | OUTPATIENT
Start: 2018-12-02 | End: 2018-12-02 | Stop reason: HOSPADM

## 2018-12-02 RX ORDER — ASPIRIN 325 MG
325 TABLET ORAL
Status: COMPLETED | OUTPATIENT
Start: 2018-12-02 | End: 2018-12-02

## 2018-12-02 RX ORDER — METOPROLOL SUCCINATE 50 MG/1
100 TABLET, EXTENDED RELEASE ORAL EVERY MORNING
Status: DISCONTINUED | OUTPATIENT
Start: 2018-12-02 | End: 2018-12-02 | Stop reason: HOSPADM

## 2018-12-02 RX ORDER — NITROGLYCERIN 0.4 MG/1
0.4 TABLET SUBLINGUAL EVERY 5 MIN PRN
Status: DISCONTINUED | OUTPATIENT
Start: 2018-12-02 | End: 2018-12-02 | Stop reason: HOSPADM

## 2018-12-02 RX ORDER — ENOXAPARIN SODIUM 150 MG/ML
1 INJECTION SUBCUTANEOUS
Status: DISCONTINUED | OUTPATIENT
Start: 2018-12-02 | End: 2018-12-02 | Stop reason: HOSPADM

## 2018-12-02 RX ORDER — FINASTERIDE 5 MG/1
5 TABLET, FILM COATED ORAL EVERY MORNING
Status: DISCONTINUED | OUTPATIENT
Start: 2018-12-02 | End: 2018-12-02 | Stop reason: HOSPADM

## 2018-12-02 RX ORDER — FAMOTIDINE 10 MG/ML
20 INJECTION INTRAVENOUS EVERY 12 HOURS
Status: DISCONTINUED | OUTPATIENT
Start: 2018-12-02 | End: 2018-12-02 | Stop reason: HOSPADM

## 2018-12-02 RX ORDER — ACETAMINOPHEN 325 MG/1
650 TABLET ORAL EVERY 8 HOURS PRN
Status: DISCONTINUED | OUTPATIENT
Start: 2018-12-02 | End: 2018-12-02 | Stop reason: HOSPADM

## 2018-12-02 RX ORDER — CLOPIDOGREL BISULFATE 75 MG/1
75 TABLET ORAL EVERY MORNING
Status: DISCONTINUED | OUTPATIENT
Start: 2018-12-02 | End: 2018-12-02 | Stop reason: HOSPADM

## 2018-12-02 RX ADMIN — FINASTERIDE 5 MG: 5 TABLET, FILM COATED ORAL at 11:12

## 2018-12-02 RX ADMIN — ROSUVASTATIN CALCIUM 20 MG: 10 TABLET, FILM COATED ORAL at 11:12

## 2018-12-02 RX ADMIN — METOPROLOL SUCCINATE 100 MG: 50 TABLET, EXTENDED RELEASE ORAL at 11:12

## 2018-12-02 RX ADMIN — FAMOTIDINE 20 MG: 10 INJECTION, SOLUTION INTRAVENOUS at 09:12

## 2018-12-02 RX ADMIN — CLOPIDOGREL BISULFATE 75 MG: 75 TABLET ORAL at 11:12

## 2018-12-02 RX ADMIN — ASPIRIN 325 MG ORAL TABLET 325 MG: 325 PILL ORAL at 04:12

## 2018-12-02 RX ADMIN — LOSARTAN POTASSIUM 50 MG: 25 TABLET, FILM COATED ORAL at 11:12

## 2018-12-02 RX ADMIN — ISOSORBIDE MONONITRATE 60 MG: 30 TABLET, EXTENDED RELEASE ORAL at 11:12

## 2018-12-02 RX ADMIN — ASPIRIN 81 MG: 81 TABLET, COATED ORAL at 11:12

## 2018-12-02 RX ADMIN — ENOXAPARIN SODIUM 120 MG: 150 INJECTION SUBCUTANEOUS at 06:12

## 2018-12-02 RX ADMIN — TAMSULOSIN HYDROCHLORIDE 0.4 MG: 0.4 CAPSULE ORAL at 11:12

## 2018-12-02 RX ADMIN — GABAPENTIN 600 MG: 300 CAPSULE ORAL at 11:12

## 2018-12-02 NOTE — HOSPITAL COURSE
Patient with history CAD sp MI/stents 6 year ago followed by Dr. Alvarez who is admitted for chest pain. Ruled out of ACS. 2 D echo normal EF and diastolic function normal wall motion. Patient remains chest pain free through out stay. Continue statin, ARB, BB. Cardiology. Patient stable for discharge home with primary Cardiology follow up for outpatient echo and stress test.

## 2018-12-02 NOTE — ASSESSMENT & PLAN NOTE
Patient with history CAD sp MI/stents 6 year ago followed by Dr. Alvarez who is admitted for chest pain. JESS 2. Currently without chest pain. EKG NSR.  Continue serial troponin.  Supplemental oxygen PRN, aspirin, nitroglycerin PRN. Continue statin, ARB, BB. Cardiology following. Follow up next set up troponin. If negative, okay discharge with close Cardiology follow up and outpatient echo and stress test.

## 2018-12-02 NOTE — NURSING
Nursing Admit Note Note      12/2/2018 at 0840    Transfer From: ED    Transfer via stretcher    Transfer with cardiac monitoring    Transported by ED PCT    Medicines sent: no    Chart send with patient: Yes    Notified: spouse    Patient reassessed at: 12/2/2018, 0935    Upon arrival to floor: cardiac monitor applied, patient oriented to room, call bell in reach and bed in lowest position.    Patient ambulated from stretcher to bed by walker. Patient tolerated well. Patient denies any complaints of chest pain or any other complaints at this time. Wheels locked on bed, side rails up x2. Will continue to monitor.

## 2018-12-02 NOTE — ED NOTES
Rounding on the patient has been done. he has been updated on the plan of care and his current status. Pain was assessed and is currently a 0/10. Comfort positioning and restroom needs were addressed. Necessary items were placed with in his reach and he was advised when a reassessment would take place. The call bell remains at the bedside for any additional patient needs. The patient is resting comfortably on the stretcher, respirations are even and unlabored, skin warm and dry. Bed locked in lowest position, side rails up x2, Will continue to monitor.

## 2018-12-02 NOTE — SUBJECTIVE & OBJECTIVE
Past Medical History:   Diagnosis Date    Anticoagulant long-term use     Arthritis     Cancer of kidney, right     Coronary artery disease     Diabetes mellitus     Hypertension     Kidney stone     Sleep apnea     Slurred speech     Stroke     MINI STROKE    TIA (transient ischemic attack)     Wears glasses        Past Surgical History:   Procedure Laterality Date    BACK SURGERY      lower back    CARDIAC SURGERY      COLONOSCOPY  07/2018    CYSTOSCOPY  03/2008    CYSTOSCOPY with bilateral RETROGRADE and right URETEROSCOPY, STENT PLACEMENT Bilateral 6/16/2015    Performed by [facility] at [facility] OR    CYSTOSCOPY WITH RETROGRADE PYELOGRAM Right 3/8/2016    Performed by [facility] Acosta [facility] at [facility] OR    ESOPHAGEAL MANOMETRY  08/2018    EVACUATION-CLOT  6/16/2015    Performed by [facility] at [facility] OR    excision right thigh mass      EXCISION-MASS-THIGH Right 3/5/2018    Performed by Jose Lewis MD at [facility] OR    FULGURATION-BLADDER  6/16/2015    Performed [facility] at [facility] OR    heart stents      stents 4 total.  2010, 2011 and 2013    HERNIA REPAIR      incisional    KIDNEY STONE SURGERY  2015    LAPAROSCOPIC NEPHRECTOMY, HAND ASSISTED Right     LITHOTRIPSY      NEPHRECTOMY-LAPAROSCOPIC Right 3/18/2016    Performed by [facility]osta [facility] at [facility] OR    PLACEMENT, Shaver Catheter  6/16/2015    Performed by [facility] Christian [facility] at [facility] OR    REMOVAL-STONE-URETERAL  6/16/2015    Performed by [facility] at [facility] OR    REPAIR-HERNIA-INCISIONAL N/A 8/19/2016    Performed by Jose Lewis [facility] at [facility] OR    UPPER GASTROINTESTINAL ENDOSCOPY      7/10/2018,9/24/2010    URETEROSCOPY Right 3/8/2016    Performed by [facility]osta [facility] at [facility] OR    VASCULAR SURGERY      WASHING-BLADDER  6/16/2015    Performed by [facility] Christian [facility] at [facility] OR       Review of patient's allergies indicates:  No Known  Allergies    No current facility-administered medications on file prior to encounter.      Current Outpatient Medications on File Prior to Encounter   Medication Sig    ACCU-CHEK REJI PLUS METER [facility] [place] [facility] CHECK TWICE DAILY    allopurinol (ZYLOPRIM) 100 MG tablet Take 100 mg by mouth once daily.    aspirin (ECOTRIN) 81 MG EC tablet Take 81 mg by mouth once daily. LAST TAKEN 3/2/16    blood sugar diagnostic Strp 1 strip by Misc.(Non-Drug; Combo Route) route 2 (two) times daily.    clopidogrel (PLAVIX) 75 mg tablet Take 75 mg by mouth every morning. LAST DOSE 3/2/16    docusate sodium (STOOL SOFTENER) 100 MG capsule Take 1 capsule (100 mg total) by mouth 2 (two) times daily.    dulaglutide (TRULICITY) 1.5 mg/0.5 mL PnIj Inject 1.5 mg into the skin every 7 days.    finasteride (PROSCAR) 5 mg tablet Take 1 tablet (5 mg total) by mouth every morning.    furosemide (LASIX) 40 MG tablet Take 40 mg by mouth 2 (two) times daily. ALTERNATES 1 TABLET ONE DAY AND 2 TABLETS THE NEXT--ALTERNATING DAYS    gabapentin (NEURONTIN) 600 MG tablet Take 1 tablet (600 mg total) by mouth 3 (three) times daily.    gemfibrozil (LOPID) 600 MG tablet Take 1 tablet (600 mg total) by mouth 2 (two) times daily before meals.    glimepiride (AMARYL) 4 MG tablet Take 1 tablet (4 mg total) by mouth 2 (two) times daily.    isosorbide mononitrate (IMDUR) 60 MG 24 hr tablet Take 60 mg by mouth every morning.     losartan (COZAAR) 50 MG tablet Take 50 mg by mouth once daily.    metoprolol succinate (TOPROL-XL) 100 MG 24 hr tablet Take 100 mg by mouth every morning.     mupirocin (BACTROBAN) 2 % ointment Apply topically 3 (three) times daily.    nateglinide (STARLIX) 60 MG tablet TAKE 1 TABLET TWICE DAILY BEFORE MEALS    NITROSTAT 0.4 mg SL tablet Place 0.4 mg under the tongue every 5 (five) minutes as needed.     prochlorperazine (COMPAZINE) 10 MG tablet Take 1 tablet (10 mg total) by mouth 3 (three) times daily as needed.    ranitidine  (ZANTAC) 150 MG tablet TAKE 1 TABLET TWICE DAILY    rosuvastatin (CRESTOR) 20 MG tablet Take 1 tablet (20 mg total) by mouth once daily.    tamsulosin (FLOMAX) 0.4 mg Cap Take 1 capsule (0.4 mg total) by mouth once daily.    TRAVATAN Z 0.004 % Drop 1 drop every evening.      Family History     Problem Relation (Age of Onset)    Heart disease Father    Hypertension Mother    Parkinsonism Mother        Tobacco Use    Smoking status: Never Smoker    Smokeless tobacco: Current User     Types: Snuff    Tobacco comment: 20 plus years   Substance and Sexual Activity    Alcohol use: Yes     Comment: occas    Drug use: No    Sexual activity: Yes     Partners: Female     Review of Systems   Constitution: Negative for decreased appetite.   HENT: Negative for ear discharge.    Eyes: Negative for blurred vision.   Endocrine: Negative for polyphagia.   Skin: Negative for nail changes.   Neurological: Negative for aphonia.   Psychiatric/Behavioral: Negative for hallucinations.     Objective:     Vital Signs (Most Recent):  Temp: 97.6 °F (36.4 °C) (12/02/18 0858)  Pulse: 76 (12/02/18 0858)  Resp: 18 (12/02/18 0858)  BP: 138/85 (12/02/18 0858)  SpO2: 95 % (12/02/18 0858) Vital Signs (24h Range):  Temp:  [97.6 °F (36.4 °C)-97.9 °F (36.6 °C)] 97.6 °F (36.4 °C)  Pulse:  [74-82] 76  Resp:  [14-20] 18  SpO2:  [95 %-97 %] 95 %  BP: (137-161)/(75-89) 138/85     Weight: 128.3 kg (282 lb 13.6 oz)  Body mass index is 44.3 kg/m².    SpO2: 95 %  O2 Device (Oxygen Therapy): room air    No intake or output data in the 24 hours ending 12/02/18 1059    Lines/Drains/Airways     Drain                 Closed/Suction Drain 08/19/16 1442 Right Abdomen Bulb 19 Fr. 834 days          Peripheral Intravenous Line                 Peripheral IV - Single Lumen 12/02/18 0453 Right Hand less than 1 day                Physical Exam   Constitutional: He is oriented to person, place, and time. He appears well-developed and well-nourished.   HENT:   Head:  Normocephalic and atraumatic.   Eyes: Conjunctivae are normal. Pupils are equal, round, and reactive to light.   Neck: Normal range of motion. Neck supple.   Cardiovascular: Normal rate, normal heart sounds and intact distal pulses.   Pulmonary/Chest: Effort normal and breath sounds normal.   Abdominal: Soft. Bowel sounds are normal.   Musculoskeletal: Normal range of motion.   Neurological: He is alert and oriented to person, place, and time.   Skin: Skin is warm and dry.       Significant Labs: All pertinent lab results from the last 24 hours have been reviewed.    Significant Imaging: Echocardiogram: Transthoracic echo (TTE) complete (Cupid Only): No results found for this or any previous visit.

## 2018-12-02 NOTE — HPI
Patient presents with acute onset of left-sided chest pain that started approximately 2 hr ago.  Started while he was at rest.  Pain is described as dull and aching.  Patient says he took 1 nitroglycerin at home with partial relief.  States he still has mild pain. Patient denies diaphoresis, nausea, or shortness of breath. He denies palpitations.  Pain does not radiate.  No back pain or abdominal pain. No cough or fever.  No leg edema or calf pain. Patient has history of acute MI 6 years ago.  States that the symptoms are not as severe.  Patient also states is that this is the 1st time that he has had pain since then.  He denies any exertional pain within the last few weeks.  Patient is compliant with his medications.  His cardiologist is at [facility].    Currently CP free  EKG NSR LVH no acute STT changes  Troponin negative    [facility]  Last stent 4 years ago    Last seen here 4/2016  1.  Chest pain, possible angina.  No acute myocardial infarction.  Also consider   possible GI or musculoskeletal in origin.  2.  Coronary artery disease with history of multiple previous stents.  3.  Old myocardial infarction.  4.  Hypertension.  5.  Type 2 diabetes mellitus.  6.  Chronic renal failure, stage III.  7.  Recent nephrectomy.  8.  Hypercholesterolemia.

## 2018-12-02 NOTE — DISCHARGE SUMMARY
[facility] Hospital Medicine  Discharge Summary      Patient Name: Nicolas Flaherty  MRN: [id number]  Admission Date: 12/2/2018  Hospital Length of Stay: 0 days  Discharge Date and Time:  12/02/2018 2:41 PM  Attending Physician: Erika Brown MD   Discharging Provider: Isabela Adams NP  Primary Care Provider: Shyanne Alfredo MD      HPI:   No notes on file    * No surgery found *      Hospital Course:   Patient with history CAD sp MI/stents 6 year ago followed by Dr. Alvarez who is admitted for chest pain. Ruled out of ACS. 2 D echo normal EF and diastolic function normal wall motion. Patient remains chest pain free through out stay. Continue statin, ARB, BB. Cardiology. Patient stable for discharge home with primary Cardiology follow up for outpatient echo and stress test.       Consults:   Consults (From admission, onward)        Status Ordering Provider     Inpatient consult to Cardiology  Once     Provider:  Jeremías Hand MD    Acknowledged YVONNE, ISABELA HARKINS          No new Assessment & Plan notes have been filed under this hospital service since the last note was generated.  Service: Hospital Medicine    Final Active Diagnoses:    Diagnosis Date Noted POA    PRINCIPAL PROBLEM:  Chest pain at rest [R07.9] 12/02/2018 Yes    DM (diabetes mellitus) type II controlled with renal manifestation [E11.29] 03/18/2016 Yes    CKD (chronic kidney disease), stage III [N18.3] 03/18/2016 Yes    Essential hypertension [I10] 03/18/2016 Yes    Status post nephrectomy [Z90.5] 03/18/2016 Not Applicable      Problems Resolved During this Admission:       Discharged Condition: good    Disposition: Home or Self Care    Follow Up:  Follow-up Information     Schedule an appointment as soon as possible for a visit with Víctor Betts MD.    Specialty:  Cardiology  Contact information:  [address] [place] [address]  HEART [facility] [address] [phone]                 Patient  Instructions:      Diet diabetic     Diet Cardiac     Diet Cardiac     Diet diabetic         Pending Diagnostic Studies:     Procedure Component Value Units Date/Time    Troponin I [[id number]]     Order Status:  Sent Lab Status:  No result     Specimen:  Blood     X-Ray Chest AP Portable [[id number]] Resulted:  12/02/18 0508    Order Status:  Sent Lab Status:  In process Updated:  12/02/18 0704         Medications:  Reconciled Home Medications:      Medication List      CHANGE how you take these medications    gabapentin 600 MG tablet  Commonly known as:  NEURONTIN  Take 1 tablet (600 mg total) by mouth 2 (two) times daily. Patient takes 600 mg am and 1200 mg qhs  What changed:    · when to take this  · additional instructions        CONTINUE taking these medications    ACCU-CHEK REJI PLUS METER [facility]  Generic drug:  blood-glucose meter  CHECK TWICE DAILY     allopurinol 100 MG tablet  Commonly known as:  ZYLOPRIM  Take 100 mg by mouth once daily.     aspirin 81 MG EC tablet  Commonly known as:  ECOTRIN  Take 81 mg by mouth once daily. LAST TAKEN 3/2/16     blood sugar diagnostic Strp  1 strip by Misc.(Non-Drug; Combo Route) route 2 (two) times daily.     clopidogrel 75 mg tablet  Commonly known as:  PLAVIX  Take 75 mg by mouth every morning. LAST DOSE 3/2/16     docusate sodium 100 MG capsule  Commonly known as:  STOOL SOFTENER  Take 1 capsule (100 mg total) by mouth 2 (two) times daily.     dulaglutide 1.5 mg/0.5 mL Pnij  Commonly known as:  TRULICITY  Inject 1.5 mg into the skin every 7 days.     finasteride 5 mg tablet  Commonly known as:  PROSCAR  Take 1 tablet (5 mg total) by mouth every morning.     furosemide 40 MG tablet  Commonly known as:  LASIX  Take 40 mg by mouth 2 (two) times daily. ALTERNATES 1 TABLET ONE DAY AND 2 TABLETS THE NEXT--ALTERNATING DAYS     gemfibrozil 600 MG tablet  Commonly known as:  LOPID  Take 1 tablet (600 mg total) by mouth 2 (two) times daily before meals.     glimepiride 4 MG  tablet  Commonly known as:  AMARYL  Take 1 tablet (4 mg total) by mouth 2 (two) times daily.     isosorbide mononitrate 60 MG 24 hr tablet  Commonly known as:  IMDUR  Take 60 mg by mouth every morning.     losartan 50 MG tablet  Commonly known as:  COZAAR  Take 50 mg by mouth once daily.     metoprolol succinate 100 MG 24 hr tablet  Commonly known as:  TOPROL-XL  Take 100 mg by mouth every morning.     mupirocin 2 % ointment  Commonly known as:  BACTROBAN  Apply topically 3 (three) times daily.     nateglinide 60 MG tablet  Commonly known as:  STARLIX  TAKE 1 TABLET TWICE DAILY BEFORE MEALS     NITROSTAT 0.4 MG SL tablet  Generic drug:  nitroGLYCERIN  Place 0.4 mg under the tongue every 5 (five) minutes as needed.     prochlorperazine 10 MG tablet  Commonly known as:  COMPAZINE  Take 1 tablet (10 mg total) by mouth 3 (three) times daily as needed.     ranitidine 150 MG tablet  Commonly known as:  ZANTAC  TAKE 1 TABLET TWICE DAILY     rosuvastatin 20 MG tablet  Commonly known as:  CRESTOR  Take 1 tablet (20 mg total) by mouth once daily.     tamsulosin 0.4 mg Cap  Commonly known as:  FLOMAX  Take 1 capsule (0.4 mg total) by mouth once daily.     TRAVATAN Z 0.004 % ophthalmic solution  Generic drug:  travoprost  1 drop every evening.            Indwelling Lines/Drains at time of discharge:   Lines/Drains/Airways     Drain                 Closed/Suction Drain 08/19/16 1442 Right Abdomen Bulb 19 Fr. 835 days                Time spent on the discharge of patient: 30 minutes  Patient was seen and examined on the date of discharge and determined to be suitable for discharge.         Isabela Adams NP  Department of Hospital Medicine  [facility]

## 2018-12-02 NOTE — CONSULTS
[facility]  Cardiology  Consult Note    Patient Name: Nicolas Flaherty  MRN: [id number]  Admission Date: 12/2/2018  Hospital Length of Stay: 0 days  Code Status: Full Code   Attending Provider: Erika Brown MD   Consulting Provider: Jeremías Hand MD  Primary Care Physician: Shyanne Alfredo MD  Principal Problem:Chest pain at rest    Patient information was obtained from patient and ER records.     Consults  Subjective:     Chief Complaint:  CP     HPI:   Patient presents with acute onset of left-sided chest pain that started approximately 2 hr ago.  Started while he was at rest.  Pain is described as dull and aching.  Patient says he took 1 nitroglycerin at home with partial relief.  States he still has mild pain. Patient denies diaphoresis, nausea, or shortness of breath. He denies palpitations.  Pain does not radiate.  No back pain or abdominal pain. No cough or fever.  No leg edema or calf pain. Patient has history of acute MI 6 years ago.  States that the symptoms are not as severe.  Patient also states is that this is the 1st time that he has had pain since then.  He denies any exertional pain within the last few weeks.  Patient is compliant with his medications.  His cardiologist is at [facility].    Currently CP free  EKG NSR LVH no acute STT changes  Troponin negative    [facility]  Last stent 4 years ago    Last seen here 4/2016  1.  Chest pain, possible angina.  No acute myocardial infarction.  Also consider   possible GI or musculoskeletal in origin.  2.  Coronary artery disease with history of multiple previous stents.  3.  Old myocardial infarction.  4.  Hypertension.  5.  Type 2 diabetes mellitus.  6.  Chronic renal failure, stage III.  7.  Recent nephrectomy.  8.  Hypercholesterolemia.     Past Medical History:   Diagnosis Date    Anticoagulant long-term use     Arthritis     Cancer of kidney, right     Coronary artery disease      Diabetes mellitus     Hypertension     Kidney stone     Sleep apnea     Slurred speech     Stroke     MINI STROKE    TIA (transient ischemic attack)     Wears glasses        Past Surgical History:   Procedure Laterality Date    BACK SURGERY      lower back    CARDIAC SURGERY      COLONOSCOPY  07/2018    CYSTOSCOPY  03/2008    CYSTOSCOPY with bilateral RETROGRADE and right URETEROSCOPY, STENT PLACEMENT Bilateral 6/16/2015    [facility]formed [facility] OR    CYSTOSCOPY WITH RETROGRADE PYELOGRAM Right 3/8/2016    [facility] OR    ESOPHAGEAL MANOMETRY  08/2018    EVACUATION-CLOT  6/16/2015    [facility]    excision right thigh mass      EXCISION-MASS-THIGH Right 3/5/2018    [facility] Jose [facility] at [facility] OR    FULGURATION-BLADDER  6/16/2015    Performed by [facility] OR    heart stents      stents 4 total.  2010, 2011 and 2013    HERNIA REPAIR      incisional    KIDNEY STONE SURGERY  2015    LAPAROSCOPIC NEPHRECTOMY, HAND ASSISTED Right     LITHOTRIPSY      NEPHRECTOMY-LAPAROSCOPIC Right 3/18/2016    Performed by [facility] OR    PLACEMENT, Shaver Catheter  6/16/2015    [facility]    REMOVAL-STONE-URETERAL  6/16/2015    [facility]    REPAIR-HERNIA-INCISIONAL N/A 8/19/2016    [facility] OR    UPPER GASTROINTESTINAL ENDOSCOPY      7/10/2018,9/24/2010    URETEROSCOPY Right 3/8/2016    [facility] OR    VASCULAR SURGERY      WASHING-BLADDER  6/16/2015    [facility] OR       Review of patient's allergies indicates:  No Known Allergies    No current facility-administered medications on file prior to encounter.      Current Outpatient Medications on File Prior to Encounter   Medication  Sig    ACCU-CHEK REJI PLUS METER [facility] CHECK TWICE DAILY    allopurinol (ZYLOPRIM) 100 MG tablet Take 100 mg by mouth once daily.    aspirin (ECOTRIN) 81 MG EC tablet Take 81 mg by mouth once daily. LAST TAKEN 3/2/16    blood sugar diagnostic Strp 1 strip by Misc.(Non-Drug; Combo Route) route 2 (two) times daily.    clopidogrel (PLAVIX) 75 mg tablet Take 75 mg by mouth every morning. LAST DOSE 3/2/16    docusate sodium (STOOL SOFTENER) 100 MG capsule Take 1 capsule (100 mg total) by mouth 2 (two) times daily.    dulaglutide (TRULICITY) 1.5 mg/0.5 mL PnIj Inject 1.5 mg into the skin every 7 days.    finasteride (PROSCAR) 5 mg tablet Take 1 tablet (5 mg total) by mouth every morning.    furosemide (LASIX) 40 MG tablet Take 40 mg by mouth 2 (two) times daily. ALTERNATES 1 TABLET ONE DAY AND 2 TABLETS THE NEXT--ALTERNATING DAYS    gabapentin (NEURONTIN) 600 MG tablet Take 1 tablet (600 mg total) by mouth 3 (three) times daily.    gemfibrozil (LOPID) 600 MG tablet Take 1 tablet (600 mg total) by mouth 2 (two) times daily before meals.    glimepiride (AMARYL) 4 MG tablet Take 1 tablet (4 mg total) by mouth 2 (two) times daily.    isosorbide mononitrate (IMDUR) 60 MG 24 hr tablet Take 60 mg by mouth every morning.     losartan (COZAAR) 50 MG tablet Take 50 mg by mouth once daily.    metoprolol succinate (TOPROL-XL) 100 MG 24 hr tablet Take 100 mg by mouth every morning.     mupirocin (BACTROBAN) 2 % ointment Apply topically 3 (three) times daily.    nateglinide (STARLIX) 60 MG tablet TAKE 1 TABLET TWICE DAILY BEFORE MEALS    NITROSTAT 0.4 mg SL tablet Place 0.4 mg under the tongue every 5 (five) minutes as needed.     prochlorperazine (COMPAZINE) 10 MG tablet Take 1 tablet (10 mg total) by mouth 3 (three) times daily as needed.    ranitidine (ZANTAC) 150 MG tablet TAKE 1 TABLET TWICE DAILY    rosuvastatin (CRESTOR) 20 MG tablet Take 1 tablet (20 mg total) by mouth once daily.    tamsulosin (FLOMAX)  0.4 mg Cap Take 1 capsule (0.4 mg total) by mouth once daily.    TRAVATAN Z 0.004 % Drop 1 drop every evening.      Family History     Problem Relation (Age of Onset)    Heart disease Father    Hypertension Mother    Parkinsonism Mother        Tobacco Use    Smoking status: Never Smoker    Smokeless tobacco: Current User     Types: Snuff    Tobacco comment: 20 plus years   Substance and Sexual Activity    Alcohol use: Yes     Comment: occas    Drug use: No    Sexual activity: Yes     Partners: Female     Review of Systems   Constitution: Negative for decreased appetite.   HENT: Negative for ear discharge.    Eyes: Negative for blurred vision.   Endocrine: Negative for polyphagia.   Skin: Negative for nail changes.   Neurological: Negative for aphonia.   Psychiatric/Behavioral: Negative for hallucinations.     Objective:     Vital Signs (Most Recent):  Temp: 97.6 °F (36.4 °C) (12/02/18 0858)  Pulse: 76 (12/02/18 0858)  Resp: 18 (12/02/18 0858)  BP: 138/85 (12/02/18 0858)  SpO2: 95 % (12/02/18 0858) Vital Signs (24h Range):  Temp:  [97.6 °F (36.4 °C)-97.9 °F (36.6 °C)] 97.6 °F (36.4 °C)  Pulse:  [74-82] 76  Resp:  [14-20] 18  SpO2:  [95 %-97 %] 95 %  BP: (137-161)/(75-89) 138/85     Weight: 128.3 kg (282 lb 13.6 oz)  Body mass index is 44.3 kg/m².    SpO2: 95 %  O2 Device (Oxygen Therapy): room air    No intake or output data in the 24 hours ending 12/02/18 1059    Lines/Drains/Airways     Drain                 Closed/Suction Drain 08/19/16 1442 Right Abdomen Bulb 19 Fr. 834 days          Peripheral Intravenous Line                 Peripheral IV - Single Lumen 12/02/18 0453 Right Hand less than 1 day                Physical Exam   Constitutional: He is oriented to person, place, and time. He appears well-developed and well-nourished.   HENT:   Head: Normocephalic and atraumatic.   Eyes: Conjunctivae are normal. Pupils are equal, round, and reactive to light.   Neck: Normal range of motion. Neck supple.    Cardiovascular: Normal rate, normal heart sounds and intact distal pulses.   Pulmonary/Chest: Effort normal and breath sounds normal.   Abdominal: Soft. Bowel sounds are normal.   Musculoskeletal: Normal range of motion.   Neurological: He is alert and oriented to person, place, and time.   Skin: Skin is warm and dry.       Significant Labs: All pertinent lab results from the last 24 hours have been reviewed.    Significant Imaging: Echocardiogram: Transthoracic echo (TTE) complete (Cupid Only): No results found for this or any previous visit.    Assessment and Plan:     * Chest pain at rest    Ruling out for MI. Known Hx CAD. Currently CP free. Ok for d/c and f/u with regular cardiologist if repeat troponin negative.     DM (diabetes mellitus) type II controlled with renal manifestation          Essential hypertension          Status post nephrectomy              VTE Risk Mitigation (From admission, onward)        Ordered     enoxaparin injection 120 mg  Every 12 hours (non-standard times)      12/02/18 0620          Thank you for your consult. I will sign off. Please contact us if you have any additional questions.    Jeremías Hand MD  Cardiology   [facility]

## 2018-12-02 NOTE — NURSING
Reviewed all discharge instructions with patient and patient's spouse including, changed medications, follow-up appointments and signs/symptoms to report to PCP or seek emergency medical care. Patient verbalized understanding to all instructions and education. Patient denies any complaints or concerns at this time. Awaiting transport.

## 2018-12-02 NOTE — ED PROVIDER NOTES
Encounter Date: 12/2/2018       History     Chief Complaint   Patient presents with    Chest Pain     pt reports left upper chest pain starting 2hrs ago; pt took 1 SL Nitro 1hr ago with no relief; pt has hx of MI & multiple stents placed; pt denies any other symptoms at this time     Patient presents with acute onset of left-sided chest pain that started approximately 2 hr ago.  Started while he was at rest.  Pain is described as dull and aching.  Patient says he took 1 nitroglycerin at home with partial relief.  States he still has mild pain. Patient denies diaphoresis, nausea, or shortness of breath. He denies palpitations.  Pain does not radiate.  No back pain or abdominal pain. No cough or fever.  No leg edema or calf pain. Patient has history of acute MI 6 years ago.  States that the symptoms are not as severe.  Patient also states is that this is the 1st time that he has had pain since then.  He denies any exertional pain within the last few weeks.  Patient is compliant with his medications.  His cardiologist is at [facility].          Review of patient's allergies indicates:  No Known Allergies  Past Medical History:   Diagnosis Date    Anticoagulant long-term use     Arthritis     Cancer of kidney, right     Coronary artery disease     Diabetes mellitus     Hypertension     Kidney stone     Sleep apnea     Slurred speech     Stroke     MINI STROKE    TIA (transient ischemic attack)     Wears glasses      Past Surgical History:   Procedure Laterality Date    BACK SURGERY      lower back    CARDIAC SURGERY      COLONOSCOPY  07/2018    CYSTOSCOPY  03/2008    CYSTOSCOPY with bilateral RETROGRADE and right URETEROSCOPY, STENT PLACEMENT Bilateral 6/16/2015    [facility] OR    CYSTOSCOPY WITH RETROGRADE PYELOGRAM Right 3/8/2016    [facility] OR    ESOPHAGEAL MANOMETRY  08/2018    EVACUATION-CLOT  6/16/2015     [facility]    excision right thigh mass      EXCISION-MASS-THIGH Right 3/5/2018    [facility]formed [facility] at [facility]    FULGURATION-BLADDER  6/16/2015    [facility] OR    heart stents      stents 4 total.  2010, 2011 and 2013    HERNIA REPAIR      incisional    KIDNEY STONE SURGERY  2015    LAPAROSCOPIC NEPHRECTOMY, HAND ASSISTED Right     LITHOTRIPSY      NEPHRECTOMY-LAPAROSCOPIC Right 3/18/2016    [facility] OR    PLACEMENT, Shaver Catheter  6/16/2015    [facility]    REMOVAL-STONE-URETERAL  6/16/2015    [facility] OR    REPAIR-HERNIA-INCISIONAL N/A 8/19/2016    [facility]formed [facility] OR    UPPER GASTROINTESTINAL ENDOSCOPY      7/10/2018,9/24/2010    URETEROSCOPY Right 3/8/2016    [facility] OR    VASCULAR SURGERY      WASHING-BLADDER  6/16/2015    [facility] at [facility] OR     Family History   Problem Relation Age of Onset    Heart disease Father     Hypertension Mother     Parkinsonism Mother      Social History     Tobacco Use    Smoking status: Never Smoker    Smokeless tobacco: Current User     Types: Snuff    Tobacco comment: 20 plus years   Substance Use Topics    Alcohol use: Yes     Comment: occas    Drug use: No     Review of Systems   Constitutional: Negative for chills and fever.   HENT: Negative for congestion and sore throat.    Eyes: Negative.    Respiratory: Negative for cough, shortness of breath and wheezing.    Cardiovascular: Positive for chest pain. Negative for palpitations and leg swelling.   Gastrointestinal: Negative for abdominal pain, diarrhea, nausea and vomiting.        NO melena or rectal bleeding   Genitourinary: Negative for dysuria and flank pain.   Musculoskeletal: Negative for arthralgias and back  pain.   Skin: Negative for rash.   Neurological: Negative for dizziness, syncope, facial asymmetry, speech difficulty, weakness, light-headedness, numbness and headaches.        No numbness   Psychiatric/Behavioral: The patient is not nervous/anxious.    All other systems reviewed and are negative.      Physical Exam     Initial Vitals [12/02/18 0431]   BP Pulse Resp Temp SpO2   137/81 82 18 97.9 °F (36.6 °C) 95 %      MAP       --         Physical Exam    Nursing note and vitals reviewed.  Constitutional: He appears well-developed and well-nourished. He is not diaphoretic. No distress.   HENT:   Head: Normocephalic and atraumatic.   Right Ear: External ear normal.   Left Ear: External ear normal.   Nose: Nose normal.   Mouth/Throat: Oropharynx is clear and moist.   Eyes: Conjunctivae and EOM are normal. Pupils are equal, round, and reactive to light. Right eye exhibits no discharge. Left eye exhibits no discharge. No scleral icterus.   Neck: Neck supple. No tracheal deviation present.   Cardiovascular: Normal rate, regular rhythm and normal heart sounds.   No murmur heard.  Pulmonary/Chest: Breath sounds normal. No stridor. No respiratory distress. He has no wheezes. He has no rhonchi. He has no rales. He exhibits no tenderness.   Abdominal: Soft. He exhibits no distension and no mass. There is no tenderness. There is no rebound and no guarding.   Musculoskeletal: Normal range of motion. He exhibits no edema or tenderness.   Neurological: He is alert and oriented to person, place, and time. He has normal strength. No cranial nerve deficit.   Skin: Skin is warm and dry. No rash noted.   Psychiatric: He has a normal mood and affect. His behavior is normal. Judgment and thought content normal.         ED Course   Procedures  Labs Reviewed   CBC W/ AUTO DIFFERENTIAL - Abnormal; Notable for the following components:       Result Value    Hemoglobin 13.1 (*)     Hematocrit 39.1 (*)     RDW 14.8 (*)     Eos # 0.6 (*)      Eosinophil% 9.1 (*)     All other components within normal limits   COMPREHENSIVE METABOLIC PANEL   TROPONIN I   B-TYPE NATRIURETIC PEPTIDE   MAGNESIUM     EKG Readings: (Independently Interpreted)   Initial Reading: No STEMI. Rhythm: Normal Sinus Rhythm. Heart Rate: 84. Ectopy: No Ectopy. Conduction: Normal. ST Segments: Normal ST Segments. T Waves: Normal. Axis: Normal. Other Impression: LVH       Imaging Results          X-Ray Chest AP Portable (In process)               X-Rays:   Independently Interpreted Readings:   Chest X-Ray: Cardiomegaly present. Wide mediastinum is present. Rotated film giving appearance of widened mediastinum.  No infiltrates.  No pneumothorax.  No effusion.  Radiology interpretation pending.     Medical Decision Making:   Differential Diagnosis:   Acute coronary syndrome.  Acute MI.  CHF.  Clinical Tests:   Lab Tests: Reviewed  The following lab test(s) were unremarkable: CBC, CMP, Troponin and BNP  Radiological Study: Reviewed  Medical Tests: Reviewed  ED Management:  Patient still complains of minimal chest pain. Cardiac enzymes negative. EKG did not show any acute changes.  Given history of coronary disease, will admit for observation for serial cardiac enzymes.  Constipation and family.                      Clinical Impression:   The primary encounter diagnosis was Unstable angina. A diagnosis of Chest pain was also pertinent to this visit.      Disposition:   Disposition: Admitted  Condition: Stable                        Adonis Nicolas MD  12/02/18 0687

## 2018-12-02 NOTE — ED NOTES
Patient AAOx4 in bed. Patient resting but easily aroused. NKD. VSS. Patient wife at bedside. Patient states that his pain is 0/10. Patient informed that he will be admitted just waiting on room number. Patient states verbal understanding. Will continue to monitor.

## 2018-12-02 NOTE — ASSESSMENT & PLAN NOTE
Ruling out for MI. Known Hx CAD. Currently CP free. Ok for d/c and f/u with regular cardiologist if repeat troponin negative.

## 2018-12-02 NOTE — ASSESSMENT & PLAN NOTE
Lab Results   Component Value Date    HGBA1C 8.4 (H) 10/31/2018   SSI and adjust prandal basal accordingly.

## 2018-12-02 NOTE — H&P
[facility] Medicine  History & Physical    Patient Name: Nicolas Flaherty  MRN: [id number]  Admission Date: 12/2/2018  Attending Physician: Erika Brown MD   Primary Care Provider: Shyanne Alfredo MD         Patient information was obtained from patient and ER records.     Subjective:     Principal Problem:Chest pain at rest    Chief Complaint:   Chief Complaint   Patient presents with    Chest Pain     pt reports left upper chest pain starting 2hrs ago; pt took 1 SL Nitro 1hr ago with no relief; pt has hx of MI & multiple stents placed; pt denies any other symptoms at this time        HPI: No notes on file    Past Medical History:   Diagnosis Date    Anticoagulant long-term use     Arthritis     Cancer of kidney, right     Coronary artery disease     Diabetes mellitus     Hypertension     Kidney stone     Sleep apnea     Slurred speech     Stroke     MINI STROKE    TIA (transient ischemic attack)     Wears glasses        Past Surgical History:   Procedure Laterality Date    BACK SURGERY      lower back    CARDIAC SURGERY      COLONOSCOPY  07/2018    CYSTOSCOPY  03/2008    CYSTOSCOPY with bilateral RETROGRADE and right URETEROSCOPY, STENT PLACEMENT Bilateral 6/16/2015    [facility] at [facility] OR    CYSTOSCOPY WITH RETROGRADE PYELOGRAM Right 3/8/2016    Performed by [facility] at [facility] OR    ESOPHAGEAL MANOMETRY  08/2018    EVACUATION-CLOT  6/16/2015    [facility] at [facility] OR    excision right thigh mass      EXCISION-MASS-THIGH Right 3/5/2018    Performed by Jose Lewis [facility] at [facility] OR    FULGURATION-BLADDER  6/16/2015    [facility] at [facility] OR    heart stents      stents 4 total.  2010, 2011 and 2013    HERNIA REPAIR      incisional    KIDNEY STONE SURGERY  2015    LAPAROSCOPIC NEPHRECTOMY, HAND ASSISTED Right     LITHOTRIPSY      NEPHRECTOMY-LAPAROSCOPIC  Right 3/18/2016    [facility]    PLACEMENT, Shaver Catheter  6/16/2015    [facility] at [facility] OR    REMOVAL-STONE-URETERAL  6/16/2015    [facility]    REPAIR-HERNIA-INCISIONAL N/A 8/19/2016    Performed by [facility] at [facility]    UPPER GASTROINTESTINAL ENDOSCOPY      7/10/2018,9/24/2010    URETEROSCOPY Right 3/8/2016    [facility]    VASCULAR SURGERY      WASHING-BLADDER  6/16/2015    [facility] at [facility] OR       Review of patient's allergies indicates:  No Known Allergies    No current facility-administered medications on file prior to encounter.      Current Outpatient Medications on File Prior to Encounter   Medication Sig    ACCU-CHEK REJI PLUS METER [facility] [place] CHECK TWICE DAILY    allopurinol (ZYLOPRIM) 100 MG tablet Take 100 mg by mouth once daily.    aspirin (ECOTRIN) 81 MG EC tablet Take 81 mg by mouth once daily. LAST TAKEN 3/2/16    blood sugar diagnostic Strp 1 strip by Misc.(Non-Drug; Combo Route) route 2 (two) times daily.    clopidogrel (PLAVIX) 75 mg tablet Take 75 mg by mouth every morning. LAST DOSE 3/2/16    docusate sodium (STOOL SOFTENER) 100 MG capsule Take 1 capsule (100 mg total) by mouth 2 (two) times daily.    dulaglutide (TRULICITY) 1.5 mg/0.5 mL PnIj Inject 1.5 mg into the skin every 7 days.    finasteride (PROSCAR) 5 mg tablet Take 1 tablet (5 mg total) by mouth every morning.    furosemide (LASIX) 40 MG tablet Take 40 mg by mouth 2 (two) times daily. ALTERNATES 1 TABLET ONE DAY AND 2 TABLETS THE NEXT--ALTERNATING DAYS    gabapentin (NEURONTIN) 600 MG tablet Take 1 tablet (600 mg total) by mouth 3 (three) times daily.    gemfibrozil (LOPID) 600 MG tablet Take 1 tablet (600 mg total) by mouth 2 (two) times daily before meals.    glimepiride (AMARYL) 4 MG tablet Take 1 tablet (4 mg total) by mouth 2 (two)  times daily.    isosorbide mononitrate (IMDUR) 60 MG 24 hr tablet Take 60 mg by mouth every morning.     losartan (COZAAR) 50 MG tablet Take 50 mg by mouth once daily.    metoprolol succinate (TOPROL-XL) 100 MG 24 hr tablet Take 100 mg by mouth every morning.     mupirocin (BACTROBAN) 2 % ointment Apply topically 3 (three) times daily.    nateglinide (STARLIX) 60 MG tablet TAKE 1 TABLET TWICE DAILY BEFORE MEALS    NITROSTAT 0.4 mg SL tablet Place 0.4 mg under the tongue every 5 (five) minutes as needed.     prochlorperazine (COMPAZINE) 10 MG tablet Take 1 tablet (10 mg total) by mouth 3 (three) times daily as needed.    ranitidine (ZANTAC) 150 MG tablet TAKE 1 TABLET TWICE DAILY    rosuvastatin (CRESTOR) 20 MG tablet Take 1 tablet (20 mg total) by mouth once daily.    tamsulosin (FLOMAX) 0.4 mg Cap Take 1 capsule (0.4 mg total) by mouth once daily.    TRAVATAN Z 0.004 % Drop 1 drop every evening.      Family History     Problem Relation (Age of Onset)    Heart disease Father    Hypertension Mother    Parkinsonism Mother        Tobacco Use    Smoking status: Never Smoker    Smokeless tobacco: Current User     Types: Snuff    Tobacco comment: 20 plus years   Substance and Sexual Activity    Alcohol use: Yes     Comment: occas    Drug use: No    Sexual activity: Yes     Partners: Female     Review of Systems   Constitutional: Negative.  Negative for activity change.   HENT: Negative.    Eyes: Negative.    Respiratory: Positive for chest tightness. Negative for shortness of breath and wheezing.    Cardiovascular: Positive for chest pain. Negative for palpitations and leg swelling.   Gastrointestinal: Negative.    Endocrine: Negative.    Genitourinary: Negative.    Musculoskeletal: Negative.    Neurological: Negative.    Hematological: Negative.    Psychiatric/Behavioral: Negative.      Objective:     Vital Signs (Most Recent):  Temp: 97.6 °F (36.4 °C) (12/02/18 0858)  Pulse: 76 (12/02/18 0858)  Resp: 18  (12/02/18 0858)  BP: 138/85 (12/02/18 0858)  SpO2: 95 % (12/02/18 0858) Vital Signs (24h Range):  Temp:  [97.6 °F (36.4 °C)-97.9 °F (36.6 °C)] 97.6 °F (36.4 °C)  Pulse:  [74-82] 76  Resp:  [14-20] 18  SpO2:  [95 %-97 %] 95 %  BP: (137-161)/(75-89) 138/85     Weight: 128.3 kg (282 lb 13.6 oz)  Body mass index is 44.3 kg/m².    Physical Exam   Constitutional: He is oriented to person, place, and time. He appears well-developed and well-nourished. No distress.   Morbid obese.    HENT:   Head: Atraumatic.   Eyes: EOM are normal.   Neck: Neck supple.   Cardiovascular: Normal rate and regular rhythm.   Pulmonary/Chest: Effort normal and breath sounds normal. No respiratory distress.   Abdominal: Soft. Bowel sounds are normal.   Musculoskeletal: Normal range of motion. He exhibits no edema.   Neurological: He is alert and oriented to person, place, and time.   Skin: Skin is warm and dry.   Psychiatric: He has a normal mood and affect.         CRANIAL NERVES     CN III, IV, VI   Extraocular motions are normal.        Significant Labs: All pertinent labs within the past 24 hours have been reviewed.    Significant Imaging: I have reviewed and interpreted all pertinent imaging results/findings within the past 24 hours.    Assessment/Plan:     * Chest pain at rest    Patient with history CAD sp MI/stents 6 year ago followed by Dr. Alvarez who is admitted for chest pain. JESS 2. Currently without chest pain. EKG NSR.  Continue serial troponin.  Supplemental oxygen PRN, aspirin, nitroglycerin PRN. Continue statin, ARB, BB. Cardiology following. Follow up next set up troponin. If negative, okay discharge with close Cardiology follow up and outpatient echo and stress test.            CKD (chronic kidney disease), stage III    Stable.        DM (diabetes mellitus) type II controlled with renal manifestation    Lab Results   Component Value Date    HGBA1C 8.4 (H) 10/31/2018   SSI and adjust prandal basal accordingly.           Essential hypertension    Currently controlled. Continue home antihypertensives.          VTE Risk Mitigation (From admission, onward)        Ordered     enoxaparin injection 120 mg  Every 12 hours (non-standard times)      12/02/18 0620             Isabela Adams NP  Department of Hospital Medicine   [facility]

## 2018-12-02 NOTE — ED TRIAGE NOTES
Pt c/o intermittent dull (L) sided cp starting 1 hr prior to arrival. Pt denies SOB. Pt denies nv.  Pt reports 1 nitro prior to arrival. Pt denies fever or chills. Pt denies difficulty urinating. Will continue to monitor.

## 2018-12-02 NOTE — SUBJECTIVE & OBJECTIVE
Past Medical History:   Diagnosis Date    Anticoagulant long-term use     Arthritis     Cancer of kidney, right     Coronary artery disease     Diabetes mellitus     Hypertension     Kidney stone     Sleep apnea     Slurred speech     Stroke     MINI STROKE    TIA (transient ischemic attack)     Wears glasses        Past Surgical History:   Procedure Laterality Date    BACK SURGERY      lower back    CARDIAC SURGERY      COLONOSCOPY  07/2018    CYSTOSCOPY  03/2008    CYSTOSCOPY with bilateral RETROGRADE and right URETEROSCOPY, STENT PLACEMENT Bilateral 6/16/2015    [facility]    CYSTOSCOPY WITH RETROGRADE PYELOGRAM Right 3/8/2016    [facility] at [facility] OR    ESOPHAGEAL MANOMETRY  08/2018    EVACUATION-CLOT  6/16/2015    [facility] at [facility] OR    excision right thigh mass      EXCISION-MASS-THIGH Right 3/5/2018    [facility]formed by Jose Lewis [facility] at [facility] OR    FULGURATION-BLADDER  6/16/2015    [facility] at [facility]    heart stents      stents 4 total.  2010, 2011 and 2013    HERNIA REPAIR      incisional    KIDNEY STONE SURGERY  2015    LAPAROSCOPIC NEPHRECTOMY, HAND ASSISTED Right     LITHOTRIPSY      NEPHRECTOMY-LAPAROSCOPIC Right 3/18/2016    [facility]formed [facility] at [facility] OR    PLACEMENT, Shaver Catheter  6/16/2015    Performed by Dion[facility]    REMOVAL-STONE-URETERAL  6/16/2015    Performed by [facility]    REPAIR-HERNIA-INCISIONAL N/A 8/19/2016    [facility] Jose [facility] at [facility] OR    UPPER GASTROINTESTINAL ENDOSCOPY      7/10/2018,9/24/2010    URETEROSCOPY Right 3/8/2016    [facility] by [facility] at [facility] OR    VASCULAR SURGERY      WASHING-BLADDER  6/16/2015    Performed by [facility] at [facility] OR       Review of patient's allergies indicates:  No Known  Allergies    No current facility-administered medications on file prior to encounter.      Current Outpatient Medications on File Prior to Encounter   Medication Sig    ACCU-CHEK REJI PLUS METER [facility] CHECK TWICE DAILY    allopurinol (ZYLOPRIM) 100 MG tablet Take 100 mg by mouth once daily.    aspirin (ECOTRIN) 81 MG EC tablet Take 81 mg by mouth once daily. LAST TAKEN 3/2/16    blood sugar diagnostic Strp 1 strip by Misc.(Non-Drug; Combo Route) route 2 (two) times daily.    clopidogrel (PLAVIX) 75 mg tablet Take 75 mg by mouth every morning. LAST DOSE 3/2/16    docusate sodium (STOOL SOFTENER) 100 MG capsule Take 1 capsule (100 mg total) by mouth 2 (two) times daily.    dulaglutide (TRULICITY) 1.5 mg/0.5 mL PnIj Inject 1.5 mg into the skin every 7 days.    finasteride (PROSCAR) 5 mg tablet Take 1 tablet (5 mg total) by mouth every morning.    furosemide (LASIX) 40 MG tablet Take 40 mg by mouth 2 (two) times daily. ALTERNATES 1 TABLET ONE DAY AND 2 TABLETS THE NEXT--ALTERNATING DAYS    gabapentin (NEURONTIN) 600 MG tablet Take 1 tablet (600 mg total) by mouth 3 (three) times daily.    gemfibrozil (LOPID) 600 MG tablet Take 1 tablet (600 mg total) by mouth 2 (two) times daily before meals.    glimepiride (AMARYL) 4 MG tablet Take 1 tablet (4 mg total) by mouth 2 (two) times daily.    isosorbide mononitrate (IMDUR) 60 MG 24 hr tablet Take 60 mg by mouth every morning.     losartan (COZAAR) 50 MG tablet Take 50 mg by mouth once daily.    metoprolol succinate (TOPROL-XL) 100 MG 24 hr tablet Take 100 mg by mouth every morning.     mupirocin (BACTROBAN) 2 % ointment Apply topically 3 (three) times daily.    nateglinide (STARLIX) 60 MG tablet TAKE 1 TABLET TWICE DAILY BEFORE MEALS    NITROSTAT 0.4 mg SL tablet Place 0.4 mg under the tongue every 5 (five) minutes as needed.     prochlorperazine (COMPAZINE) 10 MG tablet Take 1 tablet (10 mg total) by mouth 3 (three) times daily as needed.    ranitidine  (ZANTAC) 150 MG tablet TAKE 1 TABLET TWICE DAILY    rosuvastatin (CRESTOR) 20 MG tablet Take 1 tablet (20 mg total) by mouth once daily.    tamsulosin (FLOMAX) 0.4 mg Cap Take 1 capsule (0.4 mg total) by mouth once daily.    TRAVATAN Z 0.004 % Drop 1 drop every evening.      Family History     Problem Relation (Age of Onset)    Heart disease Father    Hypertension Mother    Parkinsonism Mother        Tobacco Use    Smoking status: Never Smoker    Smokeless tobacco: Current User     Types: Snuff    Tobacco comment: 20 plus years   Substance and Sexual Activity    Alcohol use: Yes     Comment: occas    Drug use: No    Sexual activity: Yes     Partners: Female     Review of Systems   Constitutional: Negative.  Negative for activity change.   HENT: Negative.    Eyes: Negative.    Respiratory: Positive for chest tightness. Negative for shortness of breath and wheezing.    Cardiovascular: Positive for chest pain. Negative for palpitations and leg swelling.   Gastrointestinal: Negative.    Endocrine: Negative.    Genitourinary: Negative.    Musculoskeletal: Negative.    Neurological: Negative.    Hematological: Negative.    Psychiatric/Behavioral: Negative.      Objective:     Vital Signs (Most Recent):  Temp: 97.6 °F (36.4 °C) (12/02/18 0858)  Pulse: 76 (12/02/18 0858)  Resp: 18 (12/02/18 0858)  BP: 138/85 (12/02/18 0858)  SpO2: 95 % (12/02/18 0858) Vital Signs (24h Range):  Temp:  [97.6 °F (36.4 °C)-97.9 °F (36.6 °C)] 97.6 °F (36.4 °C)  Pulse:  [74-82] 76  Resp:  [14-20] 18  SpO2:  [95 %-97 %] 95 %  BP: (137-161)/(75-89) 138/85     Weight: 128.3 kg (282 lb 13.6 oz)  Body mass index is 44.3 kg/m².    Physical Exam   Constitutional: He is oriented to person, place, and time. He appears well-developed and well-nourished. No distress.   Morbid obese.    HENT:   Head: Atraumatic.   Eyes: EOM are normal.   Neck: Neck supple.   Cardiovascular: Normal rate and regular rhythm.   Pulmonary/Chest: Effort normal and breath  sounds normal. No respiratory distress.   Abdominal: Soft. Bowel sounds are normal.   Musculoskeletal: Normal range of motion. He exhibits no edema.   Neurological: He is alert and oriented to person, place, and time.   Skin: Skin is warm and dry.   Psychiatric: He has a normal mood and affect.         CRANIAL NERVES     CN III, IV, VI   Extraocular motions are normal.        Significant Labs: All pertinent labs within the past 24 hours have been reviewed.    Significant Imaging: I have reviewed and interpreted all pertinent imaging results/findings within the past 24 hours.

## 2018-12-02 NOTE — PLAN OF CARE
12/02/18 1327   Discharge Assessment   Assessment Type Discharge Planning Assessment   Confirmed/corrected address and phone number on facesheet? Yes   Assessment information obtained from? Medical Record     Discharge orders written before discharge assessment could be completed

## 2018-12-03 ENCOUNTER — TELEPHONE (OUTPATIENT)
Dept: GASTROENTEROLOGY | Facility: CLINIC | Age: 63
End: 2018-12-03

## 2018-12-03 ENCOUNTER — LAB VISIT (OUTPATIENT)
Dept: LAB | Facility: HOSPITAL | Age: 63
End: 2018-12-03
Payer: MEDICARE

## 2018-12-03 ENCOUNTER — OFFICE VISIT (OUTPATIENT)
Dept: GASTROENTEROLOGY | Facility: CLINIC | Age: 63
End: 2018-12-03
Payer: MEDICARE

## 2018-12-03 ENCOUNTER — TELEPHONE (OUTPATIENT)
Dept: ENDOSCOPY | Facility: HOSPITAL | Age: 63
End: 2018-12-03

## 2018-12-03 VITALS
HEIGHT: 67 IN | BODY MASS INDEX: 42.38 KG/M2 | DIASTOLIC BLOOD PRESSURE: 72 MMHG | HEART RATE: 82 BPM | SYSTOLIC BLOOD PRESSURE: 131 MMHG | WEIGHT: 270 LBS

## 2018-12-03 DIAGNOSIS — R13.19 ESOPHAGEAL DYSPHAGIA: Primary | ICD-10-CM

## 2018-12-03 DIAGNOSIS — D50.9 IRON DEFICIENCY ANEMIA, UNSPECIFIED IRON DEFICIENCY ANEMIA TYPE: ICD-10-CM

## 2018-12-03 DIAGNOSIS — R13.19 ESOPHAGEAL DYSPHAGIA: ICD-10-CM

## 2018-12-03 DIAGNOSIS — R07.9 CHEST PAIN, UNSPECIFIED TYPE: ICD-10-CM

## 2018-12-03 DIAGNOSIS — D36.9 TUBULOVILLOUS ADENOMA: ICD-10-CM

## 2018-12-03 DIAGNOSIS — G47.33 OSA (OBSTRUCTIVE SLEEP APNEA): Primary | ICD-10-CM

## 2018-12-03 LAB — TSH SERPL DL<=0.005 MIU/L-ACNC: 2.27 UIU/ML

## 2018-12-03 PROCEDURE — 3008F BODY MASS INDEX DOCD: CPT | Mod: CPTII,HCNC,S$GLB, | Performed by: NURSE PRACTITIONER

## 2018-12-03 PROCEDURE — 3078F DIAST BP <80 MM HG: CPT | Mod: CPTII,HCNC,S$GLB, | Performed by: NURSE PRACTITIONER

## 2018-12-03 PROCEDURE — 99999 PR PBB SHADOW E&M-EST. PATIENT-LVL V: CPT | Mod: PBBFAC,HCNC,, | Performed by: NURSE PRACTITIONER

## 2018-12-03 PROCEDURE — 99205 OFFICE O/P NEW HI 60 MIN: CPT | Mod: HCNC,S$GLB,, | Performed by: NURSE PRACTITIONER

## 2018-12-03 PROCEDURE — 84443 ASSAY THYROID STIM HORMONE: CPT | Mod: HCNC

## 2018-12-03 PROCEDURE — 3075F SYST BP GE 130 - 139MM HG: CPT | Mod: CPTII,HCNC,S$GLB, | Performed by: NURSE PRACTITIONER

## 2018-12-03 PROCEDURE — 36415 COLL VENOUS BLD VENIPUNCTURE: CPT | Mod: HCNC

## 2018-12-03 NOTE — PROGRESS NOTES
[facility]astrointestinal Motility Clinic Consultation Note    Reason for Consult:    Chief Complaint   Patient presents with    Dysphagia    Nausea    Emesis         PCP:   Shyanne Alfredo   [address] [place], [place] [address]VD [address] [facility] GASTROENT*    Referring MD:  Froy Marquez Md  [address] [place] [address]  [address]  [facility], [place], operated by [facility]  [place] [address]      HPI:  Nicolas Flaherty is a 63 y.o. male a PMH of HLD, HTN, h/o MI s/p stents on plavix x 6 yrs, h/o CVA, DM type 2, glaucoma, gout, s/p kidney CA s/p right nephrectomy, BPH, GAUTAM, back problems referred to motility clinic for second opinion regarding the following problems:    Chest pain. Reports bothersome chest pain and spasms of esophagus.   Onset: last week  Frequency: daily       Triggers (cold fluids, caffeine, smoking, ETOH):not r/t eating/ swallowing. Not r/t activity. No SOB, no radiation.   Consumes mostly soft foods and liquids:regular diet  Caffeine intake:2 cups coffee /day  Negative cardiac workup:  Followed by cards for h/o MI w/ stents. Seen in ED/admit for obs this weekend. EKG and CHRISTIANO negative. Dx with unstable angina.     On nitro PRN for h/o MI.  Has not tried  Nifedipine, diltiazem, peppermint oil, sildinafil, trazodone, botox.  On  isosorrbide dinitrate 60 mg daily for high BP    Dysphagia.  Reports difficulty swallowing. Spitting up lots of phlegm.   Onset of symptoms:6-7 months  Problems with solids:yes  Problems with liquids:yes  Choking while eating:no   Coughing while eating:yes   Location: mid esopahgus  Frequency: couple days weekly  Improves with:coughing materials back up for relief. Sometimes better with washing with fluids, temporary improvement with esophageal dilation (x 1 week relief)  History of food impactions requiring ED visit:no  History of allergies:no meds  History of seasonal allergies:no  History of food allergies:no  History of eczema:no    GAUTAM. Does not sleep with CPAP d/t  claustrophobia. Has not seen sleep specialist about nasal treatment.    Anemia. Low hgb.high RDW.     Colonoscopy on 7/10/18 with 1.5- 2 cm AC TVA w/ high grade glandular atypia and 2 mm TA with low grade glandular dysplasia. Rpt in 1 year.    DM type 2. BS running 145- 200s. Last A1C 8.4 in 10/2018.  On trulicity 1.5 mg once weekly. On amaryl 4mg am 6 mg HS. On nateglinide 60 mg BID. Followed by PCP.    Periferal neuropathy. Legs.  On gabapentin 600 mg AM and 1200 mg HS. per PCP.     Chronic back pain. S/p vertebral compression. Percocet 5-325 mg once every few months. Per orthopedics.     H/o  Kidney CA. S/p right nephrectomy. Followed by nephrologist.     BPH. On flomax 0.4 mg daily per urology.     Dizziness. Cerebellar degeneration. Followed by neurology.     Denies GERD, nausea, vomiting, early satiety, abdominal pain, bloating, diarrhea, constipation, BRBPR, melena, weight loss, anxiety/depression, insomnia.       Total visit time was 90 minutes, more than 50% of which was spent in face-to-face counseling with patient regarding symptoms, diagnostic results, prognosis, risks and benefits of treatment options, instructions for management, importance of compliance with chosen treatment options, risk factor reduction, stress reduction, coping strategies.      Previous Studies:   Esophageal manometry 8/10/18: hyper contractile esophagus, no EGJ outflow obstruction. IRP nl 13.4, DCI high 9311.0, DL nl 6.3, 10% incomplete bolus clearance, 10% rapid contractions  Colon 7/10/18: GPTC. mild tics. 1.5-2cm AC polyp (TVA w/ high grade glandular atypia). 2 mm TC polyp (TA with low grade glandular dysplasia). Grade 2 IH. Rpt 1 yr  EGD 7/10/18: HH. Nl esophagus dilated with 54 fr Brown (-). Erosion, friability, granularity in stomach (mild chr inflamm, reactive glandular changes, and superficial vascular congestion).   MBSS 5/30/18: normal. Recommend GI consult to r/o esophageal dysphagia.   Abd xray 2/25/08: spine  degeneration. 4 mm superior left renal calcific focus  CT abd 1/9/08: 3 mm obstructing ureteral stones. Mild left hydroureter and hydronephrosis. Multi rt renal stones. Sm left adrenal nodule c/w adrenal adenoma.  *EGD 9/24/10: ?    Labs:  12/2/18: CMP BUN high 35, creatinine high 1.8, GFR low 39, CBC hgb low 13.1, RDW high 14.8  7/29/18: hgb low 9.9, cr high 1.52, bun high 20 , cl high 109,   4/16/18: HbA1c high 9.2  2/21/08: cmp unremarkable, cbc nl  1/9/18: TSH nl  Hep c ab -  B 12/folate panel nl.     *per referring provider    ROS:  ROS   Constitutional: No fevers, no chills, no night sweats, no weight loss  ENT: No congestion, no rhinorrhea, no chronic sinus problems  CV: + chest pain, no palpitations  Pulm: No cough, no shortness of breath  Ophtho: No blurry vision, no eye redness  GI: see HPI  Derm: No rash  Heme: No lymphadenopathy, no bruising  MSK: No joint pain, no joint swelling, no Raynauds  : No dysuria, no frequent urination, no blood in urine  Endo: + cold intolerance  Neuro: + dizziness, no syncope, no seizure  Psych: No anxiety, no depression        Medical History:   Past Medical History:   Diagnosis Date    Anticoagulant long-term use     Arthritis     Cancer of kidney, right     Colon polyp     Coronary artery disease     Diabetes mellitus     Hypertension     Kidney stone     Sleep apnea     Slurred speech     Stroke     MINI STROKE    TIA (transient ischemic attack)     Wears glasses         Surgical History:   Past Surgical History:   Procedure Laterality Date    BACK SURGERY      lower back    CARDIAC SURGERY      COLONOSCOPY  07/2018    CYSTOSCOPY  03/2008    CYSTOSCOPY with bilateral RETROGRADE and right URETEROSCOPY, STENT PLACEMENT Bilateral 6/16/2015    [facility]    CYSTOSCOPY WITH RETROGRADE PYELOGRAM Right 3/8/2016    [facility] at [facility] OR    ESOPHAGEAL MANOMETRY  08/2018    EVACUATION-CLOT   6/16/2015    [facility] at [facility] OR    excision right thigh mass      EXCISION-MASS-THIGH Right 3/5/2018    Performed by Jose Lewis [facility] at [facility] OR    FULGURATION-BLADDER  6/16/2015    [facility] at [facility] OR    heart stents      stents 4 total.  2010, 2011 and 2013    HERNIA REPAIR      incisional    KIDNEY STONE SURGERY  2015    LAPAROSCOPIC NEPHRECTOMY, HAND ASSISTED Right     LITHOTRIPSY      NEPHRECTOMY-LAPAROSCOPIC Right 3/18/2016    [facility]formed [facility] at [facility] OR    PLACEMENT, Shaver Catheter  6/16/2015    Performed [facility] at [facility] OR    REMOVAL-STONE-URETERAL  6/16/2015    [facility] at [facility] OR    REPAIR-HERNIA-INCISIONAL N/A 8/19/2016    Performed by Jose [facility] MD at [facility]    UPPER GASTROINTESTINAL ENDOSCOPY      7/10/2018,9/24/2010    URETEROSCOPY Right 3/8/2016    [facility] at [facility] OR    VASCULAR SURGERY      WASHING-BLADDER  6/16/2015    Performed by [facility] at [facility] OR        Family History:   Family History   Problem Relation Age of Onset    Heart disease Father     Hypertension Mother     Parkinsonism Mother     Celiac disease Neg Hx     Cirrhosis Neg Hx     Colon cancer Neg Hx     Colon polyps Neg Hx     Crohn's disease Neg Hx     Cystic fibrosis Neg Hx     Esophageal cancer Neg Hx     Hemochromatosis Neg Hx     Inflammatory bowel disease Neg Hx     Irritable bowel syndrome Neg Hx     Liver cancer Neg Hx     Liver disease Neg Hx     Rectal cancer Neg Hx     Stomach cancer Neg Hx     Ulcerative colitis Neg Hx     Kalia's disease Neg Hx     Lymphoma Neg Hx     Tuberculosis Neg Hx     Scleroderma Neg Hx     Rheum arthritis Neg Hx     Multiple sclerosis Neg Hx     Melanoma Neg Hx     Lupus Neg Hx     Psoriasis Neg Hx     Skin cancer Neg Hx         Social History:   Social History      Socioeconomic History    Marital status:      Spouse name: None    Number of children: None    Years of education: None    Highest education level: None   Social Needs    Financial resource strain: None    Food insecurity - worry: None    Food insecurity - inability: None    Transportation needs - medical: None    Transportation needs - non-medical: None   Occupational History    None   Tobacco Use    Smoking status: Never Smoker    Smokeless tobacco: Current User     Types: Snuff    Tobacco comment: 20 plus years   Substance and Sexual Activity    Alcohol use: Yes     Comment: occas    Drug use: No    Sexual activity: Yes     Partners: Female   Other Topics Concern    None   Social History Narrative    None        Review of patient's allergies indicates:  No Known Allergies    Current Outpatient Medications   Medication Sig Dispense Refill    ACCU-CHEK REJI PLUS METER [facility] [place] TWICE DAILY 1 each 0    allopurinol (ZYLOPRIM) 100 MG tablet Take 100 mg by mouth once daily.      aspirin (ECOTRIN) 81 MG EC tablet Take 81 mg by mouth once daily. LAST TAKEN 3/2/16      blood sugar diagnostic Strp 1 strip by Misc.(Non-Drug; Combo Route) route 2 (two) times daily. 200 each 2    clopidogrel (PLAVIX) 75 mg tablet Take 75 mg by mouth every morning. LAST DOSE 3/2/16      docusate sodium (STOOL SOFTENER) 100 MG capsule Take 1 capsule (100 mg total) by mouth 2 (two) times daily. 180 capsule 3    dulaglutide (TRULICITY) 1.5 mg/0.5 mL PnIj Inject 1.5 mg into the skin every 7 days. 6 mL 3    finasteride (PROSCAR) 5 mg tablet Take 1 tablet (5 mg total) by mouth every morning. 90 tablet 3    furosemide (LASIX) 40 MG tablet Take 40 mg by mouth 2 (two) times daily. ALTERNATES 1 TABLET ONE DAY AND 2 TABLETS THE NEXT--ALTERNATING DAYS      gabapentin (NEURONTIN) 600 MG tablet Take 1 tablet (600 mg total) by mouth 2 (two) times daily. Patient takes 600 mg am and 1200 mg qhs 270 tablet 3     "gemfibrozil (LOPID) 600 MG tablet Take 1 tablet (600 mg total) by mouth 2 (two) times daily before meals. 180 tablet 3    glimepiride (AMARYL) 4 MG tablet Take 1 tablet (4 mg total) by mouth 2 (two) times daily. 180 tablet 1    isosorbide mononitrate (IMDUR) 60 MG 24 hr tablet Take 60 mg by mouth every morning.       losartan (COZAAR) 50 MG tablet Take 50 mg by mouth once daily.      metoprolol succinate (TOPROL-XL) 100 MG 24 hr tablet Take 100 mg by mouth every morning.       nateglinide (STARLIX) 60 MG tablet TAKE 1 TABLET TWICE DAILY BEFORE MEALS 180 tablet 0    NITROSTAT 0.4 mg SL tablet Place 0.4 mg under the tongue every 5 (five) minutes as needed.       ranitidine (ZANTAC) 150 MG tablet TAKE 1 TABLET TWICE DAILY 180 tablet 5    rosuvastatin (CRESTOR) 20 MG tablet Take 1 tablet (20 mg total) by mouth once daily. 90 tablet 3    tamsulosin (FLOMAX) 0.4 mg Cap Take 1 capsule (0.4 mg total) by mouth once daily. 90 capsule 3    TRAVATAN Z 0.004 % Drop 1 drop every evening.        Current Facility-Administered Medications   Medication Dose Route Frequency Provider Last Rate Last Dose    lidocaine (PF) 10 mg/ml (1%) injection 10 mg  1 mL Intradermal Once Jose Lewis MD            Objective Findings:  Vital Signs:  /72   Pulse 82   Ht 5' 7" (1.702 m)   Wt 122.5 kg (270 lb)   BMI 42.29 kg/m²   Body mass index is 42.29 kg/m².    Physical Exam:  General appearance: alert, cooperative, no distress  HENT: Normocephalic, atraumatic, neck symmetrical, no nasal discharge  Eyes: conjunctivae/corneas clear, PERRL, EOM's intact  Lungs: clear to auscultation bilaterally, no dullness to percussion bilaterally  Heart: regular rate and rhythm without rub; no displacement of the PMI  Abdomen: soft, non-tender; bowel sounds normoactive; no organomegaly  Extremities: extremities symmetric; no clubbing, cyanosis, or edema  Integument: Skin color, texture, turgor normal; no rashes; hair distrubution " normal  Neurologic: Alert and oriented X 3, normal strength, normal coordination and gait  Psychiatric: no pressured speech; normal affect; no evidence of impaired cognition    Labs:  Lab Results   Component Value Date    WBC 6.70 12/02/2018    HGB 13.1 (L) 12/02/2018    HCT 39.1 (L) 12/02/2018    MCV 85 12/02/2018     12/02/2018     No results found for: FERRITIN  Lab Results   Component Value Date     12/02/2018    K 4.0 12/02/2018     12/02/2018    CO2 21 (L) 12/02/2018     (H) 12/02/2018    BUN 35 (H) 12/02/2018    CREATININE 1.8 (H) 12/02/2018    CALCIUM 10.2 12/02/2018    PROT 8.1 12/02/2018    ALBUMIN 4.0 12/02/2018    BILITOT 0.3 12/02/2018    ALKPHOS 67 12/02/2018    AST 18 12/02/2018    ALT 21 12/02/2018     Lab Results   Component Value Date    TSH 2.960 01/09/2018     No results found for: SEDRATE  No results found for: CRP  Lab Results   Component Value Date    HGBA1C 8.4 (H) 10/31/2018           Assessment and Plan:  Nicolas Flaherty is a 63 y.o. male with a PMH of HLD, HTN, h/o MI s/p stents on plavix x 6 yrs, h/o CVA, DM type 2, glaucoma, gout, s/p kidney CA s/p right nephrectomy, BPH, GAUTAM, back problems referred to motility clinic for second opinion regarding the following problems:    Chest pain. Daily since last week. Not related to meals/swallowing.  Followed by cards for history of CAD w/ stents 4 years ago. Seen in ED and admitted for obsservation this weekend. EKG and CHRISTIANO negative. Diagnosed with unstable angina.   On nitro PRN since MI. Has not used.  On  isosorrbide dinitrate 60 mg daily for HTN  -Follow up with cardiologist.     Dysphagia.  Reports difficulty swallowing. Spitting up lots of phlegm. manometry at OSH w hypercontractile esophagus (IRP nl 13.4, DCI high 9311.0)  Temporary improvement with esophageal dilation (x 1 week relief)  -EGD w/ e bx  -Esophageal manometry  -Esophagram with 13 mm BT  -Check labs  -Start peppermint oil, peppermint tea, or  brennen  -PT to speak with cardiologist about adding diltiazem as it could help with hypercontractile esophagus.   -Will consider trazodone if no  Improvement with above    Anemia. Low hgb.high RDW. Recent significant improvement.   Management as per Dr. Marquez     Colonoscopy on 7/10/18 with 1.5- 2 cm semi pedunculated ascending colon TVA w/ high grade glandular atypia completely resected via hot snare and EMR, and 2 mm transverse colon TA with low grade glandular dysplasia complete resected via cold bx.   Management as per Dr. Marquez - Rpt in 1 year (7/1019).    GAUTAM. Does not sleep with CPAP due to claustrophobia. Has not seen sleep specialist about alternative thearpy  -Referral to sleep specialist to discuss treatment with nasal canula or mouth guard.    DM type 2. BS running 145- 200s. Last A1C 8.4 in 10/2018.  On trulicity 1.5 mg once weekly. On amaryl 4mg am 6 mg HS. On nateglinide 60 mg BID. Followed by PCP.    Periferal neuropathy. Legs.  On gabapentin 600 mg AM and 1200 mg HS. per PCP.     Chronic back pain. S/p disc disease. Takes percocet 5-325 mg once every few months. Per orthopedics.   History of  Kidney CA. Has had right nephrectomy. Followed by nephrologist.     BPH. On flomax 0.4 mg daily per urology.     Dizziness. Cerebellar degeneration. Followed by neurology.     Follow-up in about 3 months (around 3/3/2019) for Motility w/ Laurie NP.    1. Esophageal dysphagia    2. Chest pain, unspecified type    3. Iron deficiency anemia, unspecified iron deficiency anemia type    4. Tubulovillous adenoma          Order summary:  Orders Placed This Encounter    FL Esophagram Complete    TSH    Case request GI: EGD (ESOPHAGOGASTRODUODENOSCOPY)    Case request GI: MANOMETRY, ESOPHAGUS, WITH IMPEDANCE MEASUREMENT         Thank you so much for allowing me to participate in the care of JOSE F Carlson, FNP-C    I have personally reviewed history, performed physical  exam, and educated the patient.  I have reviewed and agree with today's findings and the care plan outlined by Ophelia Ames NP.       Sylvia Treviño MD

## 2018-12-03 NOTE — LETTER
December 4, 2018        Froy Marquez MD  [address] [place] [address]  [address]  [facility]  [place] [address]             Donny [facility]armad - Gastroenterology  [address]  [facility] [address]  Phone: [phone]  Fax: [phone]   Patient: Nicolas Flaherty   MR Number: [id number]   YOB: 1955   Date of Visit: 12/3/2018       Dear Dr. Marquez:    Thank you for referring Nicolas Flaherty to me for evaluation. Attached you will find relevant portions of my assessment and plan of care.    If you have questions, please do not hesitate to call me. I look forward to following Nicolas Flaherty along with you.    Sincerely,                  CC  No Recipients    Enclosure

## 2018-12-03 NOTE — TELEPHONE ENCOUNTER
GI procedures: 2nd floor d/t co morbidities, obesity.    Day 1:   1st EGD  2nd esophageal manometry  3rd esopahgram

## 2018-12-03 NOTE — PATIENT INSTRUCTIONS
Follow up with your cardiologist for the chest pain. Also discuss with them adding a medication called diltiazem because this can help with the hypercontractile esophagus/ difficult swallowoing    For difficulty swallowing: Drink peppermint tea, eat peppermints or dilute 3-4 drops of peppermint oil in 1 cup of water before meals.    We have ordered an EGD, esophageal manometry, and a esophagram for further evaluation of the difficulty swallowing.    We have referred you to a sleep specialist to discuss alternatives to treat sleep apnea.

## 2018-12-06 ENCOUNTER — TELEPHONE (OUTPATIENT)
Dept: GASTROENTEROLOGY | Facility: CLINIC | Age: 63
End: 2018-12-06

## 2018-12-06 ENCOUNTER — TELEPHONE (OUTPATIENT)
Dept: FAMILY MEDICINE | Facility: CLINIC | Age: 63
End: 2018-12-06

## 2018-12-06 DIAGNOSIS — K22.4 HYPERCONTRACTILE ESOPHAGUS: Primary | ICD-10-CM

## 2018-12-06 NOTE — TELEPHONE ENCOUNTER
----- Message from Marly Stein sent at 12/6/2018  3:40 PM CST -----  Contact: Ellie pt wife#[phone]  Needs Advice    Reason for call:She states that cardiology was ok with changing medication but he wants you to give him the Rx         Communication Preference:call    Additional Information:

## 2018-12-06 NOTE — TELEPHONE ENCOUNTER
----- Message from Coleen Wadsworth sent at 12/6/2018  9:35 AM CST -----  Contact: wife - anh ovi - [phone]  [facility] - returning your call - please call wife - anh dior - [phone]

## 2018-12-06 NOTE — TELEPHONE ENCOUNTER
Spoke with wife and she stated that the cardiologist said her  can stop Imdur and gives you the ok to prescribe Diltiazem.

## 2018-12-06 NOTE — TELEPHONE ENCOUNTER
----- Message from Tejal Macias sent at 12/6/2018  3:35 PM CST -----  Contact: SELF  Pt is calling to have last blood work be sent over to Dr Rao [phone]

## 2018-12-07 RX ORDER — DILTIAZEM HYDROCHLORIDE 60 MG/1
60 TABLET, FILM COATED ORAL 3 TIMES DAILY
Qty: 270 TABLET | Refills: 1 | Status: SHIPPED | OUTPATIENT
Start: 2018-12-07 | End: 2019-04-26 | Stop reason: SDUPTHER

## 2018-12-07 RX ORDER — DILTIAZEM HYDROCHLORIDE 60 MG/1
60 TABLET, FILM COATED ORAL 3 TIMES DAILY
Qty: 90 TABLET | Refills: 5 | Status: SHIPPED | OUTPATIENT
Start: 2018-12-07 | End: 2018-12-07 | Stop reason: SDUPTHER

## 2018-12-07 NOTE — TELEPHONE ENCOUNTER
Done. Please contact [facility] mail order to ensure the original RX to them was candled.    Thanks,    Tiff, NP

## 2018-12-07 NOTE — TELEPHONE ENCOUNTER
Please inform pt: I discussed this with  and have sent a RX for  diltiazem 60 mg TID for hypercontractile esophagus since imdur has been stopped per cards.     Thanks,  Tiff, NP

## 2018-12-07 NOTE — TELEPHONE ENCOUNTER
Pt wife requesting you send prescription to [facility] pharmacy and asked for a 90 day supply so he can get his prescription faster.

## 2018-12-10 NOTE — TELEPHONE ENCOUNTER
Spoke with Alivia from [facility] mail order dept and prescription has been canceled and placed back on pt's file future use.

## 2018-12-11 LAB
HCV AB S/CO SERPL IA: 0.01
HCV AB SERPL QL IA: NORMAL

## 2018-12-13 RX ORDER — BLOOD SUGAR DIAGNOSTIC
STRIP MISCELLANEOUS
Qty: 200 STRIP | Refills: 2 | Status: SHIPPED | OUTPATIENT
Start: 2018-12-13 | End: 2019-01-22 | Stop reason: SDUPTHER

## 2018-12-14 ENCOUNTER — TELEPHONE (OUTPATIENT)
Dept: FAMILY MEDICINE | Facility: CLINIC | Age: 63
End: 2018-12-14

## 2018-12-14 ENCOUNTER — TELEPHONE (OUTPATIENT)
Dept: ENDOSCOPY | Facility: HOSPITAL | Age: 63
End: 2018-12-14

## 2018-12-14 NOTE — TELEPHONE ENCOUNTER
----- Message from Nereyda Abbott sent at 12/14/2018  9:18 AM CST -----  Contact: Yumiko/ Howard/[phone]  There is a drug interaction with gemfibrozil (LOPID) 600 MG tablet and rosuvastatin (CRESTOR) 20 MG tablet.  Thank you.

## 2018-12-14 NOTE — TELEPHONE ENCOUNTER
2nd attempt to fax blood thinner request and cardiac records to Dr. Emanuel based out of [facility].  Faxed to [phone]

## 2018-12-17 ENCOUNTER — TELEPHONE (OUTPATIENT)
Dept: FAMILY MEDICINE | Facility: CLINIC | Age: 63
End: 2018-12-17

## 2018-12-17 NOTE — TELEPHONE ENCOUNTER
----- Message from Adina Miner sent at 12/17/2018  9:27 AM CST -----  Contact: pt            Name of Who is Calling: pt      What is the request in detail: pt is requesting lab results. Call pt      Can the clinic reply by [facility]: no      [facility] [phone]

## 2018-12-18 ENCOUNTER — TELEPHONE (OUTPATIENT)
Dept: ENDOSCOPY | Facility: HOSPITAL | Age: 63
End: 2018-12-18

## 2018-12-18 NOTE — TELEPHONE ENCOUNTER
Received pt blood thinner hold request from Dr. Emanuel. Okay to hold Plavix 5 days prior. Document scanned into media tab in chart with date 12/18/18.

## 2018-12-18 NOTE — TELEPHONE ENCOUNTER
"Due to radiology techs off at 4, there are no available times for esophagram to be same day on 2/19 with addition to the fact that pt has to be fully awake and may not be done with manometry until 4 pm. Pt wife agreed to 12/20 at 9 am for esophagram although she expressed "my  will be angry about fasting 2 days in a row. I guess he will let you guys know if he can't make it. Just schedule it."  "

## 2018-12-18 NOTE — TELEPHONE ENCOUNTER
Called patient to inform him that I did received his Cardiac records and the approval from Dr. Emanuel to hold his Plavix 5 days prior to scheduled EGD.    Patient is scheduled for EGD then Manometry to follow on 2/19/19 on 2nd floor at 1:00pm with Dr. Treviño.  Explained to patient and wife check in is for 12:00pm on 2nd floor outpatient surgery area. Also reviewed the importance of holding his Plavix 5 days prior. Patient and his wife verbalized understanding and the need to start holding his Plavix 2/14/19.    Explained I will mail prep instructions and Diabetes Meds instructions to confirmed address in chart. They state they have no questions at this time.

## 2018-12-18 NOTE — TELEPHONE ENCOUNTER
Patient is scheduled for EGD then Manometry to follow on 2/19/19.     See Ophelia Ames NP notes below for other test to be scheduled

## 2019-01-10 ENCOUNTER — OFFICE VISIT (OUTPATIENT)
Dept: FAMILY MEDICINE | Facility: CLINIC | Age: 64
End: 2019-01-10
Payer: MEDICARE

## 2019-01-10 VITALS
OXYGEN SATURATION: 98 % | TEMPERATURE: 98 F | BODY MASS INDEX: 42.13 KG/M2 | HEART RATE: 85 BPM | WEIGHT: 268.94 LBS | SYSTOLIC BLOOD PRESSURE: 110 MMHG | DIASTOLIC BLOOD PRESSURE: 78 MMHG

## 2019-01-10 DIAGNOSIS — J20.9 ACUTE BRONCHITIS, UNSPECIFIED ORGANISM: Primary | ICD-10-CM

## 2019-01-10 PROCEDURE — 99499 RISK ADDL DX/OHS AUDIT: ICD-10-PCS | Mod: S$GLB,,, | Performed by: FAMILY MEDICINE

## 2019-01-10 PROCEDURE — 3074F PR MOST RECENT SYSTOLIC BLOOD PRESSURE < 130 MM HG: ICD-10-PCS | Mod: CPTII,S$GLB,, | Performed by: FAMILY MEDICINE

## 2019-01-10 PROCEDURE — 3074F SYST BP LT 130 MM HG: CPT | Mod: CPTII,S$GLB,, | Performed by: FAMILY MEDICINE

## 2019-01-10 PROCEDURE — 99214 PR OFFICE/OUTPT VISIT, EST, LEVL IV, 30-39 MIN: ICD-10-PCS | Mod: S$GLB,,, | Performed by: FAMILY MEDICINE

## 2019-01-10 PROCEDURE — 99214 OFFICE O/P EST MOD 30 MIN: CPT | Mod: S$GLB,,, | Performed by: FAMILY MEDICINE

## 2019-01-10 PROCEDURE — 3078F PR MOST RECENT DIASTOLIC BLOOD PRESSURE < 80 MM HG: ICD-10-PCS | Mod: CPTII,S$GLB,, | Performed by: FAMILY MEDICINE

## 2019-01-10 PROCEDURE — 99999 PR PBB SHADOW E&M-EST. PATIENT-LVL III: ICD-10-PCS | Mod: PBBFAC,,, | Performed by: FAMILY MEDICINE

## 2019-01-10 PROCEDURE — 3008F PR BODY MASS INDEX (BMI) DOCUMENTED: ICD-10-PCS | Mod: CPTII,S$GLB,, | Performed by: FAMILY MEDICINE

## 2019-01-10 PROCEDURE — 99499 UNLISTED E&M SERVICE: CPT | Mod: S$GLB,,, | Performed by: FAMILY MEDICINE

## 2019-01-10 PROCEDURE — 99999 PR PBB SHADOW E&M-EST. PATIENT-LVL III: CPT | Mod: PBBFAC,,, | Performed by: FAMILY MEDICINE

## 2019-01-10 PROCEDURE — 3078F DIAST BP <80 MM HG: CPT | Mod: CPTII,S$GLB,, | Performed by: FAMILY MEDICINE

## 2019-01-10 PROCEDURE — 3008F BODY MASS INDEX DOCD: CPT | Mod: CPTII,S$GLB,, | Performed by: FAMILY MEDICINE

## 2019-01-10 RX ORDER — DOXYCYCLINE HYCLATE 100 MG
100 TABLET ORAL EVERY 12 HOURS
Qty: 14 TABLET | Refills: 0 | Status: SHIPPED | OUTPATIENT
Start: 2019-01-10 | End: 2019-01-17

## 2019-01-10 RX ORDER — PROMETHAZINE HYDROCHLORIDE AND DEXTROMETHORPHAN HYDROBROMIDE 6.25; 15 MG/5ML; MG/5ML
5 SYRUP ORAL EVERY 12 HOURS PRN
Qty: 180 ML | Refills: 0 | Status: SHIPPED | OUTPATIENT
Start: 2019-01-10 | End: 2019-01-20

## 2019-01-10 NOTE — PROGRESS NOTES
Subjective:       Patient ID: Nicolas Flaherty is a 63 y.o. male.    Chief Complaint: URI and Wheezing    Cough   This is a new problem. The current episode started 1 to 4 weeks ago (1.5 weeks). The problem has been unchanged. The problem occurs constantly. The cough is productive of sputum. Associated symptoms include nasal congestion, postnasal drip, rhinorrhea, a sore throat and wheezing. Pertinent negatives include no chills, ear pain, fever or shortness of breath. Treatments tried: coricidine hbp. There is no history of COPD, emphysema or pneumonia.     Review of Systems   Constitutional: Negative for chills and fever.   HENT: Positive for postnasal drip, rhinorrhea and sore throat. Negative for ear pain.    Respiratory: Positive for cough and wheezing. Negative for shortness of breath.        Objective:       Vitals:    01/10/19 1526   BP: 110/78   Pulse: 85   Temp: 98.2 °F (36.8 °C)   TempSrc: Oral   SpO2: 98%   Weight: 122 kg (268 lb 15.4 oz)       Physical Exam   Constitutional: He is oriented to person, place, and time. He appears well-developed and well-nourished. No distress.   HENT:   Head: Normocephalic and atraumatic.   Right Ear: External ear normal.   Left Ear: External ear normal.   Mouth/Throat: Oropharynx is clear and moist. No oropharyngeal exudate.   Neck: Normal range of motion. Neck supple.   Cardiovascular: Normal rate, regular rhythm and normal heart sounds. Exam reveals no gallop and no friction rub.   No murmur heard.  Pulmonary/Chest: Effort normal and breath sounds normal. No stridor. No respiratory distress. He has no wheezes. He has no rales. He exhibits no tenderness.   Lymphadenopathy:     He has cervical adenopathy.   Neurological: He is alert and oriented to person, place, and time.   Skin: He is not diaphoretic.       Assessment:       1. Acute bronchitis, unspecified organism        Plan:       Nicolas was seen today for uri and wheezing.    Diagnoses and all orders for  this visit:    Acute bronchitis, unspecified organism  -     doxycycline (VIBRA-TABS) 100 MG tablet; Take 1 tablet (100 mg total) by mouth every 12 (twelve) hours. for 7 days  -     promethazine-dextromethorphan (PROMETHAZINE-DM) 6.25-15 mg/5 mL Syrp; Take 5 mLs by mouth every 12 (twelve) hours as needed.

## 2019-01-10 NOTE — PROGRESS NOTES
Patient, Nicolas Flaherty (MRN #[id number]), presented with a recorded BMI of 42.13 kg/m^2 consistent with the definition of morbid obesity (ICD-10 E66.01). The patient's morbid obesity was monitored, evaluated, addressed and/or treated. This addendum to the medical record is made on 01/10/2019.

## 2019-01-16 ENCOUNTER — OFFICE VISIT (OUTPATIENT)
Dept: FAMILY MEDICINE | Facility: CLINIC | Age: 64
End: 2019-01-16
Payer: COMMERCIAL

## 2019-01-16 VITALS
SYSTOLIC BLOOD PRESSURE: 130 MMHG | WEIGHT: 268.94 LBS | BODY MASS INDEX: 42.21 KG/M2 | TEMPERATURE: 98 F | HEIGHT: 67 IN | OXYGEN SATURATION: 97 % | DIASTOLIC BLOOD PRESSURE: 80 MMHG | HEART RATE: 88 BPM

## 2019-01-16 DIAGNOSIS — N18.30 CHRONIC KIDNEY DISEASE, STAGE III (MODERATE): Primary | ICD-10-CM

## 2019-01-16 DIAGNOSIS — N18.30 TYPE 2 DIABETES MELLITUS WITH STAGE 3 CHRONIC KIDNEY DISEASE, WITH LONG-TERM CURRENT USE OF INSULIN: ICD-10-CM

## 2019-01-16 DIAGNOSIS — I25.119 ATHEROSCLEROSIS OF NATIVE CORONARY ARTERY OF NATIVE HEART WITH ANGINA PECTORIS: ICD-10-CM

## 2019-01-16 DIAGNOSIS — G11.9 CEREBELLAR ATAXIA: ICD-10-CM

## 2019-01-16 DIAGNOSIS — M54.50 ACUTE RIGHT-SIDED LOW BACK PAIN WITHOUT SCIATICA: ICD-10-CM

## 2019-01-16 DIAGNOSIS — Z79.4 TYPE 2 DIABETES MELLITUS WITH STAGE 3 CHRONIC KIDNEY DISEASE, WITH LONG-TERM CURRENT USE OF INSULIN: ICD-10-CM

## 2019-01-16 DIAGNOSIS — C64.1 RENAL CARCINOMA, RIGHT: ICD-10-CM

## 2019-01-16 DIAGNOSIS — E11.22 TYPE 2 DIABETES MELLITUS WITH STAGE 3 CHRONIC KIDNEY DISEASE, WITH LONG-TERM CURRENT USE OF INSULIN: ICD-10-CM

## 2019-01-16 PROBLEM — R07.9 CHEST PAIN AT REST: Status: RESOLVED | Noted: 2018-12-02 | Resolved: 2019-01-16

## 2019-01-16 PROBLEM — R22.41 MASS OF THIGH, RIGHT: Status: RESOLVED | Noted: 2018-03-05 | Resolved: 2019-01-16

## 2019-01-16 PROCEDURE — 99999 PR PBB SHADOW E&M-EST. PATIENT-LVL III: CPT | Mod: PBBFAC,,, | Performed by: FAMILY MEDICINE

## 2019-01-16 PROCEDURE — 99499 UNLISTED E&M SERVICE: CPT | Mod: S$GLB,,, | Performed by: FAMILY MEDICINE

## 2019-01-16 PROCEDURE — 99999 PR PBB SHADOW E&M-EST. PATIENT-LVL III: ICD-10-PCS | Mod: PBBFAC,,, | Performed by: FAMILY MEDICINE

## 2019-01-16 PROCEDURE — 99214 OFFICE O/P EST MOD 30 MIN: CPT | Mod: S$GLB,,, | Performed by: FAMILY MEDICINE

## 2019-01-16 PROCEDURE — 99499 RISK ADDL DX/OHS AUDIT: ICD-10-PCS | Mod: S$GLB,,, | Performed by: FAMILY MEDICINE

## 2019-01-16 PROCEDURE — 99214 PR OFFICE/OUTPT VISIT, EST, LEVL IV, 30-39 MIN: ICD-10-PCS | Mod: S$GLB,,, | Performed by: FAMILY MEDICINE

## 2019-01-16 RX ORDER — OXYCODONE AND ACETAMINOPHEN 5; 325 MG/1; MG/1
1 TABLET ORAL EVERY 8 HOURS PRN
Qty: 30 TABLET | Refills: 0 | Status: SHIPPED | OUTPATIENT
Start: 2019-01-16 | End: 2019-01-26

## 2019-01-16 RX ORDER — CYCLOBENZAPRINE HCL 5 MG
5 TABLET ORAL 3 TIMES DAILY PRN
Qty: 30 TABLET | Refills: 0 | Status: SHIPPED | OUTPATIENT
Start: 2019-01-16 | End: 2019-01-26

## 2019-01-16 RX ORDER — CYCLOBENZAPRINE HCL 5 MG
5 TABLET ORAL 3 TIMES DAILY PRN
Qty: 30 TABLET | Refills: 0 | Status: SHIPPED | OUTPATIENT
Start: 2019-01-16 | End: 2019-01-16

## 2019-01-16 NOTE — PROGRESS NOTES
Subjective:       Patient ID: Nicolas Flaherty is a 63 y.o. male.    Chief Complaint: Fall and Back Pain    63 year old male presents for back pain after he fell trying to get in the car. He has pain in his lower back pain. He took a percocet 5-325, which helped. His pain is mainly on his right side. He denies radiation down his leg. He has pain with movement. He has CKD and diabetes.    Past Medical History:  No date: Anticoagulant long-term use  No date: Arthritis  No date: Cancer of kidney, right  No date: Colon polyp  No date: Coronary artery disease  No date: Diabetes mellitus  No date: Hypertension  No date: Kidney stone  No date: Sleep apnea  No date: Slurred speech  No date: Stroke      Comment:  MINI STROKE  No date: TIA (transient ischemic attack)  No date: Wears glasses   Past Surgical History:  No date: BACK SURGERY      Comment:  lower back  No date: CARDIAC SURGERY  07/2018: COLONOSCOPY  03/2008: CYSTOSCOPY  6/16/2015: CYSTOSCOPY with bilateral RETROGRADE and right   URETEROSCOPY, STENT PLACEMENT; Bilateral      Comment:  Performed by Dionte Christian MD at [facility] OR  3/8/2016: CYSTOSCOPY WITH RETROGRADE PYELOGRAM; Right      Comment:  Performed by Rosalia Acosta MD at [facility] OR  08/2018: ESOPHAGEAL MANOMETRY  6/16/2015: EVACUATION-CLOT      Comment:  Performed by Dionte Christian MD at [facility] OR  No date: excision right thigh mass  3/5/2018: EXCISION-MASS-THIGH; Right      Comment:  Performed by Jose Lewis MD at [facility] OR  6/16/2015: FULGURATION-BLADDER      Comment:  Performed by Dionte Christian MD at [facility] OR  No date: heart stents      Comment:  stents 4 total.  2010, 2011 and 2013  No date: HERNIA REPAIR      Comment:  incisional  2015: KIDNEY STONE SURGERY  No date: LAPAROSCOPIC NEPHRECTOMY, HAND ASSISTED; Right  No date: LITHOTRIPSY  3/18/2016: NEPHRECTOMY-LAPAROSCOPIC; Right      Comment:  Performed by Rosalia Acosta MD at [facility] OR  6/16/2015: PLACEMENT, Shaver  Catheter      Comment:  Performed by Dionte Christian MD at [facility] OR  6/16/2015: REMOVAL-STONE-URETERAL      Comment:  Performed by Dionte Christian MD at [facility] OR  8/19/2016: REPAIR-HERNIA-INCISIONAL; N/A      Comment:  Performed by Jose Lewis MD at [facility] OR  No date: UPPER GASTROINTESTINAL ENDOSCOPY      Comment:  7/10/2018,9/24/2010  3/8/2016: URETEROSCOPY; Right      Comment:  Performed by Rsoalia Acosta MD at [facility] OR  No date: VASCULAR SURGERY  6/16/2015: WASHING-BLADDER      Comment:  Performed by Dionte Christian MD at [facility] OR  Review of patient's family history indicates:  Problem: Heart disease      Relation: Father          Age of Onset: (Not Specified)  Problem: Hypertension      Relation: Mother          Age of Onset: (Not Specified)  Problem: Parkinsonism      Relation: Mother          Age of Onset: (Not Specified)  Problem: Celiac disease      Relation: Neg Hx          Age of Onset: (Not Specified)  Problem: Cirrhosis      Relation: Neg Hx          Age of Onset: (Not Specified)  Problem: Colon cancer      Relation: Neg Hx          Age of Onset: (Not Specified)  Problem: Colon polyps      Relation: Neg Hx          Age of Onset: (Not Specified)  Problem: Crohn's disease      Relation: Neg Hx          Age of Onset: (Not Specified)  Problem: Cystic fibrosis      Relation: Neg Hx          Age of Onset: (Not Specified)  Problem: Esophageal cancer      Relation: Neg Hx          Age of Onset: (Not Specified)  Problem: Hemochromatosis      Relation: Neg Hx          Age of Onset: (Not Specified)  Problem: Inflammatory bowel disease      Relation: Neg Hx          Age of Onset: (Not Specified)  Problem: Irritable bowel syndrome      Relation: Neg Hx          Age of Onset: (Not Specified)  Problem: Liver cancer      Relation: Neg Hx          Age of Onset: (Not Specified)  Problem: Liver disease      Relation: Neg Hx          Age of Onset: (Not Specified)  Problem: Rectal cancer      Relation:  Neg Hx          Age of Onset: (Not Specified)  Problem: Stomach cancer      Relation: Neg Hx          Age of Onset: (Not Specified)  Problem: Ulcerative colitis      Relation: Neg Hx          Age of Onset: (Not Specified)  Problem: Kalia's disease      Relation: Neg Hx          Age of Onset: (Not Specified)  Problem: Lymphoma      Relation: Neg Hx          Age of Onset: (Not Specified)  Problem: Tuberculosis      Relation: Neg Hx          Age of Onset: (Not Specified)  Problem: Scleroderma      Relation: Neg Hx          Age of Onset: (Not Specified)  Problem: Rheum arthritis      Relation: Neg Hx          Age of Onset: (Not Specified)  Problem: Multiple sclerosis      Relation: Neg Hx          Age of Onset: (Not Specified)  Problem: Melanoma      Relation: Neg Hx          Age of Onset: (Not Specified)  Problem: Lupus      Relation: Neg Hx          Age of Onset: (Not Specified)  Problem: Psoriasis      Relation: Neg Hx          Age of Onset: (Not Specified)  Problem: Skin cancer      Relation: Neg Hx          Age of Onset: (Not Specified)    Social History    Socioeconomic History      Marital status:       Spouse name: Not on file      Number of children: Not on file      Years of education: Not on file      Highest education level: Not on file    Social Needs      Financial resource strain: Not on file      Food insecurity - worry: Not on file      Food insecurity - inability: Not on file      Transportation needs - medical: Not on file      Transportation needs - non-medical: Not on file    Occupational History      Not on file    Tobacco Use      Smoking status: Never Smoker      Smokeless tobacco: Current User        Types: Snuff      Tobacco comment: 20 plus years    Substance and Sexual Activity      Alcohol use: Yes        Comment: occas      Drug use: No      Sexual activity: Yes        Partners: Female    Other Topics      Concerns:        Not on file    Social History Narrative      Not on  "file          Review of Systems    Objective:       Vitals:    01/16/19 1049   BP: 130/80   Pulse: 88   Temp: 98.2 °F (36.8 °C)   TempSrc: Oral   SpO2: 97%   Weight: 122 kg (268 lb 15.4 oz)   Height: 5' 7" (1.702 m)       Physical Exam   Constitutional: He appears well-developed and well-nourished. No distress.   HENT:   Head: Normocephalic and atraumatic.   Musculoskeletal:        Lumbar back: He exhibits decreased range of motion, tenderness, pain and spasm. He exhibits no bony tenderness, no swelling and no laceration.        Back:    Skin: He is not diaphoretic.       Assessment:       1. Chronic kidney disease, stage III (moderate)    2. Cerebellar ataxia    3. Body mass index 40.0-44.9, adult    4. Atherosclerosis of native coronary artery of native heart with angina pectoris    5. Type 2 diabetes mellitus with stage 3 chronic kidney disease, with long-term current use of insulin    6. Renal carcinoma, right    7. Acute right-sided low back pain without sciatica        Plan:       Nicolas was seen today for fall and back pain.    Diagnoses and all orders for this visit:    Chronic kidney disease, stage III (moderate)  -     oxyCODONE-acetaminophen (PERCOCET) 5-325 mg per tablet; Take 1 tablet by mouth every 8 (eight) hours as needed for Pain.  Given a small dose of percocet as hehas diabetes and can't do steroid and ckd and can't do NSAIDs.   Cerebellar ataxia    Body mass index 40.0-44.9, adult    Atherosclerosis of native coronary artery of native heart with angina pectoris    Type 2 diabetes mellitus with stage 3 chronic kidney disease, with long-term current use of insulin    Renal carcinoma, right    Acute right-sided low back pain without sciatica    -     oxyCODONE-acetaminophen (PERCOCET) 5-325 mg per tablet; Take 1 tablet by mouth every 8 (eight) hours as needed for Pain.  -     cyclobenzaprine (FLEXERIL) 5 MG tablet; Take 1 tablet (5 mg total) by mouth 3 (three) times daily as needed for Muscle " spasms.

## 2019-01-22 ENCOUNTER — TELEPHONE (OUTPATIENT)
Dept: UROLOGY | Facility: CLINIC | Age: 64
End: 2019-01-22

## 2019-01-22 RX ORDER — FINASTERIDE 5 MG/1
5 TABLET, FILM COATED ORAL EVERY MORNING
Qty: 90 TABLET | Refills: 3 | Status: SHIPPED | OUTPATIENT
Start: 2019-01-22 | End: 2020-02-19 | Stop reason: SDUPTHER

## 2019-01-22 RX ORDER — TAMSULOSIN HYDROCHLORIDE 0.4 MG/1
1 CAPSULE ORAL DAILY
Qty: 90 CAPSULE | Refills: 3 | Status: SHIPPED | OUTPATIENT
Start: 2019-01-22 | End: 2020-02-19 | Stop reason: SDUPTHER

## 2019-01-22 RX ORDER — LANCETS
1 EACH MISCELLANEOUS 2 TIMES DAILY
Qty: 200 EACH | Refills: 2 | Status: SHIPPED | OUTPATIENT
Start: 2019-01-22 | End: 2020-08-06

## 2019-01-22 RX ORDER — DEXTROSE 4 G
TABLET,CHEWABLE ORAL
Qty: 1 EACH | Refills: 0 | Status: SHIPPED | OUTPATIENT
Start: 2019-01-22 | End: 2020-08-06

## 2019-01-22 NOTE — TELEPHONE ENCOUNTER
Returned call to patient. Informed that when he needs the refills to call and let us know because if we sent the refills now they will fill them now. Informed patient that new pharmacy is in his chart and as he needs refills we will send them there. Patient voiced understanding.  Stated that he is not in meed of any refills at the moment.

## 2019-01-22 NOTE — TELEPHONE ENCOUNTER
LOV  1/16/2019  Last refill   10/30/2018  Glucometer                    12/13/2018 test strips                                       lancets

## 2019-01-22 NOTE — TELEPHONE ENCOUNTER
Calling to find out if this is a refill request of [facility] needs office notes or PAs done- left Vm for patient to call back.

## 2019-01-22 NOTE — TELEPHONE ENCOUNTER
----- Message from Laverne Strickland sent at 1/22/2019 10:01 AM CST -----  Contact: [phone]  Pt needs refill on all meds sent to a new pharamcy. Pls send all script to     [facility]    Fax        [phone]    Phone   [phone]    Thanks.......Linn

## 2019-01-22 NOTE — TELEPHONE ENCOUNTER
----- Message from Jean Pierre Nash sent at 1/22/2019 10:10 AM CST -----  Contact: [place] wife/[phone]  The patient wife would like a RX for the medications finasteride (PROSCAR) 5 mg tablet and tamsulosin (FLOMAX) 0.4 mg Cap faxed over to [facility] at [phone].        Thank you

## 2019-01-24 ENCOUNTER — TELEPHONE (OUTPATIENT)
Dept: FAMILY MEDICINE | Facility: CLINIC | Age: 64
End: 2019-01-24

## 2019-01-24 NOTE — TELEPHONE ENCOUNTER
Spoke with pt's wife and she states the medication he was given is not helping it only makes him sleeping. Is there something else he can have.

## 2019-01-24 NOTE — TELEPHONE ENCOUNTER
He doesn't have much options. He can't take steroids due to high sugars and can't take NSAIDs due to kidney disease. Hold oxycodoen and use flexeril and tylenol Extra strength.

## 2019-01-24 NOTE — TELEPHONE ENCOUNTER
----- Message from Tresa Ricardo sent at 1/24/2019  9:32 AM CST -----  Contact: Self   Pt would like to speak to a nurse regarding health.  Pt states that his back is still hurting. [phone]

## 2019-02-19 ENCOUNTER — ANESTHESIA (OUTPATIENT)
Dept: ENDOSCOPY | Facility: HOSPITAL | Age: 64
End: 2019-02-19
Payer: MEDICARE

## 2019-02-19 ENCOUNTER — TELEPHONE (OUTPATIENT)
Dept: GASTROENTEROLOGY | Facility: CLINIC | Age: 64
End: 2019-02-19

## 2019-02-19 ENCOUNTER — HOSPITAL ENCOUNTER (OUTPATIENT)
Facility: HOSPITAL | Age: 64
Discharge: HOME | End: 2019-02-19
Attending: INTERNAL MEDICINE | Admitting: INTERNAL MEDICINE
Payer: MEDICARE

## 2019-02-19 ENCOUNTER — ANESTHESIA EVENT (OUTPATIENT)
Dept: ENDOSCOPY | Facility: HOSPITAL | Age: 64
End: 2019-02-19
Payer: MEDICARE

## 2019-02-19 VITALS
HEIGHT: 67 IN | SYSTOLIC BLOOD PRESSURE: 150 MMHG | OXYGEN SATURATION: 99 % | BODY MASS INDEX: 42.53 KG/M2 | DIASTOLIC BLOOD PRESSURE: 90 MMHG | RESPIRATION RATE: 18 BRPM | HEART RATE: 68 BPM | WEIGHT: 271 LBS | TEMPERATURE: 98 F

## 2019-02-19 DIAGNOSIS — R13.10 DYSPHAGIA, UNSPECIFIED TYPE: Primary | ICD-10-CM

## 2019-02-19 DIAGNOSIS — R13.10 DYSPHAGIA: ICD-10-CM

## 2019-02-19 LAB — POCT GLUCOSE: 133 MG/DL (ref 70–110)

## 2019-02-19 PROCEDURE — D9220A PRA ANESTHESIA: Mod: CRNA,,, | Performed by: NURSE ANESTHETIST, CERTIFIED REGISTERED

## 2019-02-19 PROCEDURE — 88305 TISSUE EXAM BY PATHOLOGIST: CPT | Mod: 26,,, | Performed by: PATHOLOGY

## 2019-02-19 PROCEDURE — D9220A PRA ANESTHESIA: Mod: ANES,,, | Performed by: ANESTHESIOLOGY

## 2019-02-19 PROCEDURE — D9220A PRA ANESTHESIA: ICD-10-PCS | Mod: CRNA,,, | Performed by: NURSE ANESTHETIST, CERTIFIED REGISTERED

## 2019-02-19 PROCEDURE — 91010 ESOPHAGUS MOTILITY STUDY: CPT | Mod: 26,,, | Performed by: INTERNAL MEDICINE

## 2019-02-19 PROCEDURE — 37000008 HC ANESTHESIA 1ST 15 MINUTES: Performed by: INTERNAL MEDICINE

## 2019-02-19 PROCEDURE — 63600175 PHARM REV CODE 636 W HCPCS: Performed by: NURSE ANESTHETIST, CERTIFIED REGISTERED

## 2019-02-19 PROCEDURE — 91037 PR GERD TST W/ NASAL IMPEDENCE ELECTROD: ICD-10-PCS | Mod: 26,,, | Performed by: INTERNAL MEDICINE

## 2019-02-19 PROCEDURE — 43239 EGD BIOPSY SINGLE/MULTIPLE: CPT | Performed by: INTERNAL MEDICINE

## 2019-02-19 PROCEDURE — 91037 ESOPH IMPED FUNCTION TEST: CPT | Mod: 26,,, | Performed by: INTERNAL MEDICINE

## 2019-02-19 PROCEDURE — 25000003 PHARM REV CODE 250: Performed by: INTERNAL MEDICINE

## 2019-02-19 PROCEDURE — 91037 ESOPH IMPED FUNCTION TEST: CPT | Performed by: INTERNAL MEDICINE

## 2019-02-19 PROCEDURE — 88305 TISSUE SPECIMEN TO PATHOLOGY - SURGERY: ICD-10-PCS | Mod: 26,,, | Performed by: PATHOLOGY

## 2019-02-19 PROCEDURE — 43239 PR EGD, FLEX, W/BIOPSY, SGL/MULTI: ICD-10-PCS | Mod: ,,, | Performed by: INTERNAL MEDICINE

## 2019-02-19 PROCEDURE — D9220A PRA ANESTHESIA: ICD-10-PCS | Mod: ANES,,, | Performed by: ANESTHESIOLOGY

## 2019-02-19 PROCEDURE — 27201012 HC FORCEPS, HOT/COLD, DISP: Performed by: INTERNAL MEDICINE

## 2019-02-19 PROCEDURE — 43239 EGD BIOPSY SINGLE/MULTIPLE: CPT | Mod: ,,, | Performed by: INTERNAL MEDICINE

## 2019-02-19 PROCEDURE — 37000009 HC ANESTHESIA EA ADD 15 MINS: Performed by: INTERNAL MEDICINE

## 2019-02-19 PROCEDURE — 88305 TISSUE EXAM BY PATHOLOGIST: CPT | Mod: 59 | Performed by: PATHOLOGY

## 2019-02-19 PROCEDURE — 91010 ESOPHAGUS MOTILITY STUDY: CPT | Performed by: INTERNAL MEDICINE

## 2019-02-19 PROCEDURE — 91010 PR ESOPHAGEAL MOTILITY STUDY, MA2METRY: ICD-10-PCS | Mod: 26,,, | Performed by: INTERNAL MEDICINE

## 2019-02-19 RX ORDER — SODIUM CHLORIDE 9 MG/ML
INJECTION, SOLUTION INTRAVENOUS CONTINUOUS
Status: DISCONTINUED | OUTPATIENT
Start: 2019-02-19 | End: 2019-02-19 | Stop reason: HOSPADM

## 2019-02-19 RX ORDER — PROPOFOL 10 MG/ML
VIAL (ML) INTRAVENOUS
Status: DISCONTINUED | OUTPATIENT
Start: 2019-02-19 | End: 2019-02-19

## 2019-02-19 RX ORDER — LIDOCAINE HYDROCHLORIDE 20 MG/ML
JELLY TOPICAL ONCE
Status: DISCONTINUED | OUTPATIENT
Start: 2019-02-19 | End: 2019-02-19 | Stop reason: HOSPADM

## 2019-02-19 RX ORDER — PROPOFOL 10 MG/ML
VIAL (ML) INTRAVENOUS CONTINUOUS PRN
Status: DISCONTINUED | OUTPATIENT
Start: 2019-02-19 | End: 2019-02-19

## 2019-02-19 RX ORDER — SODIUM CHLORIDE 0.9 % (FLUSH) 0.9 %
3 SYRINGE (ML) INJECTION
Status: DISCONTINUED | OUTPATIENT
Start: 2019-02-19 | End: 2019-02-19 | Stop reason: HOSPADM

## 2019-02-19 RX ORDER — LIDOCAINE HCL/PF 100 MG/5ML
SYRINGE (ML) INTRAVENOUS
Status: DISCONTINUED | OUTPATIENT
Start: 2019-02-19 | End: 2019-02-19

## 2019-02-19 RX ADMIN — PROPOFOL 60 MG: 10 INJECTION, EMULSION INTRAVENOUS at 01:02

## 2019-02-19 RX ADMIN — PROPOFOL 150 MCG/KG/MIN: 10 INJECTION, EMULSION INTRAVENOUS at 01:02

## 2019-02-19 RX ADMIN — SODIUM CHLORIDE: 0.9 INJECTION, SOLUTION INTRAVENOUS at 01:02

## 2019-02-19 RX ADMIN — LIDOCAINE HYDROCHLORIDE 100 MG: 20 INJECTION, SOLUTION INTRAVENOUS at 01:02

## 2019-02-19 NOTE — DISCHARGE INSTRUCTIONS
Esophageal Manometry     A catheter measures pressure along the esophagus.     Esophageal manometry is a test to measure the strength and function of the esophagus (the food pipe). Results can help identify causes of heartburn, swallowing problems, or chest pain. The test can also help plan surgery and determine the success of previous surgery.  Preparing for the test  Be sure to talk to your healthcare provider about any medicines you take. Some medicines can affect the test results. Also ask any questions you have about the risks of the test. These include irritation to the nose and throat. Be sure not to smoke, eat, or drink for up to 12 hours before the test.  During the test  Manometry takes about an hour. Usually you lie down during the test. Your nose and throat are numbed. Then a soft, thin tube is placed through the nose and down the esophagus. At first you may notice a gagging feeling. You will be asked to swallow several times. Holes along the tube measure the pressure while you swallow. Measurements are printed out as tracings, much like a heart test tracing. After the test, another catheter may be left in the esophagus for up to 24 hours to measure acid (pH) levels.  After esophageal manometry  Youll probably discuss the results of the test with your healthcare provider at another appointment. This is because time is needed to review the tracings. You may have a mild sore throat for a short time. As soon as the numbness in your throat is gone, you can return to eating and your normal activities.  Date Last Reviewed: 6/1/2016  © 3073-5559 [employer]. [address] [place] [address], [place] [address]. All rights reserved. This information is not intended as a substitute for professional medical care. Always follow your healthcare professional's instructions.        Upper GI Endoscopy     During endoscopy, a long, flexible tube is used to view the inside of your upper GI tract.      Upper GI  endoscopy allows your healthcare provider to look directly into the beginning of your gastrointestinal (GI) tract. The esophagus, stomach, and duodenum (the first part of the small intestine) make up the upper GI tract.   Before the exam  Follow these and any other instructions you are given before your endoscopy. If you dont follow the healthcare providers instructions carefully, the test may need to be canceled or done over:  · Don't eat or drink anything after midnight the night before your exam. If your exam is in the afternoon, drink only clear liquids in the morning. Don't eat or drink anything for 8 hours before the exam. In some cases, you may be able to take medicines with sips of water until 2 hours before the procedure. Speak with your healthcare provider about this.   · Bring your X-rays and any other test results you have.  · Because you will be sedated, arrange for an adult to drive you home after the exam.  · Tell your healthcare provider before the exam if you are taking any medicines or have any medical problems.  The procedure  Here is what to expect:  · You will lie on the endoscopy table. Usually patients lie on the left side.  · You will be monitored and given oxygen.  · Your throat may be numbed with a spray or gargle. You are given medicine through an intravenous (IV) line that will help you relax and remain comfortable. You may be awake or asleep during the procedure.  · The healthcare provider will put the endoscope in your mouth and down your esophagus. It is thinner than most pieces of food that you swallow. It will not affect your breathing. The medicine helps keep you from gagging.  · Air is put into your GI tract to expand it. It can make you burp.  · During the procedure, the healthcare provider can take biopsies (tissue samples), remove abnormalities, such as polyps, or treat abnormalities through a variety of devices placed through the endoscope. You will not feel this.   · The  endoscope carries images of your upper GI tract to a video screen. If you are awake, you may be able to look at the images.  · After the procedure is done, you will rest for a time. An adult must drive you home.  When to call your healthcare provider  Contact your healthcare provider if you have:  · Black or tarry stools, or blood in your stool  · Fever  · Pain in your belly that does not go away  · Nausea and vomiting, or vomiting blood   Date Last Reviewed: 7/1/2016  © 5738-5838 [employer]. [address] [place] [address]. All rights reserved. This information is not intended as a substitute for professional medical care. Always follow your healthcare professional's instructions.

## 2019-02-19 NOTE — PROGRESS NOTES
Patient AAOx4, vss, released patient to [facility] to return to endoscopy to complete second part of procedure, nad noted.

## 2019-02-19 NOTE — TRANSFER OF CARE
"Anesthesia Transfer of Care Note    Patient: Nicolas Flaherty    Procedure(s) Performed: Procedure(s) (LRB):  EGD (ESOPHAGOGASTRODUODENOSCOPY) (N/A)  MANOMETRY, ESOPHAGUS, WITH IMPEDANCE MEASUREMENT (N/A)    Patient location: [facility]    Anesthesia Type: general    Transport from OR: Transported from OR on 2-3 L/min O2 by NC with adequate spontaneous ventilation    Post pain: adequate analgesia    Post assessment: no apparent anesthetic complications and tolerated procedure well    Post vital signs: stable    Level of consciousness: sedated    Nausea/Vomiting: no nausea/vomiting    Complications: none    Transfer of care protocol was followed      Last vitals:   Visit Vitals  BP (!) 109/62 (BP Location: Left arm, Patient Position: Lying)   Pulse 75   Temp 36.7 °C (98.1 °F) (Temporal)   Resp 16   Ht 5' 7" (1.702 m)   Wt 122.9 kg (271 lb)   SpO2 100%   BMI 42.44 kg/m²     "

## 2019-02-19 NOTE — PLAN OF CARE
Assisted to room A via W/C. POC initiated. Pt V /U. Pt awaiting procedure. No c/o. No distress noted.

## 2019-02-19 NOTE — H&P
Short Stay Endoscopy History and Physical    PCP - Shyanne Alfredo MD     Procedure - EGD  ASA - per anesthesia  Mallampati - per anesthesia  History of Anesthesia problems - no  Family history Anesthesia problems -  no   Plan of anesthesia - General    HPI:  This is a 63 y.o. male here for evaluation of dysphagia and chest pain w EGd and manometry.       ROS:  Constitutional: No fevers, chills, No weight loss  CV: No chest pain  Pulm: No cough, No shortness of breath  Ophtho: No vision changes  GI: see HPI  Derm: No rash    Medical History:  has a past medical history of Anticoagulant long-term use, Arthritis, Cancer of kidney, right, Colon polyp, Coronary artery disease, Diabetes mellitus, Hypertension, Kidney stone, Sleep apnea, Slurred speech, Stroke, TIA (transient ischemic attack), and Wears glasses.    Surgical History:  has a past surgical history that includes Lithotripsy; Kidney stone surgery (2015); heart stents; Back surgery; Cardiac surgery; Vascular surgery; Laparoscopic nephrectomy, hand assisted (Right); Hernia repair; excision right thigh mass; Colonoscopy (07/2018); Cystoscopy (03/2008); Esophageal manometry (08/2018); and Upper gastrointestinal endoscopy.    Family History: family history includes Heart disease in his father; Hypertension in his mother; Parkinsonism in his mother.. Otherwise no colon cancer, inflammatory bowel disease, or GI malignancies.    Social History:  reports that  has never smoked. His smokeless tobacco use includes snuff. He reports that he drinks alcohol. He reports that he does not use drugs.    Review of patient's allergies indicates:  No Known Allergies    Medications:   Facility-Administered Medications Prior to Admission   Medication Dose Route Frequency Provider Last Rate Last Dose    lidocaine (PF) 10 mg/ml (1%) injection 10 mg  1 mL Intradermal Once Jose Lewis MD         Medications Prior to Admission   Medication Sig Dispense Refill Last Dose     allopurinol (ZYLOPRIM) 100 MG tablet Take 100 mg by mouth once daily.   2/19/2019 at Unknown time    aspirin (ECOTRIN) 81 MG EC tablet Take 81 mg by mouth once daily. LAST TAKEN 3/2/16   2/18/2019 at Unknown time    blood sugar diagnostic (ACCU-CHEK REJI PLUS TEST STRP) Strp TEST BLOOD SUGAR TWICE DAILY 200 strip 2 2/19/2019 at Unknown time    blood-glucose meter (ACCU-CHEK REJI PLUS METER) Misc CHECK TWICE DAILY 1 each 0 2/19/2019 at Unknown time    clopidogrel (PLAVIX) 75 mg tablet Take 75 mg by mouth every morning. LAST DOSE 3/2/16   Past Week at Unknown time    diltiaZEM (CARDIZEM) 60 MG tablet Take 1 tablet (60 mg total) by mouth 3 (three) times daily. 270 tablet 1 2/19/2019 at Unknown time    docusate sodium (STOOL SOFTENER) 100 MG capsule Take 1 capsule (100 mg total) by mouth 2 (two) times daily. 180 capsule 3 2/18/2019 at Unknown time    dulaglutide (TRULICITY) 1.5 mg/0.5 mL PnIj Inject 1.5 mg into the skin every 7 days. 6 mL 3 2/18/2019 at Unknown time    finasteride (PROSCAR) 5 mg tablet Take 1 tablet (5 mg total) by mouth every morning. 90 tablet 3 2/19/2019 at Unknown time    furosemide (LASIX) 40 MG tablet Take 40 mg by mouth 2 (two) times daily. ALTERNATES 1 TABLET ONE DAY AND 2 TABLETS THE NEXT--ALTERNATING DAYS   2/19/2019 at Unknown time    gabapentin (NEURONTIN) 600 MG tablet Take 1 tablet (600 mg total) by mouth 2 (two) times daily. Patient takes 600 mg am and 1200 mg qhs 270 tablet 3 2/19/2019 at Unknown time    gemfibrozil (LOPID) 600 MG tablet Take 1 tablet (600 mg total) by mouth 2 (two) times daily before meals. 180 tablet 3 2/19/2019 at Unknown time    glimepiride (AMARYL) 4 MG tablet Take 1 tablet (4 mg total) by mouth 2 (two) times daily. 180 tablet 1 2/19/2019 at Unknown time    isosorbide mononitrate (IMDUR) 60 MG 24 hr tablet Take 60 mg by mouth every morning.    2/19/2019 at Unknown time    lancets (ACCU-CHEK SOFTCLIX LANCETS) Misc 1 lancet by Misc.(Non-Drug; Combo  Route) route 2 (two) times daily. 200 each 2 2/19/2019 at Unknown time    losartan (COZAAR) 50 MG tablet Take 50 mg by mouth once daily.   2/19/2019 at Unknown time    metoprolol succinate (TOPROL-XL) 100 MG 24 hr tablet Take 100 mg by mouth every morning.    2/19/2019 at Unknown time    nateglinide (STARLIX) 60 MG tablet TAKE 1 TABLET TWICE DAILY BEFORE MEALS 180 tablet 0 2/18/2019 at Unknown time    ranitidine (ZANTAC) 150 MG tablet TAKE 1 TABLET TWICE DAILY 180 tablet 5 2/19/2019 at Unknown time    rosuvastatin (CRESTOR) 20 MG tablet Take 1 tablet (20 mg total) by mouth once daily. 90 tablet 3 2/18/2019 at Unknown time    tamsulosin (FLOMAX) 0.4 mg Cap Take 1 capsule (0.4 mg total) by mouth once daily. 90 capsule 3 2/19/2019 at Unknown time    TRAVATAN Z 0.004 % Drop 1 drop every evening.    2/18/2019 at Unknown time    NITROSTAT 0.4 mg SL tablet Place 0.4 mg under the tongue every 5 (five) minutes as needed.    More than a month at Unknown time       Physical Exam:    Vital Signs:   Vitals:    02/19/19 1244   BP: (!) 152/78   Pulse: 75   Resp: 16   Temp: 98.1 °F (36.7 °C)       General Appearance: Well appearing in no acute distress  Eyes:    No scleral icterus  Lungs: CTA anteriorly  Heart:  Regular rate, S1, S2 normal, no murmurs heard.  Abdomen: Soft, non tender, non distended with normal bowel sounds. No hepatosplenomegaly, ascites, or mass.  Extremities: No edema  Skin: No rash    Labs:  Lab Results   Component Value Date    WBC 6.70 12/02/2018    HGB 13.1 (L) 12/02/2018    HCT 39.1 (L) 12/02/2018     12/02/2018    CHOL 174 10/31/2018    TRIG 510 (H) 10/31/2018    HDL 25 (L) 10/31/2018    ALT 21 12/02/2018    AST 18 12/02/2018     12/02/2018    K 4.0 12/02/2018     12/02/2018    CREATININE 1.8 (H) 12/02/2018    BUN 35 (H) 12/02/2018    CO2 21 (L) 12/02/2018    TSH 2.267 12/03/2018    INR 1.0 06/27/2016    HGBA1C 8.4 (H) 10/31/2018    MICROALBUR 17.1 10/31/2018       I have  explained the risks and benefits of endoscopy procedures to the patient including but not limited to bleeding, perforation, infection, and death.      Sylvia Treviño MD

## 2019-02-19 NOTE — TELEPHONE ENCOUNTER
----- Message from Vladimir Cook RN sent at 2/19/2019  3:18 PM CST -----  Mr Krystina's wife cancelled his esophagram previously scheduled at 0900 on 03/20/2019 by mistake. Needs to be rescheduled. Has a clinic appt scheduled with Ophelia on 03/082019.     Thanks

## 2019-02-19 NOTE — [employer] PATIENT INSTRUCTIONS
Discharge Summary/Instructions after an Endoscopic Procedure  Patient Name: Nicolas Flaherty  Patient MRN: [id number]  Patient YOB: 1955 Tuesday, February 19, 2019  Sylvia Treviño MD  RESTRICTIONS:  During your procedure today, you received medications for sedation.  These   medications may affect your judgment, balance and coordination.  Therefore,   for 24 hours, you have the following restrictions:   - DO NOT drive a car, operate machinery, make legal/financial decisions,   sign important papers or drink alcohol.    ACTIVITY:  Today: no heavy lifting, straining or running due to procedural   sedation/anesthesia.  The following day: return to full activity including work.  DIET:  Eat and drink normally unless instructed otherwise.     TREATMENT FOR COMMON SIDE EFFECTS:  - Mild abdominal pain, nausea, belching, bloating or excessive gas:  rest,   eat lightly and use a heating pad.  - Sore Throat: treat with throat lozenges and/or gargle with warm salt   water.  - Because air was used during the procedure, expelling large amounts of air   from your rectum or belching is normal.  - If a bowel prep was taken, you may not have a bowel movement for 1-3 days.    This is normal.  SYMPTOMS TO WATCH FOR AND REPORT TO YOUR PHYSICIAN:  1. Abdominal pain or bloating, other than gas cramps.  2. Chest pain.  3. Back pain.  4. Signs of infection such as: chills or fever occurring within 24 hours   after the procedure.  5. Rectal bleeding, which would show as bright red, maroon, or black stools.   (A tablespoon of blood from the rectum is not serious, especially if   hemorrhoids are present.)  6. Vomiting.  7. Weakness or dizziness.  GO DIRECTLY TO THE NEAREST EMERGENCY ROOM IF YOU HAVE ANY OF THE FOLLOWING:      Difficulty breathing              Chills and/or fever over 101 F   Persistent vomiting and/or vomiting blood   Severe abdominal pain   Severe chest pain   Black, tarry stools   Bleeding- more than one  tablespoon   Any other symptom or condition that you feel may need urgent attention  Your doctor recommends these additional instructions:  If any biopsies were taken, your doctors clinic will contact you in 1 to 2   weeks with any results.  - Await pathology results.   - Discharge patient to home (with escort).   - Resume previous diet.   - Continue present medications.   - The findings and recommendations were discussed with the patient.   - Patient has a contact number available for emergencies.  The signs and   symptoms of potential delayed complications were discussed with the   patient.  Return to normal activities tomorrow.  Written discharge   instructions were provided to the patient.   - Resume Plavix (clopidogrel) at prior dose today.  For questions, problems or results please call your physician - Sylvia Treviño MD at Work:  [phone].  [facility], EMERGENCY ROOM PHONE NUMBER: [phone]  IF A COMPLICATION OR EMERGENCY SITUATION ARISES AND YOU ARE UNABLE TO REACH   YOUR PHYSICIAN - GO DIRECTLY TO THE EMERGENCY ROOM.  Sylvia Treviño MD  2/19/2019 2:10:40 PM  This report has been verified and signed electronically.  [employer]

## 2019-02-21 DIAGNOSIS — E78.1 HYPERGLYCERIDEMIA: ICD-10-CM

## 2019-02-21 DIAGNOSIS — N18.30 STAGE 3 CHRONIC KIDNEY DISEASE: ICD-10-CM

## 2019-02-21 NOTE — ANESTHESIA PREPROCEDURE EVALUATION
02/21/2019  Nicolas Flaherty is a 63 y.o., male.    Anesthesia Evaluation    I have reviewed the Patient Summary Reports.     I have reviewed the Medications.     Review of Systems  Anesthesia Hx:  History of prior surgery of interest to airway management or planning:  Denies Personal Hx of Anesthesia complications.   Social:  Non-Smoker, No Alcohol Use    Hematology/Oncology:  Hematology Normal   Oncology Normal     EENT/Dental:EENT/Dental Normal   Cardiovascular:  Cardiovascular Normal Exercise tolerance: good CAD (pEF, mild AS)  asymptomatic     Pulmonary:   Sleep Apnea    Renal/:  Renal/ Normal     Musculoskeletal:  Musculoskeletal Normal    Neurological:   CVA (slurred speech, one-sided weakness), residual symptoms    Endocrine:   Diabetes, type 2    Dermatological:  Skin Normal    Psych:  Psychiatric Normal           Physical Exam  General:  Morbid Obesity    Airway/Jaw/Neck:  Airway Findings: Mouth Opening: Normal Tongue: Normal  General Airway Assessment: Adult, Good  Mallampati: III  TM Distance: Normal, at least 6 cm     Eyes/Ears/Nose:  EYES/EARS/NOSE FINDINGS: Normal   Dental:  Dental Findings: In tact   Chest/Lungs:  Chest/Lungs Findings: Clear to auscultation, Normal Respiratory Rate     Heart/Vascular:  Heart Findings: Rate: Normal  Rhythm: Regular Rhythm  Sounds: Normal  Heart murmur: negative       Mental Status:  Mental Status Findings:  Cooperative, Alert and Oriented         Anesthesia Plan  Type of Anesthesia, risks & benefits discussed:  Anesthesia Type:  general  Patient's Preference:   Intra-op Monitoring Plan:   Intra-op Monitoring Plan Comments:   Post Op Pain Control Plan:   Post Op Pain Control Plan Comments:   Induction:   IV  Beta Blocker:  Patient is not currently on a Beta-Blocker (No further documentation required).       Informed Consent: Patient understands risks  and agrees with Anesthesia plan.  Questions answered. Anesthesia consent signed with patient.  ASA Score: 3     Day of Surgery Review of History & Physical:    H&P update referred to the provider.     Anesthesia Plan Notes:   63M morbid obesity, GAUTAM, CAD pEF mild AS, HTN, DM2, dysphagia for EGD under GA NC TIVA        Ready For Surgery From Anesthesia Perspective.

## 2019-02-21 NOTE — ANESTHESIA POSTPROCEDURE EVALUATION
"Anesthesia Post Evaluation    Patient: Nicolas Flaherty    Procedure(s) Performed: Procedure(s) (LRB):  EGD (ESOPHAGOGASTRODUODENOSCOPY) (N/A)  MANOMETRY, ESOPHAGUS, WITH IMPEDANCE MEASUREMENT (N/A)    Final Anesthesia Type: general  Patient location during evaluation: PACU  Patient participation: Yes- Able to Participate  Level of consciousness: awake and alert  Pain management: adequate  Airway patency: patent  PONV status at discharge: No PONV  Anesthetic complications: no      Cardiovascular status: blood pressure returned to baseline  Respiratory status: unassisted, spontaneous ventilation and room air  Hydration status: euvolemic  Follow-up not needed.        Visit Vitals  BP (!) 150/90 (BP Location: Left arm, Patient Position: Sitting)   Pulse 68   Temp 36.5 °C (97.7 °F) (Temporal)   Resp 18   Ht 5' 7" (1.702 m)   Wt 122.9 kg (271 lb)   SpO2 99%   BMI 42.44 kg/m²       Pain/Christiano Score: No Data Recorded      "

## 2019-02-21 NOTE — TELEPHONE ENCOUNTER
----- Message from Tayler Hernandez sent at 2/21/2019  8:06 AM CST -----  Contact: Wife- anh  Patient's wife states he need a refill on his medications. He also would like to know if he can get something for his sinus sent to [facility]. Please call patient at [phone].       gemfibrozil (LOPID) 600 MG tablet  glimepiride (AMARYL) 4 MG tablet  [facility] - [place], [place] - [address] AT Portal to [facility]

## 2019-02-21 NOTE — TELEPHONE ENCOUNTER
----- Message from Coleen Reed sent at 2/21/2019  8:10 AM CST -----  Contact: spouse- Adal - [phone]  Pt spouse states she placed a refill request and forgot to add ---dulaglutide (TRULICITY) 1.5 mg/0.5 mL Ij-      [facility] - [place], [place] - [address] AT Portal to [place] [address]  [facility] [address]  Phone: [phone] Fax: [phone]

## 2019-02-21 NOTE — TELEPHONE ENCOUNTER
LOV 1/16/2019    Patient's requesting medication for sinus.  If it is approved, please send to [facility].  Please advise.

## 2019-02-22 RX ORDER — GEMFIBROZIL 600 MG/1
600 TABLET, FILM COATED ORAL
Qty: 180 TABLET | Refills: 3 | Status: SHIPPED | OUTPATIENT
Start: 2019-02-22 | End: 2019-07-10

## 2019-02-22 RX ORDER — GLIMEPIRIDE 4 MG/1
4 TABLET ORAL 2 TIMES DAILY
Qty: 180 TABLET | Refills: 1 | Status: SHIPPED | OUTPATIENT
Start: 2019-02-22 | End: 2019-10-22 | Stop reason: SDUPTHER

## 2019-02-25 ENCOUNTER — TELEPHONE (OUTPATIENT)
Dept: GASTROENTEROLOGY | Facility: CLINIC | Age: 64
End: 2019-02-25

## 2019-02-25 NOTE — TELEPHONE ENCOUNTER
Notified manometry show possible blockage at connection of esophagus and stomach, very strong contractions in esophagus . Follow up with Dr. Treviño after all studies completed. Verbalize understanding

## 2019-02-25 NOTE — TELEPHONE ENCOUNTER
Manometry Results:    High LES with borderline relaxation.    Normalization of IRP upright.    Hypercontractile ([place]hammer) esophagus (vs Type III achalasia (less likely))  Incomplete bolus clearance  Abnormal multiple rapid swallows test with loss of inhibition, panesophageal pressurization and hypercontractile final contraction.  Patient unable to perform MRS correctly.    No significant difference with other maneuvers   Evidence of residual liquid in esophagus at the end of 200cc bolus     Please let patient know that the manometry shows    Possible blockage at connection of esophagus and stomach   Very strong contractions in esophagus     Recommendation:  Follow up with Dr. Treviño after all studies completed

## 2019-02-28 ENCOUNTER — OFFICE VISIT (OUTPATIENT)
Dept: FAMILY MEDICINE | Facility: CLINIC | Age: 64
End: 2019-02-28
Payer: MEDICARE

## 2019-02-28 VITALS
OXYGEN SATURATION: 98 % | HEART RATE: 78 BPM | BODY MASS INDEX: 42.13 KG/M2 | DIASTOLIC BLOOD PRESSURE: 86 MMHG | WEIGHT: 268.94 LBS | TEMPERATURE: 99 F | SYSTOLIC BLOOD PRESSURE: 110 MMHG

## 2019-02-28 DIAGNOSIS — J01.00 ACUTE NON-RECURRENT MAXILLARY SINUSITIS: ICD-10-CM

## 2019-02-28 DIAGNOSIS — M47.26 OSTEOARTHRITIS OF SPINE WITH RADICULOPATHY, LUMBAR REGION: ICD-10-CM

## 2019-02-28 PROCEDURE — 3079F PR MOST RECENT DIASTOLIC BLOOD PRESSURE 80-89 MM HG: ICD-10-PCS | Mod: CPTII,S$GLB,, | Performed by: FAMILY MEDICINE

## 2019-02-28 PROCEDURE — 3008F BODY MASS INDEX DOCD: CPT | Mod: CPTII,S$GLB,, | Performed by: FAMILY MEDICINE

## 2019-02-28 PROCEDURE — 3045F PR MOST RECENT HEMOGLOBIN A1C LEVEL 7.0-9.0%: CPT | Mod: CPTII,S$GLB,, | Performed by: FAMILY MEDICINE

## 2019-02-28 PROCEDURE — 3074F PR MOST RECENT SYSTOLIC BLOOD PRESSURE < 130 MM HG: ICD-10-PCS | Mod: CPTII,S$GLB,, | Performed by: FAMILY MEDICINE

## 2019-02-28 PROCEDURE — 3079F DIAST BP 80-89 MM HG: CPT | Mod: CPTII,S$GLB,, | Performed by: FAMILY MEDICINE

## 2019-02-28 PROCEDURE — 99214 OFFICE O/P EST MOD 30 MIN: CPT | Mod: S$GLB,,, | Performed by: FAMILY MEDICINE

## 2019-02-28 PROCEDURE — 99214 PR OFFICE/OUTPT VISIT, EST, LEVL IV, 30-39 MIN: ICD-10-PCS | Mod: S$GLB,,, | Performed by: FAMILY MEDICINE

## 2019-02-28 PROCEDURE — 99999 PR PBB SHADOW E&M-EST. PATIENT-LVL II: CPT | Mod: PBBFAC,,, | Performed by: FAMILY MEDICINE

## 2019-02-28 PROCEDURE — 3074F SYST BP LT 130 MM HG: CPT | Mod: CPTII,S$GLB,, | Performed by: FAMILY MEDICINE

## 2019-02-28 PROCEDURE — 3045F PR MOST RECENT HEMOGLOBIN A1C LEVEL 7.0-9.0%: ICD-10-PCS | Mod: CPTII,S$GLB,, | Performed by: FAMILY MEDICINE

## 2019-02-28 PROCEDURE — 3008F PR BODY MASS INDEX (BMI) DOCUMENTED: ICD-10-PCS | Mod: CPTII,S$GLB,, | Performed by: FAMILY MEDICINE

## 2019-02-28 PROCEDURE — 99999 PR PBB SHADOW E&M-EST. PATIENT-LVL II: ICD-10-PCS | Mod: PBBFAC,,, | Performed by: FAMILY MEDICINE

## 2019-02-28 RX ORDER — AMOXICILLIN 500 MG/1
500 TABLET, FILM COATED ORAL EVERY 12 HOURS
Qty: 20 TABLET | Refills: 0 | Status: SHIPPED | OUTPATIENT
Start: 2019-02-28 | End: 2019-03-10

## 2019-02-28 RX ORDER — HYDROCODONE BITARTRATE AND ACETAMINOPHEN 10; 325 MG/1; MG/1
1 TABLET ORAL NIGHTLY PRN
Qty: 30 TABLET | Refills: 0 | Status: SHIPPED | OUTPATIENT
Start: 2019-02-28 | End: 2019-04-22 | Stop reason: SDUPTHER

## 2019-02-28 RX ORDER — GABAPENTIN 600 MG/1
600 TABLET ORAL DAILY
Qty: 180 TABLET | Refills: 3
Start: 2019-02-28 | End: 2019-03-08

## 2019-02-28 NOTE — PROGRESS NOTES
Subjective:       Patient ID: Nicolas Flaherty is a 63 y.o. male.    Chief Complaint: Fall and Sinus Problem    63 year old male with frequent falls. She feels the gabapentin makes him feel weak and he is falling. He states he has changed his gabapentin to 1 in the morning and 2 in  The evening. He would like to decrease this.     He also had an EGD and manometry due to the scopes. He states he has had sinus issues since then. He has had this for 3 weeks. He has had been coughing up mucus with blood. He state she is now getting cold with chills. He is heaving a frontal sinus headache, rhinorrhea, sneezing.    He also needs a refill of his hydrocodone for his chronic back pain.         Past Medical History:  No date: Anticoagulant long-term use  No date: Arthritis  No date: Cancer of kidney, right  No date: Colon polyp  No date: Coronary artery disease  No date: Diabetes mellitus  No date: Hypertension  No date: Kidney stone  No date: Sleep apnea  No date: Slurred speech  No date: Stroke      Comment:  MINI STROKE  No date: TIA (transient ischemic attack)  No date: Wears glasses   Past Surgical History:  No date: BACK SURGERY      Comment:  lower back  No date: CARDIAC SURGERY  07/2018: COLONOSCOPY  03/2008: CYSTOSCOPY  6/16/2015: CYSTOSCOPY with bilateral RETROGRADE and right   URETEROSCOPY, STENT PLACEMENT; Bilateral      Comment:  Performed by Dionte Christian MD at [facility] OR  3/8/2016: CYSTOSCOPY WITH RETROGRADE PYELOGRAM; Right      Comment:  Performed by Rosalia Acosta MD at [facility] OR  2/19/2019: EGD (ESOPHAGOGASTRODUODENOSCOPY); N/A      Comment:  Performed by Sylvia Treviño MD at [facility] ENDO (2ND FLR)  08/2018: ESOPHAGEAL MANOMETRY  6/16/2015: EVACUATION-CLOT      Comment:  Performed by Dionte Christian MD at [facility] OR  No date: excision right thigh mass  3/5/2018: EXCISION-MASS-THIGH; Right      Comment:  Performed by Jose Lewis MD at [facility] OR  6/16/2015: FULGURATION-BLADDER       Comment:  Performed by Dionte Christian MD at [facility] OR  No date: heart stents      Comment:  stents 4 total.  2010, 2011 and 2013  No date: HERNIA REPAIR      Comment:  incisional  2015: KIDNEY STONE SURGERY  No date: LAPAROSCOPIC NEPHRECTOMY, HAND ASSISTED; Right  No date: LITHOTRIPSY  2/19/2019: MANOMETRY, ESOPHAGUS, WITH IMPEDANCE MEASUREMENT; N/A      Comment:  Performed by Sylvia Treviño MD at [facility] ENDO (2ND FLR)  3/18/2016: NEPHRECTOMY-LAPAROSCOPIC; Right      Comment:  Performed by Rosalia Acosta MD at [facility] OR  6/16/2015: PLACEMENT, Shaver Catheter      Comment:  Performed by Dionte Christian MD at [facility] OR  6/16/2015: REMOVAL-STONE-URETERAL      Comment:  Performed by Dionte Christian MD at [facility] OR  8/19/2016: REPAIR-HERNIA-INCISIONAL; N/A      Comment:  Performed by Jose Lewis MD at [facility] OR  No date: UPPER GASTROINTESTINAL ENDOSCOPY      Comment:  7/10/2018,9/24/2010  3/8/2016: URETEROSCOPY; Right      Comment:  Performed by Rosalia Acosta MD at [facility] OR  No date: VASCULAR SURGERY  6/16/2015: WASHING-BLADDER      Comment:  Performed by Dionte Christian MD at [facility] OR  Review of patient's family history indicates:  Problem: Heart disease      Relation: Father          Age of Onset: (Not Specified)  Problem: Hypertension      Relation: Mother          Age of Onset: (Not Specified)  Problem: Parkinsonism      Relation: Mother          Age of Onset: (Not Specified)  Problem: Celiac disease      Relation: Neg Hx          Age of Onset: (Not Specified)  Problem: Cirrhosis      Relation: Neg Hx          Age of Onset: (Not Specified)  Problem: Colon cancer      Relation: Neg Hx          Age of Onset: (Not Specified)  Problem: Colon polyps      Relation: Neg Hx          Age of Onset: (Not Specified)  Problem: Crohn's disease      Relation: Neg Hx          Age of Onset: (Not Specified)  Problem: Cystic fibrosis      Relation: Neg Hx          Age of Onset: (Not Specified)  Problem:  Esophageal cancer      Relation: Neg Hx          Age of Onset: (Not Specified)  Problem: Hemochromatosis      Relation: Neg Hx          Age of Onset: (Not Specified)  Problem: Inflammatory bowel disease      Relation: Neg Hx          Age of Onset: (Not Specified)  Problem: Irritable bowel syndrome      Relation: Neg Hx          Age of Onset: (Not Specified)  Problem: Liver cancer      Relation: Neg Hx          Age of Onset: (Not Specified)  Problem: Liver disease      Relation: Neg Hx          Age of Onset: (Not Specified)  Problem: Rectal cancer      Relation: Neg Hx          Age of Onset: (Not Specified)  Problem: Stomach cancer      Relation: Neg Hx          Age of Onset: (Not Specified)  Problem: Ulcerative colitis      Relation: Neg Hx          Age of Onset: (Not Specified)  Problem: Kalia's disease      Relation: Neg Hx          Age of Onset: (Not Specified)  Problem: Lymphoma      Relation: Neg Hx          Age of Onset: (Not Specified)  Problem: Tuberculosis      Relation: Neg Hx          Age of Onset: (Not Specified)  Problem: Scleroderma      Relation: Neg Hx          Age of Onset: (Not Specified)  Problem: Rheum arthritis      Relation: Neg Hx          Age of Onset: (Not Specified)  Problem: Multiple sclerosis      Relation: Neg Hx          Age of Onset: (Not Specified)  Problem: Melanoma      Relation: Neg Hx          Age of Onset: (Not Specified)  Problem: Lupus      Relation: Neg Hx          Age of Onset: (Not Specified)  Problem: Psoriasis      Relation: Neg Hx          Age of Onset: (Not Specified)  Problem: Skin cancer      Relation: Neg Hx          Age of Onset: (Not Specified)    Social History    Socioeconomic History      Marital status:       Spouse name: Not on file      Number of children: Not on file      Years of education: Not on file      Highest education level: Not on file    Social Needs      Financial resource strain: Not on file      Food insecurity - worry: Not on file       Food insecurity - inability: Not on file      Transportation needs - medical: Not on file      Transportation needs - non-medical: Not on file    Occupational History      Not on file    Tobacco Use      Smoking status: Never Smoker      Smokeless tobacco: Current User        Types: Snuff      Tobacco comment: 20 plus years    Substance and Sexual Activity      Alcohol use: Yes        Comment: occas      Drug use: No      Sexual activity: Yes        Partners: Female    Other Topics      Concerns:        Not on file    Social History Narrative      Not on file          Review of Systems    Objective:       Vitals:    02/28/19 0858   BP: 110/86   Pulse: 78   Temp: 98.6 °F (37 °C)   TempSrc: Oral   SpO2: 98%   Weight: 122 kg (268 lb 15.4 oz)       Physical Exam   Constitutional: He is oriented to person, place, and time. He appears well-developed and well-nourished. No distress.   HENT:   Head: Normocephalic and atraumatic.   Right Ear: External ear normal.   Left Ear: External ear normal.   Nose: Right sinus exhibits no maxillary sinus tenderness and no frontal sinus tenderness. Left sinus exhibits maxillary sinus tenderness. Left sinus exhibits no frontal sinus tenderness.   Mouth/Throat: Oropharynx is clear and moist. No oropharyngeal exudate.   Neck: Normal range of motion. Neck supple.   Cardiovascular: Normal rate, regular rhythm and normal heart sounds. Exam reveals no gallop and no friction rub.   No murmur heard.  Pulmonary/Chest: Effort normal and breath sounds normal. No respiratory distress. He has no wheezes. He has no rales. He exhibits no tenderness.   Lymphadenopathy:     He has cervical adenopathy.   Neurological: He is alert and oriented to person, place, and time.   Skin: He is not diaphoretic.       Assessment:       1. DM (diabetes mellitus), type 2, uncontrolled w/neurologic complication    2. Acute non-recurrent maxillary sinusitis    3. Osteoarthritis of spine with radiculopathy, lumbar region         Plan:       Nicolas was seen today for fall and sinus problem.    Diagnoses and all orders for this visit:    DM (diabetes mellitus), type 2, uncontrolled w/neurologic complication  -     Hemoglobin A1c; Future  -     Comprehensive metabolic panel; Future  -     Lipid panel; Future  Due for labs      Acute non-recurrent maxillary sinusitis  -     amoxicillin (AMOXIL) 500 MG Tab; Take 1 tablet (500 mg total) by mouth every 12 (twelve) hours. for 10 days    Osteoarthritis of spine with radiculopathy, lumbar region  -     HYDROcodone-acetaminophen (NORCO)  mg per tablet; Take 1 tablet by mouth nightly as needed for Pain.    Other orders  -     gabapentin (NEURONTIN) 600 MG tablet; Take 1 tablet (600 mg total) by mouth once daily. Patient takes 600 mg am and 1200 mg qhs

## 2019-03-02 NOTE — TELEPHONE ENCOUNTER
----- Message from Tayler Hernandez sent at 1/22/2019 11:04 AM CST -----  Contact: Gaudencio PAULSON   Patient is requesting an order for Diabetic True Metric Meter and (Test strips and Lancets)  supplies. Please call at [phone] fax: [phone]   NBA MCDONALD  : 1957 11:26:35  ACCOUNT:  [id number]  HOME PHONE:  [phone]  WORK PHONE:  [phone]    Pt stated he had sleep study and CPAP was recommended. [facility], where he had [facility] study, told pt that he needed an order for CPAP machine from Dr. Díaz. bjf

## 2019-03-04 ENCOUNTER — TELEPHONE (OUTPATIENT)
Dept: GASTROENTEROLOGY | Facility: CLINIC | Age: 64
End: 2019-03-04

## 2019-03-07 ENCOUNTER — TELEPHONE (OUTPATIENT)
Dept: RADIOLOGY | Facility: HOSPITAL | Age: 64
End: 2019-03-07

## 2019-03-08 ENCOUNTER — HOSPITAL ENCOUNTER (OUTPATIENT)
Dept: RADIOLOGY | Facility: HOSPITAL | Age: 64
Discharge: HOME | End: 2019-03-08
Attending: NURSE PRACTITIONER
Payer: MEDICARE

## 2019-03-08 ENCOUNTER — OFFICE VISIT (OUTPATIENT)
Dept: GASTROENTEROLOGY | Facility: CLINIC | Age: 64
End: 2019-03-08
Payer: MEDICARE

## 2019-03-08 VITALS
SYSTOLIC BLOOD PRESSURE: 144 MMHG | WEIGHT: 268.06 LBS | HEIGHT: 67 IN | HEART RATE: 92 BPM | BODY MASS INDEX: 42.07 KG/M2 | DIASTOLIC BLOOD PRESSURE: 86 MMHG

## 2019-03-08 DIAGNOSIS — R13.19 ESOPHAGEAL DYSPHAGIA: ICD-10-CM

## 2019-03-08 DIAGNOSIS — Z51.81 THERAPEUTIC DRUG MONITORING: ICD-10-CM

## 2019-03-08 DIAGNOSIS — R07.9 CHEST PAIN, UNSPECIFIED TYPE: ICD-10-CM

## 2019-03-08 DIAGNOSIS — R13.19 ESOPHAGEAL DYSPHAGIA: Primary | ICD-10-CM

## 2019-03-08 PROCEDURE — 99499 RISK ADDL DX/OHS AUDIT: ICD-10-PCS | Mod: S$GLB,,, | Performed by: NURSE PRACTITIONER

## 2019-03-08 PROCEDURE — 3008F PR BODY MASS INDEX (BMI) DOCUMENTED: ICD-10-PCS | Mod: CPTII,S$GLB,, | Performed by: NURSE PRACTITIONER

## 2019-03-08 PROCEDURE — 99214 PR OFFICE/OUTPT VISIT, EST, LEVL IV, 30-39 MIN: ICD-10-PCS | Mod: S$GLB,,, | Performed by: NURSE PRACTITIONER

## 2019-03-08 PROCEDURE — 99499 UNLISTED E&M SERVICE: CPT | Mod: S$GLB,,, | Performed by: NURSE PRACTITIONER

## 2019-03-08 PROCEDURE — 74220 X-RAY XM ESOPHAGUS 1CNTRST: CPT | Mod: 26,,, | Performed by: RADIOLOGY

## 2019-03-08 PROCEDURE — 99999 PR PBB SHADOW E&M-EST. PATIENT-LVL V: CPT | Mod: PBBFAC,,, | Performed by: NURSE PRACTITIONER

## 2019-03-08 PROCEDURE — 74220 X-RAY XM ESOPHAGUS 1CNTRST: CPT | Mod: TC

## 2019-03-08 PROCEDURE — 3079F PR MOST RECENT DIASTOLIC BLOOD PRESSURE 80-89 MM HG: ICD-10-PCS | Mod: CPTII,S$GLB,, | Performed by: NURSE PRACTITIONER

## 2019-03-08 PROCEDURE — 99999 PR PBB SHADOW E&M-EST. PATIENT-LVL V: ICD-10-PCS | Mod: PBBFAC,,, | Performed by: NURSE PRACTITIONER

## 2019-03-08 PROCEDURE — 99214 OFFICE O/P EST MOD 30 MIN: CPT | Mod: S$GLB,,, | Performed by: NURSE PRACTITIONER

## 2019-03-08 PROCEDURE — 3077F PR MOST RECENT SYSTOLIC BLOOD PRESSURE >= 140 MM HG: ICD-10-PCS | Mod: CPTII,S$GLB,, | Performed by: NURSE PRACTITIONER

## 2019-03-08 PROCEDURE — 3077F SYST BP >= 140 MM HG: CPT | Mod: CPTII,S$GLB,, | Performed by: NURSE PRACTITIONER

## 2019-03-08 PROCEDURE — 3079F DIAST BP 80-89 MM HG: CPT | Mod: CPTII,S$GLB,, | Performed by: NURSE PRACTITIONER

## 2019-03-08 PROCEDURE — 3008F BODY MASS INDEX DOCD: CPT | Mod: CPTII,S$GLB,, | Performed by: NURSE PRACTITIONER

## 2019-03-08 PROCEDURE — 74220 FL ESOPHAGRAM COMPLETE: ICD-10-PCS | Mod: 26,,, | Performed by: RADIOLOGY

## 2019-03-08 RX ORDER — ERYTHROMYCIN 5 MG/G
OINTMENT OPHTHALMIC
Refills: 0 | COMMUNITY
Start: 2019-02-06 | End: 2019-04-22 | Stop reason: ALTCHOICE

## 2019-03-08 NOTE — PROGRESS NOTES
[facility] Consultation Note    Reason for Consult:    Chief Complaint   Patient presents with    Dysphagia         PCP:   Shyanne Alfredo       Referring MD:  No referring provider defined for this encounter.      HPI:  Nicolas Flaherty is a 63 y.o. male a PMH of HLD, HTN, h/o MI s/p stents on plavix x 6 yrs, h/o CVA, DM type 2, glaucoma, gout, s/p kidney CA s/p right nephrectomy, BPH, GAUTAM, back problems referred to motility clinic for second opinion regarding the following problems:    Chest pain.   Onset: few months ago  Frequency: no longer occurring  Followed by cardiology.        Triggers (cold fluids, caffeine, smoking, ETOH):not r/t eating/ swallowing. Not r/t activity. No SOB, no radiation.   Consumes mostly soft foods and liquids:regular diet  Caffeine intake:2 cups coffee /day  Negative cardiac workup:  Followed by cards for h/o MI w/ stents. Seen in ED/admit for obs this weekend. EKG and CHRISTIANO negative. Dx with unstable angina.     On nitro PRN for h/o MI.  Has not tried  Nifedipine,  peppermint oil, sildinafil, trazodone, botox.  No longer on isosorrbide dinitrate and now taking diltiazem 60 mg TID with resolution of chest pain, but no change in dysphagia.    Dysphagia.  Unchanged. No improvement with peppermint oil, mints, or peppermint tea. No improvement with diltiazem  Reports difficulty swallowing. Spitting up lots of phlegm.   Onset of symptoms:6-7 months  Problems with solids:yes  Problems with liquids:yes  Choking while eating:no   Coughing while eating:yes   Location: mid esopahgus  Frequency: couple days weekly  Improves with:coughing materials back up for relief. Sometimes better with washing with fluids, temporary improvement with esophageal dilation (x 1 week relief)  History of food impactions requiring ED visit:no  History of allergies:no meds  History of seasonal allergies:no  History of food allergies:no  History of eczema:no    GAUTAM. Does not sleep  with CPAP d/t claustrophobia. Has not seen sleep specialist about nasal treatment, but sleep study ordered by cardiologist in 1/2019 showing persistent sleep apnea. Awaiting CPAP to be sent by insurance.     Anemia. Low hgb.high RDW.     Colonoscopy on 7/10/18 with 1.5- 2 cm AC TVA w/ high grade glandular atypia and 2 mm TA with low grade glandular dysplasia. Rpt in 1 year.    DM type 2. BS running 145- 200s. Last A1C 8.4 in 10/2018.  On trulicity 1.5 mg once weekly. On amaryl 4mg am 6 mg HS. On nateglinide 60 mg BID. Followed by PCP.    Periferal neuropathy. Legs.  Unable to tolerate gabapentin 600 mg AM and 1200 mg HS d/t falling. Followed by PCP.     Chronic back pain. S/p vertebral compression. Percocet 5-325 mg once every few months. Per orthopedics.     H/o  Kidney CA. S/p right nephrectomy. Followed by nephrologist.     BPH. On flomax 0.4 mg daily per urology.     Dizziness. Cerebellar degeneration. Followed by neurology.     Denies GERD, nausea, vomiting, early satiety, abdominal pain, bloating, diarrhea, constipation, BRBPR, melena, weight loss, anxiety/depression, insomnia.       Total visit time was 30 minutes, more than 50% of which was spent in face-to-face counseling with patient regarding symptoms, diagnostic results, prognosis, risks and benefits of treatment options, instructions for management, importance of compliance with chosen treatment options, risk factor reduction, stress reduction, coping strategies.      Previous Studies:   Esophagram 3/8/19: Significant esophageal dysmotility compatible with diffuse esophageal spasm or corkscrew esophagus. 13 mm BT passed normally.  2/19/19: esophageal manometry: high les with borderline relaxation. Normalization of IRP upright. Hypercontractile esophagus (vs type III achalasia, less likely). Incomplete bolus clearance, abnormal multiple rapid swallow test with loss of inhibition, pan-esophageal pressurization and hypercontractile final contraction. Pt  unable to perform MRS correctly. No significant diff w/ maneuvers. Evidence of residual liquid in esophagus at the end of 200cc bolus. IRP normal 14.7, DCI high 70278.4 (highest 17,245.8), Dl normal 6.9.   EGD 2/19/19: abnormal esophageal motility (-). Nl stomach. Nl duodenum. Manometry catheter endoscopically placed.  Esophageal manometry 8/10/18: hyper contractile esophagus, no EGJ outflow obstruction. IRP nl 13.4, DCI high 9311.0, DL nl 6.3, 10% incomplete bolus clearance, 10% rapid contractions  Colon 7/10/18: GPTC. mild tics. 1.5-2cm AC polyp (TVA w/ high grade glandular atypia). 2 mm TC polyp (TA with low grade glandular dysplasia). Grade 2 IH. Rpt 1 yr  EGD 7/10/18: HH. Nl esophagus dilated with 54 fr Brown (-). Erosion, friability, granularity in stomach (mild chr inflamm, reactive glandular changes, and superficial vascular congestion).   MBSS 5/30/18: normal. Recommend GI consult to r/o esophageal dysphagia.   Abd xray 2/25/08: spine degeneration. 4 mm superior left renal calcific focus  CT abd 1/9/08: 3 mm obstructing ureteral stones. Mild left hydroureter and hydronephrosis. Multi rt renal stones. Sm left adrenal nodule c/w adrenal adenoma.  *EGD 9/24/10: ?    Labs:  12/3/18: TSH nl  12/2/18: CMP BUN high 35, creatinine high 1.8, GFR low 39, CBC hgb low 13.1, RDW high 14.8  7/29/18: hgb low 9.9, cr high 1.52, bun high 20 , cl high 109,   4/16/18: HbA1c high 9.2  2/21/08: cmp unremarkable, cbc nl  1/9/18: TSH nl  Hep c ab -  B 12/folate panel nl.     *per referring provider    ROS:  ROS   Constitutional: No fevers, no chills, no night sweats, no weight loss  ENT: No congestion, no rhinorrhea, + chronic sinus problems  CV: no chest pain, no palpitations  Pulm: No cough, + shortness of breath  Ophtho: No blurry vision, no eye redness  GI: see HPI  Derm: No rash  Heme: No lymphadenopathy, no bruising  MSK: No joint pain, no joint swelling, no Raynauds  : No dysuria, no frequent urination, no blood in  urine  Endo: no hot or cold intolerance  Neuro: no dizziness, no syncope, no seizure  Psych: No anxiety, no depression        Medical History:   Past Medical History:   Diagnosis Date    Anticoagulant long-term use     Arthritis     Cancer of kidney, right     Colon polyp     Coronary artery disease     Diabetes mellitus     Hypertension     Kidney stone     Sleep apnea     Slurred speech     Stroke     MINI STROKE    TIA (transient ischemic attack)     Wears glasses         Surgical History:   Past Surgical History:   Procedure Laterality Date    BACK SURGERY      lower back    CARDIAC SURGERY      COLONOSCOPY  07/2018    CYSTOSCOPY  03/2008    CYSTOSCOPY with bilateral RETROGRADE and right URETEROSCOPY, STENT PLACEMENT Bilateral 6/16/2015    [facility]    CYSTOSCOPY WITH RETROGRADE PYELOGRAM Right 3/8/2016    [facility] by [facility] at [facility] OR    EGD (ESOPHAGOGASTRODUODENOSCOPY) N/A 2/19/2019    Performed by Sylvia [facility]ño [facility] at [facility] (2ND FLR)    ESOPHAGEAL MANOMETRY  08/2018    EVACUATION-CLOT  6/16/2015    [facility] by [facility] at [facility] OR    excision right thigh mass      EXCISION-MASS-THIGH Right 3/5/2018    [facility]formed by Jose Lewis [facility] at [facility]    FULGURATION-BLADDER  6/16/2015    [facility] at [facility]    heart stents      stents 4 total.  2010, 2011 and 2013    HERNIA REPAIR      incisional    KIDNEY STONE SURGERY  2015    LAPAROSCOPIC NEPHRECTOMY, HAND ASSISTED Right     LITHOTRIPSY      MANOMETRY, ESOPHAGUS, WITH IMPEDANCE MEASUREMENT N/A 2/19/2019    Performed by Sylvia Treviño MD at [facility] (2ND FLR)    NEPHRECTOMY-LAPAROSCOPIC Right 3/18/2016    [facility] at [facility] OR    PLACEMENT, Shaver Catheter  6/16/2015    [facility] by [facility] at [facility] OR    REMOVAL-STONE-URETERAL  6/16/2015    [facility] at  [facility] OR    REPAIR-HERNIA-INCISIONAL N/A 8/19/2016    [facility]formed by [facility] at [facility] OR    UPPER GASTROINTESTINAL ENDOSCOPY      7/10/2018,9/24/2010    URETEROSCOPY Right 3/8/2016    [facility] OR    VASCULAR SURGERY      WASHING-BLADDER  6/16/2015    [facility]formed by [facility] at [facility] OR        Family History:   Family History   Problem Relation Age of Onset    Heart disease Father     Hypertension Mother     Parkinsonism Mother     Celiac disease Neg Hx     Cirrhosis Neg Hx     Colon cancer Neg Hx     Colon polyps Neg Hx     Crohn's disease Neg Hx     Cystic fibrosis Neg Hx     Esophageal cancer Neg Hx     Hemochromatosis Neg Hx     Inflammatory bowel disease Neg Hx     Irritable bowel syndrome Neg Hx     Liver cancer Neg Hx     Liver disease Neg Hx     Rectal cancer Neg Hx     Stomach cancer Neg Hx     Ulcerative colitis Neg Hx     Kalia's disease Neg Hx     Lymphoma Neg Hx     Tuberculosis Neg Hx     Scleroderma Neg Hx     Rheum arthritis Neg Hx     Multiple sclerosis Neg Hx     Melanoma Neg Hx     Lupus Neg Hx     Psoriasis Neg Hx     Skin cancer Neg Hx         Social History:   Social History     Socioeconomic History    Marital status:      Spouse name: None    Number of children: None    Years of education: None    Highest education level: None   Social Needs    Financial resource strain: None    Food insecurity - worry: None    Food insecurity - inability: None    Transportation needs - medical: None    Transportation needs - non-medical: None   Occupational History    None   Tobacco Use    Smoking status: Never Smoker    Smokeless tobacco: Current User     Types: Snuff    Tobacco comment: 20 plus years   Substance and Sexual Activity    Alcohol use: Yes     Comment: occas    Drug use: No    Sexual activity: Yes     Partners: Female   Other Topics Concern    None   Social History Narrative     None        Review of patient's allergies indicates:  No Known Allergies    Current Outpatient Medications   Medication Sig Dispense Refill    allopurinol (ZYLOPRIM) 100 MG tablet Take 100 mg by mouth once daily.      amoxicillin (AMOXIL) 500 MG Tab Take 1 tablet (500 mg total) by mouth every 12 (twelve) hours. for 10 days 20 tablet 0    aspirin (ECOTRIN) 81 MG EC tablet Take 81 mg by mouth once daily. LAST TAKEN 3/2/16      blood sugar diagnostic (ACCU-CHEK REJI PLUS TEST STRP) Strp TEST BLOOD SUGAR TWICE DAILY 200 strip 2    blood-glucose meter (ACCU-CHEK REJI PLUS METER) Mis CHECK TWICE DAILY 1 each 0    clopidogrel (PLAVIX) 75 mg tablet Take 75 mg by mouth every morning. LAST DOSE 3/2/16      diltiaZEM (CARDIZEM) 60 MG tablet Take 1 tablet (60 mg total) by mouth 3 (three) times daily. 270 tablet 1    docusate sodium (STOOL SOFTENER) 100 MG capsule Take 1 capsule (100 mg total) by mouth 2 (two) times daily. 180 capsule 3    dulaglutide (TRULICITY) 1.5 mg/0.5 mL PnIj Inject 1.5 mg into the skin every 7 days. 6 mL 3    erythromycin (ROMYCIN) ophthalmic ointment APPLY TO BOTH EYES AT BEDTIME  0    finasteride (PROSCAR) 5 mg tablet Take 1 tablet (5 mg total) by mouth every morning. 90 tablet 3    furosemide (LASIX) 40 MG tablet Take 40 mg by mouth 2 (two) times daily. ALTERNATES 1 TABLET ONE DAY AND 2 TABLETS THE NEXT--ALTERNATING DAYS      gemfibrozil (LOPID) 600 MG tablet Take 1 tablet (600 mg total) by mouth 2 (two) times daily before meals. 180 tablet 3    glimepiride (AMARYL) 4 MG tablet Take 1 tablet (4 mg total) by mouth 2 (two) times daily. 180 tablet 1    HYDROcodone-acetaminophen (NORCO)  mg per tablet Take 1 tablet by mouth nightly as needed for Pain. 30 tablet 0    lancets (ACCU-CHEK SOFTCLIX LANCETS) Misc 1 lancet by Misc.(Non-Drug; Combo Route) route 2 (two) times daily. 200 each 2    losartan (COZAAR) 50 MG tablet Take 50 mg by mouth once daily.      metoprolol succinate  "(TOPROL-XL) 100 MG 24 hr tablet Take 100 mg by mouth every morning.       nateglinide (STARLIX) 60 MG tablet TAKE 1 TABLET TWICE DAILY BEFORE MEALS 180 tablet 0    NITROSTAT 0.4 mg SL tablet Place 0.4 mg under the tongue every 5 (five) minutes as needed.       ranitidine (ZANTAC) 150 MG tablet TAKE 1 TABLET TWICE DAILY 180 tablet 5    rosuvastatin (CRESTOR) 20 MG tablet Take 1 tablet (20 mg total) by mouth once daily. 90 tablet 3    tamsulosin (FLOMAX) 0.4 mg Cap Take 1 capsule (0.4 mg total) by mouth once daily. 90 capsule 3    TRAVATAN Z 0.004 % Drop 1 drop every evening.        Current Facility-Administered Medications   Medication Dose Route Frequency Provider Last Rate Last Dose    lidocaine (PF) 10 mg/ml (1%) injection 10 mg  1 mL Intradermal Once Jose Lewis MD            Objective Findings:  Vital Signs:  BP (!) 144/86   Pulse 92   Ht 5' 7" (1.702 m)   Wt 121.6 kg (268 lb 1.3 oz)   BMI 41.99 kg/m²   Body mass index is 41.99 kg/m².    Physical Exam:  General appearance: alert, cooperative, no distress  HENT: Normocephalic, atraumatic, neck symmetrical, no nasal discharge  Eyes: conjunctivae/corneas clear, PERRL, EOM's intact  Lungs: clear to auscultation bilaterally, no dullness to percussion bilaterally  Heart: regular rate and rhythm without rub; no displacement of the PMI  Abdomen: soft, non-tender; bowel sounds normoactive; no organomegaly  Extremities: extremities symmetric; no clubbing, cyanosis, or edema  Integument: Skin color, texture, turgor normal; no rashes; hair distrubution normal  Neurologic: Alert and oriented X 3, normal strength, normal coordination and gait  Psychiatric: no pressured speech; normal affect; no evidence of impaired cognition    Labs:  Lab Results   Component Value Date    WBC 6.70 12/02/2018    HGB 13.1 (L) 12/02/2018    HCT 39.1 (L) 12/02/2018    MCV 85 12/02/2018     12/02/2018     No results found for: FERRITIN  Lab Results   Component Value Date    "  12/02/2018    K 4.0 12/02/2018     12/02/2018    CO2 21 (L) 12/02/2018     (H) 12/02/2018    BUN 35 (H) 12/02/2018    CREATININE 1.8 (H) 12/02/2018    CALCIUM 10.2 12/02/2018    PROT 8.1 12/02/2018    ALBUMIN 4.0 12/02/2018    BILITOT 0.3 12/02/2018    ALKPHOS 67 12/02/2018    AST 18 12/02/2018    ALT 21 12/02/2018     Lab Results   Component Value Date    TSH 2.267 12/03/2018     No results found for: SEDRATE  No results found for: CRP  Lab Results   Component Value Date    HGBA1C 8.4 (H) 10/31/2018           Assessment and Plan:  Nicolas Flaherty is a 63 y.o. male with a PMH of HLD, HTN, h/o MI s/p stents on plavix x 6 yrs, h/o CVA, DM type 2, glaucoma, gout, s/p kidney CA s/p right nephrectomy, BPH, GAUTAM, back problems referred to motility clinic for second opinion regarding the following problems:    Chest pain. Resolved.  Followed by cards for history of CAD w/ stents 4 years ago. Seen in ED and admitted for obsservation this just pror to s/s onsest. EKG and CHRISTIANO negative. Diagnosed with unstable angina. Followed by cardiology and seen after last motility clinic visit.  On nitro PRN since MI.  previously on  isosorrbide dinitrate 60 mg daily for HTN, but switched to diltiazem 60 mg TID with resolution of chest pain    Dysphagia.  Reports difficulty swallowing. Spitting up lots of phlegm. manometry at OSH w hypercontractile esophagus (IRP nl 14.7, DCI high 13,239.4). Esophagram with hypercontractile esophagus and corkscrewing, 13 mm BT passed normally. EGD with normal esophageal biopsies and evidence of dysmotility.  No improvement while on isosorbide  No improvement with diltiazem  No improvement with peppermint oil, mints and peppermint tea  Temporary improvement with esophageal dilation (x 1 week relief)  -Pt states he had EKG at [facility] on this past Sunday. Obtain report.   -Start nortriptyline 25 mg HS x 2 weeks, then 50 mg HS if QTc okay.     Anemia. Low hgb.high RDW. Recent  significant improvement.   Management as per Dr. Marquez     Colonoscopy on 7/10/18 with 1.5- 2 cm semi pedunculated ascending colon TVA w/ high grade glandular atypia completely resected via hot snare and EMR, and 2 mm transverse colon TA with low grade glandular dysplasia complete resected via cold bx.   Management as per Dr. Marquez - Rpt in 1 year (7/1019).    GAUTAM. Does not sleep with CPAP due to claustrophobia. Has not seen sleep specialist about alternative therapy. Repeat sleep study per cardiology with persistent sleep apnea. Awaiting CPAP delivery.    DM type 2. BS running 145- 200s. Last A1C 8.4 in 10/2018.  On trulicity 1.5 mg once weekly. On amaryl 4mg am 6 mg HS. On nateglinide 60 mg BID. Followed by PCP.    Periferal neuropathy. Legs.  Unable to tolerate gabapentin 600 mg AM and 1200 mg HS due to falling. Followed by PCP.     Chronic back pain. Secondary to disc disease. Takes percocet 5-325 mg once every few months. Per orthopedics.     History of  Kidney CA. Has had right nephrectomy. Followed by nephrologist.     BPH. On flomax 0.4 mg daily per urology.     Dizziness. Cerebellar degeneration. Followed by neurology.     *Case discussed with Dr. Treviño.     Follow-up in about 2 months (around 5/8/2019) for Motility w/ Dr. Treviño.    1. Esophageal dysphagia    2. Therapeutic drug monitoring          Order summary:  Orders Placed This Encounter    EKG 12-lead         Thank you so much for allowing me to participate in the care of Nicolas Ames, JOSE F, FNP-C

## 2019-03-08 NOTE — PATIENT INSTRUCTIONS
I will discuss adding trazodone (or switching trazodone with diltiazem) with Dr Treviño to treat the difficulty swallowing/greta hammer esophagus.

## 2019-04-03 ENCOUNTER — LAB VISIT (OUTPATIENT)
Dept: LAB | Facility: HOSPITAL | Age: 64
End: 2019-04-03
Attending: FAMILY MEDICINE
Payer: MEDICARE

## 2019-04-03 LAB
ALBUMIN SERPL BCP-MCNC: 4.2 G/DL (ref 3.5–5.2)
ALP SERPL-CCNC: 73 U/L (ref 55–135)
ALT SERPL W/O P-5'-P-CCNC: 35 U/L (ref 10–44)
ANION GAP SERPL CALC-SCNC: 10 MMOL/L (ref 8–16)
AST SERPL-CCNC: 34 U/L (ref 10–40)
BILIRUB SERPL-MCNC: 0.3 MG/DL (ref 0.1–1)
BUN SERPL-MCNC: 19 MG/DL (ref 8–23)
CALCIUM SERPL-MCNC: 10.5 MG/DL (ref 8.7–10.5)
CHLORIDE SERPL-SCNC: 101 MMOL/L (ref 95–110)
CHOLEST SERPL-MCNC: 169 MG/DL (ref 120–199)
CHOLEST/HDLC SERPL: 7.3 {RATIO} (ref 2–5)
CO2 SERPL-SCNC: 29 MMOL/L (ref 23–29)
CREAT SERPL-MCNC: 1.8 MG/DL (ref 0.5–1.4)
EST. GFR  (AFRICAN AMERICAN): 45 ML/MIN/1.73 M^2
EST. GFR  (NON AFRICAN AMERICAN): 39 ML/MIN/1.73 M^2
GLUCOSE SERPL-MCNC: 111 MG/DL (ref 70–110)
HDLC SERPL-MCNC: 23 MG/DL (ref 40–75)
HDLC SERPL: 13.6 % (ref 20–50)
LDLC SERPL CALC-MCNC: ABNORMAL MG/DL (ref 63–159)
NONHDLC SERPL-MCNC: 146 MG/DL
POTASSIUM SERPL-SCNC: 5 MMOL/L (ref 3.5–5.1)
PROT SERPL-MCNC: 8.3 G/DL (ref 6–8.4)
SODIUM SERPL-SCNC: 140 MMOL/L (ref 136–145)
TRIGL SERPL-MCNC: 416 MG/DL (ref 30–150)

## 2019-04-03 PROCEDURE — 80053 COMPREHEN METABOLIC PANEL: CPT

## 2019-04-03 PROCEDURE — 83036 HEMOGLOBIN GLYCOSYLATED A1C: CPT

## 2019-04-03 PROCEDURE — 80061 LIPID PANEL: CPT

## 2019-04-03 PROCEDURE — 36415 COLL VENOUS BLD VENIPUNCTURE: CPT | Mod: PO

## 2019-04-04 ENCOUNTER — TELEPHONE (OUTPATIENT)
Dept: FAMILY MEDICINE | Facility: CLINIC | Age: 64
End: 2019-04-04

## 2019-04-04 LAB
ESTIMATED AVG GLUCOSE: 226 MG/DL (ref 68–131)
ESTIMATED AVG GLUCOSE: 226 MG/DL (ref 68–131)
HBA1C MFR BLD HPLC: 9.5 % (ref 4–5.6)
HBA1C MFR BLD HPLC: 9.5 % (ref 4–5.6)

## 2019-04-04 NOTE — TELEPHONE ENCOUNTER
Call placed to Pt and informed of 's order to monitor CBG BID, before breakfast and after largest meal daily. He will bring log in with him to visit in 2 weeks. Pt acknowledged understanding.

## 2019-04-04 NOTE — TELEPHONE ENCOUNTER
Needs appt for diabetes  Bring in sugar labs BID fasting and 2 hours after largest meal. Bring in all medications to visit please

## 2019-04-08 ENCOUNTER — PATIENT OUTREACH (OUTPATIENT)
Dept: ADMINISTRATIVE | Facility: HOSPITAL | Age: 64
End: 2019-04-08

## 2019-04-08 NOTE — LETTER
April 8, 2019                 [facility]  [address]  [facility] [address]  Phone: [phone] April 8, 2019     Patient: Nicolas Flaherty    YOB: 1955                                              We are seeing Nicolas Flaherty in the clinic at [facility].  Shyanne Alfredo MD is their PCP. He has an outstanding lab/procedure at the time we reviewed their chart.  To help with our Health Maintenance records will you please supply the following report(s):                                                                                                                                                         [x]  Foot Exam                                                                                                      Please Fax to [facility] at [phone].    Thank you for your help. If my Care Coordinator can be of any assistance, you can call Gloria Cronin MA-[facility] at [phone]

## 2019-04-22 ENCOUNTER — OFFICE VISIT (OUTPATIENT)
Dept: FAMILY MEDICINE | Facility: CLINIC | Age: 64
End: 2019-04-22
Payer: MEDICARE

## 2019-04-22 VITALS
OXYGEN SATURATION: 97 % | HEART RATE: 84 BPM | BODY MASS INDEX: 40.83 KG/M2 | WEIGHT: 260.13 LBS | TEMPERATURE: 98 F | SYSTOLIC BLOOD PRESSURE: 124 MMHG | HEIGHT: 67 IN | RESPIRATION RATE: 16 BRPM | DIASTOLIC BLOOD PRESSURE: 70 MMHG

## 2019-04-22 DIAGNOSIS — E11.22 TYPE 2 DIABETES MELLITUS WITH STAGE 3 CHRONIC KIDNEY DISEASE, WITHOUT LONG-TERM CURRENT USE OF INSULIN: Primary | ICD-10-CM

## 2019-04-22 DIAGNOSIS — M47.26 OSTEOARTHRITIS OF SPINE WITH RADICULOPATHY, LUMBAR REGION: ICD-10-CM

## 2019-04-22 DIAGNOSIS — N18.30 TYPE 2 DIABETES MELLITUS WITH STAGE 3 CHRONIC KIDNEY DISEASE, WITHOUT LONG-TERM CURRENT USE OF INSULIN: Primary | ICD-10-CM

## 2019-04-22 PROCEDURE — 3008F BODY MASS INDEX DOCD: CPT | Mod: CPTII,S$GLB,, | Performed by: FAMILY MEDICINE

## 2019-04-22 PROCEDURE — 99214 OFFICE O/P EST MOD 30 MIN: CPT | Mod: S$GLB,,, | Performed by: FAMILY MEDICINE

## 2019-04-22 PROCEDURE — 3008F PR BODY MASS INDEX (BMI) DOCUMENTED: ICD-10-PCS | Mod: CPTII,S$GLB,, | Performed by: FAMILY MEDICINE

## 2019-04-22 PROCEDURE — 3074F PR MOST RECENT SYSTOLIC BLOOD PRESSURE < 130 MM HG: ICD-10-PCS | Mod: CPTII,S$GLB,, | Performed by: FAMILY MEDICINE

## 2019-04-22 PROCEDURE — 3046F PR MOST RECENT HEMOGLOBIN A1C LEVEL > 9.0%: ICD-10-PCS | Mod: CPTII,S$GLB,, | Performed by: FAMILY MEDICINE

## 2019-04-22 PROCEDURE — 3074F SYST BP LT 130 MM HG: CPT | Mod: CPTII,S$GLB,, | Performed by: FAMILY MEDICINE

## 2019-04-22 PROCEDURE — 3078F PR MOST RECENT DIASTOLIC BLOOD PRESSURE < 80 MM HG: ICD-10-PCS | Mod: CPTII,S$GLB,, | Performed by: FAMILY MEDICINE

## 2019-04-22 PROCEDURE — 3078F DIAST BP <80 MM HG: CPT | Mod: CPTII,S$GLB,, | Performed by: FAMILY MEDICINE

## 2019-04-22 PROCEDURE — 3046F HEMOGLOBIN A1C LEVEL >9.0%: CPT | Mod: CPTII,S$GLB,, | Performed by: FAMILY MEDICINE

## 2019-04-22 PROCEDURE — 99999 PR PBB SHADOW E&M-EST. PATIENT-LVL III: ICD-10-PCS | Mod: PBBFAC,,, | Performed by: FAMILY MEDICINE

## 2019-04-22 PROCEDURE — 99214 PR OFFICE/OUTPT VISIT, EST, LEVL IV, 30-39 MIN: ICD-10-PCS | Mod: S$GLB,,, | Performed by: FAMILY MEDICINE

## 2019-04-22 PROCEDURE — 99999 PR PBB SHADOW E&M-EST. PATIENT-LVL III: CPT | Mod: PBBFAC,,, | Performed by: FAMILY MEDICINE

## 2019-04-22 RX ORDER — HYDROCODONE BITARTRATE AND ACETAMINOPHEN 10; 325 MG/1; MG/1
1 TABLET ORAL NIGHTLY PRN
Qty: 30 TABLET | Refills: 0 | Status: SHIPPED | OUTPATIENT
Start: 2019-04-22 | End: 2019-05-22 | Stop reason: SDUPTHER

## 2019-04-22 NOTE — PROGRESS NOTES
Subjective:       Patient ID: Nicolas Flaherty is a 63 y.o. male.    Chief Complaint: No chief complaint on file.    63 year-old female with diabetes presents with for follow up on diabetes . He has an A1C of 9.4. He admits to eating 3 bags of black jelly beans with sugars in the 200's after meals. He states now he has stopped the jelly beans and has post prandials from the 160's -200's. He has no hypoglycemia. He is on Trulicity 1.5mg, Starlix 60 mg BID, and glimepiride 4 mg BID.     Past Medical History:  No date: Anticoagulant long-term use  No date: Arthritis  No date: Cancer of kidney, right  No date: Colon polyp  No date: Coronary artery disease  No date: Diabetes mellitus  No date: Hypertension  No date: Kidney stone  No date: Sleep apnea  No date: Slurred speech  No date: Stroke      Comment:  MINI STROKE  No date: TIA (transient ischemic attack)  No date: Wears glasses   Past Surgical History:  No date: BACK SURGERY      Comment:  lower back  No date: CARDIAC SURGERY  07/2018: COLONOSCOPY  03/2008: CYSTOSCOPY  6/16/2015: CYSTOSCOPY with bilateral RETROGRADE and right   URETEROSCOPY, STENT PLACEMENT; Bilateral      Comment:  Performed by Dionte Christian MD at [facility] OR  3/8/2016: CYSTOSCOPY WITH RETROGRADE PYELOGRAM; Right      Comment:  Performed by Rosalia Acosta MD at [facility] OR  2/19/2019: EGD (ESOPHAGOGASTRODUODENOSCOPY); N/A      Comment:  Performed by Sylvia Treviño MD at [facility] ENDO ([address] [place] [address])  08/2018: ESOPHAGEAL MANOMETRY  6/16/2015: EVACUATION-CLOT      Comment:  Performed by Dionte Christian MD at [facility] OR  No date: excision right thigh mass  3/5/2018: EXCISION-MASS-THIGH; Right      Comment:  Performed by Jose Lewis MD at [facility] OR  6/16/2015: FULGURATION-BLADDER      Comment:  Performed by Dionte Christian MD at [facility] OR  No date: heart stents      Comment:  stents 4 total.  2010, 2011 and 2013  No date: HERNIA REPAIR      Comment:  incisional  2015: KIDNEY STONE  SURGERY  No date: LAPAROSCOPIC NEPHRECTOMY, HAND ASSISTED; Right  No date: LITHOTRIPSY  2/19/2019: MANOMETRY, ESOPHAGUS, WITH IMPEDANCE MEASUREMENT; N/A      Comment:  Performed by Sylvia Treviño MD at [facility] ([facility])  3/18/2016: NEPHRECTOMY-LAPAROSCOPIC; Right      Comment:  Performed by Rosalia Acosta MD at [facility] OR  6/16/2015: PLACEMENT, Shaver Catheter      Comment:  Performed by Dionte Christian MD at [facility] OR  6/16/2015: REMOVAL-STONE-URETERAL      Comment:  Performed by Dionte Christian MD at [facility] OR  8/19/2016: REPAIR-HERNIA-INCISIONAL; N/A      Comment:  Performed by Jose Lewis MD at [facility] OR  No date: UPPER GASTROINTESTINAL ENDOSCOPY      Comment:  7/10/2018,9/24/2010  3/8/2016: URETEROSCOPY; Right      Comment:  Performed by Rosalia Acosta MD at [facility] OR  No date: VASCULAR SURGERY  6/16/2015: WASHING-BLADDER      Comment:  Performed by Dionte Christian MD at [facility] OR  Review of patient's family history indicates:  Problem: Heart disease      Relation: Father          Age of Onset: (Not Specified)  Problem: Hypertension      Relation: Mother          Age of Onset: (Not Specified)  Problem: Parkinsonism      Relation: Mother          Age of Onset: (Not Specified)  Problem: Celiac disease      Relation: Neg Hx          Age of Onset: (Not Specified)  Problem: Cirrhosis      Relation: Neg Hx          Age of Onset: (Not Specified)  Problem: Colon cancer      Relation: Neg Hx          Age of Onset: (Not Specified)  Problem: Colon polyps      Relation: Neg Hx          Age of Onset: (Not Specified)  Problem: Crohn's disease      Relation: Neg Hx          Age of Onset: (Not Specified)  Problem: Cystic fibrosis      Relation: Neg Hx          Age of Onset: (Not Specified)  Problem: Esophageal cancer      Relation: Neg Hx          Age of Onset: (Not Specified)  Problem: Hemochromatosis      Relation: Neg Hx          Age of Onset: (Not Specified)  Problem: Inflammatory bowel disease       Relation: Neg Hx          Age of Onset: (Not Specified)  Problem: Irritable bowel syndrome      Relation: Neg Hx          Age of Onset: (Not Specified)  Problem: Liver cancer      Relation: Neg Hx          Age of Onset: (Not Specified)  Problem: Liver disease      Relation: Neg Hx          Age of Onset: (Not Specified)  Problem: Rectal cancer      Relation: Neg Hx          Age of Onset: (Not Specified)  Problem: Stomach cancer      Relation: Neg Hx          Age of Onset: (Not Specified)  Problem: Ulcerative colitis      Relation: Neg Hx          Age of Onset: (Not Specified)  Problem: Kalia's disease      Relation: Neg Hx          Age of Onset: (Not Specified)  Problem: Lymphoma      Relation: Neg Hx          Age of Onset: (Not Specified)  Problem: Tuberculosis      Relation: Neg Hx          Age of Onset: (Not Specified)  Problem: Scleroderma      Relation: Neg Hx          Age of Onset: (Not Specified)  Problem: Rheum arthritis      Relation: Neg Hx          Age of Onset: (Not Specified)  Problem: Multiple sclerosis      Relation: Neg Hx          Age of Onset: (Not Specified)  Problem: Melanoma      Relation: Neg Hx          Age of Onset: (Not Specified)  Problem: Lupus      Relation: Neg Hx          Age of Onset: (Not Specified)  Problem: Psoriasis      Relation: Neg Hx          Age of Onset: (Not Specified)  Problem: Skin cancer      Relation: Neg Hx          Age of Onset: (Not Specified)    Social History    Socioeconomic History      Marital status:       Spouse name: Not on file      Number of children: Not on file      Years of education: Not on file      Highest education level: Not on file    Occupational History      Not on file    Social Needs      Financial resource strain: Not on file      Food insecurity:        Worry: Not on file        Inability: Not on file      Transportation needs:        Medical: Not on file        Non-medical: Not on file    Tobacco Use      Smoking status: Never  "Smoker      Smokeless tobacco: Current User        Types: Snuff      Tobacco comment: 20 plus years    Substance and Sexual Activity      Alcohol use: Yes        Comment: occas      Drug use: No      Sexual activity: Yes        Partners: Female    Lifestyle      Physical activity:        Days per week: Not on file        Minutes per session: Not on file      Stress: Not on file    Relationships      Social connections:        Talks on phone: Not on file        Gets together: Not on file        Attends Zoroastrianism service: Not on file        Active member of club or organization: Not on file        Attends meetings of clubs or organizations: Not on file        Relationship status: Not on file    Other Topics      Concerns:        Not on file    Social History Narrative      Not on file        Review of Systems    Objective:       Vitals:    04/22/19 0811   BP: 124/70   Pulse: 84   Resp: 16   Temp: 98.1 °F (36.7 °C)   TempSrc: Oral   SpO2: 97%   Weight: 118 kg (260 lb 2.3 oz)   Height: 5' 7" (1.702 m)       Physical Exam   Constitutional: He is oriented to person, place, and time. He appears well-developed and well-nourished. No distress.   HENT:   Head: Atraumatic.   Neck: Normal range of motion. Neck supple.   Pulmonary/Chest: Effort normal.   Musculoskeletal:   Ambulates with a walker   Neurological: He is alert and oriented to person, place, and time.   Slurred speech at baseline   Skin: He is not diaphoretic.       Assessment:       1. Type 2 diabetes mellitus with stage 3 chronic kidney disease, without long-term current use of insulin    2. DM (diabetes mellitus), type 2, uncontrolled w/neurologic complication    3. Osteoarthritis of spine with radiculopathy, lumbar region        Plan:       Diagnoses and all orders for this visit:    Type 2 diabetes mellitus with stage 3 chronic kidney disease, without long-term current use of insulin    DM (diabetes mellitus), type 2, uncontrolled w/neurologic complication  -   "   Discontinue: SITagliptin (JANUVIA) 50 MG Tab; Take 1 tablet (50 mg total) by mouth once daily.  -     Ambulatory referral to Podiatry  -     SITagliptin (JANUVIA) 50 MG Tab; Take 1 tablet (50 mg total) by mouth once daily.    Osteoarthritis of spine with radiculopathy, lumbar region  -     HYDROcodone-acetaminophen (NORCO)  mg per tablet; Take 1 tablet by mouth nightly as needed for Pain.    HE IS ON STARLIX 60 MG BID, GLIMEPIRIDE 4 MG BID, AND TRULICITY 1.5 MG. WILL ADD JANUVIA 50MG DUE TO KIDNEY DISEASE.      Follow up in about 1 month (around 5/22/2019).  If no improvement in number with addition and with diet change as we did dietary counseling today then long acting insulin as well. He is resistant to this as well.

## 2019-04-23 ENCOUNTER — HOSPITAL ENCOUNTER (OUTPATIENT)
Dept: RADIOLOGY | Facility: HOSPITAL | Age: 64
Discharge: HOME | End: 2019-04-23
Attending: UROLOGY
Payer: MEDICARE

## 2019-04-23 DIAGNOSIS — C64.1 RENAL CELL CARCINOMA OF RIGHT KIDNEY: ICD-10-CM

## 2019-04-23 PROCEDURE — 74176 CT CHEST ABDOMEN PELVIS WITHOUT CONTRAST(XPD): ICD-10-PCS | Mod: 26,,, | Performed by: RADIOLOGY

## 2019-04-23 PROCEDURE — 74176 CT ABD & PELVIS W/O CONTRAST: CPT | Mod: TC

## 2019-04-23 PROCEDURE — 74176 CT ABD & PELVIS W/O CONTRAST: CPT | Mod: 26,,, | Performed by: RADIOLOGY

## 2019-04-23 PROCEDURE — 71250 CT CHEST ABDOMEN PELVIS WITHOUT CONTRAST(XPD): ICD-10-PCS | Mod: 26,,, | Performed by: RADIOLOGY

## 2019-04-23 PROCEDURE — 71250 CT THORAX DX C-: CPT | Mod: 26,,, | Performed by: RADIOLOGY

## 2019-04-23 PROCEDURE — 71250 CT THORAX DX C-: CPT | Mod: TC

## 2019-04-26 DIAGNOSIS — K22.4 HYPERCONTRACTILE ESOPHAGUS: ICD-10-CM

## 2019-04-26 DIAGNOSIS — K21.9 GASTROESOPHAGEAL REFLUX DISEASE, ESOPHAGITIS PRESENCE NOT SPECIFIED: ICD-10-CM

## 2019-04-26 NOTE — TELEPHONE ENCOUNTER
----- Message from Yohana Sheffield sent at 4/26/2019  9:18 AM CDT -----  Contact: Self- [phone]                                Prescription Refill Request  Name of medication and dose diltiaZEM (CARDIZEM) 60 MG tablet    Pharmacy [facility] - [place], [place] - [address] AT Portal to [facility] Sites [phone]      Are you completely out of your medication YES    Additional Information:  Pt requesting a 90 day supply

## 2019-04-26 NOTE — TELEPHONE ENCOUNTER
----- Message from Erin Freire sent at 4/26/2019  9:14 AM CDT -----  Contact: saray Escamilla the clinic reply in [facility]: no      Please refill the medication(s) listed below. Please call the patient when the prescription(s) is ready for  at this phone number  [phone]      Medication #1 ranitidine (ZANTAC) 150 MG tablet    Medication #2       Preferred Pharmacy: [facility]

## 2019-04-29 RX ORDER — DILTIAZEM HYDROCHLORIDE 60 MG/1
60 TABLET, FILM COATED ORAL 3 TIMES DAILY
Qty: 270 TABLET | Refills: 1 | Status: SHIPPED | OUTPATIENT
Start: 2019-04-29 | End: 2019-06-25 | Stop reason: SDUPTHER

## 2019-04-30 ENCOUNTER — OFFICE VISIT (OUTPATIENT)
Dept: UROLOGY | Facility: CLINIC | Age: 64
End: 2019-04-30
Payer: MEDICARE

## 2019-04-30 VITALS
BODY MASS INDEX: 41.37 KG/M2 | HEART RATE: 68 BPM | SYSTOLIC BLOOD PRESSURE: 128 MMHG | DIASTOLIC BLOOD PRESSURE: 60 MMHG | RESPIRATION RATE: 16 BRPM | HEIGHT: 67 IN | WEIGHT: 263.56 LBS

## 2019-04-30 DIAGNOSIS — R31.0 GROSS HEMATURIA: ICD-10-CM

## 2019-04-30 DIAGNOSIS — N20.0 NEPHROLITHIASIS: ICD-10-CM

## 2019-04-30 DIAGNOSIS — K43.2 INCISIONAL HERNIA, WITHOUT OBSTRUCTION OR GANGRENE: ICD-10-CM

## 2019-04-30 DIAGNOSIS — R91.8 PULMONARY NODULES: ICD-10-CM

## 2019-04-30 DIAGNOSIS — C64.1 RENAL CELL CARCINOMA OF RIGHT KIDNEY: Primary | ICD-10-CM

## 2019-04-30 PROCEDURE — 99214 OFFICE O/P EST MOD 30 MIN: CPT | Mod: S$GLB,,, | Performed by: UROLOGY

## 2019-04-30 PROCEDURE — 3078F DIAST BP <80 MM HG: CPT | Mod: CPTII,S$GLB,, | Performed by: UROLOGY

## 2019-04-30 PROCEDURE — 99499 RISK ADDL DX/OHS AUDIT: ICD-10-PCS | Mod: S$GLB,,, | Performed by: UROLOGY

## 2019-04-30 PROCEDURE — 3074F SYST BP LT 130 MM HG: CPT | Mod: CPTII,S$GLB,, | Performed by: UROLOGY

## 2019-04-30 PROCEDURE — 99499 UNLISTED E&M SERVICE: CPT | Mod: S$GLB,,, | Performed by: UROLOGY

## 2019-04-30 PROCEDURE — 99999 PR PBB SHADOW E&M-EST. PATIENT-LVL III: CPT | Mod: PBBFAC,,, | Performed by: UROLOGY

## 2019-04-30 PROCEDURE — 3078F PR MOST RECENT DIASTOLIC BLOOD PRESSURE < 80 MM HG: ICD-10-PCS | Mod: CPTII,S$GLB,, | Performed by: UROLOGY

## 2019-04-30 PROCEDURE — 99999 PR PBB SHADOW E&M-EST. PATIENT-LVL III: ICD-10-PCS | Mod: PBBFAC,,, | Performed by: UROLOGY

## 2019-04-30 PROCEDURE — 3008F PR BODY MASS INDEX (BMI) DOCUMENTED: ICD-10-PCS | Mod: CPTII,S$GLB,, | Performed by: UROLOGY

## 2019-04-30 PROCEDURE — 3074F PR MOST RECENT SYSTOLIC BLOOD PRESSURE < 130 MM HG: ICD-10-PCS | Mod: CPTII,S$GLB,, | Performed by: UROLOGY

## 2019-04-30 PROCEDURE — 99214 PR OFFICE/OUTPT VISIT, EST, LEVL IV, 30-39 MIN: ICD-10-PCS | Mod: S$GLB,,, | Performed by: UROLOGY

## 2019-04-30 PROCEDURE — 3008F BODY MASS INDEX DOCD: CPT | Mod: CPTII,S$GLB,, | Performed by: UROLOGY

## 2019-04-30 NOTE — PROGRESS NOTES
Subjective:       Nicolas Flaherty is a 63 y.o. male who is an established patient who was seen for evaluation of nephrolithiasis and hematuria.      Last seen by Dr Christian 7/15. He has a h/o stones and is s/p ESWL and URS in the past. He has had issues with gross hematuria and has been worked up for this. He does take Plavix and ASA regularly. He has significant cardiac issues, especially when off Plavix.     He also has BPH with some LUTS. Nocturia x 4. He takes Flomax and Proscar.     R URS 6/15 for stones. Stone analysis - CaOx mono. 24hr stone risk analysis - hyperoxalaturia. He is taking allopurinol currently. Unsure efficacy of this.    KAREN 4/15 - normal, no hydro. 1.5cm simple cyst RUP. 4mm R MP stone. PVR 51cc.  KAREN 1/16 - no stone seen. 2.9cm lesion in R LP concerning for neoplasm. Not seen on previous study.   CT scan shows 3.2cm enhancing mass in R kidney, endophytic.    He is s/p R URS to r/o UC. No tumor seen therefore now s/p R lap radical nephrectomy on 3/18/16 for renal mass. 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 Cr 1.4 at baseline. Post-op Cr was 2.1 at discharge. He is followed by nephrology (Dr Short).     Initial post-operative check was fine. Staples were removed.    He was noted to have R sided hernia at the area of nephrectomy. He has had this repaired by Dr Lewis in 8/16. He has healed well from this.    CT 10/16 is clear of recurrence. Hernia repair noted 8/16. Cr 2.1.    CT and CXR 4/17, 10/17, 4/18 - clear of recurrence, Cr 4/18 - 2.0    CT and CXR 10/18 - essentially clear though increase in pulm nodules.     CT c/a/p (noncon 2/2 CKD) 4/19 - no local recurrence, increase in pulm nodules concerning for mets (up to 8mm), R abdominal wall hernia.     He continues to c/o R flank pain and swelling. DM2 poorly controlled.       The following portions of the patient's history were reviewed and updated as appropriate: allergies, current medications,  "past family history, past medical history, past social history, past surgical history and problem list.    Review of Systems  Constitutional: no fever or chills  ENT: no nasal congestion or sore throat  Respiratory: no cough or shortness of breath  Cardiovascular: no chest pain or palpitations  Gastrointestinal: no nausea or vomiting, tolerating diet  Genitourinary: as per HPI  Hematologic/Lymphatic: no easy bruising or lymphadenopathy  Musculoskeletal: no arthralgias or myalgias  Skin: no rashes or lesions  Neurological: no seizures or tremors  Behavioral/Psych: no auditory or visual hallucinations       Objective:    Vitals:   /60   Pulse 68   Resp 16   Ht 5' 7" (1.702 m)   Wt 119.6 kg (263 lb 9 oz)   BMI 41.28 kg/m²     Physical Exam   General: well developed, well nourished in no acute distress  Head: normocephalic, atraumatic  Neck: supple, trachea midline, no obvious enlargement of thyroid  HEENT: EOMI, mucus membranes moist, sclera anicteric, no hearing impairment  Lungs: symmetric expansion, non-labored breathing  Cardiovascular: regular rate and rhythm, normal pulses  Abdomen: soft, non tender, non distended, no palpable masses, no hepatosplenomegaly, no hernias, bladder nonpalpable. No CVA tenderness.  Musculoskeletal: no peripheral edema, normal ROM in bilateral upper and lower extremities  Lymphatics: no cervical or inguinal lymphadenopathy  Skin: no rashes or lesions  Neuro: alert and oriented x 3, no gross deficits  Psych: normal judgment and insight, normal mood/affect and non-anxious  Genitourinary: (last visit)  Penis -  circumcised penis without plaques, lesions, or scarring.  Urethra - orthotopic location without stenosis.  Scrotum  - no lesions or rashes, no hydrocele or hernia.  Testes - descended bilaterally, symmetric without masses, non tender.  Epididymides - no cysts or lesions, no spermatocele, symmetric   No evidence of any testicular/scrotal infection   ILDA: patient declined " exam    Inc: well healed, s/p hernia repair.     Lab Review   Urine analysis today in clinic shows - 5-10 RBC    Lab Results   Component Value Date    WBC 6.70 12/02/2018    HGB 13.1 (L) 12/02/2018    HCT 39.1 (L) 12/02/2018    MCV 85 12/02/2018     12/02/2018     Lab Results   Component Value Date    CREATININE 1.8 (H) 04/03/2019    BUN 19 04/03/2019     No results found for: PSA  Lab Results   Component Value Date    PSADIAG 0.23 04/21/2015       Imaging  KAREN/CT reviewed  Reports and images were personally reviewed by me and discussed with the patient today         Assessment/Plan:      1. Renal cell carcinoma    - Baseline Cr 1.4, new baseline 2.2.   - s/p R lap nephrectomy 3/18/16, pT3aNx, FG2, ccRCC   - CT a/p and CXR clear 4/17, 10/17, 4/18   - Follow up plan: CT q6m x 3y (3/19), q1y to year 5 (3/21). CXR q6m x 5yrs   - s/p hernia repair by Dr Lewis at kidney extraction site (8/16) - recurrent, declines repeat referral for now.    - CT c/a/p 4/19 - increase in pulm nodules, concern for mets   - Recommend consultation with oncology - ?PET     2. Nephrolithiasis    - Punctate stone in R LP   - CaOx mono stones   - 24hr urine collection - hyperoxalaturia. Recommend Ca intake when oxalate taken PO. Low oxalate diet.    - He is taking allopurinol per RCA. Unsure efficacy of this. Instructed him he can stop this.    - Discussed low oxalate diet   - No stones on CT     3. Hematuria, gross    - Negative cysto 6/15, 3/16   - RCC - suspect reason for hematuria     4. Benign non-nodular prostatic hyperplasia with lower urinary tract symptoms    - Continue Flomax/Proscar   - PVD - urethral milking           Follow up in 2 months

## 2019-05-02 ENCOUNTER — TELEPHONE (OUTPATIENT)
Dept: FAMILY MEDICINE | Facility: CLINIC | Age: 64
End: 2019-05-02

## 2019-05-02 NOTE — TELEPHONE ENCOUNTER
Patient's wife stated that she was advised by the pharmacy that patient is not to take Januvia.  Stated they were not notified that patient was not to take medication.  Would like to clarify this with Dr. Alfredo.     Please advise.

## 2019-05-02 NOTE — TELEPHONE ENCOUNTER
"----- Message from Irene Lona sent at 5/2/2019  2:48 PM CDT -----  Contact: Ellie "wife"  [phone]  .Type: Patient Call Back    Who called: Ellie "wife"    What is the request in detail: Pt went to  JANUVIA 50 MG Tab from the pharmacy and was told the Rx was denied.  .  [facility] - [place], [place] - [address] AT Portal to [place] [address]  [facility] [address]  Phone: [phone] Fax: [phone]    Can the clinic reply by [facility]? Call back     Would the patient rather a call back or a response via [facility]?  Call back     Best call back number: [phone]    Additional Information:      "

## 2019-05-08 NOTE — TELEPHONE ENCOUNTER
"Call placed to Pt, spoke with his wife Ellie. Informed her that Pt's med's was not stopped by Provider. She acknowledged understanding and stated, "Thank you".   "

## 2019-05-09 ENCOUNTER — INITIAL CONSULT (OUTPATIENT)
Dept: HEMATOLOGY/ONCOLOGY | Facility: CLINIC | Age: 64
End: 2019-05-09
Payer: MEDICARE

## 2019-05-09 ENCOUNTER — LAB VISIT (OUTPATIENT)
Dept: LAB | Facility: HOSPITAL | Age: 64
End: 2019-05-09
Attending: INTERNAL MEDICINE
Payer: MEDICARE

## 2019-05-09 VITALS
DIASTOLIC BLOOD PRESSURE: 70 MMHG | WEIGHT: 258.38 LBS | TEMPERATURE: 99 F | OXYGEN SATURATION: 95 % | SYSTOLIC BLOOD PRESSURE: 131 MMHG | HEART RATE: 94 BPM | BODY MASS INDEX: 40.55 KG/M2 | HEIGHT: 67 IN

## 2019-05-09 DIAGNOSIS — Z90.5 STATUS POST NEPHRECTOMY: ICD-10-CM

## 2019-05-09 DIAGNOSIS — R91.8 PULMONARY NODULES: ICD-10-CM

## 2019-05-09 DIAGNOSIS — R27.0 ATAXIA: ICD-10-CM

## 2019-05-09 DIAGNOSIS — C64.1 RENAL CELL CARCINOMA OF RIGHT KIDNEY: ICD-10-CM

## 2019-05-09 DIAGNOSIS — C64.1 RENAL CELL CARCINOMA OF RIGHT KIDNEY: Primary | ICD-10-CM

## 2019-05-09 LAB
ALBUMIN SERPL BCP-MCNC: 4.3 G/DL (ref 3.5–5.2)
ALP SERPL-CCNC: 63 U/L (ref 55–135)
ALT SERPL W/O P-5'-P-CCNC: 25 U/L (ref 10–44)
ANION GAP SERPL CALC-SCNC: 12 MMOL/L (ref 8–16)
AST SERPL-CCNC: 28 U/L (ref 10–40)
BASOPHILS # BLD AUTO: 0.09 K/UL (ref 0–0.2)
BASOPHILS NFR BLD: 1.3 % (ref 0–1.9)
BILIRUB SERPL-MCNC: 0.3 MG/DL (ref 0.1–1)
BUN SERPL-MCNC: 28 MG/DL (ref 8–23)
CALCIUM SERPL-MCNC: 10 MG/DL (ref 8.7–10.5)
CHLORIDE SERPL-SCNC: 105 MMOL/L (ref 95–110)
CO2 SERPL-SCNC: 23 MMOL/L (ref 23–29)
CREAT SERPL-MCNC: 1.7 MG/DL (ref 0.5–1.4)
DIFFERENTIAL METHOD: ABNORMAL
EOSINOPHIL # BLD AUTO: 0.5 K/UL (ref 0–0.5)
EOSINOPHIL NFR BLD: 7.2 % (ref 0–8)
ERYTHROCYTE [DISTWIDTH] IN BLOOD BY AUTOMATED COUNT: 14 % (ref 11.5–14.5)
EST. GFR  (AFRICAN AMERICAN): 48 ML/MIN/1.73 M^2
EST. GFR  (NON AFRICAN AMERICAN): 42 ML/MIN/1.73 M^2
GLUCOSE SERPL-MCNC: 156 MG/DL (ref 70–110)
HCT VFR BLD AUTO: 39.2 % (ref 40–54)
HGB BLD-MCNC: 12.8 G/DL (ref 14–18)
LDH SERPL L TO P-CCNC: 168 U/L (ref 110–260)
LYMPHOCYTES # BLD AUTO: 1.6 K/UL (ref 1–4.8)
LYMPHOCYTES NFR BLD: 24.3 % (ref 18–48)
MCH RBC QN AUTO: 28.6 PG (ref 27–31)
MCHC RBC AUTO-ENTMCNC: 32.7 G/DL (ref 32–36)
MCV RBC AUTO: 88 FL (ref 82–98)
MONOCYTES # BLD AUTO: 0.6 K/UL (ref 0.3–1)
MONOCYTES NFR BLD: 8.9 % (ref 4–15)
NEUTROPHILS # BLD AUTO: 3.9 K/UL (ref 1.8–7.7)
NEUTROPHILS NFR BLD: 57.6 % (ref 38–73)
PLATELET # BLD AUTO: 228 K/UL (ref 150–350)
PMV BLD AUTO: 12.1 FL (ref 9.2–12.9)
POTASSIUM SERPL-SCNC: 4 MMOL/L (ref 3.5–5.1)
PROT SERPL-MCNC: 8.3 G/DL (ref 6–8.4)
RBC # BLD AUTO: 4.47 M/UL (ref 4.6–6.2)
SODIUM SERPL-SCNC: 140 MMOL/L (ref 136–145)
WBC # BLD AUTO: 6.71 K/UL (ref 3.9–12.7)

## 2019-05-09 PROCEDURE — 99499 RISK ADDL DX/OHS AUDIT: ICD-10-PCS | Mod: S$GLB,,, | Performed by: INTERNAL MEDICINE

## 2019-05-09 PROCEDURE — 80053 COMPREHEN METABOLIC PANEL: CPT

## 2019-05-09 PROCEDURE — 99205 PR OFFICE/OUTPT VISIT, NEW, LEVL V, 60-74 MIN: ICD-10-PCS | Mod: S$GLB,,, | Performed by: INTERNAL MEDICINE

## 2019-05-09 PROCEDURE — 99999 PR PBB SHADOW E&M-EST. PATIENT-LVL V: ICD-10-PCS | Mod: PBBFAC,,, | Performed by: INTERNAL MEDICINE

## 2019-05-09 PROCEDURE — 3075F SYST BP GE 130 - 139MM HG: CPT | Mod: CPTII,S$GLB,, | Performed by: INTERNAL MEDICINE

## 2019-05-09 PROCEDURE — 3075F PR MOST RECENT SYSTOLIC BLOOD PRESS GE 130-139MM HG: ICD-10-PCS | Mod: CPTII,S$GLB,, | Performed by: INTERNAL MEDICINE

## 2019-05-09 PROCEDURE — 99999 PR PBB SHADOW E&M-EST. PATIENT-LVL V: CPT | Mod: PBBFAC,,, | Performed by: INTERNAL MEDICINE

## 2019-05-09 PROCEDURE — 3008F BODY MASS INDEX DOCD: CPT | Mod: CPTII,S$GLB,, | Performed by: INTERNAL MEDICINE

## 2019-05-09 PROCEDURE — 3078F DIAST BP <80 MM HG: CPT | Mod: CPTII,S$GLB,, | Performed by: INTERNAL MEDICINE

## 2019-05-09 PROCEDURE — 83615 LACTATE (LD) (LDH) ENZYME: CPT

## 2019-05-09 PROCEDURE — 3008F PR BODY MASS INDEX (BMI) DOCUMENTED: ICD-10-PCS | Mod: CPTII,S$GLB,, | Performed by: INTERNAL MEDICINE

## 2019-05-09 PROCEDURE — 99499 UNLISTED E&M SERVICE: CPT | Mod: S$GLB,,, | Performed by: INTERNAL MEDICINE

## 2019-05-09 PROCEDURE — 99205 OFFICE O/P NEW HI 60 MIN: CPT | Mod: S$GLB,,, | Performed by: INTERNAL MEDICINE

## 2019-05-09 PROCEDURE — 36415 COLL VENOUS BLD VENIPUNCTURE: CPT

## 2019-05-09 PROCEDURE — 3078F PR MOST RECENT DIASTOLIC BLOOD PRESSURE < 80 MM HG: ICD-10-PCS | Mod: CPTII,S$GLB,, | Performed by: INTERNAL MEDICINE

## 2019-05-09 PROCEDURE — 85025 COMPLETE CBC W/AUTO DIFF WBC: CPT

## 2019-05-09 NOTE — PROGRESS NOTES
Subjective:       Patient ID: Nicolas Flaherty is a 63 y.o. male.    Chief Complaint: Consult (Right Side Pain )    HPI     Diagnosis RCC s/p Right nephrectomy 3/18/16 2016  pT3aNx, FG2, ccRCC with pulmonary nodules  Referring Physician: Rosalia Acosta MD     Nicolas Flaherty is a 63 y.o. male with multiple medical diagnoses as listed seen today in consultation for history of RCC s/p nephrectomy s/p R lap nephrectomy 3/18/16, pT3aNx, FG2, ccRCC.August 2016 with increasing pulmonary nodules. .  He has been  followed by Urology . He is also diagnosed with  nephrolithiasis and hematuria.  He is followed by nephrology (Dr Short). for CKD. . Follow-up imaging studies including CT a/p and CXR clear 4/17, 10/17, 4/18- negative. CT chest/abdomen/pelvis w/out contrast 4/23/2019   shows numerous subcentimeter noncalcified pulmonary nodules identified throughout both lungs with the largest located within the right lower lobe measuring up to 8 mm in size  increasing in size and number when compared to prior examinations and concern for metastatic diease.  No cough or hemoptysis. No CP. Appetite and weight stable. He is accompanied by family member. He is here for further evaluation.         Past Medical History:   Diagnosis Date    Anticoagulant long-term use     Arthritis     Cancer of kidney, right     Colon polyp     Coronary artery disease     Diabetes mellitus     Hypertension     Kidney stone     Sleep apnea     Slurred speech     Stroke     MINI STROKE    TIA (transient ischemic attack)     Wears glasses        Past Surgical History:   Procedure Laterality Date    BACK SURGERY      lower back    CARDIAC SURGERY      COLONOSCOPY  07/2018    CYSTOSCOPY  03/2008    CYSTOSCOPY with bilateral RETROGRADE and right URETEROSCOPY, STENT PLACEMENT Bilateral 6/16/2015    Performed by [facility] at [facility] OR    CYSTOSCOPY WITH RETROGRADE PYELOGRAM Right 3/8/2016    Performed by Rosalia  [facility]O Acsota [facility] at [facility] OR    EGD (ESOPHAGOGASTRODUODENOSCOPY) N/A 2/19/2019    Performed by Sylvia Treviño [facility] at [facility] (2ND FLR)    ESOPHAGEAL MANOMETRY  08/2018    EVACUATION-CLOT  6/16/2015    Performed by [facility]te [facility] at [facility] OR    excision right thigh mass      EXCISION-MASS-THIGH Right 3/5/2018    Performed by Jose Lewis MD at [facility] OR    FULGURATION-BLADDER  6/16/2015    Performed by [facility] at [facility] OR    heart stents      stents 4 total.  2010, 2011 and 2013    HERNIA REPAIR      incisional    KIDNEY STONE SURGERY  2015    LAPAROSCOPIC NEPHRECTOMY, HAND ASSISTED Right     LITHOTRIPSY      MANOMETRY, ESOPHAGUS, WITH IMPEDANCE MEASUREMENT N/A 2/19/2019    Performed by Sylvia [facility]viño MD at [facility] (2ND FLR)    NEPHRECTOMY-LAPAROSCOPIC Right 3/18/2016    Performed by Rosalia Acosta [facility] at [facility] OR    PLACEMENT, Shaver Catheter  6/16/2015    Performed by Dionte Christian MD at [facility] OR    REMOVAL-STONE-URETERAL  6/16/2015    Performed by Dionte Christian [facility] at [facility] OR    REPAIR-HERNIA-INCISIONAL N/A 8/19/2016    Performed by Jose Lewis [facility] at [facility] OR    UPPER GASTROINTESTINAL ENDOSCOPY      7/10/2018,9/24/2010    URETEROSCOPY Right 3/8/2016    Performed by [facility]alia [facility] at [facility] OR    VASCULAR SURGERY      WASHING-BLADDER  6/16/2015    Performed by Dionte Christian [facility] at [facility] OR     Social History     Tobacco Use    Smoking status: Never Smoker    Smokeless tobacco: Current User     Types: Snuff    Tobacco comment: 20 plus years   Substance Use Topics    Alcohol use: Yes     Comment: occas    Drug use: No       Review of Systems   Constitutional: Negative for appetite change, fatigue, fever and unexpected weight change.   HENT: Negative for mouth sores.    Eyes: Negative for visual disturbance.   Respiratory: Negative for cough and shortness of breath.    Cardiovascular: Negative for chest pain.   Gastrointestinal:  "Negative for abdominal pain and diarrhea.   Genitourinary: Negative for frequency.   Musculoskeletal: Negative for back pain.   Skin: Negative for rash.   Neurological: Negative for headaches.   Hematological: Negative for adenopathy.   Psychiatric/Behavioral: The patient is not nervous/anxious.        Objective:       Vitals:    05/09/19 1441   BP: 131/70   BP Location: Left arm   Patient Position: Sitting   BP Method: Medium (Automatic)   Pulse: 94   Temp: 99.1 °F (37.3 °C)   TempSrc: Oral   SpO2: 95%   Weight: 117.2 kg (258 lb 6.1 oz)   Height: 5' 7" (1.702 m)       Physical Exam   Constitutional: He is oriented to person, place, and time. He appears well-developed and well-nourished.   HENT:   Head: Normocephalic.   Eyes: Conjunctivae are normal. No scleral icterus.   Neck: Normal range of motion. Neck supple. No thyromegaly present.   Cardiovascular: Normal rate, regular rhythm and normal heart sounds.   No murmur heard.  Pulmonary/Chest: Effort normal and breath sounds normal. He has no wheezes. He has no rales.   Abdominal: Soft. Bowel sounds are normal. There is no hepatosplenomegaly. There is no tenderness. There is no rebound and no guarding.   Musculoskeletal: He exhibits no edema.   Ambulates with assistance of walker   Neurological: He is alert and oriented to person, place, and time. No cranial nerve deficit.   Slurred speech at baseline   Skin: No rash noted. No erythema.   Psychiatric: He has a normal mood and affect.         FINAL PATHOLOGIC DIAGNOSIS 3/18/2016  Kidney, right radical nephrectomy-clear cell renal cell carcinoma. (pathologic stage [id number]). Please see synoptic  report.  Synoptic report-renal cell carcinoma.  Diagnosis-clear cell renal cell carcinoma  Specimen procedure-right, radical nephrectomy  Tumor site-lower pole  Tumor size-2.5 x 2.2 x 2.0 macroscopic extent of tumor-tumor involvement of main renal vein  Histologic type-clear cell renal cell carcinoma  Sarcomatoid features-not " identified  Tumor necrosis-not identified  Histologic grade-Lilian grade 2.  Microscopic extent of tumor-involvement of segmental and main renal vein.  Pyddtqi-qjrueeum-swlrtgra; parenchymal-negative; soft tissue-negative. Vascular-tumor thrombus is present  immediately adjacent to the renal vein staple line.  Regional lymph nodes-cannot be assessed. pNx  Pathologic staging-pT3a pNx  Pathologic findings in nonneoplastic kidney- moderate nonspecific interstitial chronic inflammation.  Medial calcification of renal artery  Mild to moderate arteriolar sclerosis.  Report          CT chest/abdomen/pelvis w/out contrast 4/23/2019   Numerous subcentimeter noncalcified pulmonary nodules identified throughout both lungs with the largest located within the right lower lobe measuring up to 8 mm in size (image 288, series 4).  These are increasing in size and number when compared to prior examinations and metastatic disease should be strongly considered.    S/p right nephrectomy.    Extensive atherosclerotic calcification within the coronary arteries.    Left adrenal adenoma.    Fat containing right lateral abdominal wall hernias.    Assessment:       1. Renal cell carcinoma of right kidney    2. Status post nephrectomy    3. Pulmonary nodules        Plan:     64 y/o male with multiple medical diagnoses with history of RCC s/p nephrectomy s/p R lap nephrectomy 3/18/16, pT3aNx, FG2, ccRCC.August 2016 with increasing pulmonary nodules.  Plan further evaluation with PET CT imaging.  Although PET/CT imaging use is limited in initial staging for RCC it may have a role in recurrent and metastatic disease. Plan  Ambulatory Referral to IR for evaluation for any lesions amenable to biopsy although doubtful due to size/location.   If it is deemed lesion(s) not amenable to biopsy then plan Ambulatory referral  to Pulmonology for evaluation.  All questions posed answered to patient's satisfaction.      Thank you for allowing me to  participate in the care of your patient.     >60 minutes spent during this visit of which greater than 50% devoted to counseling and coordination of care regarding diagnosis and management plan.     cc: Rosalia Acosta MD

## 2019-05-16 ENCOUNTER — HOSPITAL ENCOUNTER (OUTPATIENT)
Dept: RADIOLOGY | Facility: HOSPITAL | Age: 64
Discharge: HOME | End: 2019-05-16
Attending: INTERNAL MEDICINE
Payer: MEDICARE

## 2019-05-16 ENCOUNTER — OFFICE VISIT (OUTPATIENT)
Dept: PODIATRY | Facility: CLINIC | Age: 64
End: 2019-05-16
Payer: MEDICARE

## 2019-05-16 ENCOUNTER — TELEPHONE (OUTPATIENT)
Dept: ADMINISTRATIVE | Facility: HOSPITAL | Age: 64
End: 2019-05-16

## 2019-05-16 VITALS
DIASTOLIC BLOOD PRESSURE: 69 MMHG | SYSTOLIC BLOOD PRESSURE: 146 MMHG | HEIGHT: 67 IN | WEIGHT: 258.38 LBS | BODY MASS INDEX: 40.55 KG/M2

## 2019-05-16 DIAGNOSIS — M20.40 HAMMER TOE, UNSPECIFIED LATERALITY: ICD-10-CM

## 2019-05-16 DIAGNOSIS — C64.1 RENAL CELL CARCINOMA OF RIGHT KIDNEY: ICD-10-CM

## 2019-05-16 DIAGNOSIS — B35.1 ONYCHOMYCOSIS DUE TO DERMATOPHYTE: ICD-10-CM

## 2019-05-16 DIAGNOSIS — R91.8 PULMONARY NODULES: ICD-10-CM

## 2019-05-16 DIAGNOSIS — E11.9 ENCOUNTER FOR COMPREHENSIVE DIABETIC FOOT EXAMINATION, TYPE 2 DIABETES MELLITUS: Primary | ICD-10-CM

## 2019-05-16 PROCEDURE — A9552 F18 FDG: HCPCS

## 2019-05-16 PROCEDURE — 3077F SYST BP >= 140 MM HG: CPT | Mod: CPTII,S$GLB,, | Performed by: PODIATRIST

## 2019-05-16 PROCEDURE — 3078F PR MOST RECENT DIASTOLIC BLOOD PRESSURE < 80 MM HG: ICD-10-PCS | Mod: CPTII,S$GLB,, | Performed by: PODIATRIST

## 2019-05-16 PROCEDURE — 78815 PET IMAGE W/CT SKULL-THIGH: CPT | Mod: 26,PI,, | Performed by: RADIOLOGY

## 2019-05-16 PROCEDURE — 99203 PR OFFICE/OUTPT VISIT, NEW, LEVL III, 30-44 MIN: ICD-10-PCS | Mod: S$GLB,,, | Performed by: PODIATRIST

## 2019-05-16 PROCEDURE — 78815 PET IMAGE W/CT SKULL-THIGH: CPT | Mod: TC

## 2019-05-16 PROCEDURE — 3008F PR BODY MASS INDEX (BMI) DOCUMENTED: ICD-10-PCS | Mod: CPTII,S$GLB,, | Performed by: PODIATRIST

## 2019-05-16 PROCEDURE — 3046F HEMOGLOBIN A1C LEVEL >9.0%: CPT | Mod: CPTII,S$GLB,, | Performed by: PODIATRIST

## 2019-05-16 PROCEDURE — 3078F DIAST BP <80 MM HG: CPT | Mod: CPTII,S$GLB,, | Performed by: PODIATRIST

## 2019-05-16 PROCEDURE — 99999 PR PBB SHADOW E&M-EST. PATIENT-LVL III: CPT | Mod: PBBFAC,,, | Performed by: PODIATRIST

## 2019-05-16 PROCEDURE — 3046F PR MOST RECENT HEMOGLOBIN A1C LEVEL > 9.0%: ICD-10-PCS | Mod: CPTII,S$GLB,, | Performed by: PODIATRIST

## 2019-05-16 PROCEDURE — 3077F PR MOST RECENT SYSTOLIC BLOOD PRESSURE >= 140 MM HG: ICD-10-PCS | Mod: CPTII,S$GLB,, | Performed by: PODIATRIST

## 2019-05-16 PROCEDURE — 99203 OFFICE O/P NEW LOW 30 MIN: CPT | Mod: S$GLB,,, | Performed by: PODIATRIST

## 2019-05-16 PROCEDURE — 99999 PR PBB SHADOW E&M-EST. PATIENT-LVL III: ICD-10-PCS | Mod: PBBFAC,,, | Performed by: PODIATRIST

## 2019-05-16 PROCEDURE — 78815 NM PET CT ROUTINE: ICD-10-PCS | Mod: 26,PI,, | Performed by: RADIOLOGY

## 2019-05-16 PROCEDURE — 3008F BODY MASS INDEX DOCD: CPT | Mod: CPTII,S$GLB,, | Performed by: PODIATRIST

## 2019-05-16 RX ORDER — DICLOFENAC SODIUM 10 MG/G
2 GEL TOPICAL DAILY
Qty: 100 G | Refills: 3 | Status: SHIPPED | OUTPATIENT
Start: 2019-05-16 | End: 2019-07-30

## 2019-05-16 RX ORDER — AMMONIUM LACTATE 12 G/100G
1 CREAM TOPICAL DAILY
Qty: 1 BOTTLE | Refills: 3 | Status: SHIPPED | OUTPATIENT
Start: 2019-05-16 | End: 2019-08-14 | Stop reason: CLARIF

## 2019-05-16 NOTE — PROGRESS NOTES
Subjective:      Patient ID: Nicolas Flaherty is a 63 y.o. male.    Chief Complaint: Diabetes Mellitus (pcp Juni 4-22-19); Diabetic Foot Exam; and Nail Care    Nicolas is a 63 y.o. male who presents to the clinic upon referral from Dr. Alfredo  for evaluation and treatment of diabetic feet. Nicolas has a past medical history of Anticoagulant long-term use, Arthritis, Cancer of kidney, right, Colon polyp, Coronary artery disease, Diabetes mellitus, Hypertension, Kidney stone, Sleep apnea, Slurred speech, Stroke, TIA (transient ischemic attack), and Wears glasses. Presents for diabetic foot risk assessment. Reports numbness, burning , tingling B/L feet. Pt. Reports previously taking gabapentin however had to stop this due to side effects.     PCP: Shyanne Alfredo MD    Date Last Seen by PCP: 4/22/19    Current shoe gear: Tennis shoes    Hemoglobin A1C   Date Value Ref Range Status   04/03/2019 9.5 (H) 4.0 - 5.6 % Final     Comment:     ADA Screening Guidelines:  5.7-6.4%  Consistent with prediabetes  >or=6.5%  Consistent with diabetes  High levels of fetal hemoglobin interfere with the HbA1C  assay. Heterozygous hemoglobin variants (HbS, HgC, etc)do  not significantly interfere with this assay.   However, presence of multiple variants may affect accuracy.     04/03/2019 9.5 (H) 4.0 - 5.6 % Final     Comment:     ADA Screening Guidelines:  5.7-6.4%  Consistent with prediabetes  >or=6.5%  Consistent with diabetes  High levels of fetal hemoglobin interfere with the HbA1C  assay. Heterozygous hemoglobin variants (HbS, HgC, etc)do  not significantly interfere with this assay.   However, presence of multiple variants may affect accuracy.     10/31/2018 8.4 (H) <5.7 % of total Hgb Final     Comment:     For someone without known diabetes, a hemoglobin A1c  value of 6.5% or greater indicates that they may have   diabetes and this should be confirmed with a follow-up   test.     For someone with known diabetes, a  value <7% indicates   that their diabetes is well controlled and a value   greater than or equal to 7% indicates suboptimal   control. A1c targets should be individualized based on   duration of diabetes, age, comorbid conditions, and   other considerations.     Currently, no consensus exists regarding use of  hemoglobin A1c for diagnosis of diabetes for children.                 Review of Systems   Constitution: Negative for chills, diaphoresis and fever.   Cardiovascular: Negative for claudication, cyanosis, leg swelling and syncope.   Respiratory: Negative for cough and shortness of breath.    Skin: Positive for color change, nail changes and suspicious lesions.   Musculoskeletal: Negative for falls, joint pain, muscle cramps and muscle weakness.   Gastrointestinal: Negative for diarrhea, nausea and vomiting.   Neurological: Negative for disturbances in coordination, numbness, paresthesias, sensory change, tremors and weakness.   Psychiatric/Behavioral: Negative for altered mental status.           Objective:      Physical Exam   Constitutional: He appears well-developed. He is cooperative.   Oriented to time, place, and person.   Cardiovascular:   DP and PT pulses are palpable bilaterally. 3 sec capillary refill time and toes and feet are warm to touch proximally .  There is  hair growth on the feet and toes b/l. There is no edema b/l. No spider veins or varicosities present b/l.      Musculoskeletal:   Equinus noted b/l ankles with < 10 deg DF noted. MMT 5/5 in DF/PF/Inv/Ev resistance with no reproduction of pain in any direction. Passive range of motion of ankle and pedal joints is painless b/l.     Feet:   Right Foot:   Skin Integrity: Negative for callus or dry skin.   Left Foot:   Skin Integrity: Negative for callus or dry skin.   Lymphadenopathy:   Negative lymphadenopathy bilateral popliteal fossa and tarsal tunnel.   Neurological: He is alert.   Light touch, proprioception, and sharp/dull sensation are  all intact bilaterally. Protective threshold with the [place]-Wienstein monofilament is intact bilaterally. .par   Skin:   No open lesions, lacerations or wounds noted.Interdigital spaces clean, dry and intact b/l. No erythema noted to b/l foot.  Nails normal color and trophic qualities.     Psychiatric: He has a normal mood and affect.             Assessment:       Encounter Diagnoses   Name Primary?    Encounter for comprehensive diabetic foot examination, type 2 diabetes mellitus Yes    Hammer toe, unspecified laterality     Onychomycosis due to dermatophyte          Plan:       Nicolas was seen today for diabetes mellitus, diabetic foot exam and nail care.    Diagnoses and all orders for this visit:    Encounter for comprehensive diabetic foot examination, type 2 diabetes mellitus    Hammer toe, unspecified laterality  -     DIABETIC SHOES FOR HOME USE    Onychomycosis due to dermatophyte  -     DIABETIC SHOES FOR HOME USE    Other orders  -     diclofenac sodium (VOLTAREN) 1 % Gel; Apply 2 g topically once daily.  -     ammonium lactate 12 % Crea; Apply 1 g topically once daily.      I counseled the patient on his conditions, their implications and medical management.      - Shoe inspection. Diabetic Foot Education. Patient reminded of the importance of good nutrition and blood sugar control to help prevent podiatric complications of diabetes. Patient instructed on proper foot hygeine. We discussed wearing proper shoe gear, daily foot inspections, never walking without protective shoe gear, never putting sharp instruments to feet, routine podiatric nail visits every year   - With patient's permission, nails were aggressively reduced and debrided x 10 to their soft tissue attachment mechanically and with electric , removing all offending nail and debris. Patient relates relief following the procedure. He will continue to monitor the areas daily, inspect his feet, wear protective shoe gear when  ambulatory, moisturizer to maintain skin integrity and follow in this office in approximately one year sooner p.r.n.     Rx Voltaren gel to be applied to affected area up to 3-4 x daily as needed for pain    Rx diabetic shoes with custom molded inserts to be worn at all times while ambulating. Prescription provided with list of local retailers.     Prescription for amlactin sent to pharmacy    F/u one year DM foot exam, sooner prn    Shari Lyn DPM

## 2019-05-16 NOTE — LETTER
May 17, 2019      Shyanne Alfredo MD  [address]  [place] [address]           [facility]  [address]  [place] [address]  Phone: [phone]          Patient: Nicolas Flaherty   MR Number: [id number]   YOB: 1955   Date of Visit: 5/16/2019       Dear Dr. Shyanne Alfredo:    Thank you for referring Nicolas Flaherty to me for evaluation. Attached you will find relevant portions of my assessment and plan of care.    If you have questions, please do not hesitate to call me. I look forward to following Nicolas Flaherty along with you.    Sincerely,    Shari Lyn DPM    Enclosure  CC:  No Recipients    If you would like to receive this communication electronically, please contact [email or web address] or [phone] to request more information on [employer] Link access.    For providers and/or their staff who would like to refer a patient to [facility], please contact us through our one-stop-shop provider referral line, [facility], at [phone].    If you feel you have received this communication in error or would no longer like to receive these types of communications, please e-mail [email or web address]

## 2019-05-20 ENCOUNTER — TELEPHONE (OUTPATIENT)
Dept: HEMATOLOGY/ONCOLOGY | Facility: CLINIC | Age: 64
End: 2019-05-20

## 2019-05-20 DIAGNOSIS — R91.8 PULMONARY NODULES: Primary | ICD-10-CM

## 2019-05-20 DIAGNOSIS — Z85.528 HISTORY OF RENAL CELL CANCER: ICD-10-CM

## 2019-05-20 NOTE — TELEPHONE ENCOUNTER
----- Message from Guillermina Wood MD sent at 5/20/2019  3:10 PM CDT -----  Order completed  Notify pt IR reports not amenable to bx  ----- Message -----  From: Oralia Velez, RN  Sent: 5/20/2019   8:24 AM  To: Guillermina Wood MD    No referral to pulmonology yet, if you enter I can schedule.  ----- Message -----  From: Guillermina Wood MD  Sent: 5/18/2019   1:14 PM  To: Oralia Velez RN    Confirming pt referred to Pulmonology ..thanks

## 2019-05-20 NOTE — TELEPHONE ENCOUNTER
Wife called back, explained that nodules were too small to biopsy & that  wanted him to see pulmonology, she was agreeable. Appt made.

## 2019-05-21 ENCOUNTER — TELEPHONE (OUTPATIENT)
Dept: PULMONOLOGY | Facility: CLINIC | Age: 64
End: 2019-05-21

## 2019-05-21 NOTE — TELEPHONE ENCOUNTER
RN. Oralia Velez     Pt has lung nodules too small to biopsy, Dr. Pizarro would like for the Pt to be seen by pulm. First opening we have is 06/20/19, and by any chance she would like to know do we have a sooner appt. In order for us to put him in we would have to double book your appt. Please advise thank you.

## 2019-05-22 ENCOUNTER — OFFICE VISIT (OUTPATIENT)
Dept: PULMONOLOGY | Facility: CLINIC | Age: 64
End: 2019-05-22
Payer: MEDICARE

## 2019-05-22 ENCOUNTER — OFFICE VISIT (OUTPATIENT)
Dept: FAMILY MEDICINE | Facility: CLINIC | Age: 64
End: 2019-05-22
Payer: MEDICARE

## 2019-05-22 VITALS
HEART RATE: 79 BPM | OXYGEN SATURATION: 97 % | HEIGHT: 67 IN | DIASTOLIC BLOOD PRESSURE: 77 MMHG | SYSTOLIC BLOOD PRESSURE: 124 MMHG | WEIGHT: 257.38 LBS | BODY MASS INDEX: 40.4 KG/M2

## 2019-05-22 VITALS
WEIGHT: 257.94 LBS | SYSTOLIC BLOOD PRESSURE: 124 MMHG | OXYGEN SATURATION: 98 % | HEIGHT: 67 IN | TEMPERATURE: 98 F | HEART RATE: 70 BPM | BODY MASS INDEX: 40.48 KG/M2 | DIASTOLIC BLOOD PRESSURE: 80 MMHG

## 2019-05-22 DIAGNOSIS — G47.33 OSA (OBSTRUCTIVE SLEEP APNEA): ICD-10-CM

## 2019-05-22 DIAGNOSIS — M47.26 OSTEOARTHRITIS OF SPINE WITH RADICULOPATHY, LUMBAR REGION: ICD-10-CM

## 2019-05-22 DIAGNOSIS — R91.1 PULMONARY NODULE: ICD-10-CM

## 2019-05-22 DIAGNOSIS — R91.1 LUNG NODULE: ICD-10-CM

## 2019-05-22 PROCEDURE — 99999 PR PBB SHADOW E&M-EST. PATIENT-LVL III: ICD-10-PCS | Mod: PBBFAC,,, | Performed by: INTERNAL MEDICINE

## 2019-05-22 PROCEDURE — 99999 PR PBB SHADOW E&M-EST. PATIENT-LVL III: CPT | Mod: PBBFAC,,, | Performed by: INTERNAL MEDICINE

## 2019-05-22 PROCEDURE — 99214 PR OFFICE/OUTPT VISIT, EST, LEVL IV, 30-39 MIN: ICD-10-PCS | Mod: S$GLB,,, | Performed by: FAMILY MEDICINE

## 2019-05-22 PROCEDURE — 3046F PR MOST RECENT HEMOGLOBIN A1C LEVEL > 9.0%: ICD-10-PCS | Mod: CPTII,S$GLB,, | Performed by: FAMILY MEDICINE

## 2019-05-22 PROCEDURE — 3078F PR MOST RECENT DIASTOLIC BLOOD PRESSURE < 80 MM HG: ICD-10-PCS | Mod: CPTII,S$GLB,, | Performed by: INTERNAL MEDICINE

## 2019-05-22 PROCEDURE — 99204 PR OFFICE/OUTPT VISIT, NEW, LEVL IV, 45-59 MIN: ICD-10-PCS | Mod: S$GLB,,, | Performed by: INTERNAL MEDICINE

## 2019-05-22 PROCEDURE — 3079F DIAST BP 80-89 MM HG: CPT | Mod: CPTII,S$GLB,, | Performed by: FAMILY MEDICINE

## 2019-05-22 PROCEDURE — 3078F DIAST BP <80 MM HG: CPT | Mod: CPTII,S$GLB,, | Performed by: INTERNAL MEDICINE

## 2019-05-22 PROCEDURE — 99999 PR PBB SHADOW E&M-EST. PATIENT-LVL IV: ICD-10-PCS | Mod: PBBFAC,,, | Performed by: FAMILY MEDICINE

## 2019-05-22 PROCEDURE — 3008F PR BODY MASS INDEX (BMI) DOCUMENTED: ICD-10-PCS | Mod: CPTII,S$GLB,, | Performed by: INTERNAL MEDICINE

## 2019-05-22 PROCEDURE — 99214 OFFICE O/P EST MOD 30 MIN: CPT | Mod: S$GLB,,, | Performed by: FAMILY MEDICINE

## 2019-05-22 PROCEDURE — 99499 UNLISTED E&M SERVICE: CPT | Mod: S$GLB,,, | Performed by: FAMILY MEDICINE

## 2019-05-22 PROCEDURE — 3074F SYST BP LT 130 MM HG: CPT | Mod: CPTII,S$GLB,, | Performed by: FAMILY MEDICINE

## 2019-05-22 PROCEDURE — 3008F BODY MASS INDEX DOCD: CPT | Mod: CPTII,S$GLB,, | Performed by: INTERNAL MEDICINE

## 2019-05-22 PROCEDURE — 99999 PR PBB SHADOW E&M-EST. PATIENT-LVL IV: CPT | Mod: PBBFAC,,, | Performed by: FAMILY MEDICINE

## 2019-05-22 PROCEDURE — 3074F SYST BP LT 130 MM HG: CPT | Mod: CPTII,S$GLB,, | Performed by: INTERNAL MEDICINE

## 2019-05-22 PROCEDURE — 99499 RISK ADDL DX/OHS AUDIT: ICD-10-PCS | Mod: S$GLB,,, | Performed by: FAMILY MEDICINE

## 2019-05-22 PROCEDURE — 99204 OFFICE O/P NEW MOD 45 MIN: CPT | Mod: S$GLB,,, | Performed by: INTERNAL MEDICINE

## 2019-05-22 PROCEDURE — 3046F HEMOGLOBIN A1C LEVEL >9.0%: CPT | Mod: CPTII,S$GLB,, | Performed by: FAMILY MEDICINE

## 2019-05-22 PROCEDURE — 3008F BODY MASS INDEX DOCD: CPT | Mod: CPTII,S$GLB,, | Performed by: FAMILY MEDICINE

## 2019-05-22 PROCEDURE — 3008F PR BODY MASS INDEX (BMI) DOCUMENTED: ICD-10-PCS | Mod: CPTII,S$GLB,, | Performed by: FAMILY MEDICINE

## 2019-05-22 PROCEDURE — 3074F PR MOST RECENT SYSTOLIC BLOOD PRESSURE < 130 MM HG: ICD-10-PCS | Mod: CPTII,S$GLB,, | Performed by: FAMILY MEDICINE

## 2019-05-22 PROCEDURE — 3074F PR MOST RECENT SYSTOLIC BLOOD PRESSURE < 130 MM HG: ICD-10-PCS | Mod: CPTII,S$GLB,, | Performed by: INTERNAL MEDICINE

## 2019-05-22 PROCEDURE — 3079F PR MOST RECENT DIASTOLIC BLOOD PRESSURE 80-89 MM HG: ICD-10-PCS | Mod: CPTII,S$GLB,, | Performed by: FAMILY MEDICINE

## 2019-05-22 RX ORDER — HYDROCODONE BITARTRATE AND ACETAMINOPHEN 10; 325 MG/1; MG/1
1 TABLET ORAL NIGHTLY PRN
Qty: 30 TABLET | Refills: 0 | Status: SHIPPED | OUTPATIENT
Start: 2019-05-22 | End: 2019-07-01 | Stop reason: SDUPTHER

## 2019-05-22 NOTE — PROGRESS NOTES
Nicolas Flaherty  was seen as a new patient for the evaluation of Pulmonary nodule.    CHIEF COMPLAINT:  Pulmonary Nodules      HISTORY OF PRESENT ILLNESS: Nicolas Flaherty is a 63 y.o. male  has a past medical history of Abdominal hernia, Anticoagulant long-term use, Arthritis, Cancer of kidney, right, Colon polyp, Coronary artery disease, Diabetes mellitus, Hypertension, Kidney stone, Sleep apnea, Slurred speech, Stroke, TIA (transient ischemic attack), and Wears glasses.  Patient history of RCC s/p nephrectomy s/p R lap nephrectomy 3/18/16.  He has been  followed by Urology . He is also diagnosed with  nephrolithiasis and hematuria.  He is followed by nephrology (Dr Short) for CKD.  Follow-up imaging studies including CT a/p and CXR clear 4/17, 10/17, 4/18- negative. CT chest/abdomen/pelvis w/out contrast 4/23/2019 shows numerous subcentimeter noncalcified pulmonary nodules identified throughout both lungs with the largest located within the right lower lobe measuring up to 8 mm in size  increasing in size and number when compared to prior examinations.  S/p pet scan without increase uptake.  Patient was referred to pulmonary for further inputs.      Patient is a lifelong non-smoker.  Does dip tobacco.  Patient with intermittent cough with occasional greenish phlegm.  No hemoptysis.  No chest pain.  +intentional weight loss due to dm2.  ~13 lbs over past 2 months.  Patient is walker dependent due to neuropathy and balance issue.  +slurred speech from CVA.  Patient with limited exertion due to balance.  No dyspnea with limited adl.      PAST MEDICAL HISTORY:    Active Ambulatory Problems     Diagnosis Date Noted    Renal cyst 06/01/2015    Benign non-nodular prostatic hyperplasia with lower urinary tract symptoms     Nephrolithiasis 02/05/2016    Status post nephrectomy 03/18/2016    Essential hypertension 03/18/2016    DM (diabetes mellitus) type II controlled with renal manifestation 03/18/2016     CKD (chronic kidney disease), stage III 03/18/2016    Coronary artery disease involving native coronary artery 03/18/2016    Coronary artery disease involving native coronary artery of native heart with angina pectoris     History of coronary artery disease     Renal cell carcinoma of right kidney 04/15/2016    Hernia, incisional 08/19/2016    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 03/01/2018    Ataxic dysarthria 06/20/2018    BMI 40.0-44.9, adult 06/26/2018    Neoplasm, uncertain whether benign or malignant 10/04/2018    Unstable angina 12/02/2018    Dysphagia 02/19/2019    Pulmonary nodule 05/22/2019    GAUTAM (obstructive sleep apnea) 05/22/2019     Resolved Ambulatory Problems     Diagnosis Date Noted    Gross hematuria 03/10/2015    Nocturia 03/10/2015    Incomplete bladder emptying 03/10/2015    Kidney stone 03/10/2015    BPH (benign prostatic hypertrophy) 03/10/2015    Incomplete bladder emptying     Nocturia     Kidney stone     Renal cyst     Renal mass 02/05/2016    Hematuria, gross 02/05/2016    Acute renal failure 03/18/2016    Left sided chest pain 03/26/2016    Mass of thigh, right 03/05/2018    Impaired functional mobility, balance, gait, and endurance 05/11/2018    Muscle weakness of lower extremity 05/11/2018    Decreased coordination 05/11/2018    Chest pain at rest 12/02/2018     Past Medical History:   Diagnosis Date    Abdominal hernia     Anticoagulant long-term use     Arthritis     Cancer of kidney, right     Colon polyp     Coronary artery disease     Diabetes mellitus     Hypertension     Kidney stone     Sleep apnea     Slurred speech     Stroke     TIA (transient ischemic attack)     Wears glasses                 PAST SURGICAL HISTORY:    Past Surgical History:   Procedure Laterality Date    BACK SURGERY      lower back    CARDIAC SURGERY      COLONOSCOPY  07/2018    CYSTOSCOPY  03/2008    CYSTOSCOPY with bilateral RETROGRADE and right  URETEROSCOPY, STENT PLACEMENT Bilateral 6/16/2015    Performed by Dionte Christian MD at [facility] OR    CYSTOSCOPY WITH RETROGRADE PYELOGRAM Right 3/8/2016    Performed by Rosalia Acosta MD at [facility] OR    EGD (ESOPHAGOGASTRODUODENOSCOPY) N/A 2/19/2019    Performed by [facility] at [facility] (2ND FLR)    ESOPHAGEAL MANOMETRY  08/2018    EVACUATION-CLOT  6/16/2015    Performed by [facility] at [facility] OR    excision right thigh mass      EXCISION-MASS-THIGH Right 3/5/2018    Performed by Jose Lewis MD at [facility] OR    FULGURATION-BLADDER  6/16/2015    Performed [facility] at [facility] OR    heart stents      stents 4 total.  2010, 2011 and 2013    HERNIA REPAIR      incisional    KIDNEY STONE SURGERY  2015    LAPAROSCOPIC NEPHRECTOMY, HAND ASSISTED Right     LITHOTRIPSY      MANOMETRY, ESOPHAGUS, WITH IMPEDANCE MEASUREMENT N/A 2/19/2019    Performed by [facility] at [facility] (2ND FLR)    NEPHRECTOMY-LAPAROSCOPIC Right 3/18/2016    Performed by [facility] at [facility] OR    PLACEMENT, Shaver Catheter  6/16/2015    Performed by [facility] at [facility] OR    REMOVAL-STONE-URETERAL  6/16/2015    Performed by [facility] at [facility] OR    REPAIR-HERNIA-INCISIONAL N/A 8/19/2016    Performed by Jose Lewis MD at [facility] OR    UPPER GASTROINTESTINAL ENDOSCOPY      7/10/2018,9/24/2010    URETEROSCOPY Right 3/8/2016    Performed by [facility] at [facility] OR    VASCULAR SURGERY      WASHING-BLADDER  6/16/2015    Performed by [facility] at [facility] OR         FAMILY HISTORY:                Family History   Problem Relation Age of Onset    Heart disease Father     Hypertension Mother     Parkinsonism Mother     Celiac disease Neg Hx     Cirrhosis Neg Hx     Colon cancer Neg Hx     Colon polyps Neg Hx     Crohn's disease Neg Hx     Cystic fibrosis Neg Hx     Esophageal cancer Neg Hx     Hemochromatosis Neg  Hx     Inflammatory bowel disease Neg Hx     Irritable bowel syndrome Neg Hx     Liver cancer Neg Hx     Liver disease Neg Hx     Rectal cancer Neg Hx     Stomach cancer Neg Hx     Ulcerative colitis Neg Hx     Kalia's disease Neg Hx     Lymphoma Neg Hx     Tuberculosis Neg Hx     Scleroderma Neg Hx     Rheum arthritis Neg Hx     Multiple sclerosis Neg Hx     Melanoma Neg Hx     Lupus Neg Hx     Psoriasis Neg Hx     Skin cancer Neg Hx        SOCIAL HISTORY:          Tobacco:   Social History     Tobacco Use   Smoking Status Never Smoker   Smokeless Tobacco Current User    Types: Snuff   Tobacco Comment    20 plus years     alcohol use:    Social History     Substance and Sexual Activity   Alcohol Use Yes    Comment: occasional               Occupation:  Former [employer]    ALLERGIES:  Review of patient's allergies indicates:  No Known Allergies    CURRENT MEDICATIONS:    Current Outpatient Medications   Medication Sig Dispense Refill    allopurinol (ZYLOPRIM) 100 MG tablet Take 100 mg by mouth once daily.      ammonium lactate 12 % Crea Apply 1 g topically once daily. 1 Bottle 3    aspirin (ECOTRIN) 81 MG EC tablet Take 81 mg by mouth once daily. LAST TAKEN 3/2/16      blood sugar diagnostic (ACCU-CHEK REJI PLUS TEST STRP) Strp TEST BLOOD SUGAR TWICE DAILY 200 strip 2    blood-glucose meter (ACCU-CHEK REJI PLUS METER) Misc CHECK TWICE DAILY 1 each 0    clopidogrel (PLAVIX) 75 mg tablet Take 75 mg by mouth every morning. LAST DOSE 3/2/16      diclofenac sodium (VOLTAREN) 1 % Gel Apply 2 g topically once daily. 100 g 3    diltiaZEM (CARDIZEM) 60 MG tablet Take 1 tablet (60 mg total) by mouth 3 (three) times daily. 270 tablet 1    docusate sodium (STOOL SOFTENER) 100 MG capsule Take 1 capsule (100 mg total) by mouth 2 (two) times daily. 180 capsule 3    dulaglutide (TRULICITY) 1.5 mg/0.5 mL PnIj Inject 1.5 mg into the skin every 7 days. 6 mL 3    finasteride (PROSCAR) 5 mg tablet Take 1  tablet (5 mg total) by mouth every morning. 90 tablet 3    furosemide (LASIX) 40 MG tablet Take 40 mg by mouth 2 (two) times daily. ALTERNATES 1 TABLET ONE DAY AND 2 TABLETS THE NEXT--ALTERNATING DAYS      gemfibrozil (LOPID) 600 MG tablet Take 1 tablet (600 mg total) by mouth 2 (two) times daily before meals. 180 tablet 3    glimepiride (AMARYL) 4 MG tablet Take 1 tablet (4 mg total) by mouth 2 (two) times daily. 180 tablet 1    HYDROcodone-acetaminophen (NORCO)  mg per tablet Take 1 tablet by mouth nightly as needed for Pain. 30 tablet 0    lancets (ACCU-CHEK SOFTCLIX LANCETS) Misc 1 lancet by Misc.(Non-Drug; Combo Route) route 2 (two) times daily. 200 each 2    losartan (COZAAR) 50 MG tablet Take 50 mg by mouth once daily.      metoprolol succinate (TOPROL-XL) 100 MG 24 hr tablet Take 100 mg by mouth every morning.       nateglinide (STARLIX) 60 MG tablet TAKE 1 TABLET TWICE DAILY BEFORE MEALS 180 tablet 0    NITROSTAT 0.4 mg SL tablet Place 0.4 mg under the tongue every 5 (five) minutes as needed.       ranitidine (ZANTAC) 150 MG tablet Take 1 tablet (150 mg total) by mouth 2 (two) times daily. 180 tablet 5    rosuvastatin (CRESTOR) 20 MG tablet Take 1 tablet (20 mg total) by mouth once daily. 90 tablet 3    SITagliptin (JANUVIA) 50 MG Tab Take 1 tablet (50 mg total) by mouth once daily. 90 tablet 3    tamsulosin (FLOMAX) 0.4 mg Cap Take 1 capsule (0.4 mg total) by mouth once daily. 90 capsule 3    TRAVATAN Z 0.004 % Drop 1 drop every evening.        Current Facility-Administered Medications   Medication Dose Route Frequency Provider Last Rate Last Dose    lidocaine (PF) 10 mg/ml (1%) injection 10 mg  1 mL Intradermal Once Jose Lewis MD                      REVIEW OF SYSTEMS:     Pulmonary related symptoms as per HPI.  Gen:  + weight loss, no fever, no night sweat  HEENT:  no visual changes, no sore throat, no hearing loss  CV:  No chest pain, no orthopnea, no PND  GI:  no melena, no  "hematochezia, no diarhea, no constipation.  :  no dysuria, no hematuria, no hesistancy, no dribbling  Neuro:  no syncope, no vertigo, no tinitus  Psych:  No homocide or suicide ideation; no depression.  Endocrine:  No heat or cold intolerance.  Sleep:  Doing well with cpap  Otherwise, a balance of systems reviewed is negative.          PHYSICAL EXAM:  Vitals:    05/22/19 1306   BP: 124/77   Pulse: 79   SpO2: 97%   Weight: 116.7 kg (257 lb 6.2 oz)   Height: 5' 7" (1.702 m)   PainSc:   5   PainLoc: Abdomen     Body mass index is 40.31 kg/m².     GENERAL:  well develop; no apparent distress  HEENT:  no nasal congestion; no discharge noted; class 3 modified mallampatti.   NECK:  supple; no palpable masses.  CARDIO: regular rate and rhythm  PULM:  clear to auscultation bilaterally; no intercostals retractions; no accessory muscle usage   ABDOMEN:  soft nontender/nondistended.  +bowel sound  EXTREMITIES no cce  NEURO:  CN II-XII intact.  5/5 motor in all extremities.  sensation grossly intact   to light touch.  PSYCH:  normal affect.  Alert and oriented x 4    LABS  Pulmonary Functions Testing Results(personally reviewed):  none  ABG (personally reviewed):  none  CT CHEST(personally reviewed):  4/23/18 scattered nodule.  Largest at 5 cm rul image 288  Pet scan 5/19 no increase up take    12/2/18 echo  · Low normal left ventricular systolic function. The estimated ejection fraction is 50%  · Normal LV diastolic function.  · Normal right ventricular systolic function.  · Mild-to-moderate aortic valve stenosis.  · Aortic valve area is 1.29 cm2; peak velocity is 2.31 m/s; mean gradient is 12.32 mmHg.  · Trace mitral regurgitation.  · Trace tricuspid regurgitation.  ASSESSMENT/PLAN  Problem List Items Addressed This Visit     GAUTAM (obstructive sleep apnea)    Current Assessment & Plan     Doing well with cpap         Pulmonary nodule    Current Assessment & Plan     Scattered nodule with largest ~5 mm.   Clinically " asymptomatic.  Will repeat ct in 6 months.             Other Visit Diagnoses     Lung nodule        Relevant Orders    CT Chest Without Contrast          jeffrey - doing well with cpap    Patient will Follow up in about 6 months (around 11/22/2019). with md/np.

## 2019-05-22 NOTE — PROGRESS NOTES
Subjective:       Patient ID: Nicolas Flaherty is a 63 y.o. male.    Chief Complaint: 1 Month Diabetes Follow up; Diabetic Foot Exam; and Right Side/Abdominal Pain    63 year old male presents for follow up on diabetes. His blood sugars have improved. He has lost 13 pounds. He is eating salads, strawberries, and almonds. They are fasting  with an outlier of 143 and post prandial 110's-170's. Impression.    HE IS ON STARLIX 60 MG BID, GLIMEPIRIDE 4 MG BID, AND TRULICITY 1.5 MG. WILL ADD JANUVIA 50 MG DUE TO KIDNEY DISEASE.       Multiple subcentimeter solid pulmonary nodules scattered throughout both lungs, similar to most recent CT.  No corresponding radiotracer uptake noting that subcentimeter pulmonary nodules are too small to characterize with PET CT.  Recommend continued close CT surveillance as malignancy not excluded.    Right nephrectomy.  No evidence of local recurrence.        He was seen by urologist and had a CT scan, which showed spots on his lungs. He had a PET scan done and his nodules were too small on the PET scan and they recommended a pulmonologist for follow up. He has an appointment with a lung specialist on June 20 th.    He plans to see Dr. Lewis for a hernia as he has pain associated with this. He is going to follow up with him as he did his first hernia repair.     He is due for a colonoscopy and has an appt with Dr. Marquez in June to set this up. He has been cleared by colonoscopy already.     Past Medical History:  No date: Anticoagulant long-term use  No date: Arthritis  No date: Cancer of kidney, right  No date: Colon polyp  No date: Coronary artery disease  No date: Diabetes mellitus  No date: Hypertension  No date: Kidney stone  No date: Sleep apnea  No date: Slurred speech  No date: Stroke      Comment:  MINI STROKE  No date: TIA (transient ischemic attack)  No date: Wears glasses   Past Surgical History:  No date: BACK SURGERY      Comment:  lower back  No date: CARDIAC  SURGERY  07/2018: COLONOSCOPY  03/2008: CYSTOSCOPY  6/16/2015: CYSTOSCOPY with bilateral RETROGRADE and right   URETEROSCOPY, STENT PLACEMENT; Bilateral      Comment:  Performed by Dionte Christian MD at [facility] [place]  3/8/2016: CYSTOSCOPY WITH RETROGRADE PYELOGRAM; Right      Comment:  Performed by Rosalia Acosta MD at [facility] [place] OR  2/19/2019: EGD (ESOPHAGOGASTRODUODENOSCOPY); N/A      Comment:  Performed by Sylvia Treviño MD at [facility] (2ND FLR)  08/2018: ESOPHAGEAL MANOMETRY  6/16/2015: EVACUATION-CLOT      Comment:  Performed by Dionte Christian MD at [facility] [place] OR  No date: excision right thigh mass  3/5/2018: EXCISION-MASS-THIGH; Right      Comment:  Performed by Jose Lewis MD at [facility] [place] OR  6/16/2015: FULGURATION-BLADDER      Comment:  Performed by Dionte Christian MD at [facility] [place] OR  No date: heart stents      Comment:  stents 4 total.  2010, 2011 and 2013  No date: HERNIA REPAIR      Comment:  incisional  2015: KIDNEY STONE SURGERY  No date: LAPAROSCOPIC NEPHRECTOMY, HAND ASSISTED; Right  No date: LITHOTRIPSY  2/19/2019: MANOMETRY, ESOPHAGUS, WITH IMPEDANCE MEASUREMENT; N/A      Comment:  Performed by Sylvia Treviño MD at [facility] (2ND FLR)  3/18/2016: NEPHRECTOMY-LAPAROSCOPIC; Right      Comment:  Performed by Rosalia Acosta MD at [facility] [place] OR  6/16/2015: PLACEMENT, Shaver Catheter      Comment:  Performed by Dionte Christian MD at [facility] [place] OR  6/16/2015: REMOVAL-STONE-URETERAL      Comment:  Performed by Dionte Christian MD at [facility] [place] OR  8/19/2016: REPAIR-HERNIA-INCISIONAL; N/A      Comment:  Performed by Jose Lewis MD at [facility] [place] OR  No date: UPPER GASTROINTESTINAL ENDOSCOPY      Comment:  7/10/2018,9/24/2010  3/8/2016: URETEROSCOPY; Right      Comment:  Performed by Rosalia Acosta MD at [facility] [place] OR  No date: VASCULAR SURGERY  6/16/2015: WASHING-BLADDER      Comment:  Performed by Dionte Christian MD at [facility] [place] OR  Review of patient's family history indicates:  Problem: Heart disease       Relation: Father          Age of Onset: (Not Specified)  Problem: Hypertension      Relation: Mother          Age of Onset: (Not Specified)  Problem: Parkinsonism      Relation: Mother          Age of Onset: (Not Specified)  Problem: Celiac disease      Relation: Neg Hx          Age of Onset: (Not Specified)  Problem: Cirrhosis      Relation: Neg Hx          Age of Onset: (Not Specified)  Problem: Colon cancer      Relation: Neg Hx          Age of Onset: (Not Specified)  Problem: Colon polyps      Relation: Neg Hx          Age of Onset: (Not Specified)  Problem: Crohn's disease      Relation: Neg Hx          Age of Onset: (Not Specified)  Problem: Cystic fibrosis      Relation: Neg Hx          Age of Onset: (Not Specified)  Problem: Esophageal cancer      Relation: Neg Hx          Age of Onset: (Not Specified)  Problem: Hemochromatosis      Relation: Neg Hx          Age of Onset: (Not Specified)  Problem: Inflammatory bowel disease      Relation: Neg Hx          Age of Onset: (Not Specified)  Problem: Irritable bowel syndrome      Relation: Neg Hx          Age of Onset: (Not Specified)  Problem: Liver cancer      Relation: Neg Hx          Age of Onset: (Not Specified)  Problem: Liver disease      Relation: Neg Hx          Age of Onset: (Not Specified)  Problem: Rectal cancer      Relation: Neg Hx          Age of Onset: (Not Specified)  Problem: Stomach cancer      Relation: Neg Hx          Age of Onset: (Not Specified)  Problem: Ulcerative colitis      Relation: Neg Hx          Age of Onset: (Not Specified)  Problem: Kalia's disease      Relation: Neg Hx          Age of Onset: (Not Specified)  Problem: Lymphoma      Relation: Neg Hx          Age of Onset: (Not Specified)  Problem: Tuberculosis      Relation: Neg Hx          Age of Onset: (Not Specified)  Problem: Scleroderma      Relation: Neg Hx          Age of Onset: (Not Specified)  Problem: Rheum arthritis      Relation: Neg Hx          Age of Onset: (Not  "Specified)  Problem: Multiple sclerosis      Relation: Neg Hx          Age of Onset: (Not Specified)  Problem: Melanoma      Relation: Neg Hx          Age of Onset: (Not Specified)  Problem: Lupus      Relation: Neg Hx          Age of Onset: (Not Specified)  Problem: Psoriasis      Relation: Neg Hx          Age of Onset: (Not Specified)  Problem: Skin cancer      Relation: Neg Hx          Age of Onset: (Not Specified)    Social History    Socioeconomic History      Marital status:       Spouse name: Not on file      Number of children: Not on file      Years of education: Not on file      Highest education level: Not on file    Occupational History      Not on file    Social Needs      Financial resource strain: Not on file      Food insecurity:        Worry: Not on file        Inability: Not on file      Transportation needs:        Medical: Not on file        Non-medical: Not on file    Tobacco Use      Smoking status: Never Smoker      Smokeless tobacco: Current User        Types: Snuff      Tobacco comment: 20 plus years    Substance and Sexual Activity      Alcohol use: Yes        Comment: occas      Drug use: No      Sexual activity: Yes        Partners: Female    Lifestyle      Physical activity:        Days per week: Not on file        Minutes per session: Not on file      Stress: Not on file    Relationships      Social connections:        Talks on phone: Not on file        Gets together: Not on file        Attends Jewish service: Not on file        Active member of club or organization: Not on file        Attends meetings of clubs or organizations: Not on file        Relationship status: Not on file    Other Topics      Concerns:        Not on file    Social History Narrative      Not on file        Review of Systems    Objective:       Vitals:    05/22/19 0838   BP: 124/80   Pulse: 70   Temp: 98.4 °F (36.9 °C)   TempSrc: Oral   SpO2: 98%   Weight: 117 kg (257 lb 15 oz)   Height: 5' 7" (1.702 m) "       Physical Exam   Constitutional: He is oriented to person, place, and time. He appears well-developed and well-nourished. No distress.   HENT:   Head: Normocephalic and atraumatic.   Eyes: Conjunctivae are normal.   Neck: Normal range of motion. Neck supple. Carotid bruit is not present.   Cardiovascular: Normal rate, regular rhythm and normal heart sounds. Exam reveals no gallop and no friction rub.   No murmur heard.  Pulmonary/Chest: Effort normal and breath sounds normal. No respiratory distress. He has no wheezes. He has no rales.   Musculoskeletal: He exhibits no edema.   Ambulates with a walker   Lymphadenopathy:     He has no cervical adenopathy.   Neurological: He is alert and oriented to person, place, and time.   Skin: He is not diaphoretic.   Psychiatric: He has a normal mood and affect.       Assessment:       1. DM (diabetes mellitus), type 2, uncontrolled w/neurologic complication    2. Osteoarthritis of spine with radiculopathy, lumbar region        Plan:       Nicolas was seen today for 1 month diabetes follow up, diabetic foot exam and right side/abdominal pain.    Diagnoses and all orders for this visit:    DM (diabetes mellitus), type 2, uncontrolled w/neurologic complication  -     SITagliptin (JANUVIA) 50 MG Tab; Take 1 tablet (50 mg total) by mouth once daily.  -     Hemoglobin A1c; Future  His numbers are improving significantly    Osteoarthritis of spine with radiculopathy, lumbar region  -     HYDROcodone-acetaminophen (NORCO)  mg per tablet; Take 1 tablet by mouth nightly as needed for Pain.  Given hydrocodone for pain,.

## 2019-05-23 ENCOUNTER — TELEPHONE (OUTPATIENT)
Dept: SURGERY | Facility: CLINIC | Age: 64
End: 2019-05-23

## 2019-05-23 ENCOUNTER — TELEPHONE (OUTPATIENT)
Dept: PULMONOLOGY | Facility: CLINIC | Age: 64
End: 2019-05-23

## 2019-05-23 NOTE — TELEPHONE ENCOUNTER
----- Message from Hussain Tipton MD sent at 5/22/2019  7:57 PM CDT -----  Please schedule ct of chest in 6 months (11/19)  Thanks  hussain

## 2019-05-23 NOTE — TELEPHONE ENCOUNTER
----- Message from Tresa Ricardo sent at 5/23/2019  9:59 AM CDT -----  Contact: Self   Pt calling to speak to a nurse regarding scheduling appt with doctor. [phone]

## 2019-05-24 ENCOUNTER — TELEPHONE (OUTPATIENT)
Dept: FAMILY MEDICINE | Facility: CLINIC | Age: 64
End: 2019-05-24

## 2019-05-24 NOTE — TELEPHONE ENCOUNTER
----- Message from [place] sent at 5/24/2019  3:22 PM CDT -----  Contact: [facility]  Type:  Pharmacy Calling to Clarify an RX    Name of Caller: [place]    Pharm[facility]    Prescription Name: HYDROcodone-acetaminophen (NORCO)  mg per tablet    What do they need to clarify? A prior auth is required or documentation showing the patient has been on this medication for the past 3 months.    Best Call Back Number: [phone]

## 2019-05-28 ENCOUNTER — TELEPHONE (OUTPATIENT)
Dept: PODIATRY | Facility: CLINIC | Age: 64
End: 2019-05-28

## 2019-05-28 NOTE — TELEPHONE ENCOUNTER
----- Message from [place] sent at 5/28/2019  9:20 AM CDT -----  Contact: Spouse-Gera  Type: RX Refill Request    Who Called: Spouse -Gera    Refill or New Rx: New RX    RX Name and Strength: diclofenac sodium (VOLTAREN) 1 % Gel      Preferred Pharmacy with phone number:[facility] [place] - [place], [place] - [address] [address]   [phone] (Phone)  [phone] (Fax)      Local or Mail Order: Local    Ordering Provider: Dr. Lyn    Would the patient rather a call back or a response via My [facility]? Call    Best Call Back Number: [phone]    Additional Information: also patient would like to know the status of the DME Sh[facility] request.

## 2019-05-30 ENCOUNTER — TELEPHONE (OUTPATIENT)
Dept: PODIATRY | Facility: CLINIC | Age: 64
End: 2019-05-30

## 2019-05-30 NOTE — TELEPHONE ENCOUNTER
----- Message from Coleen Reed sent at 5/30/2019 12:42 PM CDT -----  ..Type: Patient Call Back    Who called: Debbie PAULSON     What is the request in detail: Rep asking for a call back regarding PA for Diclofenac Gel    Can the clinic reply by [facility]? No     Would the patient rather a call back or a response via [facility]? Call back     Best call back number:[phone]

## 2019-06-06 ENCOUNTER — PATIENT OUTREACH (OUTPATIENT)
Dept: ADMINISTRATIVE | Facility: HOSPITAL | Age: 64
End: 2019-06-06

## 2019-06-10 ENCOUNTER — TELEPHONE (OUTPATIENT)
Dept: FAMILY MEDICINE | Facility: CLINIC | Age: 64
End: 2019-06-10

## 2019-06-10 ENCOUNTER — OFFICE VISIT (OUTPATIENT)
Dept: SURGERY | Facility: CLINIC | Age: 64
End: 2019-06-10
Payer: MEDICARE

## 2019-06-10 VITALS
WEIGHT: 257 LBS | BODY MASS INDEX: 40.34 KG/M2 | HEART RATE: 92 BPM | SYSTOLIC BLOOD PRESSURE: 133 MMHG | HEIGHT: 67 IN | DIASTOLIC BLOOD PRESSURE: 70 MMHG

## 2019-06-10 DIAGNOSIS — K43.2 RECURRENT INCISIONAL HERNIA: Primary | ICD-10-CM

## 2019-06-10 PROCEDURE — 3078F PR MOST RECENT DIASTOLIC BLOOD PRESSURE < 80 MM HG: ICD-10-PCS | Mod: CPTII,S$GLB,, | Performed by: SURGERY

## 2019-06-10 PROCEDURE — 3075F PR MOST RECENT SYSTOLIC BLOOD PRESS GE 130-139MM HG: ICD-10-PCS | Mod: CPTII,S$GLB,, | Performed by: SURGERY

## 2019-06-10 PROCEDURE — 3008F PR BODY MASS INDEX (BMI) DOCUMENTED: ICD-10-PCS | Mod: CPTII,S$GLB,, | Performed by: SURGERY

## 2019-06-10 PROCEDURE — 3078F DIAST BP <80 MM HG: CPT | Mod: CPTII,S$GLB,, | Performed by: SURGERY

## 2019-06-10 PROCEDURE — 3008F BODY MASS INDEX DOCD: CPT | Mod: CPTII,S$GLB,, | Performed by: SURGERY

## 2019-06-10 PROCEDURE — 99214 PR OFFICE/OUTPT VISIT, EST, LEVL IV, 30-39 MIN: ICD-10-PCS | Mod: S$GLB,,, | Performed by: SURGERY

## 2019-06-10 PROCEDURE — 3075F SYST BP GE 130 - 139MM HG: CPT | Mod: CPTII,S$GLB,, | Performed by: SURGERY

## 2019-06-10 PROCEDURE — 99214 OFFICE O/P EST MOD 30 MIN: CPT | Mod: S$GLB,,, | Performed by: SURGERY

## 2019-06-10 NOTE — TELEPHONE ENCOUNTER
His sugars are bottoming out int he 40's and 50's and he is waking up at 2 a.m. It is 160 at bedtime. He is holding his glimepiride    He is on januvia 50 mg, starlix 60 mg, trulicity once weekly and glimepiride 4 mg at [facility].     He will hold the glimepiride at night.

## 2019-06-10 NOTE — LETTER
Karin 10, 2019      Froy Marquez MD  [address] [place] [address]  [address]  [facility]  [place] [address]           [facility]  [address] [facility] [address], [address]  [place] [address]  Phone: [phone]  Fax: [phone]          Patient: Nicolas Flaherty   MR Number: [id number]   YOB: 1955   Date of Visit: 6/10/2019       Dear Dr. Froy Marquez:    Thank you for referring Nicolas Flaherty to me for evaluation. Attached you will find relevant portions of my assessment and plan of care.    If you have questions, please do not hesitate to call me. I look forward to following Nicolas Flaherty along with you.    Sincerely,    Jose Lewis MD    Enclosure  CC:  No Recipients    If you would like to receive this communication electronically, please contact [email or web address] or [phone] to request more information on [employer] access.    For providers and/or their staff who would like to refer a patient to [facility], please contact us through our one-stop-shop provider referral line, [facility], at [phone].    If you feel you have received this communication in error or would no longer like to receive these types of communications, please e-mail [email or web address]

## 2019-06-10 NOTE — PROGRESS NOTES
Subjective:       Patient ID: Nicolas Flaherty is a 63 y.o. male.    Chief Complaint: Follow-up    HPI 64 yo male with recurrent incisional hernia with pain   Review of Systems   Constitutional: Negative.    HENT: Negative.    Eyes: Negative.    Respiratory: Negative.    Cardiovascular: Negative.    Gastrointestinal: Negative.    Endocrine: Negative.    Musculoskeletal: Negative.    Skin: Negative.    Allergic/Immunologic: Negative.    Neurological: Negative.    Hematological: Negative.    Psychiatric/Behavioral: Negative.    All other systems reviewed and are negative.      Objective:      Physical Exam   Constitutional: He is oriented to person, place, and time. He appears well-developed and well-nourished.   HENT:   Head: Normocephalic and atraumatic.   Right Ear: External ear normal.   Left Ear: External ear normal.   Nose: Nose normal.   Mouth/Throat: Oropharynx is clear and moist.   Eyes: Pupils are equal, round, and reactive to light. Conjunctivae and EOM are normal.   Neck: Normal range of motion. Neck supple.   Cardiovascular: Normal rate, regular rhythm, normal heart sounds and intact distal pulses.   Pulmonary/Chest: Effort normal and breath sounds normal.   Abdominal: Soft. Bowel sounds are normal.       Musculoskeletal: Normal range of motion.   Neurological: He is alert and oriented to person, place, and time. He has normal reflexes.   Skin: Skin is warm and dry.   Psychiatric: He has a normal mood and affect. His behavior is normal. Thought content normal.   Vitals reviewed.      Assessment:       1. Recurrent incisional hernia        Plan:       I will see him back after he gets his colonoscopy for scheduling

## 2019-06-25 ENCOUNTER — OFFICE VISIT (OUTPATIENT)
Dept: GASTROENTEROLOGY | Facility: CLINIC | Age: 64
End: 2019-06-25
Payer: MEDICARE

## 2019-06-25 ENCOUNTER — HOSPITAL ENCOUNTER (OUTPATIENT)
Dept: CARDIOLOGY | Facility: HOSPITAL | Age: 64
Discharge: HOME | End: 2019-06-25
Attending: INTERNAL MEDICINE
Payer: MEDICARE

## 2019-06-25 VITALS
HEIGHT: 67 IN | WEIGHT: 252.19 LBS | SYSTOLIC BLOOD PRESSURE: 146 MMHG | HEART RATE: 76 BPM | BODY MASS INDEX: 39.58 KG/M2 | DIASTOLIC BLOOD PRESSURE: 81 MMHG

## 2019-06-25 DIAGNOSIS — K22.4 HYPERCONTRACTILE ESOPHAGUS: ICD-10-CM

## 2019-06-25 DIAGNOSIS — R07.89 NON-CARDIAC CHEST PAIN: ICD-10-CM

## 2019-06-25 DIAGNOSIS — R13.10 DYSPHAGIA, UNSPECIFIED TYPE: Primary | ICD-10-CM

## 2019-06-25 DIAGNOSIS — Z51.81 THERAPEUTIC DRUG MONITORING: ICD-10-CM

## 2019-06-25 DIAGNOSIS — D64.9 ANEMIA, UNSPECIFIED TYPE: ICD-10-CM

## 2019-06-25 PROCEDURE — 99999 PR PBB SHADOW E&M-EST. PATIENT-LVL V: CPT | Mod: PBBFAC,,, | Performed by: INTERNAL MEDICINE

## 2019-06-25 PROCEDURE — 93005 ELECTROCARDIOGRAM TRACING: CPT

## 2019-06-25 PROCEDURE — 99499 UNLISTED E&M SERVICE: CPT | Mod: S$GLB,,, | Performed by: INTERNAL MEDICINE

## 2019-06-25 PROCEDURE — 93010 ELECTROCARDIOGRAM REPORT: CPT | Mod: ,,, | Performed by: INTERNAL MEDICINE

## 2019-06-25 PROCEDURE — 99499 RISK ADDL DX/OHS AUDIT: ICD-10-PCS | Mod: S$GLB,,, | Performed by: INTERNAL MEDICINE

## 2019-06-25 PROCEDURE — 99215 PR OFFICE/OUTPT VISIT, EST, LEVL V, 40-54 MIN: ICD-10-PCS | Mod: S$GLB,,, | Performed by: INTERNAL MEDICINE

## 2019-06-25 PROCEDURE — 99999 PR PBB SHADOW E&M-EST. PATIENT-LVL V: ICD-10-PCS | Mod: PBBFAC,,, | Performed by: INTERNAL MEDICINE

## 2019-06-25 PROCEDURE — 99215 OFFICE O/P EST HI 40 MIN: CPT | Mod: S$GLB,,, | Performed by: INTERNAL MEDICINE

## 2019-06-25 PROCEDURE — 93010 EKG 12-LEAD: ICD-10-PCS | Mod: ,,, | Performed by: INTERNAL MEDICINE

## 2019-06-25 RX ORDER — DILTIAZEM HYDROCHLORIDE 60 MG/1
60 TABLET, FILM COATED ORAL 3 TIMES DAILY
Qty: 270 TABLET | Refills: 3 | Status: SHIPPED | OUTPATIENT
Start: 2019-06-25 | End: 2019-08-15

## 2019-06-25 NOTE — PATIENT INSTRUCTIONS
-Decrease pain medication as this is likely contributing to your difficulty swallowing.  Speak with your primary care doctor about alternative medications for your pain.  Consider physical therapy and referral to pain mangement    -Check EKG today   -Continue to take diltiazem 10mg three time sper day   -We will start nortriptyline 25mg at night if your EKG is ok.  We will increase the dose over time. This should also decrease your pain and help you get off pain medication

## 2019-06-25 NOTE — PROGRESS NOTES
[facility] Consultation Note    Reason for Consult:    Chief Complaint   Patient presents with    Weight Loss    Dysphagia         PCP:   Shyanne Alfredo       Referring MD:  Froy Marquez Md  [address] [place] [address] Blvd  [address]  [facility]  [place] [address]    HPI:  Nicolas Flaherty is a 63 y.o. male a PMH of HLD, HTN, h/o MI s/p stents on plavix x 6 yrs, h/o CVA, DM type 2, glaucoma, gout, s/p kidney CA s/p right nephrectomy, BPH, GAUTAM, back problems referred to motility clinic for second opinion regarding the following problems:    Chest pain. No longer having     Dysphagia.  Slightly better.     No improvement with peppermint oil, mints, or peppermint tea. No improvement with diltiazem  Taking diltiazem TID - some improvement   Never started nortriptyline       Reports difficulty swallowing. Spitting up lots of phlegm.   Onset of symptoms:6-7 months  Problems with solids:yes  Problems with liquids:yes  Choking while eating:no   Coughing while eating:yes   Location: mid esopahgus  Frequency: almost every day     Achalasia Eckardt Score  Dysphagia  (none (0), occasional (1), daily (2), with every meal (3)): 2  Regurgitation (none (0), occasional (1), daily (2), with every meal (3)): 1   Chest pain (none (0), occasional (1), daily (2), several times per day (3)):0   Weight loss (kg) (0 (0), <5 (1), 5-10 (2), >10 (3)): 0  Total Eckardt Score(the final score is the sum of four components (0-12)):  3    GAUTAM. Sleeping with new CPAP.  Getting good sleep every night     Anemia. No overt bleeding     Colonoscopy on 7/10/18 with 1.5- 2 cm AC TVA w/ high grade glandular atypia and 2 mm TA with low grade glandular dysplasia. Rpt in 1 year.  Plan for colonoscopy w Dr. Marquez    Weight loss.  Lost 20lb in the past few months.  This is intentional due to diet.  Duran snot think this is related to dysphagia.      DM type 2. BS running 145- 200s. Last A1C 8.4 in  10/2018.  On trulicity 1.5 mg once weekly. On amaryl 4mg am 6 mg HS. On nateglinide 60 mg BID. Followed by PCP.    Periferal neuropathy. Legs.  Unable to tolerate gabapentin 600 mg AM and 1200 mg HS d/t falling. Followed by PCP.     Chronic back pain. S/p vertebral compression.   Takes norco every night per orthopedics.     H/o  Kidney CA. S/p right nephrectomy. Followed by nephrologist.     Denies GERD, nausea, vomiting, early satiety, abdominal pain, bloating, diarrhea, constipation, BRBPR, melena, weight loss, anxiety/depression, insomnia.       Total visit time was 40 minutes, more than 50% of which was spent in face-to-face counseling with patient regarding symptoms, diagnostic results, prognosis, risks and benefits of treatment options, instructions for management, importance of compliance with chosen treatment options, risk factor reduction, stress reduction, coping strategies.      Previous Studies:   PET CT 5/16/2019: Multiple subcentimeter solid pulmonary nodules scattered throughout both lungs, similar to most recent CT.  No corresponding radiotracer uptake noting that subcentimeter pulmonary nodules are too small to characterize with PET CT.  Recommend continued close CT surveillance as malignancy not excluded. Right nephrectomy.  No evidence of local recurrence.  Ct chest 4/23/2019: Numerous subcentimeter noncalcified pulmonary nodules identified throughout both lungs with the largest located within the right lower lobe measuring up to 8 mm in size (image 288, series 4).  These are increasing in size and number when compared to prior examinations and metastatic disease should be strongly considered.S/p right nephrectomy.Extensive atherosclerotic calcification within the coronary arteries.Left adrenal adenoma.Fat containing right lateral abdominal wall hernias.  Esophagram 3/8/19: Significant esophageal dysmotility compatible with diffuse esophageal spasm or corkscrew esophagus. 13 mm BT passed  normally.  2/19/19: esophageal manometry: high les with borderline relaxation. Normalization of IRP upright. Hypercontractile esophagus (vs type III achalasia, less likely). Incomplete bolus clearance, abnormal multiple rapid swallow test with loss of inhibition, pan-esophageal pressurization and hypercontractile final contraction. Pt unable to perform MRS correctly. No significant diff w/ maneuvers. Evidence of residual liquid in esophagus at the end of 200cc bolus. IRP normal 14.7, DCI high 73742.4 (highest 17,245.8), Dl normal 6.9.   EGD 2/19/19: abnormal esophageal motility (-). Nl stomach. Nl duodenum. Manometry catheter endoscopically placed.  Esophageal manometry 8/10/18: hyper contractile esophagus, no EGJ outflow obstruction. IRP nl 13.4, DCI high 9311.0, DL nl 6.3, 10% incomplete bolus clearance, 10% rapid contractions  Colon 7/10/18: GPTC. mild tics. 1.5-2cm AC polyp (TVA w/ high grade glandular atypia). 2 mm TC polyp (TA with low grade glandular dysplasia). Grade 2 IH. Rpt 1 yr  EGD 7/10/18: HH. Nl esophagus dilated with 54 fr Brown (-). Erosion, friability, granularity in stomach (mild chr inflamm, reactive glandular changes, and superficial vascular congestion).   MBSS 5/30/18: normal. Recommend GI consult to r/o esophageal dysphagia.   Abd xray 2/25/08: spine degeneration. 4 mm superior left renal calcific focus  CT abd 1/9/08: 3 mm obstructing ureteral stones. Mild left hydroureter and hydronephrosis. Multi rt renal stones. Sm left adrenal nodule c/w adrenal adenoma.  *EGD 9/24/10: ?    Labs:  12/3/18: TSH nl  12/2/18: CMP BUN high 35, creatinine high 1.8, GFR low 39, CBC hgb low 13.1, RDW high 14.8  7/29/18: hgb low 9.9, cr high 1.52, bun high 20 , cl high 109,   4/16/18: HbA1c high 9.2  2/21/08: cmp unremarkable, cbc nl  1/9/18: TSH nl  Hep c ab -  B 12/folate panel nl.     *per referring provider    ROS:  ROS   Constitutional: No fevers, no chills, no night sweats, no weight loss  ENT: No  congestion, no rhinorrhea, + chronic sinus problems  CV: no chest pain, no palpitations  Pulm: No cough, + shortness of breath  Ophtho: No blurry vision, no eye redness  GI: see HPI  Derm: No rash  Heme: No lymphadenopathy, no bruising  MSK: No joint pain, no joint swelling, no Raynauds  : No dysuria, no frequent urination, no blood in urine  Endo: no hot or cold intolerance  Neuro: no dizziness, no syncope, no seizure  Psych: No anxiety, no depression        Medical History:   Past Medical History:   Diagnosis Date    Abdominal hernia     Anticoagulant long-term use     Arthritis     Cancer of kidney, right     Colon polyp     Coronary artery disease     Diabetes mellitus     Hypertension     Kidney stone     Sleep apnea     Slurred speech     Stroke     MINI STROKE    TIA (transient ischemic attack)     Wears glasses         Surgical History:   Past Surgical History:   Procedure Laterality Date    BACK SURGERY      lower back    CARDIAC SURGERY      COLONOSCOPY  07/2018    CYSTOSCOPY  03/2008    CYSTOSCOPY with bilateral RETROGRADE and right URETEROSCOPY, STENT PLACEMENT Bilateral 6/16/2015    [facility]    CYSTOSCOPY WITH RETROGRADE PYELOGRAM Right 3/8/2016    [facility]    EGD (ESOPHAGOGASTRODUODENOSCOPY) N/A 2/19/2019    Performed by [facility] at [facility] ([address] [place] [address])    ESOPHAGEAL MANOMETRY  08/2018    EVACUATION-CLOT  6/16/2015    [facility]    excision right thigh mass      EXCISION-MASS-THIGH Right 3/5/2018    [facility]    FULGURATION-BLADDER  6/16/2015    [facility]    heart stents      stents 4 total.  2010, 2011 and 2013    HERNIA REPAIR      incisional    KIDNEY STONE SURGERY  2015    LAPAROSCOPIC NEPHRECTOMY, HAND ASSISTED Right     LITHOTRIPSY      MANOMETRY, ESOPHAGUS, WITH  IMPEDANCE MEASUREMENT N/A 2/19/2019    [facility]O (2ND FLR)    NEPHRECTOMY-LAPAROSCOPIC Right 3/18/2016    [facility] OR    PLACEMENT, Shaver Catheter  6/16/2015    [facility] OR    REMOVAL-STONE-URETERAL  6/16/2015    [facility] OR    REPAIR-HERNIA-INCISIONAL N/A 8/19/2016    [facility] by Jose [facility] at [facility] OR    UPPER GASTROINTESTINAL ENDOSCOPY      7/10/2018,9/24/2010    URETEROSCOPY Right 3/8/2016    [facility] OR    VASCULAR SURGERY      WASHING-BLADDER  6/16/2015    [facility] at [facility] OR        Family History:   Family History   Problem Relation Age of Onset    Heart disease Father     Hypertension Mother     Parkinsonism Mother     Celiac disease Neg Hx     Cirrhosis Neg Hx     Colon cancer Neg Hx     Colon polyps Neg Hx     Crohn's disease Neg Hx     Cystic fibrosis Neg Hx     Esophageal cancer Neg Hx     Hemochromatosis Neg Hx     Inflammatory bowel disease Neg Hx     Irritable bowel syndrome Neg Hx     Liver cancer Neg Hx     Liver disease Neg Hx     Rectal cancer Neg Hx     Stomach cancer Neg Hx     Ulcerative colitis Neg Hx     Kalia's disease Neg Hx     Lymphoma Neg Hx     Tuberculosis Neg Hx     Scleroderma Neg Hx     Rheum arthritis Neg Hx     Multiple sclerosis Neg Hx     Melanoma Neg Hx     Lupus Neg Hx     Psoriasis Neg Hx     Skin cancer Neg Hx         Social History:   Social History     Socioeconomic History    Marital status:      Spouse name: Not on file    Number of children: Not on file    Years of education: Not on file    Highest education level: Not on file   Occupational History    Not on file   Social Needs    Financial resource strain: Not on file    Food insecurity:     Worry: Not on file     Inability: Not on file     Transportation needs:     Medical: Not on file     Non-medical: Not on file   Tobacco Use    Smoking status: Never Smoker    Smokeless tobacco: Current User     Types: Snuff    Tobacco comment: 20 plus years   Substance and Sexual Activity    Alcohol use: Yes     Comment: occasional    Drug use: No    Sexual activity: Yes     Partners: Female   Lifestyle    Physical activity:     Days per week: Not on file     Minutes per session: Not on file    Stress: Not on file   Relationships    Social connections:     Talks on phone: Not on file     Gets together: Not on file     Attends [employer] service: Not on file     Active member of club or organization: Not on file     Attends meetings of clubs or organizations: Not on file     Relationship status: Not on file   Other Topics Concern    Not on file   Social History Narrative    Not on file        Review of patient's allergies indicates:  No Known Allergies    Current Outpatient Medications   Medication Sig Dispense Refill    allopurinol (ZYLOPRIM) 100 MG tablet Take 100 mg by mouth once daily.      ammonium lactate 12 % Crea Apply 1 g topically once daily. 1 Bottle 3    aspirin (ECOTRIN) 81 MG EC tablet Take 81 mg by mouth once daily. LAST TAKEN 3/2/16      blood sugar diagnostic (ACCU-CHEK REJI PLUS TEST STRP) Strp TEST BLOOD SUGAR TWICE DAILY 200 strip 2    blood-glucose meter (ACCU-CHEK REJI PLUS METER) Misc CHECK TWICE DAILY 1 each 0    clopidogrel (PLAVIX) 75 mg tablet Take 75 mg by mouth every morning. LAST DOSE 3/2/16      diclofenac sodium (VOLTAREN) 1 % Gel Apply 2 g topically once daily. 100 g 3    diltiaZEM (CARDIZEM) 60 MG tablet Take 1 tablet (60 mg total) by mouth 3 (three) times daily. 270 tablet 3    docusate sodium (STOOL SOFTENER) 100 MG capsule Take 1 capsule (100 mg total) by mouth 2 (two) times daily. 180 capsule 3    dulaglutide (TRULICITY) 1.5 mg/0.5 mL PnIj Inject 1.5 mg into the skin every 7 days. 6 mL 3    finasteride  "(PROSCAR) 5 mg tablet Take 1 tablet (5 mg total) by mouth every morning. 90 tablet 3    furosemide (LASIX) 40 MG tablet Take 40 mg by mouth 2 (two) times daily. ALTERNATES 1 TABLET ONE DAY AND 2 TABLETS THE NEXT--ALTERNATING DAYS      gemfibrozil (LOPID) 600 MG tablet Take 1 tablet (600 mg total) by mouth 2 (two) times daily before meals. 180 tablet 3    glimepiride (AMARYL) 4 MG tablet Take 1 tablet (4 mg total) by mouth 2 (two) times daily. 180 tablet 1    HYDROcodone-acetaminophen (NORCO)  mg per tablet Take 1 tablet by mouth nightly as needed for Pain. 30 tablet 0    lancets (ACCU-CHEK SOFTCLIX LANCETS) Misc 1 lancet by Misc.(Non-Drug; Combo Route) route 2 (two) times daily. 200 each 2    losartan (COZAAR) 50 MG tablet Take 50 mg by mouth once daily.      metoprolol succinate (TOPROL-XL) 100 MG 24 hr tablet Take 100 mg by mouth every morning.       nateglinide (STARLIX) 60 MG tablet TAKE 1 TABLET TWICE DAILY BEFORE MEALS 180 tablet 0    NITROSTAT 0.4 mg SL tablet Place 0.4 mg under the tongue every 5 (five) minutes as needed.       ranitidine (ZANTAC) 150 MG tablet Take 1 tablet (150 mg total) by mouth 2 (two) times daily. 180 tablet 5    rosuvastatin (CRESTOR) 20 MG tablet Take 1 tablet (20 mg total) by mouth once daily. 90 tablet 3    SITagliptin (JANUVIA) 50 MG Tab Take 1 tablet (50 mg total) by mouth once daily. 90 tablet 3    tamsulosin (FLOMAX) 0.4 mg Cap Take 1 capsule (0.4 mg total) by mouth once daily. 90 capsule 3    TRAVATAN Z 0.004 % Drop 1 drop every evening.        Current Facility-Administered Medications   Medication Dose Route Frequency Provider Last Rate Last Dose    lidocaine (PF) 10 mg/ml (1%) injection 10 mg  1 mL Intradermal Once Jose Lewis MD            Objective Findings:  Vital Signs:  BP (!) 146/81   Pulse 76   Ht 5' 7" (1.702 m)   Wt 114.4 kg (252 lb 3.3 oz)   BMI 39.50 kg/m²   Body mass index is 39.5 kg/m².    Physical Exam:  General appearance: alert, " cooperative, no distress  HENT: Normocephalic, atraumatic, neck symmetrical, no nasal discharge  Eyes: conjunctivae/corneas clear, PERRL, EOM's intact  Lungs: clear to auscultation bilaterally, no dullness to percussion bilaterally  Heart: regular rate and rhythm without rub; no displacement of the PMI  Abdomen: soft, non-tender; bowel sounds normoactive; no organomegaly  Extremities: extremities symmetric; no clubbing, cyanosis, or edema  Integument: Skin color, texture, turgor normal; no rashes; hair distrubution normal  Neurologic: Alert and oriented X 3, normal strength, normal coordination and gait  Psychiatric: no pressured speech; normal affect; no evidence of impaired cognition    Labs:  Lab Results   Component Value Date    WBC 6.71 05/09/2019    HGB 12.8 (L) 05/09/2019    HCT 39.2 (L) 05/09/2019    MCV 88 05/09/2019     05/09/2019     No results found for: FERRITIN  Lab Results   Component Value Date     05/09/2019    K 4.0 05/09/2019     05/09/2019    CO2 23 05/09/2019     (H) 05/09/2019    BUN 28 (H) 05/09/2019    CREATININE 1.7 (H) 05/09/2019    CALCIUM 10.0 05/09/2019    PROT 8.3 05/09/2019    ALBUMIN 4.3 05/09/2019    BILITOT 0.3 05/09/2019    ALKPHOS 63 05/09/2019    AST 28 05/09/2019    ALT 25 05/09/2019     Lab Results   Component Value Date    TSH 2.267 12/03/2018     No results found for: SEDRATE  No results found for: CRP  Lab Results   Component Value Date    HGBA1C 9.5 (H) 04/03/2019    HGBA1C 9.5 (H) 04/03/2019           Assessment and Plan:  Nicolas Flaherty is a 63 y.o. male with a PMH of HLD, HTN, h/o MI s/p stents on plavix x 6 yrs, h/o CVA, DM type 2, glaucoma, gout, s/p kidney CA s/p right nephrectomy, BPH, GAUTAM, back problems referred to motility clinic for second opinion regarding the following problems:    Chest pain. Resolved.  Followed by cards for history of CAD w/ stents 4 years ago. Seen in ED and admitted for obsservation this just pror to s/s  onsest. EKG and CHRISTIANO negative. Diagnosed with unstable angina. Followed by cardiology and seen after last motility clinic visit.  On nitro PRN since MI.  Previously on  isosorrbide dinitrate 60 mg daily for HTN, but switched to diltiazem 60 mg TID with resolution of chest pain    Dysphagia.  Eckardt Score 3/12.  Manometry at [facility] with high les with borderline relaxation, normalization of IRP upright, iIncomplete bolus clearance. Study suggestive of hypercontractile esophagus (vs type III achalasia, less likely).  Esophagram with hypercontractile esophagus and corkscrewing, 13 mm BT passed normally. EGD with normal esophageal biopsies and evidence of dysmotility.      No improvement while on isosorbide  No improvement with peppermint oil, mints and peppermint tea  Temporary improvement with esophageal dilation (x 1 week relief)  Some improvement with diltiazem. Cont diltiazem 60mg  TID  -Discussed pathophysiology, presentation, and treatment (including surgical options) of hypercontractile esophagus  -Check EKG.  Start nortriptyline 25 mg HS if QTc okay, increase to 50Qhs after 2 weeks.  Will increase to 75mg at next visit.     -Will consider adding sildenafil   -Will consider EGD w EndoFlip and Botox if medical management fails   -Will consider myotomy if medical management fails   -Discussed pathophysiology, presentation, and treatment (including surgical options) of hypercontractile esophagus    Anemia.  -Egd/colon pending w Dr. Marquez   Management as per Dr. Marquez     Colonoscopy on 7/10/18 with 1.5- 2 cm semi pedunculated ascending colon TVA w/ high grade glandular atypia completely resected via hot snare and EMR, and 2 mm transverse colon TA with low grade glandular dysplasia complete resected via cold bx.   Management as per Dr. Marquez - Rpt pending (7/1019).    GAUTAM.   -Restarted CPAP     DM type 2. BS running 145- 200s. Last A1C 8.4 in 10/2018.    On trulicity, amaryl, nateglinide  Followed by PCP.    Periferal  neuropathy in legs.  Chronic back pain secondary to disc disease.   Unable to tolerate gabapentin 600 mg AM and 1200 mg HS due to falling.   -On norco daily by PCP/orthopedics.   -Discussed the role of narcotic pain medication in motility and functional gastrointestinal disorders.  Pt will attempt to limit narcotic use.   -PT will speak with your primary care doctor about alternative medications for your pain and consider physical therapy and referral to pain mangement      History of  Kidney CA. Has had right nephrectomy. Followed by nephrologist.       Follow up in about 2 months (around 9/3/2019) for DEB Ames NP, DEB Ames in 3 months, Dr. Treviño in 6 months  .    1. Dysphagia, unspecified type    2. Hypercontractile esophagus    3. Therapeutic drug monitoring    4. Non-cardiac chest pain    5. Anemia, unspecified type          Order summary:  Orders Placed This Encounter    EKG 12-lead    diltiaZEM (CARDIZEM) 60 MG tablet         Thank you so much for allowing me to participate in the care of Nicolas Treviño MD

## 2019-06-27 ENCOUNTER — TELEPHONE (OUTPATIENT)
Dept: GASTROENTEROLOGY | Facility: CLINIC | Age: 64
End: 2019-06-27

## 2019-06-27 DIAGNOSIS — Z51.81 THERAPEUTIC DRUG MONITORING: Primary | ICD-10-CM

## 2019-06-27 RX ORDER — NORTRIPTYLINE HYDROCHLORIDE 25 MG/1
25 CAPSULE ORAL NIGHTLY
Qty: 45 CAPSULE | Refills: 6 | Status: SHIPPED | OUTPATIENT
Start: 2019-06-27 | End: 2019-07-02

## 2019-06-27 NOTE — TELEPHONE ENCOUNTER
Sidney lt pt know that I would like to start     EKG shows normal QTC    Nortriptyline 25mg QHS for 2 weeks.    This will likely make her/him sleepy and fatigued. Most people get use to the side effects over time. We are starting at very low dose so that her/his body can get use to it. It takes a long time for this medicine to start working. She/he will most likely not feel any improvement with this dose.      She/he should increase it to nortriptyline 50mg QHS after 2 weeks of treatment. We will likely need to increase it further at next appointment.  Schedule apt w Italo Ames NP as previously advised

## 2019-07-01 ENCOUNTER — PATIENT OUTREACH (OUTPATIENT)
Dept: ADMINISTRATIVE | Facility: OTHER | Age: 64
End: 2019-07-01

## 2019-07-01 ENCOUNTER — LAB VISIT (OUTPATIENT)
Dept: LAB | Facility: HOSPITAL | Age: 64
End: 2019-07-01
Attending: FAMILY MEDICINE
Payer: COMMERCIAL

## 2019-07-01 DIAGNOSIS — K21.9 GASTROESOPHAGEAL REFLUX DISEASE, ESOPHAGITIS PRESENCE NOT SPECIFIED: ICD-10-CM

## 2019-07-01 DIAGNOSIS — M47.26 OSTEOARTHRITIS OF SPINE WITH RADICULOPATHY, LUMBAR REGION: ICD-10-CM

## 2019-07-01 PROCEDURE — 83036 HEMOGLOBIN GLYCOSYLATED A1C: CPT

## 2019-07-01 PROCEDURE — 36415 COLL VENOUS BLD VENIPUNCTURE: CPT | Mod: PO

## 2019-07-01 NOTE — TELEPHONE ENCOUNTER
----- Message from Anette Willett sent at 7/1/2019  4:36 PM CDT -----  Contact: self  Patient need meds sent to the pharmacy-pain meds and heartburn meds.to [facility] on [address]. Thank You

## 2019-07-02 ENCOUNTER — OFFICE VISIT (OUTPATIENT)
Dept: UROLOGY | Facility: CLINIC | Age: 64
End: 2019-07-02
Payer: COMMERCIAL

## 2019-07-02 ENCOUNTER — TELEPHONE (OUTPATIENT)
Dept: GASTROENTEROLOGY | Facility: CLINIC | Age: 64
End: 2019-07-02

## 2019-07-02 VITALS — WEIGHT: 253 LBS | BODY MASS INDEX: 39.71 KG/M2 | RESPIRATION RATE: 16 BRPM | HEIGHT: 67 IN

## 2019-07-02 DIAGNOSIS — R31.0 GROSS HEMATURIA: ICD-10-CM

## 2019-07-02 DIAGNOSIS — K43.2 INCISIONAL HERNIA, WITHOUT OBSTRUCTION OR GANGRENE: ICD-10-CM

## 2019-07-02 DIAGNOSIS — K22.4 ESOPHAGEAL DYSFUNCTION: Primary | ICD-10-CM

## 2019-07-02 DIAGNOSIS — R91.8 PULMONARY NODULES: ICD-10-CM

## 2019-07-02 DIAGNOSIS — C64.1 RENAL CELL CARCINOMA OF RIGHT KIDNEY: Primary | ICD-10-CM

## 2019-07-02 LAB
ESTIMATED AVG GLUCOSE: 134 MG/DL (ref 68–131)
HBA1C MFR BLD HPLC: 6.3 % (ref 4–5.6)

## 2019-07-02 PROCEDURE — 99214 PR OFFICE/OUTPT VISIT, EST, LEVL IV, 30-39 MIN: ICD-10-PCS | Mod: S$GLB,,, | Performed by: UROLOGY

## 2019-07-02 PROCEDURE — 99999 PR PBB SHADOW E&M-EST. PATIENT-LVL III: ICD-10-PCS | Mod: PBBFAC,,, | Performed by: UROLOGY

## 2019-07-02 PROCEDURE — 99999 PR PBB SHADOW E&M-EST. PATIENT-LVL III: CPT | Mod: PBBFAC,,, | Performed by: UROLOGY

## 2019-07-02 PROCEDURE — 99499 RISK ADDL DX/OHS AUDIT: ICD-10-PCS | Mod: S$GLB,,, | Performed by: UROLOGY

## 2019-07-02 PROCEDURE — 99499 UNLISTED E&M SERVICE: CPT | Mod: S$GLB,,, | Performed by: UROLOGY

## 2019-07-02 PROCEDURE — 99214 OFFICE O/P EST MOD 30 MIN: CPT | Mod: S$GLB,,, | Performed by: UROLOGY

## 2019-07-02 RX ORDER — NORTRIPTYLINE HYDROCHLORIDE 25 MG/1
CAPSULE ORAL
Qty: 75 CAPSULE | Refills: 3 | Status: SHIPPED | OUTPATIENT
Start: 2019-07-02 | End: 2019-12-09 | Stop reason: SDUPTHER

## 2019-07-02 NOTE — TELEPHONE ENCOUNTER
----- Message from Shyann Tavera sent at 7/2/2019  1:20 PM CDT -----  Contact: [facility]      Reason for call: Calling to get directions for script on nortriptyline (PAMELOR) 25 MG capsule        Communication Preference: [phone] ref#[id number]    Additional Information:

## 2019-07-02 NOTE — PROGRESS NOTES
Subjective:       Nicolas Flaherty is a 63 y.o. male who is an established patient who was seen for evaluation of nephrolithiasis and hematuria.      Last seen by Dr Christian 7/15. He has a h/o stones and is s/p ESWL and URS in the past. He has had issues with gross hematuria and has been worked up for this. He does take Plavix and ASA regularly. He has significant cardiac issues, especially when off Plavix.     He also has BPH with some LUTS. Nocturia x 4. He takes Flomax and Proscar.     R URS 6/15 for stones. Stone analysis - CaOx mono. 24hr stone risk analysis - hyperoxalaturia. He is taking allopurinol currently. Unsure efficacy of this.    KAREN 4/15 - normal, no hydro. 1.5cm simple cyst RUP. 4mm R MP stone. PVR 51cc.  KAREN 1/16 - no stone seen. 2.9cm lesion in R LP concerning for neoplasm. Not seen on previous study.   CT scan shows 3.2cm enhancing mass in R kidney, endophytic.    He is s/p R URS to r/o UC. No tumor seen therefore now s/p R lap radical nephrectomy on 3/18/16 for renal mass. 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 Cr 1.4 at baseline. Post-op Cr was 2.1 at discharge. He is followed by nephrology (Dr Short).     Initial post-operative check was fine. Staples were removed.    He was noted to have R sided hernia at the area of nephrectomy. He has had this repaired by Dr Lewis in 8/16. He has healed well from this.    CT 10/16 is clear of recurrence. Hernia repair noted 8/16. Cr 2.1.    CT and CXR 4/17, 10/17, 4/18 - clear of recurrence, Cr 4/18 - 2.0    CT and CXR 10/18 - essentially clear though increase in pulm nodules.     CT c/a/p (noncon 2/2 CKD) 4/19 - no local recurrence, increase in pulm nodules concerning for mets (up to 8mm), R abdominal wall hernia.     He continues to c/o R flank pain and swelling. DM2 poorly controlled. He has seen Dr Lewis in 5/19 for recurrent hernia (has colonoscopy yesterday). Seeing oncology and pulmonology.      The  "following portions of the patient's history were reviewed and updated as appropriate: allergies, current medications, past family history, past medical history, past social history, past surgical history and problem list.    Review of Systems  Constitutional: no fever or chills  ENT: no nasal congestion or sore throat  Respiratory: no cough or shortness of breath  Cardiovascular: no chest pain or palpitations  Gastrointestinal: no nausea or vomiting, tolerating diet  Genitourinary: as per HPI  Hematologic/Lymphatic: no easy bruising or lymphadenopathy  Musculoskeletal: no arthralgias or myalgias  Skin: no rashes or lesions  Neurological: no seizures or tremors  Behavioral/Psych: no auditory or visual hallucinations       Objective:    Vitals:   Resp 16   Ht 5' 7" (1.702 m)   Wt 114.8 kg (253 lb)   BMI 39.63 kg/m²     Physical Exam   General: well developed, well nourished in no acute distress  Head: normocephalic, atraumatic  Neck: supple, trachea midline, no obvious enlargement of thyroid  HEENT: EOMI, mucus membranes moist, sclera anicteric, no hearing impairment  Lungs: symmetric expansion, non-labored breathing  Cardiovascular: regular rate and rhythm, normal pulses  Abdomen: soft, non tender, non distended, no palpable masses, no hepatosplenomegaly, no hernias, bladder nonpalpable. No CVA tenderness.  Musculoskeletal: no peripheral edema, normal ROM in bilateral upper and lower extremities  Lymphatics: no cervical or inguinal lymphadenopathy  Skin: no rashes or lesions  Neuro: alert and oriented x 3, no gross deficits  Psych: normal judgment and insight, normal mood/affect and non-anxious  Genitourinary: (last visit)  Penis -  circumcised penis without plaques, lesions, or scarring.  Urethra - orthotopic location without stenosis.  Scrotum  - no lesions or rashes, no hydrocele or hernia.  Testes - descended bilaterally, symmetric without masses, non tender.  Epididymides - no cysts or lesions, no " spermatocele, symmetric   No evidence of any testicular/scrotal infection   ILDA: patient declined exam    Inc: well healed, s/p hernia repair.     Lab Review   Urine analysis today in clinic shows - 5-10 RBC    Lab Results   Component Value Date    WBC 6.71 05/09/2019    HGB 12.8 (L) 05/09/2019    HCT 39.2 (L) 05/09/2019    MCV 88 05/09/2019     05/09/2019     Lab Results   Component Value Date    CREATININE 1.7 (H) 05/09/2019    BUN 28 (H) 05/09/2019     No results found for: PSA  Lab Results   Component Value Date    PSADIAG 0.23 04/21/2015       Imaging  KAREN/CT reviewed  Reports and images were personally reviewed by me and discussed with the patient today         Assessment/Plan:      1. Renal cell carcinoma    - Baseline Cr 1.4, new baseline 2.2.   - s/p R lap nephrectomy 3/18/16, pT3aNx, FG2, ccRCC   - CT a/p and CXR clear 4/17, 10/17, 4/18   - Follow up plan: CT q6m x 3y (3/19), q1y to year 5 (3/21). CXR q6m x 5yrs   - s/p hernia repair by Dr Lewis at kidney extraction site (8/16) - recurrent, saw Dr Lewis again in 6/19. Needs follow up.   - CT c/a/p 4/19 - increase in pulm nodules, concern for mets   - Recommend consultation with oncology - ?PET. Seeing Dr Wood (PET did not show uptake due to small size)   - f/u with oncology   - CT c/a/p in 4 months (CT chest ordered by pulm - will add abd/pel)     2. Nephrolithiasis    - Punctate stone in R LP   - CaOx mono stones   - 24hr urine collection - hyperoxalaturia. Recommend Ca intake when oxalate taken PO. Low oxalate diet.    - He is taking allopurinol per RCA. Unsure efficacy of this. Instructed him he can stop this.    - Discussed low oxalate diet   - No stones on CT     3. Hematuria, gross    - Negative cysto 6/15, 3/16   - RCC - suspect reason for hematuria     4. Benign non-nodular prostatic hyperplasia with lower urinary tract symptoms    - Continue Flomax/Proscar   - PVD - urethral milking           Follow up in 4 months

## 2019-07-02 NOTE — TELEPHONE ENCOUNTER
"Pharmacy called. Directions state "Route: Take 1 capsule (25 mg total) by mouth every evening. for 30 doses" Pharmacy calling to clarify.  "

## 2019-07-03 ENCOUNTER — TELEPHONE (OUTPATIENT)
Dept: GASTROENTEROLOGY | Facility: CLINIC | Age: 64
End: 2019-07-03

## 2019-07-03 ENCOUNTER — TELEPHONE (OUTPATIENT)
Dept: ADMINISTRATIVE | Facility: HOSPITAL | Age: 64
End: 2019-07-03

## 2019-07-03 RX ORDER — POLYETHYLENE GLYCOL-3350 AND ELECTROLYTES WITH FLAVOR PACK 240; 5.84; 2.98; 6.72; 22.72 G/278.26G; G/278.26G; G/278.26G; G/278.26G; G/278.26G
POWDER, FOR SOLUTION ORAL
Refills: 0 | COMMUNITY
Start: 2019-06-17 | End: 2019-08-14 | Stop reason: CLARIF

## 2019-07-09 RX ORDER — HYDROCODONE BITARTRATE AND ACETAMINOPHEN 10; 325 MG/1; MG/1
1 TABLET ORAL NIGHTLY PRN
Qty: 30 TABLET | Refills: 0 | Status: SHIPPED | OUTPATIENT
Start: 2019-07-09 | End: 2019-08-05 | Stop reason: SDUPTHER

## 2019-07-10 ENCOUNTER — OFFICE VISIT (OUTPATIENT)
Dept: FAMILY MEDICINE | Facility: CLINIC | Age: 64
End: 2019-07-10
Payer: COMMERCIAL

## 2019-07-10 VITALS
SYSTOLIC BLOOD PRESSURE: 120 MMHG | DIASTOLIC BLOOD PRESSURE: 80 MMHG | BODY MASS INDEX: 39.38 KG/M2 | OXYGEN SATURATION: 97 % | HEART RATE: 86 BPM | TEMPERATURE: 99 F | WEIGHT: 250.88 LBS | HEIGHT: 67 IN

## 2019-07-10 DIAGNOSIS — R25.2 LEG CRAMPS: ICD-10-CM

## 2019-07-10 DIAGNOSIS — E78.5 HYPERLIPIDEMIA, UNSPECIFIED HYPERLIPIDEMIA TYPE: Primary | ICD-10-CM

## 2019-07-10 DIAGNOSIS — K21.9 GASTROESOPHAGEAL REFLUX DISEASE, ESOPHAGITIS PRESENCE NOT SPECIFIED: ICD-10-CM

## 2019-07-10 DIAGNOSIS — E08.40 DIABETES MELLITUS DUE TO UNDERLYING CONDITION WITH DIABETIC NEUROPATHY, WITHOUT LONG-TERM CURRENT USE OF INSULIN: ICD-10-CM

## 2019-07-10 PROCEDURE — 99214 PR OFFICE/OUTPT VISIT, EST, LEVL IV, 30-39 MIN: ICD-10-PCS | Mod: S$GLB,,, | Performed by: FAMILY MEDICINE

## 2019-07-10 PROCEDURE — 99499 UNLISTED E&M SERVICE: CPT | Mod: S$GLB,,, | Performed by: FAMILY MEDICINE

## 2019-07-10 PROCEDURE — 99499 RISK ADDL DX/OHS AUDIT: ICD-10-PCS | Mod: S$GLB,,, | Performed by: FAMILY MEDICINE

## 2019-07-10 PROCEDURE — 99999 PR PBB SHADOW E&M-EST. PATIENT-LVL III: CPT | Mod: PBBFAC,,, | Performed by: FAMILY MEDICINE

## 2019-07-10 PROCEDURE — 99999 PR PBB SHADOW E&M-EST. PATIENT-LVL III: ICD-10-PCS | Mod: PBBFAC,,, | Performed by: FAMILY MEDICINE

## 2019-07-10 PROCEDURE — 99214 OFFICE O/P EST MOD 30 MIN: CPT | Mod: S$GLB,,, | Performed by: FAMILY MEDICINE

## 2019-07-10 RX ORDER — ROSUVASTATIN CALCIUM 40 MG/1
40 TABLET, COATED ORAL NIGHTLY
Qty: 90 TABLET | Refills: 3 | Status: SHIPPED | OUTPATIENT
Start: 2019-07-10 | End: 2020-03-13 | Stop reason: SDUPTHER

## 2019-07-10 NOTE — PROGRESS NOTES
Subjective:       Patient ID: Nicolas Flaherty is a 63 y.o. male.    Chief Complaint: Bilateral Leg Pain    63 year old male with diabetes, CAD, hypertension, sleep apnea presents for pains in his legs. He has tried the muscle relaxers, but it is not helping. He had to increase to 15 mg of flexeril. He stopped the gabapentin as it was making him too drowsy and he was falling. He states it feels like a warm pain. He also gets onelia horses in his feet as well.     He is taking his diabetes medication intermittently as he is having drops in his blood sugars. He states it is dropping down to the 50's and 60's with or without taking his medication. He states he will have blood sugars in the 400's and then it will drop without medication.     She is also having issues with esophageal dysmotility. He was given nortriptyline, but they haven't received this yet.       Past Medical History:  No date: Abdominal hernia  No date: Anticoagulant long-term use  No date: Arthritis  No date: Cancer of kidney, right  No date: Colon polyp  No date: Coronary artery disease  No date: Diabetes mellitus  No date: Hypertension  No date: Kidney stone  No date: Sleep apnea  No date: Slurred speech  No date: Stroke      Comment:  MINI STROKE  No date: TIA (transient ischemic attack)  No date: Wears glasses   Past Surgical History:  No date: BACK SURGERY      Comment:  lower back  No date: CARDIAC SURGERY  07/2018: COLONOSCOPY  03/2008: CYSTOSCOPY  6/16/2015: CYSTOSCOPY with bilateral RETROGRADE and right   URETEROSCOPY, STENT PLACEMENT; Bilateral      Comment:  Performed by Dionte Christian MD at [facility] OR  3/8/2016: CYSTOSCOPY WITH RETROGRADE PYELOGRAM; Right      Comment:  Performed by Rosalia Acosta MD at [facility] OR  2/19/2019: EGD (ESOPHAGOGASTRODUODENOSCOPY); N/A      Comment:  Performed by Sylvia Treviño MD at [facility] ENDO ([address] [place] [address])  08/2018: ESOPHAGEAL MANOMETRY  6/16/2015: EVACUATION-CLOT      Comment:  Performed by  Dionte Christian MD at [facility] OR  No date: excision right thigh mass  3/5/2018: EXCISION-MASS-THIGH; Right      Comment:  Performed by Jose Lewis MD at [facility] OR  6/16/2015: FULGURATION-BLADDER      Comment:  Performed by Dionte Christian MD at [facility] OR  No date: heart stents      Comment:  stents 4 total.  2010, 2011 and 2013  No date: HERNIA REPAIR      Comment:  incisional  2015: KIDNEY STONE SURGERY  No date: LAPAROSCOPIC NEPHRECTOMY, HAND ASSISTED; Right  No date: LITHOTRIPSY  2/19/2019: MANOMETRY, ESOPHAGUS, WITH IMPEDANCE MEASUREMENT; N/A      Comment:  Performed by Sylvia Treviño MD at [facility] (2ND FLR)  3/18/2016: NEPHRECTOMY-LAPAROSCOPIC; Right      Comment:  Performed by Rosalia Acosta MD at [facility] OR  6/16/2015: PLACEMENT, Shaver Catheter      Comment:  Performed by Dionte Christian MD at [facility] OR  6/16/2015: REMOVAL-STONE-URETERAL      Comment:  Performed by Dionte Christian MD at [facility] OR  8/19/2016: REPAIR-HERNIA-INCISIONAL; N/A      Comment:  Performed by Jose Lewis MD at [facility] OR  No date: UPPER GASTROINTESTINAL ENDOSCOPY      Comment:  7/10/2018,9/24/2010  3/8/2016: URETEROSCOPY; Right      Comment:  Performed by Rosalia Acosta MD at [facility] OR  No date: VASCULAR SURGERY  6/16/2015: WASHING-BLADDER      Comment:  Performed by Dionte Christian MD at [facility] OR  Review of patient's family history indicates:  Problem: Heart disease      Relation: Father          Age of Onset: (Not Specified)  Problem: Hypertension      Relation: Mother          Age of Onset: (Not Specified)  Problem: Parkinsonism      Relation: Mother          Age of Onset: (Not Specified)  Problem: Celiac disease      Relation: Neg Hx          Age of Onset: (Not Specified)  Problem: Cirrhosis      Relation: Neg Hx          Age of Onset: (Not Specified)  Problem: Colon cancer      Relation: Neg Hx          Age of Onset: (Not Specified)  Problem: Colon polyps      Relation: Neg Hx          Age of  Onset: (Not Specified)  Problem: Crohn's disease      Relation: Neg Hx          Age of Onset: (Not Specified)  Problem: Cystic fibrosis      Relation: Neg Hx          Age of Onset: (Not Specified)  Problem: Esophageal cancer      Relation: Neg Hx          Age of Onset: (Not Specified)  Problem: Hemochromatosis      Relation: Neg Hx          Age of Onset: (Not Specified)  Problem: Inflammatory bowel disease      Relation: Neg Hx          Age of Onset: (Not Specified)  Problem: Irritable bowel syndrome      Relation: Neg Hx          Age of Onset: (Not Specified)  Problem: Liver cancer      Relation: Neg Hx          Age of Onset: (Not Specified)  Problem: Liver disease      Relation: Neg Hx          Age of Onset: (Not Specified)  Problem: Rectal cancer      Relation: Neg Hx          Age of Onset: (Not Specified)  Problem: Stomach cancer      Relation: Neg Hx          Age of Onset: (Not Specified)  Problem: Ulcerative colitis      Relation: Neg Hx          Age of Onset: (Not Specified)  Problem: Kalia's disease      Relation: Neg Hx          Age of Onset: (Not Specified)  Problem: Lymphoma      Relation: Neg Hx          Age of Onset: (Not Specified)  Problem: Tuberculosis      Relation: Neg Hx          Age of Onset: (Not Specified)  Problem: Scleroderma      Relation: Neg Hx          Age of Onset: (Not Specified)  Problem: Rheum arthritis      Relation: Neg Hx          Age of Onset: (Not Specified)  Problem: Multiple sclerosis      Relation: Neg Hx          Age of Onset: (Not Specified)  Problem: Melanoma      Relation: Neg Hx          Age of Onset: (Not Specified)  Problem: Lupus      Relation: Neg Hx          Age of Onset: (Not Specified)  Problem: Psoriasis      Relation: Neg Hx          Age of Onset: (Not Specified)  Problem: Skin cancer      Relation: Neg Hx          Age of Onset: (Not Specified)    Social History    Socioeconomic History      Marital status:       Spouse name: Not on file      Number of  "children: Not on file      Years of education: Not on file      Highest education level: Not on file    Occupational History      Not on file    Social Needs      Financial resource strain: Not on file      Food insecurity:        Worry: Not on file        Inability: Not on file      Transportation needs:        Medical: Not on file        Non-medical: Not on file    Tobacco Use      Smoking status: Never Smoker      Smokeless tobacco: Current User        Types: Snuff      Tobacco comment: 20 plus years    Substance and Sexual Activity      Alcohol use: Yes        Comment: occasional      Drug use: No      Sexual activity: Yes        Partners: Female    Lifestyle      Physical activity:        Days per week: Not on file        Minutes per session: Not on file      Stress: Not on file    Relationships      Social connections:        Talks on phone: Not on file        Gets together: Not on file        Attends [employer] service: Not on file        Active member of club or organization: Not on file        Attends meetings of clubs or organizations: Not on file        Relationship status: Not on file    Other Topics      Concerns:        Not on file    Social History Narrative      Not on file        Review of Systems    Objective:       Vitals:    07/10/19 0758   BP: 120/80   Pulse: 86   Temp: 98.6 °F (37 °C)   TempSrc: Oral   SpO2: 97%   Weight: 113.8 kg (250 lb 14.1 oz)   Height: 5' 7" (1.702 m)       Physical Exam   Constitutional: He is oriented to person, place, and time. He appears well-developed and well-nourished. No distress.   HENT:   Head: Normocephalic and atraumatic.   Neck: Neck supple.   Cardiovascular: Normal rate, regular rhythm and normal heart sounds. Exam reveals no gallop and no friction rub.   No murmur heard.  Pulmonary/Chest: Effort normal.   Musculoskeletal: He exhibits no edema.   Ambulates with a walker   Neurological: He is alert and oriented to person, place, and time.   Skin: He is not " diaphoretic.       Assessment:       1. Hyperlipidemia, unspecified hyperlipidemia type    2. Gastroesophageal reflux disease, esophagitis presence not specified    3. Leg cramps    4. Diabetes mellitus due to underlying condition with diabetic neuropathy, without long-term current use of insulin        Plan:       Nicolas was seen today for bilateral leg pain.    Diagnoses and all orders for this visit:    Hyperlipidemia, unspecified hyperlipidemia type  -     rosuvastatin (CRESTOR) 40 MG Tab; Take 1 tablet (40 mg total) by mouth every evening.  Stop gemfibrozil and increase Crestor to 40 mg as this may be causing his leg cramps.     Gastroesophageal reflux disease, esophagitis presence not specified    Leg cramps  Stop gemfibrozil and increase Crestor to 40 mg as this may be causing his leg cramps.       Diabetes mellitus due to underlying condition with diabetic neuropathy, without long-term current use of insulin  He will get nortriptyline for has esophageal dysmotility and this should help with his neuropathy. His blood sugar is controlled with an A1C of 6.3. Due for recheck in January 2020.

## 2019-07-17 LAB
LEFT EYE DM RETINOPATHY: NEGATIVE
RIGHT EYE DM RETINOPATHY: NEGATIVE

## 2019-07-30 ENCOUNTER — OFFICE VISIT (OUTPATIENT)
Dept: SURGERY | Facility: CLINIC | Age: 64
End: 2019-07-30
Payer: MEDICARE

## 2019-07-30 VITALS
WEIGHT: 250.88 LBS | SYSTOLIC BLOOD PRESSURE: 144 MMHG | HEART RATE: 74 BPM | BODY MASS INDEX: 39.38 KG/M2 | HEIGHT: 67 IN | DIASTOLIC BLOOD PRESSURE: 78 MMHG

## 2019-07-30 DIAGNOSIS — K43.2 RECURRENT INCISIONAL HERNIA: Primary | ICD-10-CM

## 2019-07-30 PROCEDURE — 99214 OFFICE O/P EST MOD 30 MIN: CPT | Mod: S$GLB,,, | Performed by: SURGERY

## 2019-07-30 PROCEDURE — 99214 PR OFFICE/OUTPT VISIT, EST, LEVL IV, 30-39 MIN: ICD-10-PCS | Mod: S$GLB,,, | Performed by: SURGERY

## 2019-07-30 RX ORDER — LIDOCAINE HYDROCHLORIDE 10 MG/ML
1 INJECTION, SOLUTION EPIDURAL; INFILTRATION; INTRACAUDAL; PERINEURAL ONCE
Status: DISCONTINUED | OUTPATIENT
Start: 2019-07-30 | End: 2019-08-14 | Stop reason: CLARIF

## 2019-07-30 RX ORDER — SODIUM CHLORIDE 9 MG/ML
INJECTION, SOLUTION INTRAVENOUS CONTINUOUS
Status: CANCELLED | OUTPATIENT
Start: 2019-07-30

## 2019-07-30 NOTE — PROGRESS NOTES
History & Physical    SUBJECTIVE:     History of Present Illness:  Patient is a 63 y.o. male presents with recurrent incisional hernia with symptoms.    Chief Complaint   Patient presents with    Hernia       Review of patient's allergies indicates:  No Known Allergies    Current Outpatient Medications   Medication Sig Dispense Refill    allopurinol (ZYLOPRIM) 100 MG tablet Take 100 mg by mouth once daily.      aspirin (ECOTRIN) 81 MG EC tablet Take 81 mg by mouth once daily. LAST TAKEN 3/2/16      blood sugar diagnostic (ACCU-CHEK REJI PLUS TEST STRP) Strp TEST BLOOD SUGAR TWICE DAILY 200 strip 2    blood-glucose meter (ACCU-CHEK REJI PLUS METER) [facility] CHECK TWICE DAILY 1 each 0    clopidogrel (PLAVIX) 75 mg tablet Take 75 mg by mouth every morning. LAST DOSE 3/2/16      diltiaZEM (CARDIZEM) 60 MG tablet Take 1 tablet (60 mg total) by mouth 3 (three) times daily. 270 tablet 3    docusate sodium (STOOL SOFTENER) 100 MG capsule Take 1 capsule (100 mg total) by mouth 2 (two) times daily. 180 capsule 3    dulaglutide (TRULICITY) 1.5 mg/0.5 mL PnIj Inject 1.5 mg into the skin every 7 days. 6 mL 3    finasteride (PROSCAR) 5 mg tablet Take 1 tablet (5 mg total) by mouth every morning. 90 tablet 3    furosemide (LASIX) 40 MG tablet Take 40 mg by mouth 2 (two) times daily. ALTERNATES 1 TABLET ONE DAY AND 2 TABLETS THE NEXT--ALTERNATING DAYS      GAVILYTE-C 240-22.72-6.72 -5.84 gram SolR USE AS DIRECTED  0    glimepiride (AMARYL) 4 MG tablet Take 1 tablet (4 mg total) by mouth 2 (two) times daily. 180 tablet 1    HYDROcodone-acetaminophen (NORCO)  mg per tablet Take 1 tablet by mouth nightly as needed for Pain. 30 tablet 0    lancets (ACCU-CHEK SOFTCLIX LANCETS) Misc 1 lancet by Misc.(Non-Drug; Combo Route) route 2 (two) times daily. 200 each 2    losartan (COZAAR) 50 MG tablet Take 50 mg by mouth once daily.      metoprolol succinate (TOPROL-XL) 100 MG 24 hr tablet Take 100 mg by mouth every morning.        nateglinide (STARLIX) 60 MG tablet TAKE 1 TABLET TWICE DAILY BEFORE MEALS 180 tablet 0    NITROSTAT 0.4 mg SL tablet Place 0.4 mg under the tongue every 5 (five) minutes as needed.       nortriptyline (PAMELOR) 25 MG capsule Take 1 tablet PO (25 mg) nightly for two weeks, then increase to 2 tablets PO (50 mg) nightly. 75 capsule 3    ranitidine (ZANTAC) 150 MG tablet Take 1 tablet (150 mg total) by mouth 2 (two) times daily. 180 tablet 5    rosuvastatin (CRESTOR) 40 MG Tab Take 1 tablet (40 mg total) by mouth every evening. 90 tablet 3    SITagliptin (JANUVIA) 50 MG Tab Take 1 tablet (50 mg total) by mouth once daily. 90 tablet 3    tamsulosin (FLOMAX) 0.4 mg Cap Take 1 capsule (0.4 mg total) by mouth once daily. 90 capsule 3    TRAVATAN Z 0.004 % Drop 1 drop every evening.       ammonium lactate 12 % Crea Apply 1 g topically once daily. 1 Bottle 3     Current Facility-Administered Medications   Medication Dose Route Frequency Provider Last Rate Last Dose    lidocaine (PF) 10 mg/ml (1%) injection 10 mg  1 mL Intradermal Once Jose Lewis MD           Past Medical History:   Diagnosis Date    Abdominal hernia     Anticoagulant long-term use     Arthritis     Cancer of kidney, right     Colon polyp     Coronary artery disease     Diabetes mellitus     Hypertension     Kidney stone     Sleep apnea     Slurred speech     Stroke     MINI STROKE    TIA (transient ischemic attack)     Wears glasses      Past Surgical History:   Procedure Laterality Date    BACK SURGERY      lower back    CARDIAC SURGERY      COLONOSCOPY  07/2018    CYSTOSCOPY  03/2008    CYSTOSCOPY with bilateral RETROGRADE and right URETEROSCOPY, STENT PLACEMENT Bilateral 6/16/2015    [facility]    CYSTOSCOPY WITH RETROGRADE PYELOGRAM Right 3/8/2016    [facility] at [facility] OR    EGD (ESOPHAGOGASTRODUODENOSCOPY) N/A 2/19/2019    Performed by Sylvia BEAUCHAMP  [facility] END[facility] (2ND F[facility])    ESOPHAGEAL MANOMETRY  08/2018    EVACUATION-CLOT  6/16/2015    Performed by [facility]    excision right thigh mass      EXCISION-MASS-THIGH Right 3/5/2018    Performed by Jose Lewis MD at [facility]    FULGURATION-BLADDER  6/16/2015    [facility] at [facility] OR    heart stents      stents 4 total.  2010, 2011 and 2013    HERNIA REPAIR      incisional    KIDNEY STONE SURGERY  2015    LAPAROSCOPIC NEPHRECTOMY, HAND ASSISTED Right     LITHOTRIPSY      MANOMETRY, ESOPHAGUS, WITH IMPEDANCE MEASUREMENT N/A 2/19/2019    Per[facility] END[facility] (2ND FLR)    NEPHRECTOMY-LAPAROSCOPIC Right 3/18/2016    [facility] OR    PLACEMENT, Shaver Catheter  6/16/2015    [facility] OR    REMOVAL-STONE-URETERAL  6/16/2015    [facility] OR    REPAIR-HERNIA-INCISIONAL N/A 8/19/2016    [facility] Jose [facility] at [facility] OR    UPPER GASTROINTESTINAL ENDOSCOPY      7/10/2018,9/24/2010    URETEROSCOPY Right 3/8/2016    [facility] OR    VASCULAR SURGERY      WASHING-BLADDER  6/16/2015    [facility] at [facility] OR     Family History   Problem Relation Age of Onset    Heart disease Father     Hypertension Mother     Parkinsonism Mother     Celiac disease Neg Hx     Cirrhosis Neg Hx     Colon cancer Neg Hx     Colon polyps Neg Hx     Crohn's disease Neg Hx     Cystic fibrosis Neg Hx     Esophageal cancer Neg Hx     Hemochromatosis Neg Hx     Inflammatory bowel disease Neg Hx     Irritable bowel syndrome Neg Hx     Liver cancer Neg Hx     Liver disease Neg Hx     Rectal cancer Neg Hx     Stomach cancer Neg Hx     Ulcerative colitis Neg Hx     Kalia's disease Neg Hx     Lymphoma Neg Hx     Tuberculosis Neg Hx     Scleroderma Neg Hx      "Rheum arthritis Neg Hx     Multiple sclerosis Neg Hx     Melanoma Neg Hx     Lupus Neg Hx     Psoriasis Neg Hx     Skin cancer Neg Hx      Social History     Tobacco Use    Smoking status: Never Smoker    Smokeless tobacco: Current User     Types: Snuff    Tobacco comment: 20 plus years   Substance Use Topics    Alcohol use: Yes     Comment: occasional    Drug use: No        Review of Systems:  Review of Systems   Constitutional: Negative.    HENT: Negative.    Eyes: Negative.    Respiratory: Negative.    Cardiovascular: Negative.    Gastrointestinal: Negative.    Endocrine: Negative.    Musculoskeletal: Negative.    Skin: Negative.    Allergic/Immunologic: Negative.    Neurological: Negative.    Hematological: Negative.    Psychiatric/Behavioral: Negative.    All other systems reviewed and are negative.      OBJECTIVE:     Vital Signs (Most Recent)  Pulse: 74 (07/30/19 1501)  BP: (!) 144/78 (07/30/19 1501)  5' 7" (1.702 m)  113.8 kg (250 lb 14.1 oz)     Physical Exam:  Physical Exam   Constitutional: He is oriented to person, place, and time. He appears well-developed and well-nourished.   HENT:   Head: Normocephalic and atraumatic.   Right Ear: External ear normal.   Left Ear: External ear normal.   Nose: Nose normal.   Mouth/Throat: Oropharynx is clear and moist.   Eyes: Pupils are equal, round, and reactive to light. Conjunctivae and EOM are normal.   Neck: Normal range of motion. Neck supple.   Cardiovascular: Normal rate, regular rhythm, normal heart sounds and intact distal pulses.   Pulmonary/Chest: Effort normal and breath sounds normal.   Abdominal: Soft. Bowel sounds are normal. A hernia is present. Hernia confirmed positive in the ventral area.       Musculoskeletal: Normal range of motion.   Neurological: He is alert and oriented to person, place, and time. He has normal reflexes.   Skin: Skin is warm and dry.   Psychiatric: He has a normal mood and affect. His behavior is normal. Thought " content normal.   Vitals reviewed.      Laboratory  none    Diagnostic Results:  none    ASSESSMENT/PLAN:     Recurrent incisional hernia    PLAN:Plan     I will take her to the OR for repair of incisional hernia with the risks explained and he will see cardiology prior to surgery

## 2019-08-05 ENCOUNTER — TELEPHONE (OUTPATIENT)
Dept: GASTROENTEROLOGY | Facility: CLINIC | Age: 64
End: 2019-08-05

## 2019-08-05 DIAGNOSIS — M47.26 OSTEOARTHRITIS OF SPINE WITH RADICULOPATHY, LUMBAR REGION: ICD-10-CM

## 2019-08-05 NOTE — TELEPHONE ENCOUNTER
----- Message from Marly Stein sent at 8/2/2019  3:17 PM CDT -----  Contact: pt [phone]  Needs Advice    Reason for call:Pt wants to know if he can reschedule for 08/15        Communication Preference:call    Additional Information:

## 2019-08-05 NOTE — TELEPHONE ENCOUNTER
----- Message from Daphne Edmonds sent at 8/5/2019  3:07 PM CDT -----  Contact: Wife [phone]  Type: RX Refill Request    Who Called: Self    Refill or New Rx:Refill    RX Name and Strength:Hydrocodone (Not Listed)  How is the patient currently taking it? (1XDay)  Is this a 30 day or 90 day RX:30 Day    Preferred Pharmacy with phone number:..          [facility] [id number] - [place] - [address]  [address] [address]  Phone: [phone] Fax: [phone]        Local or Mail Order:Local    Ordering Provider:Dr. Alfredo    Would the patient rather a call back or a response via [facility]? Callback    Best Call Back Number:[phone]    Additional Information: N/A

## 2019-08-14 ENCOUNTER — HOSPITAL ENCOUNTER (OUTPATIENT)
Dept: PREADMISSION TESTING | Facility: HOSPITAL | Age: 64
Discharge: HOME | End: 2019-08-14
Attending: SURGERY
Payer: MEDICARE

## 2019-08-14 ENCOUNTER — TELEPHONE (OUTPATIENT)
Dept: SURGERY | Facility: CLINIC | Age: 64
End: 2019-08-14

## 2019-08-14 VITALS
BODY MASS INDEX: 39.76 KG/M2 | RESPIRATION RATE: 18 BRPM | HEIGHT: 67 IN | DIASTOLIC BLOOD PRESSURE: 78 MMHG | HEART RATE: 76 BPM | WEIGHT: 253.31 LBS | SYSTOLIC BLOOD PRESSURE: 125 MMHG | TEMPERATURE: 98 F | OXYGEN SATURATION: 97 %

## 2019-08-14 DIAGNOSIS — K43.2 RECURRENT INCISIONAL HERNIA: ICD-10-CM

## 2019-08-14 LAB
ALBUMIN SERPL BCP-MCNC: 4.1 G/DL (ref 3.5–5.2)
ALP SERPL-CCNC: 78 U/L (ref 55–135)
ALT SERPL W/O P-5'-P-CCNC: 16 U/L (ref 10–44)
ANION GAP SERPL CALC-SCNC: 8 MMOL/L (ref 8–16)
AST SERPL-CCNC: 14 U/L (ref 10–40)
BASOPHILS # BLD AUTO: 0.05 K/UL (ref 0–0.2)
BASOPHILS NFR BLD: 0.6 % (ref 0–1.9)
BILIRUB SERPL-MCNC: 0.3 MG/DL (ref 0.1–1)
BUN SERPL-MCNC: 22 MG/DL (ref 8–23)
CALCIUM SERPL-MCNC: 9.6 MG/DL (ref 8.7–10.5)
CHLORIDE SERPL-SCNC: 104 MMOL/L (ref 95–110)
CO2 SERPL-SCNC: 30 MMOL/L (ref 23–29)
CREAT SERPL-MCNC: 1.7 MG/DL (ref 0.5–1.4)
DIFFERENTIAL METHOD: ABNORMAL
EOSINOPHIL # BLD AUTO: 0.4 K/UL (ref 0–0.5)
EOSINOPHIL NFR BLD: 5.3 % (ref 0–8)
ERYTHROCYTE [DISTWIDTH] IN BLOOD BY AUTOMATED COUNT: 13.7 % (ref 11.5–14.5)
EST. GFR  (AFRICAN AMERICAN): 48 ML/MIN/1.73 M^2
EST. GFR  (NON AFRICAN AMERICAN): 42 ML/MIN/1.73 M^2
GLUCOSE SERPL-MCNC: 94 MG/DL (ref 70–110)
HCT VFR BLD AUTO: 39.8 % (ref 40–54)
HGB BLD-MCNC: 12.9 G/DL (ref 14–18)
LYMPHOCYTES # BLD AUTO: 1.5 K/UL (ref 1–4.8)
LYMPHOCYTES NFR BLD: 18.5 % (ref 18–48)
MCH RBC QN AUTO: 28.4 PG (ref 27–31)
MCHC RBC AUTO-ENTMCNC: 32.4 G/DL (ref 32–36)
MCV RBC AUTO: 88 FL (ref 82–98)
MONOCYTES # BLD AUTO: 0.7 K/UL (ref 0.3–1)
MONOCYTES NFR BLD: 8.3 % (ref 4–15)
NEUTROPHILS # BLD AUTO: 5.5 K/UL (ref 1.8–7.7)
NEUTROPHILS NFR BLD: 68 % (ref 38–73)
PLATELET # BLD AUTO: 214 K/UL (ref 150–350)
PMV BLD AUTO: 11.1 FL (ref 9.2–12.9)
POTASSIUM SERPL-SCNC: 4.5 MMOL/L (ref 3.5–5.1)
PROT SERPL-MCNC: 7.5 G/DL (ref 6–8.4)
RBC # BLD AUTO: 4.55 M/UL (ref 4.6–6.2)
SODIUM SERPL-SCNC: 142 MMOL/L (ref 136–145)
WBC # BLD AUTO: 8.11 K/UL (ref 3.9–12.7)

## 2019-08-14 PROCEDURE — 36415 COLL VENOUS BLD VENIPUNCTURE: CPT

## 2019-08-14 PROCEDURE — 85025 COMPLETE CBC W/AUTO DIFF WBC: CPT

## 2019-08-14 PROCEDURE — 80053 COMPREHEN METABOLIC PANEL: CPT

## 2019-08-14 NOTE — TELEPHONE ENCOUNTER
"Spoke w/pt's wife re:authorization.  Pt notified the info is dropped to the authourization dept the day  books the surgery which was on 7/30/19.  I told her I would look into it for her.  Pt verbalized understanding.    ----- Message from Ryanne Garcia sent at 8/14/2019 12:22 PM CDT -----  Contact: Ellie "wife" [phone]  Type: Patient Call Back    Who called: Ellie"wife"    What is the request in detail: pt wife is calling in regards to her 's surgery that is scheduled for 8/21 and he went pre op today and was told that the authorization is pending and she stated that she called [employer] and [employer] informed her that they do not have any paperwork for his surgery. [employer] is his primary insurance    Can the clinic reply by [facility]? Call back    Would the patient rather a call back or a response via [facility]? Call back    Best call back number:[phone]            "

## 2019-08-14 NOTE — DISCHARGE INSTRUCTIONS
"Your procedure  is scheduled for _8/21/2019_________.    Call [phone] between 2pm and 5pm on _8/20/2019______to find out your arrival time for the day of surgery.    Report to Same Day Surgery Unit at ____ AM on the 2nd floor of the hospital.  Use the front entrance of the hospital.  The front doors of the hospital open promptly at 5:30am.  If you need wheelchair assistance, call [phone] from your cell phone, or call "0" from the courtesy phone in the lobby.    Important instructions:   Do not eat or drink after 12 midnight, including water.  It is okay to brush your teeth.  Do not have gum, candy or mints.     Take only these medications with a small swallow of water on the morning of your surgery _diltiazem, metoprolol, zantac, hydrocodone_____________    Do not take any diabetic medication on the morning of surgery unless instructed to do so by your doctor or pre op nurse.    Stop taking Aspirin, Ibuprofen, Motrin and Aleve , Fish oil, and Vitamin E for at least 7 days before your surgery. You may use Tylenol unless otherwise instructed by your doctor.         Please shower the night before and the morning of your surgery.        Use Hibiclens soap as instructed by your pre op nurse.   Please place clean linens on your bed the night before surgery. Please wear fresh clean clothing after each shower.     No shaving of procedural area at least 4-5 days before surgery due to increased risk of skin irritation and/or possible infection.     You may wear deodorant only.      Do not wear powder, body lotion or perfume/cologne.     Do not wear any jewelry or have any metal on your body.     You will be asked to remove any dentures or partials for the procedure.     Please bring any documents given to you by your doctor.     If you are going home on the same day of surgery, you must arrange for a family member or a friend to drive you home.  Public transportation is prohibited.  You will not be able to drive " home if you were given anesthesia or sedation.     Wear loose fitting clothes allowing for bandages.     Please leave money and valuables home.       You may bring your cell phone.     Call the doctor if fever or illness should occur before your surgery.    Call [phone] to contact us here if needed.

## 2019-08-15 ENCOUNTER — OFFICE VISIT (OUTPATIENT)
Dept: GASTROENTEROLOGY | Facility: CLINIC | Age: 64
End: 2019-08-15
Payer: COMMERCIAL

## 2019-08-15 VITALS
SYSTOLIC BLOOD PRESSURE: 115 MMHG | DIASTOLIC BLOOD PRESSURE: 71 MMHG | BODY MASS INDEX: 38.77 KG/M2 | WEIGHT: 247 LBS | HEIGHT: 67 IN | HEART RATE: 73 BPM

## 2019-08-15 DIAGNOSIS — R14.0 BLOATING: ICD-10-CM

## 2019-08-15 DIAGNOSIS — R13.19 ESOPHAGEAL DYSPHAGIA: ICD-10-CM

## 2019-08-15 DIAGNOSIS — K22.4 ESOPHAGEAL SPASM: ICD-10-CM

## 2019-08-15 DIAGNOSIS — R14.3 INTESTINAL GAS EXCRETION: ICD-10-CM

## 2019-08-15 DIAGNOSIS — R07.9 CHEST PAIN, UNSPECIFIED TYPE: Primary | ICD-10-CM

## 2019-08-15 PROCEDURE — 99999 PR PBB SHADOW E&M-EST. PATIENT-LVL V: ICD-10-PCS | Mod: PBBFAC,,, | Performed by: NURSE PRACTITIONER

## 2019-08-15 PROCEDURE — 99215 PR OFFICE/OUTPT VISIT, EST, LEVL V, 40-54 MIN: ICD-10-PCS | Mod: S$GLB,,, | Performed by: NURSE PRACTITIONER

## 2019-08-15 PROCEDURE — 99999 PR PBB SHADOW E&M-EST. PATIENT-LVL V: CPT | Mod: PBBFAC,,, | Performed by: NURSE PRACTITIONER

## 2019-08-15 PROCEDURE — 99215 OFFICE O/P EST HI 40 MIN: CPT | Mod: S$GLB,,, | Performed by: NURSE PRACTITIONER

## 2019-08-15 RX ORDER — DILTIAZEM HYDROCHLORIDE 60 MG/1
60 TABLET, FILM COATED ORAL EVERY 8 HOURS
Qty: 270 TABLET | Refills: 3 | Status: SHIPPED | OUTPATIENT
Start: 2019-08-15 | End: 2020-03-13 | Stop reason: SDUPTHER

## 2019-08-15 NOTE — PROGRESS NOTES
[facility] Consultation Note    Reason for Consult:    Chief Complaint   Patient presents with    Dysphagia    Gas         PCP:   Shyanne Alfredo       Referring MD:  Froy Marquez Md  [address] [place] [address]  [address]  [facility]  [place] [address]    HPI:  Nicolas Flaherty is a 63 y.o. male a PMH of HLD, HTN, h/o MI s/p stents on plavix x 6 yrs, h/o CVA, DM type 2, glaucoma, gout, s/p kidney CA s/p right nephrectomy, BPH, GAUTAM, back problems referred to motility clinic for second opinion regarding the following problems:    Chest pain. No longer having     Dysphagia.  better. Occurring once every few weeks  Some improvement with diltiazem 60 mg TID  Some improvement w nortriptyline 50 mg HS  No improvement with peppermint oil, mints, or peppermint tea. No improvement with diltiazem    Onset of symptoms:several months ago  Problems with solids:yes  Problems with liquids:yes  Choking while eating:no   Coughing while eating:yes   Location: mid esophagus  Spits up phlegm   Frequency: every few weeks; previously almost every day     Achalasia Eckardt Score  Dysphagia  (none (0), occasional (1), daily (2), with every meal (3)): 1  Regurgitation (none (0), occasional (1), daily (2), with every meal (3)): 0   Chest pain (none (0), occasional (1), daily (2), several times per day (3)):0   Weight loss (kg) (0 (0), <5 (1), 5-10 (2), >10 (3)): 0  Total Eckardt Score(the final score is the sum of four components (0-12)):  1/12    Gas and bloating. All his life. Mild. Not problematic.    GAUTAM. Sleeping with new CPAP.  Getting good sleep every night     Anemia. No overt bleeding.  EGD/colon recently done by primary GI-normal per pt report    Colonoscopy on 7/10/18 with 1.5- 2 cm AC TVA w/ high grade glandular atypia and 2 mm TA with low grade glandular dysplasia. Rpt in 1 year.  Recent  colonoscopy w Dr. Marquez per pt report    Weight loss.  Had lost 20lb  over few months.  This is intentional due to diet.  Does not think this is related to dysphagia.      DM type 2. BS running 50s-170s. Last A1C improved to 6.3.  On trulicity 1.5 mg once weekly. On amaryl 4mg am 6 mg AM. On nateglinide 60 mg BID, januvia 50 mg daily. Followed by PCP.    Periferal neuropathy. Legs.  Unable to tolerate gabapentin 600 mg AM and 1200 mg HS d/t falling. Followed by PCP.     Chronic back pain. S/p vertebral compression.   Takes norco PRN per orthopedics. Has not taken in about 4 days. Reduced from every night.    No improvement with PT in the past.     H/o  Kidney CA. S/p right nephrectomy. Followed by nephrologist.     Denies GERD, nausea, vomiting, early satiety, abdominal pain, bloating, diarrhea, constipation, BRBPR, melena, weight loss, anxiety/depression, insomnia.       Total visit time was 40 minutes, more than 50% of which was spent in face-to-face counseling with patient regarding symptoms, diagnostic results, prognosis, risks and benefits of treatment options, instructions for management, importance of compliance with chosen treatment options, risk factor reduction, stress reduction, coping strategies.      Previous Studies:   EGD 07/01/2019:  Small hiatal hernia.  Tortuous esophagus.    Colonoscopy 07/01/2019:  Good prep to TI.  Moderate diverticulosis.  Grade 2 internal hemorrhoids.  2 mm ileocecal valve polyp (TA, no HGD).  Repeat in 5 years  PET CT 5/16/2019: Multiple subcentimeter solid pulmonary nodules scattered throughout both lungs, similar to most recent CT.  No corresponding radiotracer uptake noting that subcentimeter pulmonary nodules are too small to characterize with PET CT.  Recommend continued close CT surveillance as malignancy not excluded. Right nephrectomy.  No evidence of local recurrence.  Ct chest 4/23/2019: Numerous subcentimeter noncalcified pulmonary nodules identified throughout both lungs with the largest located within the right lower lobe measuring  up to 8 mm in size (image 288, series 4).  These are increasing in size and number when compared to prior examinations and metastatic disease should be strongly considered.S/p right nephrectomy.Extensive atherosclerotic calcification within the coronary arteries.Left adrenal adenoma.Fat containing right lateral abdominal wall hernias.  Esophagram 3/8/19: Significant esophageal dysmotility compatible with diffuse esophageal spasm or corkscrew esophagus. 13 mm BT passed normally.  2/19/19: esophageal manometry: high les with borderline relaxation. Normalization of IRP upright. Hypercontractile esophagus (vs type III achalasia, less likely). Incomplete bolus clearance, abnormal multiple rapid swallow test with loss of inhibition, pan-esophageal pressurization and hypercontractile final contraction. Pt unable to perform MRS correctly. No significant diff w/ maneuvers. Evidence of residual liquid in esophagus at the end of 200cc bolus. IRP normal 14.7, DCI high 97554.4 (highest 17,245.8), Dl normal 6.9.   EGD 2/19/19: abnormal esophageal motility (-). Nl stomach. Nl duodenum. Manometry catheter endoscopically placed.  Esophageal manometry 8/10/18: hyper contractile esophagus, no EGJ outflow obstruction. IRP nl 13.4, DCI high 9311.0, DL nl 6.3, 10% incomplete bolus clearance, 10% rapid contractions  Colon 7/10/18: GPTC. mild tics. 1.5-2cm AC polyp (TVA w/ high grade glandular atypia). 2 mm TC polyp (TA with low grade glandular dysplasia). Grade 2 IH. Rpt 1 yr  EGD 7/10/18: HH. Nl esophagus dilated with 54 fr Brown (-). Erosion, friability, granularity in stomach (mild chr inflamm, reactive glandular changes, and superficial vascular congestion).   MBSS 5/30/18: normal. Recommend GI consult to r/o esophageal dysphagia.   Abd xray 2/25/08: spine degeneration. 4 mm superior left renal calcific focus  CT abd 1/9/08: 3 mm obstructing ureteral stones. Mild left hydroureter and hydronephrosis. Multi rt renal stones. Sm left  adrenal nodule c/w adrenal adenoma.  *EGD 9/24/10: ?    Labs:  12/3/18: TSH nl  12/2/18: CMP BUN high 35, creatinine high 1.8, GFR low 39, CBC hgb low 13.1, RDW high 14.8  7/29/18: hgb low 9.9, cr high 1.52, bun high 20 , cl high 109,   4/16/18: HbA1c high 9.2  2/21/08: cmp unremarkable, cbc nl  1/9/18: TSH nl  Hep c ab -  B 12/folate panel nl.     *per referring provider    ROS:  ROS   Constitutional: No fevers, no chills, no night sweats, no weight loss  ENT: No congestion, no rhinorrhea, no chronic sinus problems  CV: no chest pain, no palpitations  Pulm: No cough,no shortness of breath  Ophtho: No blurry vision, no eye redness  GI: see HPI  Derm: No rash  Heme: No lymphadenopathy, + bruising (on blood thinners)  MSK: No joint pain, no joint swelling, no Raynauds  : No dysuria, no frequent urination, no blood in urine  Endo: no hot or cold intolerance  Neuro: no dizziness, no syncope, no seizure  Psych: No anxiety, no depression        Medical History:   Past Medical History:   Diagnosis Date    Abdominal hernia     Anticoagulant long-term use     Arthritis     Cancer of kidney, right     Colon polyp     Coronary artery disease     Diabetes mellitus     Difficulty swallowing     food comes back up    Hypertension     Kidney stone     Sleep apnea     Slurred speech     Stroke     MINI STROKE    TIA (transient ischemic attack)     Wears glasses         Surgical History:   Past Surgical History:   Procedure Laterality Date    BACK SURGERY      lower back    CARDIAC SURGERY      COLONOSCOPY  07/2018    CYSTOSCOPY  03/2008    CYSTOSCOPY with bilateral RETROGRADE and right URETEROSCOPY, STENT PLACEMENT Bilateral 6/16/2015    [facility] at [facility] OR    CYSTOSCOPY WITH RETROGRADE PYELOGRAM Right 3/8/2016    [facility] at [facility] OR    EGD (ESOPHAGOGASTRODUODENOSCOPY) N/A 2/19/2019    [facility] (2ND FLR)     ESOPHAGEAL MANOMETRY  08/2018    EVACUATION-CLOT  6/16/2015    [facility] OR    excision right thigh mass      EXCISION-MASS-THIGH Right 3/5/2018    Performed by Jose [facility] at [facility] OR    FULGURATION-BLADDER  6/16/2015    [facility]te [facility] at [facility] OR    heart stents      stents 4 total.  2010, 2011 and 2013    HERNIA REPAIR      incisional    KIDNEY STONE SURGERY  2015    LAPAROSCOPIC NEPHRECTOMY, HAND ASSISTED Right     LITHOTRIPSY      MANOMETRY, ESOPHAGUS, WITH IMPEDANCE MEASUREMENT N/A 2/19/2019    [facility]O (2ND FLR)    NEPHRECTOMY-LAPAROSCOPIC Right 3/18/2016    [facility]formed by [facility] OR    PLACEMENT, Shaver Catheter  6/16/2015    [facility] at [facility] OR    REMOVAL-STONE-URETERAL  6/16/2015    [facility] OR    REPAIR-HERNIA-INCISIONAL N/A 8/19/2016    Performed by Jose Lewis [facility] at [facility] OR    UPPER GASTROINTESTINAL ENDOSCOPY      7/10/2018,9/24/2010    URETEROSCOPY Right 3/8/2016    [facility] OR    VASCULAR SURGERY      WASHING-BLADDER  6/16/2015    Performed by [facility] at [facility] OR        Family History:   Family History   Problem Relation Age of Onset    Heart disease Father     Hypertension Mother     Parkinsonism Mother     Celiac disease Neg Hx     Cirrhosis Neg Hx     Colon cancer Neg Hx     Colon polyps Neg Hx     Crohn's disease Neg Hx     Cystic fibrosis Neg Hx     Esophageal cancer Neg Hx     Hemochromatosis Neg Hx     Inflammatory bowel disease Neg Hx     Irritable bowel syndrome Neg Hx     Liver cancer Neg Hx     Liver disease Neg Hx     Rectal cancer Neg Hx     Stomach cancer Neg Hx     Ulcerative colitis Neg Hx     Kalia's disease Neg Hx     Lymphoma Neg Hx     Tuberculosis Neg Hx     Scleroderma Neg Hx     Rheum arthritis  Neg Hx     Multiple sclerosis Neg Hx     Melanoma Neg Hx     Lupus Neg Hx     Psoriasis Neg Hx     Skin cancer Neg Hx         Social History:   Social History     Socioeconomic History    Marital status:      Spouse name: Not on file    Number of children: Not on file    Years of education: Not on file    Highest education level: Not on file   Occupational History    Not on file   Social Needs    Financial resource strain: Not on file    Food insecurity:     Worry: Not on file     Inability: Not on file    Transportation needs:     Medical: Not on file     Non-medical: Not on file   Tobacco Use    Smoking status: Never Smoker    Smokeless tobacco: Current User     Types: Snuff    Tobacco comment: 20 plus years   Substance and Sexual Activity    Alcohol use: Yes     Comment: occasional    Drug use: No    Sexual activity: Yes     Partners: Female   Lifestyle    Physical activity:     Days per week: Not on file     Minutes per session: Not on file    Stress: Not on file   Relationships    Social connections:     Talks on phone: Not on file     Gets together: Not on file     Attends Rastafari service: Not on file     Active member of club or organization: Not on file     Attends meetings of clubs or organizations: Not on file     Relationship status: Not on file   Other Topics Concern    Not on file   Social History Narrative    Not on file        Review of patient's allergies indicates:  No Known Allergies    Current Outpatient Medications   Medication Sig Dispense Refill    allopurinol (ZYLOPRIM) 100 MG tablet Take 100 mg by mouth once daily.      aspirin (ECOTRIN) 81 MG EC tablet Take 81 mg by mouth once daily. LAST TAKEN 3/2/16      blood sugar diagnostic (ACCU-CHEK REJI PLUS TEST STRP) Strp TEST BLOOD SUGAR TWICE DAILY 200 strip 2    blood-glucose meter (ACCU-CHEK REJI PLUS METER) Misc CHECK TWICE DAILY 1 each 0    clopidogrel (PLAVIX) 75 mg tablet Take 75 mg by mouth every  morning. LAST DOSE 3/2/16      docusate sodium (STOOL SOFTENER) 100 MG capsule Take 1 capsule (100 mg total) by mouth 2 (two) times daily. 180 capsule 3    dulaglutide (TRULICITY) 1.5 mg/0.5 mL PnIj Inject 1.5 mg into the skin every 7 days. 6 mL 3    finasteride (PROSCAR) 5 mg tablet Take 1 tablet (5 mg total) by mouth every morning. 90 tablet 3    furosemide (LASIX) 40 MG tablet Take 40 mg by mouth 2 (two) times daily. ALTERNATES 1 TABLET ONE DAY AND 2 TABLETS THE NEXT--ALTERNATING DAYS      glimepiride (AMARYL) 4 MG tablet Take 1 tablet (4 mg total) by mouth 2 (two) times daily. (Patient taking differently: Take by mouth daily with breakfast. ) 180 tablet 1    HYDROcodone-acetaminophen (NORCO)  mg per tablet Take 1 tablet by mouth nightly as needed for Pain. 30 tablet 0    lancets (ACCU-CHEK SOFTCLIX LANCETS) Misc 1 lancet by Misc.(Non-Drug; Combo Route) route 2 (two) times daily. 200 each 2    losartan (COZAAR) 50 MG tablet Take 50 mg by mouth once daily.      metoprolol succinate (TOPROL-XL) 100 MG 24 hr tablet Take 100 mg by mouth every morning.       nateglinide (STARLIX) 60 MG tablet TAKE 1 TABLET TWICE DAILY BEFORE MEALS 180 tablet 0    NITROSTAT 0.4 mg SL tablet Place 0.4 mg under the tongue every 5 (five) minutes as needed.       nortriptyline (PAMELOR) 25 MG capsule Take 1 tablet PO (25 mg) nightly for two weeks, then increase to 2 tablets PO (50 mg) nightly. 75 capsule 3    ranitidine (ZANTAC) 150 MG tablet Take 1 tablet (150 mg total) by mouth 2 (two) times daily. 180 tablet 5    rosuvastatin (CRESTOR) 40 MG Tab Take 1 tablet (40 mg total) by mouth every evening. 90 tablet 3    SITagliptin (JANUVIA) 50 MG Tab Take 1 tablet (50 mg total) by mouth once daily. 90 tablet 3    tamsulosin (FLOMAX) 0.4 mg Cap Take 1 capsule (0.4 mg total) by mouth once daily. 90 capsule 3    TRAVATAN Z 0.004 % Drop 1 drop every evening.       diltiaZEM (CARDIZEM) 60 MG tablet Take 1 tablet (60 mg total)  "by mouth every 8 (eight) hours. Take 4 times a day 270 tablet 3     No current facility-administered medications for this visit.         Objective Findings:  Vital Signs:  /71   Pulse 73   Ht 5' 7" (1.702 m)   Wt 112 kg (247 lb)   BMI 38.69 kg/m²   Body mass index is 38.69 kg/m².    Physical Exam:  General appearance: alert, cooperative, no distress  HENT: Normocephalic, atraumatic, neck symmetrical, no nasal discharge  Eyes: conjunctivae/corneas clear, PERRL, EOM's intact  Lungs: clear to auscultation bilaterally, no dullness to percussion bilaterally  Heart: regular rate and rhythm without rub; no displacement of the PMI  Abdomen: soft, non-tender; bowel sounds normoactive; no organomegaly  Extremities: extremities symmetric; no clubbing, cyanosis, or edema  Integument: Skin color, texture, turgor normal; no rashes; hair distrubution normal  Neurologic: Alert and oriented X 3, normal strength, normal coordination and gait  Psychiatric: no pressured speech; normal affect; no evidence of impaired cognition    Labs:  Lab Results   Component Value Date    WBC 8.11 08/14/2019    HGB 12.9 (L) 08/14/2019    HCT 39.8 (L) 08/14/2019    MCV 88 08/14/2019     08/14/2019     No results found for: FERRITIN  Lab Results   Component Value Date     08/14/2019    K 4.5 08/14/2019     08/14/2019    CO2 30 (H) 08/14/2019    GLU 94 08/14/2019    BUN 22 08/14/2019    CREATININE 1.7 (H) 08/14/2019    CALCIUM 9.6 08/14/2019    PROT 7.5 08/14/2019    ALBUMIN 4.1 08/14/2019    BILITOT 0.3 08/14/2019    ALKPHOS 78 08/14/2019    AST 14 08/14/2019    ALT 16 08/14/2019     Lab Results   Component Value Date    TSH 2.267 12/03/2018     No results found for: SEDRATE  No results found for: CRP  Lab Results   Component Value Date    HGBA1C 6.3 (H) 07/02/2019           Assessment and Plan:  Nicolas Flaherty is a 63 y.o. male with a PMH of HLD, HTN, h/o MI s/p stents on plavix x 6 yrs, h/o CVA, DM type 2, " glaucoma, gout, s/p kidney CA s/p right nephrectomy, BPH, GAUTAM, back problems referred to motility clinic for second opinion regarding the following problems:    Chest pain. Resolved.  Followed by cards for history of CAD w/ stents 4 years ago. Seen in ED and admitted for obsservation this just pror to s/s onsest. EKG and CHRISTIANO negative. Diagnosed with unstable angina. Followed by cardiology and seen after last motility clinic visit.  On nitro PRN since MI.  Previously on  isosorrbide dinitrate 60 mg daily for HTN, but switched to diltiazem 60 mg TID with resolution of chest pain    Dysphagia.  Eckardt Score 1/12.  Manometry at [facility] with high les with borderline relaxation, normalization of IRP upright, iIncomplete bolus clearance. Study suggestive of hypercontractile esophagus (vs type III achalasia, less likely).  Esophagram with hypercontractile esophagus and corkscrewing, 13 mm BT passed normally. EGD with normal esophageal biopsies and evidence of dysmotility.      No improvement while on isosorbide  No improvement with peppermint oil, mints and peppermint tea  Temporary improvement with esophageal dilation (x 1 week relief)  Some improvement with diltiazem. Cont diltiazem 60mg  TID  Improvement with nortriptyline 50 mg HS  -obtain EKG report from yesterday  -Increase nortriptyline to 75 mg (90 day supply per pt request) if QTc okay.  -Will consider adding sildenafil   -Will consider EGD w EndoFlip and Botox if medical management fails   -Will consider myotomy if medical management fails       Anemia.  -Egd/colon in 7/2019  w Dr. Marquez unrevealing.   Management as per Dr. Marquez     Colonoscopy on 7/10/18 with 1.5- 2 cm semi pedunculated ascending colon TVA w/ high grade glandular atypia completely resected via hot snare and EMR, and 2 mm transverse colon TA with low grade glandular dysplasia complete resected via cold bx.   Colonoscopy in 7/2019 with 2 mm ileocecal valve polyp (?path?)  -Obtain path  report  -Management as per Dr. Marquez    GAUTAM.   -On CPAP nightly    DM type 2. BS running 145- 200s. Last A1C 6.3 in 10/2018.    On trulicity, amaryl, nateglinide  Followed by PCP.    Periferal neuropathy in legs.  Chronic back pain secondary to disc disease.   No improvement with PT  No improvement with OTC pain relievers and creams  Unable to tolerate gabapentin 600 mg AM and 1200 mg HS due to falling.   -On norco PRN by PCP/orthopedics.   -Cont to limit  -Will try another OTC cream that has not been tried yet.     History of  Kidney CA. Has had right nephrectomy. Followed by nephrologist.       Follow up in about 3 months (around 11/15/2019) for Zafar Treviño.    1. Chest pain, unspecified type    2. Esophageal dysphagia    3. Esophageal spasm    4. Bloating    5. Intestinal gas excretion          Order summary:  Orders Placed This Encounter    diltiaZEM (CARDIZEM) 60 MG tablet         Thank you so much for allowing me to participate in the care of Nicolas Ames, APRN, FNP-C

## 2019-08-15 NOTE — PATIENT INSTRUCTIONS
Continue diltiazem 60 mg three times daily for chest discomfort, difficulty swallowing  Continue nortriptyline 50 mg nightly. I will get the EKG results (done on yesterday) from your heart doctor and let you know if we can increase the nortriptyline.   I will get the EGD/colonoscopy reports from your primary GI provider.

## 2019-08-16 ENCOUNTER — TELEPHONE (OUTPATIENT)
Dept: GASTROENTEROLOGY | Facility: CLINIC | Age: 64
End: 2019-08-16

## 2019-08-16 RX ORDER — HYDROCODONE BITARTRATE AND ACETAMINOPHEN 10; 325 MG/1; MG/1
1 TABLET ORAL NIGHTLY PRN
Qty: 30 TABLET | Refills: 0 | Status: SHIPPED | OUTPATIENT
Start: 2019-08-16 | End: 2020-08-06

## 2019-08-16 NOTE — TELEPHONE ENCOUNTER
EKG from 10/5/18 received, but we need the EKG from day before yesterday. Pt report he had one at heart clinic [facility] on day before yesterday. Please contact heart clinic [facility] for clarification.    Thanks,  Tiff, NP

## 2019-08-20 ENCOUNTER — TELEPHONE (OUTPATIENT)
Dept: GASTROENTEROLOGY | Facility: CLINIC | Age: 64
End: 2019-08-20

## 2019-08-20 NOTE — TELEPHONE ENCOUNTER
[facility] alivia called and wanted to clarify directions of rx. mia wrote: Take 1 tablet (60 mg total) by mouth every 8 (eight) hours. Take 4 times a day - Oral. However if it is every 8 hrs that would be 3 times a day. I did let them know to so it as 3 times a day since the last time it was filled by  that was the directions of use.

## 2019-08-20 NOTE — TELEPHONE ENCOUNTER
----- Message from Kasey Momin MA sent at 8/20/2019 11:25 AM CDT -----  Contact:  anyone that answers can assist [facility] [phone] ref# [id number]  Needs Advice    Reason for call: Needing clarification on the directions for pt's diltiaZEM (CARDIZEM) 60 MG tablet        Communication Preference:  anyone that answers can assist [facility] [phone] ref# [id number]    Additional Information: please call

## 2019-08-21 ENCOUNTER — HOSPITAL ENCOUNTER (OUTPATIENT)
Facility: HOSPITAL | Age: 64
Discharge: HOME | End: 2019-08-21
Attending: SURGERY | Admitting: SURGERY
Payer: MEDICARE

## 2019-08-21 ENCOUNTER — ANESTHESIA EVENT (OUTPATIENT)
Dept: SURGERY | Facility: HOSPITAL | Age: 64
End: 2019-08-21
Payer: MEDICARE

## 2019-08-21 ENCOUNTER — ANESTHESIA (OUTPATIENT)
Dept: SURGERY | Facility: HOSPITAL | Age: 64
End: 2019-08-21
Payer: MEDICARE

## 2019-08-21 VITALS
TEMPERATURE: 98 F | BODY MASS INDEX: 39.76 KG/M2 | HEIGHT: 67 IN | HEART RATE: 78 BPM | WEIGHT: 253.31 LBS | SYSTOLIC BLOOD PRESSURE: 144 MMHG | OXYGEN SATURATION: 99 % | DIASTOLIC BLOOD PRESSURE: 87 MMHG | RESPIRATION RATE: 20 BRPM

## 2019-08-21 DIAGNOSIS — K43.2 RECURRENT INCISIONAL HERNIA: Primary | ICD-10-CM

## 2019-08-21 LAB — POCT GLUCOSE: 109 MG/DL (ref 70–110)

## 2019-08-21 PROCEDURE — 63600175 PHARM REV CODE 636 W HCPCS: Performed by: ANESTHESIOLOGY

## 2019-08-21 PROCEDURE — 71000039 HC RECOVERY, EACH ADD'L HOUR: Performed by: SURGERY

## 2019-08-21 PROCEDURE — C9290 INJ, BUPIVACAINE LIPOSOME: HCPCS | Performed by: SURGERY

## 2019-08-21 PROCEDURE — 25000003 PHARM REV CODE 250: Performed by: SURGERY

## 2019-08-21 PROCEDURE — 25000003 PHARM REV CODE 250: Performed by: ANESTHESIOLOGY

## 2019-08-21 PROCEDURE — 49565 PR REPAIR RECURR INCIS HERNIA,REDUC: ICD-10-PCS | Mod: ,,, | Performed by: SURGERY

## 2019-08-21 PROCEDURE — 63600175 PHARM REV CODE 636 W HCPCS: Performed by: NURSE ANESTHETIST, CERTIFIED REGISTERED

## 2019-08-21 PROCEDURE — 49565 PR REPAIR RECURR INCIS HERNIA,REDUC: CPT | Mod: ,,, | Performed by: SURGERY

## 2019-08-21 PROCEDURE — 63600175 PHARM REV CODE 636 W HCPCS: Performed by: SURGERY

## 2019-08-21 PROCEDURE — D9220A PRA ANESTHESIA: ICD-10-PCS | Mod: ANES,,, | Performed by: ANESTHESIOLOGY

## 2019-08-21 PROCEDURE — 37000009 HC ANESTHESIA EA ADD 15 MINS: Performed by: SURGERY

## 2019-08-21 PROCEDURE — D9220A PRA ANESTHESIA: Mod: ANES,,, | Performed by: ANESTHESIOLOGY

## 2019-08-21 PROCEDURE — 25000003 PHARM REV CODE 250: Performed by: NURSE ANESTHETIST, CERTIFIED REGISTERED

## 2019-08-21 PROCEDURE — 71000016 HC POSTOP RECOV ADDL HR: Performed by: SURGERY

## 2019-08-21 PROCEDURE — 71000033 HC RECOVERY, INTIAL HOUR: Performed by: SURGERY

## 2019-08-21 PROCEDURE — 37000008 HC ANESTHESIA 1ST 15 MINUTES: Performed by: SURGERY

## 2019-08-21 PROCEDURE — D9220A PRA ANESTHESIA: ICD-10-PCS | Mod: CRNA,,, | Performed by: NURSE ANESTHETIST, CERTIFIED REGISTERED

## 2019-08-21 PROCEDURE — 71000015 HC POSTOP RECOV 1ST HR: Performed by: SURGERY

## 2019-08-21 PROCEDURE — 36000707: Performed by: SURGERY

## 2019-08-21 PROCEDURE — 36000706: Performed by: SURGERY

## 2019-08-21 PROCEDURE — D9220A PRA ANESTHESIA: Mod: CRNA,,, | Performed by: NURSE ANESTHETIST, CERTIFIED REGISTERED

## 2019-08-21 PROCEDURE — 82962 GLUCOSE BLOOD TEST: CPT | Performed by: SURGERY

## 2019-08-21 RX ORDER — NEOSTIGMINE METHYLSULFATE 1 MG/ML
INJECTION, SOLUTION INTRAVENOUS
Status: DISCONTINUED | OUTPATIENT
Start: 2019-08-21 | End: 2019-08-21

## 2019-08-21 RX ORDER — SUCCINYLCHOLINE CHLORIDE 20 MG/ML
INJECTION INTRAMUSCULAR; INTRAVENOUS
Status: DISCONTINUED | OUTPATIENT
Start: 2019-08-21 | End: 2019-08-21

## 2019-08-21 RX ORDER — GLYCOPYRROLATE 0.2 MG/ML
INJECTION INTRAMUSCULAR; INTRAVENOUS
Status: DISCONTINUED | OUTPATIENT
Start: 2019-08-21 | End: 2019-08-21

## 2019-08-21 RX ORDER — ONDANSETRON 2 MG/ML
4 INJECTION INTRAMUSCULAR; INTRAVENOUS DAILY PRN
Status: DISCONTINUED | OUTPATIENT
Start: 2019-08-21 | End: 2019-08-21 | Stop reason: HOSPADM

## 2019-08-21 RX ORDER — ROCURONIUM BROMIDE 10 MG/ML
INJECTION, SOLUTION INTRAVENOUS
Status: DISCONTINUED | OUTPATIENT
Start: 2019-08-21 | End: 2019-08-21

## 2019-08-21 RX ORDER — FENTANYL CITRATE 50 UG/ML
INJECTION, SOLUTION INTRAMUSCULAR; INTRAVENOUS
Status: DISCONTINUED | OUTPATIENT
Start: 2019-08-21 | End: 2019-08-21

## 2019-08-21 RX ORDER — OXYCODONE AND ACETAMINOPHEN 5; 325 MG/1; MG/1
1 TABLET ORAL EVERY 4 HOURS PRN
Qty: 30 TABLET | Refills: 0 | Status: SHIPPED | OUTPATIENT
Start: 2019-08-21 | End: 2020-08-06

## 2019-08-21 RX ORDER — MIDAZOLAM HYDROCHLORIDE 1 MG/ML
INJECTION, SOLUTION INTRAMUSCULAR; INTRAVENOUS
Status: DISCONTINUED | OUTPATIENT
Start: 2019-08-21 | End: 2019-08-21

## 2019-08-21 RX ORDER — ACETAMINOPHEN 10 MG/ML
1000 INJECTION, SOLUTION INTRAVENOUS ONCE
Status: COMPLETED | OUTPATIENT
Start: 2019-08-21 | End: 2019-08-21

## 2019-08-21 RX ORDER — OXYCODONE AND ACETAMINOPHEN 5; 325 MG/1; MG/1
1 TABLET ORAL ONCE
Status: COMPLETED | OUTPATIENT
Start: 2019-08-21 | End: 2019-08-21

## 2019-08-21 RX ORDER — CEFAZOLIN SODIUM 2 G/50ML
2 SOLUTION INTRAVENOUS
Status: COMPLETED | OUTPATIENT
Start: 2019-08-21 | End: 2019-08-21

## 2019-08-21 RX ORDER — MORPHINE SULFATE 10 MG/ML
3 INJECTION INTRAMUSCULAR; INTRAVENOUS; SUBCUTANEOUS
Status: DISCONTINUED | OUTPATIENT
Start: 2019-08-21 | End: 2019-08-21 | Stop reason: HOSPADM

## 2019-08-21 RX ORDER — SODIUM CHLORIDE 9 MG/ML
INJECTION, SOLUTION INTRAVENOUS CONTINUOUS
Status: CANCELLED | OUTPATIENT
Start: 2019-08-21

## 2019-08-21 RX ORDER — SODIUM CHLORIDE, SODIUM LACTATE, POTASSIUM CHLORIDE, CALCIUM CHLORIDE 600; 310; 30; 20 MG/100ML; MG/100ML; MG/100ML; MG/100ML
INJECTION, SOLUTION INTRAVENOUS CONTINUOUS
Status: DISCONTINUED | OUTPATIENT
Start: 2019-08-21 | End: 2019-08-21 | Stop reason: HOSPADM

## 2019-08-21 RX ORDER — ACETAMINOPHEN 10 MG/ML
1000 INJECTION, SOLUTION INTRAVENOUS ONCE
Status: DISCONTINUED | OUTPATIENT
Start: 2019-08-21 | End: 2019-08-21 | Stop reason: HOSPADM

## 2019-08-21 RX ORDER — SODIUM CHLORIDE 9 MG/ML
INJECTION, SOLUTION INTRAVENOUS CONTINUOUS
Status: DISCONTINUED | OUTPATIENT
Start: 2019-08-21 | End: 2019-08-21 | Stop reason: HOSPADM

## 2019-08-21 RX ORDER — SODIUM CHLORIDE 0.9 % (FLUSH) 0.9 %
10 SYRINGE (ML) INJECTION
Status: DISCONTINUED | OUTPATIENT
Start: 2019-08-21 | End: 2019-08-21 | Stop reason: HOSPADM

## 2019-08-21 RX ORDER — BUPIVACAINE HYDROCHLORIDE 2.5 MG/ML
INJECTION, SOLUTION EPIDURAL; INFILTRATION; INTRACAUDAL
Status: DISCONTINUED | OUTPATIENT
Start: 2019-08-21 | End: 2019-08-21 | Stop reason: HOSPADM

## 2019-08-21 RX ORDER — METOCLOPRAMIDE HYDROCHLORIDE 5 MG/ML
INJECTION INTRAMUSCULAR; INTRAVENOUS
Status: DISCONTINUED | OUTPATIENT
Start: 2019-08-21 | End: 2019-08-21

## 2019-08-21 RX ORDER — HYDROMORPHONE HYDROCHLORIDE 2 MG/ML
0.2 INJECTION, SOLUTION INTRAMUSCULAR; INTRAVENOUS; SUBCUTANEOUS EVERY 5 MIN PRN
Status: DISCONTINUED | OUTPATIENT
Start: 2019-08-21 | End: 2019-08-21 | Stop reason: HOSPADM

## 2019-08-21 RX ORDER — LIDOCAINE HYDROCHLORIDE 10 MG/ML
1 INJECTION, SOLUTION EPIDURAL; INFILTRATION; INTRACAUDAL; PERINEURAL ONCE
Status: COMPLETED | OUTPATIENT
Start: 2019-08-21 | End: 2019-08-21

## 2019-08-21 RX ORDER — ONDANSETRON 2 MG/ML
INJECTION INTRAMUSCULAR; INTRAVENOUS
Status: DISCONTINUED | OUTPATIENT
Start: 2019-08-21 | End: 2019-08-21

## 2019-08-21 RX ORDER — ACETAMINOPHEN 10 MG/ML
1000 INJECTION, SOLUTION INTRAVENOUS ONCE
Status: CANCELLED | OUTPATIENT
Start: 2019-08-21 | End: 2019-08-21

## 2019-08-21 RX ORDER — MORPHINE SULFATE 10 MG/ML
3 INJECTION INTRAMUSCULAR; INTRAVENOUS; SUBCUTANEOUS
Status: CANCELLED | OUTPATIENT
Start: 2019-08-21

## 2019-08-21 RX ORDER — PROPOFOL 10 MG/ML
VIAL (ML) INTRAVENOUS
Status: DISCONTINUED | OUTPATIENT
Start: 2019-08-21 | End: 2019-08-21

## 2019-08-21 RX ADMIN — NEOSTIGMINE METHYLSULFATE 4 MG: 1 INJECTION INTRAVENOUS at 11:08

## 2019-08-21 RX ADMIN — MIDAZOLAM HYDROCHLORIDE 2 MG: 1 INJECTION, SOLUTION INTRAMUSCULAR; INTRAVENOUS at 10:08

## 2019-08-21 RX ADMIN — FENTANYL CITRATE 100 MCG: 50 INJECTION INTRAMUSCULAR; INTRAVENOUS at 10:08

## 2019-08-21 RX ADMIN — SODIUM CHLORIDE, SODIUM LACTATE, POTASSIUM CHLORIDE, AND CALCIUM CHLORIDE: .6; .31; .03; .02 INJECTION, SOLUTION INTRAVENOUS at 10:08

## 2019-08-21 RX ADMIN — SUCCINYLCHOLINE CHLORIDE 100 MG: 20 INJECTION, SOLUTION INTRAMUSCULAR; INTRAVENOUS at 10:08

## 2019-08-21 RX ADMIN — GLYCOPYRROLATE 0.7 MG: 0.2 INJECTION, SOLUTION INTRAMUSCULAR; INTRAVENOUS at 11:08

## 2019-08-21 RX ADMIN — PROPOFOL 150 MG: 10 INJECTION, EMULSION INTRAVENOUS at 10:08

## 2019-08-21 RX ADMIN — ROCURONIUM BROMIDE 30 MG: 10 INJECTION, SOLUTION INTRAVENOUS at 10:08

## 2019-08-21 RX ADMIN — OXYCODONE HYDROCHLORIDE AND ACETAMINOPHEN 1 TABLET: 5; 325 TABLET ORAL at 01:08

## 2019-08-21 RX ADMIN — METOCLOPRAMIDE 10 MG: 5 INJECTION, SOLUTION INTRAMUSCULAR; INTRAVENOUS at 10:08

## 2019-08-21 RX ADMIN — CEFAZOLIN SODIUM 2 G: 2 SOLUTION INTRAVENOUS at 10:08

## 2019-08-21 RX ADMIN — GLYCOPYRROLATE 0.1 MG: 0.2 INJECTION, SOLUTION INTRAMUSCULAR; INTRAVENOUS at 10:08

## 2019-08-21 RX ADMIN — ONDANSETRON 4 MG: 2 INJECTION, SOLUTION INTRAMUSCULAR; INTRAVENOUS at 11:08

## 2019-08-21 RX ADMIN — LIDOCAINE HYDROCHLORIDE 100 MG: 10 INJECTION, SOLUTION EPIDURAL; INFILTRATION; INTRACAUDAL; PERINEURAL at 10:08

## 2019-08-21 RX ADMIN — ACETAMINOPHEN 1000 MG: 10 INJECTION, SOLUTION INTRAVENOUS at 12:08

## 2019-08-21 NOTE — ANESTHESIA PREPROCEDURE EVALUATION
08/21/2019  Nicolas Flaherty is a 63 y.o., male.    Pre-operative evaluation for Procedure(s) (LRB):  REPAIR, HERNIA, INCISIONAL, RECURRENT (N/A)    Nicolas Flaherty is a 63 y.o. male     Denies CP/SOB/GERD/MI/CVA/URI symptoms.  METS > 4  NPO > 8    Patient Active Problem List   Diagnosis    Renal cyst    Benign non-nodular prostatic hyperplasia with lower urinary tract symptoms    Nephrolithiasis    Status post nephrectomy    Essential hypertension    DM (diabetes mellitus) type II controlled with renal manifestation    CKD (chronic kidney disease), stage III    Coronary artery disease involving native coronary artery    Coronary artery disease involving native coronary artery of native heart with angina pectoris    History of coronary artery disease    Renal cell carcinoma of right kidney    Hernia, incisional    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      Current Outpatient Medications on File Prior to Encounter   Medication Sig Dispense Refill    allopurinol (ZYLOPRIM) 100 MG tablet Take 100 mg by mouth once daily.      blood sugar diagnostic (ACCU-CHEK REJI PLUS TEST STRP) Strp TEST BLOOD SUGAR TWICE DAILY 200 strip 2    blood-glucose meter (ACCU-CHEK REJI PLUS METER) Misc CHECK TWICE DAILY 1 each 0    docusate sodium (STOOL SOFTENER) 100 MG capsule Take 1 capsule (100 mg total) by mouth 2 (two) times daily. 180 capsule 3    finasteride (PROSCAR) 5 mg tablet Take 1 tablet (5 mg total) by mouth every morning. 90  tablet 3    furosemide (LASIX) 40 MG tablet Take 40 mg by mouth 2 (two) times daily. ALTERNATES 1 TABLET ONE DAY AND 2 TABLETS THE NEXT--ALTERNATING DAYS      glimepiride (AMARYL) 4 MG tablet Take 1 tablet (4 mg total) by mouth 2 (two) times daily. (Patient taking differently: Take by mouth daily with breakfast. ) 180 tablet 1    lancets (ACCU-CHEK SOFTCLIX LANCETS) Misc 1 lancet by Misc.(Non-Drug; Combo Route) route 2 (two) times daily. 200 each 2    losartan (COZAAR) 50 MG tablet Take 50 mg by mouth once daily.      metoprolol succinate (TOPROL-XL) 100 MG 24 hr tablet Take 100 mg by mouth every morning.       nateglinide (STARLIX) 60 MG tablet TAKE 1 TABLET TWICE DAILY BEFORE MEALS 180 tablet 0    nortriptyline (PAMELOR) 25 MG capsule Take 1 tablet PO (25 mg) nightly for two weeks, then increase to 2 tablets PO (50 mg) nightly. 75 capsule 3    rosuvastatin (CRESTOR) 40 MG Tab Take 1 tablet (40 mg total) by mouth every evening. 90 tablet 3    SITagliptin (JANUVIA) 50 MG Tab Take 1 tablet (50 mg total) by mouth once daily. 90 tablet 3    tamsulosin (FLOMAX) 0.4 mg Cap Take 1 capsule (0.4 mg total) by mouth once daily. 90 capsule 3    TRAVATAN Z 0.004 % Drop 1 drop every evening.       aspirin (ECOTRIN) 81 MG EC tablet Take 81 mg by mouth once daily. LAST TAKEN 3/2/16      clopidogrel (PLAVIX) 75 mg tablet Take 75 mg by mouth every morning. LAST DOSE 3/2/16      dulaglutide (TRULICITY) 1.5 mg/0.5 mL PnIj Inject 1.5 mg into the skin every 7 days. 6 mL 3    NITROSTAT 0.4 mg SL tablet Place 0.4 mg under the tongue every 5 (five) minutes as needed.       ranitidine (ZANTAC) 150 MG tablet Take 1 tablet (150 mg total) by mouth 2 (two) times daily. 180 tablet 5       Past Surgical History:   Procedure Laterality Date    BACK SURGERY      lower back    CARDIAC SURGERY      COLONOSCOPY  07/2018    CYSTOSCOPY  03/2008    CYSTOSCOPY with bilateral RETROGRADE and right URETEROSCOPY, STENT PLACEMENT Bilateral  6/16/2015    Per[facility] at [facility]    CYSTOSCOPY WITH RETROGRADE PYELOGRAM Right 3/8/2016    Performed by Rosalia Acosta [facility] at [facility] OR    EGD (ESOPHAGOGASTRODUODENOSCOPY) N/A 2/19/2019    Per[facility] (2ND F[facility])    ESOPHAGEAL MANOMETRY  08/2018    EVACUATION-CLOT  6/16/2015    Performed by [facility] at [facility] OR    excision right thigh mass      EXCISION-MASS-THIGH Right 3/5/2018    Performed by Jose Lewis [facility] at [facility]    FULGURATION-BLADDER  6/16/2015    [facility] by [facility] at [facility] OR    heart stents      stents 4 total.  2010, 2011 and 2013    HERNIA REPAIR      incisional    KIDNEY STONE SURGERY  2015    LAPAROSCOPIC NEPHRECTOMY, HAND ASSISTED Right     LITHOTRIPSY      MANOMETRY, ESOPHAGUS, WITH IMPEDANCE MEASUREMENT N/A 2/19/2019    Performed by [facility] at [facility] (2ND FLR)    NEPHRECTOMY-LAPAROSCOPIC Right 3/18/2016    Performed by [facility] at [facility] OR    PLACEMENT, Shaver Catheter  6/16/2015    Performed by [facility]te [facility] at [facility] OR    REMOVAL-STONE-URETERAL  6/16/2015    Performed by [facility] at [facility] OR    REPAIR-HERNIA-INCISIONAL N/A 8/19/2016    Performed by Jose Lewis MD at [facility] OR    UPPER GASTROINTESTINAL ENDOSCOPY      7/10/2018,9/24/2010    URETEROSCOPY Right 3/8/2016    Performed by [facility] at [facility] OR    VASCULAR SURGERY      WASHING-BLADDER  6/16/2015    Performed by [facility]te [facility] at [facility] OR       Social History     Socioeconomic History    Marital status:      Spouse name: Not on file    Number of children: Not on file    Years of education: Not on file    Highest education level: Not on file   Occupational History    Not on file   Social Needs    Financial resource strain: Not on file    Food insecurity:     Worry: Not on file     Inability: Not on file    Transportation needs:      Medical: Not on file     Non-medical: Not on file   Tobacco Use    Smoking status: Never Smoker    Smokeless tobacco: Current User     Types: Snuff    Tobacco comment: 20 plus years   Substance and Sexual Activity    Alcohol use: Yes     Comment: occasional    Drug use: No    Sexual activity: Yes     Partners: Female   Lifestyle    Physical activity:     Days per week: Not on file     Minutes per session: Not on file    Stress: Not on file   Relationships    Social connections:     Talks on phone: Not on file     Gets together: Not on file     Attends Nondenominational service: Not on file     Active member of club or organization: Not on file     Attends meetings of clubs or organizations: Not on file     Relationship status: Not on file   Other Topics Concern    Not on file   Social History Narrative    Not on file         CBC: No results for input(s): WBC, RBC, HGB, HCT, PLT, MCV, MCH, MCHC in the last 72 hours.    CMP: No results for input(s): NA, K, CL, CO2, BUN, CREATININE, GLU, MG, PHOS, CALCIUM, ALBUMIN, PROT, ALKPHOS, ALT, AST, BILITOT in the last 72 hours.    INR  No results for input(s): PT, INR, PROTIME, APTT in the last 72 hours.      Vitals:    08/21/19 0721   BP: (!) 159/77   Pulse: 76   Resp: 20   Temp: 36.8 °C (98.2 °F)     See nursing charting for additional vital signs      Diagnostic Studies:  Results for LAKSHMI LEVI REYES (MRN [id number]) as of 8/21/2019 09:02   Ref. Range 8/14/2019 12:09   WBC Latest Ref Range: 3.90 - 12.70 K/uL 8.11   RBC Latest Ref Range: 4.60 - 6.20 M/uL 4.55 (L)   Hemoglobin Latest Ref Range: 14.0 - 18.0 g/dL 12.9 (L)   Hematocrit Latest Ref Range: 40.0 - 54.0 % 39.8 (L)   MCV Latest Ref Range: 82 - 98 fL 88   MCH Latest Ref Range: 27.0 - 31.0 pg 28.4   MCHC Latest Ref Range: 32.0 - 36.0 g/dL 32.4   RDW Latest Ref Range: 11.5 - 14.5 % 13.7   Platelets Latest Ref Range: 150 - 350 K/uL 214   MPV Latest Ref Range: 9.2 - 12.9 fL 11.1   Gran% Latest Ref Range: 38.0 -  73.0 % 68.0   Gran # (ANC) Latest Ref Range: 1.8 - 7.7 K/uL 5.5   Lymph% Latest Ref Range: 18.0 - 48.0 % 18.5   Lymph # Latest Ref Range: 1.0 - 4.8 K/uL 1.5   Mono% Latest Ref Range: 4.0 - 15.0 % 8.3   Mono # Latest Ref Range: 0.3 - 1.0 K/uL 0.7   Eosinophil% Latest Ref Range: 0.0 - 8.0 % 5.3   Eos # Latest Ref Range: 0.0 - 0.5 K/uL 0.4   Basophil% Latest Ref Range: 0.0 - 1.9 % 0.6   Baso # Latest Ref Range: 0.00 - 0.20 K/uL 0.05   Differential Method Unknown Automated   Sodium Latest Ref Range: 136 - 145 mmol/L 142   Potassium Latest Ref Range: 3.5 - 5.1 mmol/L 4.5   Chloride Latest Ref Range: 95 - 110 mmol/L 104   CO2 Latest Ref Range: 23 - 29 mmol/L 30 (H)   Anion Gap Latest Ref Range: 8 - 16 mmol/L 8   BUN, Bld Latest Ref Range: 8 - 23 mg/dL 22   Creatinine Latest Ref Range: 0.5 - 1.4 mg/dL 1.7 (H)   eGFR if non African American Latest Ref Range: >60 mL/min/1.73 m^2 42 (A)   eGFR if African American Latest Ref Range: >60 mL/min/1.73 m^2 48 (A)   Glucose Latest Ref Range: 70 - 110 mg/dL 94   Calcium Latest Ref Range: 8.7 - 10.5 mg/dL 9.6   Alkaline Phosphatase Latest Ref Range: 55 - 135 U/L 78   PROTEIN TOTAL Latest Ref Range: 6.0 - 8.4 g/dL 7.5   Albumin Latest Ref Range: 3.5 - 5.2 g/dL 4.1   BILIRUBIN TOTAL Latest Ref Range: 0.1 - 1.0 mg/dL 0.3   AST Latest Ref Range: 10 - 40 U/L 14   ALT Latest Ref Range: 10 - 44 U/L 16       EKG (6/25/19):  Normal sinus rhythm  LVH with repolarization abnormality  Abnormal ECG      Anesthesia Evaluation    I have reviewed the Patient Summary Reports.    I have reviewed the Nursing Notes.      Review of Systems  Anesthesia Hx:  No problems with previous Anesthesia   Denies Personal Hx of Anesthesia complications.   Social:  Non-Smoker, Social Alcohol Use    Hematology/Oncology:         -- Anemia: --  Cancer in past history:  right Oncology Comments: H/o right kidney RCC s/p nephrectomy     Cardiovascular:   Exercise tolerance: good Hypertension, well controlled CAD  asymptomatic  Angina, with exertion ECG has been reviewed.    Pulmonary:   Sleep Apnea    Renal/:   Chronic Renal Disease, CRI renal calculi    Neurological:   CVA, residual symptoms Ataxia, dysarthria   Endocrine:   Diabetes, well controlled, type 2        Physical Exam  General:  Obesity    Airway/Jaw/Neck:   MP3, TMD >3FB, Teeth: missing top middle and incisors     Chest/Lungs:  Chest/Lungs Clear    Heart/Vascular:  Heart Findings: Normal            Anesthesia Plan  Type of Anesthesia, risks & benefits discussed:  Anesthesia Type:  general  Patient's Preference:   Intra-op Monitoring Plan: standard ASA monitors  Intra-op Monitoring Plan Comments:   Post Op Pain Control Plan: multimodal analgesia, IV/PO Opioids PRN and per primary service following discharge from PACU  Post Op Pain Control Plan Comments:   Induction:    Beta Blocker:  Patient is on a Beta-Blocker and has received one dose within the past 24 hours (No further documentation required).       Informed Consent: Patient understands risks and agrees with Anesthesia plan.  Questions answered. Anesthesia consent signed with patient.  ASA Score: 3     Day of Surgery Review of History & Physical: I have interviewed and examined the patient. I have reviewed the patient's H&P dated:  There are no significant changes.          Ready For Surgery From Anesthesia Perspective.

## 2019-08-21 NOTE — OP NOTE
[facility]  General Surgery  Operative Note    SUMMARY     Date of Procedure: 8/21/2019     Procedure: Procedure(s):  REPAIR, HERNIA, INCISIONAL, RECURRENT       Surgeon(s) and Role:     * Jose Lewis MD - Primary    Assisting Surgeon: Coleen Daniels MD    Pre-Operative Diagnosis: Recurrent incisional hernia [K43.2]    Post-Operative Diagnosis: Post-Op Diagnosis Codes:     * Recurrent incisional hernia [K43.2]    Anesthesia: General    Technical Procedures Used:  Patient was taken to the operating room placed on operating table supine position.  Under adequate general anesthesia prepped and draped around his right lower quadrant usual sterile fashion. Incision was made overlying previous incision site deepened to expose a 2 cm defect that was submuscular.  This was circumferentially dissected free from surrounding tissues and then reapproximated in a Smead-Sanchez fashion with interrupted Ethibond suture.  The rest of the layers were closed of the primarily around on top of the external oblique was then reapproximated and then and the subcu and skin on layers with absorbable suture. Bandage was applied as well as a binder patient was awakened transported to recovery room satisfactory condition.    Description of the Findings of the Procedure: 2.0 cm defect    Significant Surgical Tasks Conducted by the Assistant(s), if Applicable:  Greater than 50%    Complications: No    Estimated Blood Loss (EBL):  Minimal           Implants: * No implants in log *    Specimens:   Specimen (12h ago, onward)    None                  Condition: Good    Disposition: PACU - hemodynamically stable.    Attestation: I was present and scrubbed for the entire procedure.

## 2019-08-21 NOTE — DISCHARGE INSTRUCTIONS
[facility]   Office # [phone]     Discharge Instructions for Same Day Surgery     Call the office for and appointment if one has not already been made.     Diet: Drink plenty of fluids the first 48 hours and you may resume your   usual diet.     Activity: No heavy lifting (over 10 pounds), pushing or pulling until your   post op visit. Your doctor's office may have told you to limit your lifting to less weight, or even no weight.  Be sure to follow those instructions.    Note: You may ride in a car and you may drive when comfortable.     Do not drive, drink alcohol, or sign legal documents for 24 hours, or if taking narcotic pain medication.    Dressings: Dermabond / Prineotape    or a material like it was used on your incision.   It is like a liquid glue.   Do not peel or try to remove it it will start to fall off in 7-10 days on its' own.   It is OK to shower, pat dry, do not apply any creams or lotions.    No tub baths, swimming pools, hot tubs or submersion of the incision until your surgeon says it's ok.     Medical: Call the doctor for any of the following problems: fever above 101,   severe pain, bleeding, or abdominal distention (swelling).   If constipated you may take any stool softener you choose.     Occasionally small areas of skin numbness or an unpleasant skin sensation can result. Also, you may find that your incision is swollen and tender for a few days.  Some redness around sutures and staples is a normal reaction, but if the discomfort persists or worsens, call you doctor.   Wear Abdominal for comfort                                             -Exparel information-  Remove teal colored bracelet on Sunday August 25,2019 at 11:20 am     To help control your pain after surgery, your surgeon injected Exparel (bupicacaine liposome injectable suspension) into your surgical incision just before the end of the procedure.      Exparel is a local analgesic that contains the local  anesthetic bupivacaine..  Local anesthetics provide pain relief by numbing the tissue around the surgical site.    Exparel is specifically designed to release pain medication over time an can control pain for up to 72 hours.    In addition to Exparel, your surgeon may provide other pain medications to control your pain.    Each patient is different and responds differently to pain medication.  Depending on how you respond to Exparel, you may require less additional pain medication during your recovery.    Side effects can occur with any medication and it is important not to ignore anything you may be experiencing.  Some patients who received Exparel experienced nausea, vomiting, or constipation.  Rarely, patients who receive bupivacaine (the active ingredient in Exparel) have experienced numbness and tingling in their mouth or lips, lightheadedness, or anxiety.  Speak with your doctor right away if you think you may be experiencing any of these sensations, or if you have other questions regarding possible side effects.    Products that contain bupivacaine, like Exparel, may cause a temporary loss of sensation or the ability to move in the area where bupivacaine was injected.    Other formulations of bupivacaine should not be administered within 96 hours following administrations of Exparel.      Do not remove the teal colored bracelet that you have on for 96 hours.  (4 DAYS)    This bracelet will let other health care workers know that you have received Exparel, and not to give you bupivacaine during this 96 hours.  Fall Prevention  Millions of people fall every year and injure themselves. You may have had anesthesia or sedation which may increase your risk of falling. You may have health issues that put you at an increased risk of falling.     Here are ways to reduce your risk of falling.  ·   · Make your home safe by keeping walkways clear of objects you may trip over.  · Use non-slip pads under rugs. Do not use  area rugs or small throw rugs.  · Use non-slip mats in bathtubs and showers.  · Install handrails and lights on staircases.  · Do not walk in poorly lit areas.  · Do not stand on chairs or wobbly ladders.  · Use caution when reaching overhead or looking upward. This position can cause a loss of balance.  · Be sure your shoes fit properly, have non-slip bottoms and are in good condition.   · Wear shoes both inside and out. Avoid going barefoot or wearing slippers.  · Be cautious when going up and down stairs, curbs, and when walking on uneven sidewalks.  · If your balance is poor, consider using a cane or walker.  · If your fall was related to alcohol use, stop or limit alcohol intake.   · If your fall was related to use of sleeping medicines, talk to your doctor about this. You may need to reduce your dosage at bedtime if you awaken during the night to go to the bathroom.    · To reduce the need for nighttime bathroom trips:  ¨ Avoid drinking fluids for several hours before going to bed  ¨ Empty your bladder before going to bed  ¨ Men can keep a urinal at the bedside  · Stay as active as you can. Balance, flexibility, strength, and endurance all come from exercise. They all play a role in preventing falls. Ask your healthcare provider which types of activity are right for you.  · Get your vision checked on a regular basis.  · If you have pets, know where they are before you stand up or walk so you don't trip over them.  · Use night lights.

## 2019-08-21 NOTE — ANESTHESIA POSTPROCEDURE EVALUATION
Anesthesia Post Evaluation    Patient: Nicolas Flaherty    Procedure(s) Performed: Procedure(s):  REPAIR, HERNIA, INCISIONAL, RECURRENT    Final Anesthesia Type: general  Patient location during evaluation: PACU  Patient participation: Yes- Able to Participate  Level of consciousness: awake and alert and oriented  Post-procedure vital signs: reviewed and stable  Pain management: adequate  Airway patency: patent  PONV status at discharge: No PONV  Anesthetic complications: no      Cardiovascular status: hemodynamically stable and blood pressure returned to baseline  Respiratory status: spontaneous ventilation, room air and unassisted  Hydration status: euvolemic  Follow-up not needed.          Vitals Value Taken Time   /67 8/21/2019  1:15 PM   Temp 36.3 °C (97.4 °F) 8/21/2019  1:15 PM   Pulse 73 8/21/2019  1:15 PM   Resp 20 8/21/2019  1:15 PM   SpO2 94 % 8/21/2019  1:15 PM         Event Time     Out of Recovery 13:11:40          Pain/Christiano Score: Pain Rating Prior to Med Admin: 5 (8/21/2019  1:04 PM)  Pain Rating Post Med Admin: 5 (8/21/2019  1:10 PM)  Christiano Score: 10 (8/21/2019  1:15 PM)

## 2019-08-21 NOTE — TRANSFER OF CARE
"Anesthesia Transfer of Care Note    Patient: Nicolas Flaherty    Procedure(s) Performed: Procedure(s):  REPAIR, HERNIA, INCISIONAL, RECURRENT    Patient location: PACU    Anesthesia Type: general    Transport from OR: Transported from OR on room air with adequate spontaneous ventilation    Post pain: adequate analgesia    Post assessment: no apparent anesthetic complications    Post vital signs: stable    Level of consciousness: awake and alert    Nausea/Vomiting: no nausea/vomiting    Complications: none    Transfer of care protocol was followed      Last vitals:   Visit Vitals  BP (!) 140/66 (BP Location: Right arm, Patient Position: Lying)   Pulse 97   Temp 36.5 °C (97.7 °F) (Oral)   Resp 18   Ht 5' 7" (1.702 m)   Wt 114.9 kg (253 lb 4.9 oz)   SpO2 95%   BMI 39.67 kg/m²     "

## 2019-08-21 NOTE — BRIEF OP NOTE
[facility]  Brief Operative Note     SUMMARY     Surgery Date: 8/21/2019     Surgeon(s) and Role:     * Jose Lewis MD - Primary    Assisting Surgeon: Coleen Daniels MD    Pre-op Diagnosis:  Recurrent incisional hernia [K43.2]    Post-op Diagnosis:  Post-Op Diagnosis Codes:     * Recurrent incisional hernia [K43.2]    Procedure(s):  REPAIR, HERNIA, INCISIONAL, RECURRENT    Anesthesia: General    Description of the findings of the procedure: 2 cm defect    Findings/Key Components: same    Estimated Blood Loss: minimal         Specimens:   Specimen (12h ago, onward)    None          Discharge Note    SUMMARY     Admit Date: 8/21/2019    Discharge Date and Time:  08/21/2019 11:58 AM    Hospital Course (synopsis of major diagnoses, care, treatment, and services provided during the course of the hospital stay): uneventful post op course     Final Diagnosis: Post-Op Diagnosis Codes:     * Recurrent incisional hernia [K43.2]    Disposition: Home or Self Care    Follow Up/Patient Instructions:     Medications:  Reconciled Home Medications:      Medication List      START taking these medications    * oxyCODONE-acetaminophen 5-325 mg per tablet  Commonly known as:  PERCOCET  Take 1 tablet by mouth every 4 (four) hours as needed for Pain.     * oxyCODONE-acetaminophen 5-325 mg per tablet  Commonly known as:  PERCOCET  Take 1 tablet by mouth every 4 (four) hours as needed for Pain.         * This list has 2 medication(s) that are the same as other medications prescribed for you. Read the directions carefully, and ask your doctor or other care provider to review them with you.            CHANGE how you take these medications    glimepiride 4 MG tablet  Commonly known as:  AMARYL  Take 1 tablet (4 mg total) by mouth 2 (two) times daily.  What changed:    · how much to take  · when to take this        CONTINUE taking these medications    allopurinol 100 MG tablet  Commonly known as:  ZYLOPRIM  Take 100 mg by  mouth once daily.     aspirin 81 MG EC tablet  Commonly known as:  ECOTRIN  Take 81 mg by mouth once daily. LAST TAKEN 3/2/16     blood sugar diagnostic Strp  Commonly known as:  ACCU-CHEK REJI PLUS TEST STRP  TEST BLOOD SUGAR TWICE DAILY     blood-glucose meter Misc  Commonly known as:  ACCU-CHEK REJI PLUS METER  CHECK TWICE DAILY     clopidogrel 75 mg tablet  Commonly known as:  PLAVIX  Take 75 mg by mouth every morning. LAST DOSE 3/2/16     diltiaZEM 60 MG tablet  Commonly known as:  CARDIZEM  Take 1 tablet (60 mg total) by mouth every 8 (eight) hours. Take 4 times a day     docusate sodium 100 MG capsule  Commonly known as:  STOOL SOFTENER  Take 1 capsule (100 mg total) by mouth 2 (two) times daily.     dulaglutide 1.5 mg/0.5 mL Pnij  Commonly known as:  TRULICITY  Inject 1.5 mg into the skin every 7 days.     finasteride 5 mg tablet  Commonly known as:  PROSCAR  Take 1 tablet (5 mg total) by mouth every morning.     furosemide 40 MG tablet  Commonly known as:  LASIX  Take 40 mg by mouth 2 (two) times daily. ALTERNATES 1 TABLET ONE DAY AND 2 TABLETS THE NEXT--ALTERNATING DAYS     HYDROcodone-acetaminophen  mg per tablet  Commonly known as:  NORCO  Take 1 tablet by mouth nightly as needed for Pain.     lancets Misc  Commonly known as:  ACCU-CHEK SOFTCLIX LANCETS  1 lancet by Misc.(Non-Drug; Combo Route) route 2 (two) times daily.     losartan 50 MG tablet  Commonly known as:  COZAAR  Take 50 mg by mouth once daily.     metoprolol succinate 100 MG 24 hr tablet  Commonly known as:  TOPROL-XL  Take 100 mg by mouth every morning.     nateglinide 60 MG tablet  Commonly known as:  STARLIX  TAKE 1 TABLET TWICE DAILY BEFORE MEALS     NITROSTAT 0.4 MG SL tablet  Generic drug:  nitroGLYCERIN  Place 0.4 mg under the tongue every 5 (five) minutes as needed.     nortriptyline 25 MG capsule  Commonly known as:  PAMELOR  Take 1 tablet PO (25 mg) nightly for two weeks, then increase to 2 tablets PO (50 mg) nightly.      ranitidine 150 MG tablet  Commonly known as:  ZANTAC  Take 1 tablet (150 mg total) by mouth 2 (two) times daily.     rosuvastatin 40 MG Tab  Commonly known as:  CRESTOR  Take 1 tablet (40 mg total) by mouth every evening.     SITagliptin 50 MG Tab  Commonly known as:  JANUVIA  Take 1 tablet (50 mg total) by mouth once daily.     tamsulosin 0.4 mg Cap  Commonly known as:  FLOMAX  Take 1 capsule (0.4 mg total) by mouth once daily.     TRAVATAN Z 0.004 % ophthalmic solution  Generic drug:  travoprost  1 drop every evening.          Discharge Procedure Orders   Diet general     Diet general     Call MD for:  temperature >100.4     Call MD for:  persistent nausea and vomiting     Call MD for:  severe uncontrolled pain     Call MD for:  difficulty breathing, headache or visual disturbances     Call MD for:  redness, tenderness, or signs of infection (pain, swelling, redness, odor or green/yellow discharge around incision site)     Call MD for:  hives     Remove dressing in 24 hours     Call MD for:  temperature >100.4     Call MD for:  persistent nausea and vomiting     Call MD for:  severe uncontrolled pain     Call MD for:  difficulty breathing, headache or visual disturbances     Call MD for:  redness, tenderness, or signs of infection (pain, swelling, redness, odor or green/yellow discharge around incision site)     Call MD for:  hives     Remove dressing in 24 hours     Shower on day dressing removed (No bath)     Shower on day dressing removed (No bath)     Follow-up Information     Jose Lewis MD.    Specialties:  General Surgery, Oncology  Contact information:  [address] [place] [address] [phone]             Follow up In 1 week.

## 2019-08-22 NOTE — H&P
History & Physical     SUBJECTIVE:      History of Present Illness:  Patient is a 63 y.o. male presents with recurrent incisional hernia with symptoms.         Chief Complaint   Patient presents with    Hernia         Review of patient's allergies indicates:  No Known Allergies     Current Medications          Current Outpatient Medications   Medication Sig Dispense Refill    allopurinol (ZYLOPRIM) 100 MG tablet Take 100 mg by mouth once daily.        aspirin (ECOTRIN) 81 MG EC tablet Take 81 mg by mouth once daily. LAST TAKEN 3/2/16        blood sugar diagnostic (ACCU-CHEK REJI PLUS TEST STRP) Strp TEST BLOOD SUGAR TWICE DAILY 200 strip 2    blood-glucose meter (ACCU-CHEK REJI PLUS METER) [facility] [place] TWICE DAILY 1 each 0    clopidogrel (PLAVIX) 75 mg tablet Take 75 mg by mouth every morning. LAST DOSE 3/2/16        diltiaZEM (CARDIZEM) 60 MG tablet Take 1 tablet (60 mg total) by mouth 3 (three) times daily. 270 tablet 3    docusate sodium (STOOL SOFTENER) 100 MG capsule Take 1 capsule (100 mg total) by mouth 2 (two) times daily. 180 capsule 3    dulaglutide (TRULICITY) 1.5 mg/0.5 mL PnIj Inject 1.5 mg into the skin every 7 days. 6 mL 3    finasteride (PROSCAR) 5 mg tablet Take 1 tablet (5 mg total) by mouth every morning. 90 tablet 3    furosemide (LASIX) 40 MG tablet Take 40 mg by mouth 2 (two) times daily. ALTERNATES 1 TABLET ONE DAY AND 2 TABLETS THE NEXT--ALTERNATING DAYS        GAVILYTE-C 240-22.72-6.72 -5.84 gram SolR USE AS DIRECTED   0    glimepiride (AMARYL) 4 MG tablet Take 1 tablet (4 mg total) by mouth 2 (two) times daily. 180 tablet 1    HYDROcodone-acetaminophen (NORCO)  mg per tablet Take 1 tablet by mouth nightly as needed for Pain. 30 tablet 0    lancets (ACCU-CHEK SOFTCLIX LANCETS) Misc 1 lancet by Misc.(Non-Drug; Combo Route) route 2 (two) times daily. 200 each 2    losartan (COZAAR) 50 MG tablet Take 50 mg by mouth once daily.        metoprolol succinate (TOPROL-XL) 100  MG 24 hr tablet Take 100 mg by mouth every morning.         nateglinide (STARLIX) 60 MG tablet TAKE 1 TABLET TWICE DAILY BEFORE MEALS 180 tablet 0    NITROSTAT 0.4 mg SL tablet Place 0.4 mg under the tongue every 5 (five) minutes as needed.         nortriptyline (PAMELOR) 25 MG capsule Take 1 tablet PO (25 mg) nightly for two weeks, then increase to 2 tablets PO (50 mg) nightly. 75 capsule 3    ranitidine (ZANTAC) 150 MG tablet Take 1 tablet (150 mg total) by mouth 2 (two) times daily. 180 tablet 5    rosuvastatin (CRESTOR) 40 MG Tab Take 1 tablet (40 mg total) by mouth every evening. 90 tablet 3    SITagliptin (JANUVIA) 50 MG Tab Take 1 tablet (50 mg total) by mouth once daily. 90 tablet 3    tamsulosin (FLOMAX) 0.4 mg Cap Take 1 capsule (0.4 mg total) by mouth once daily. 90 capsule 3    TRAVATAN Z 0.004 % Drop 1 drop every evening.         ammonium lactate 12 % Crea Apply 1 g topically once daily. 1 Bottle 3                Current Facility-Administered Medications   Medication Dose Route Frequency Provider Last Rate Last Dose    lidocaine (PF) 10 mg/ml (1%) injection 10 mg  1 mL Intradermal Once Jose Lewis MD                     Past Medical History:   Diagnosis Date    Abdominal hernia      Anticoagulant long-term use      Arthritis      Cancer of kidney, right      Colon polyp      Coronary artery disease      Diabetes mellitus      Hypertension      Kidney stone      Sleep apnea      Slurred speech      Stroke       MINI STROKE    TIA (transient ischemic attack)      Wears glasses              Past Surgical History:   Procedure Laterality Date    BACK SURGERY         lower back    CARDIAC SURGERY        COLONOSCOPY   07/2018    CYSTOSCOPY   03/2008    CYSTOSCOPY with bilateral RETROGRADE and right URETEROSCOPY, STENT PLACEMENT Bilateral 6/16/2015     [facility]    CYSTOSCOPY WITH RETROGRADE PYELOGRAM Right 3/8/2016     Performed by  [facility]osta [facility] at [facility] OR    EGD (ESOPHAGOGASTRODUODENOSCOPY) N/A 2/19/2019     Performed by [facility] at [facility] ENDO (2ND FLR)    ESOPHAGEAL MANOMETRY   08/2018    EVACUATION-CLOT   6/16/2015     Performed by Dionte Christian MD at [facility] OR    excision right thigh mass        EXCISION-MASS-THIGH Right 3/5/2018     Performed by Jose Lewis MD at [facility] OR    FULGURATION-BLADDER   6/16/2015     Performed by Dionte Christian MD at [facility] OR    heart stents         stents 4 total.  2010, 2011 and 2013    HERNIA REPAIR         incisional    KIDNEY STONE SURGERY   2015    LAPAROSCOPIC NEPHRECTOMY, HAND ASSISTED Right      LITHOTRIPSY        MANOMETRY, ESOPHAGUS, WITH IMPEDANCE MEASUREMENT N/A 2/19/2019     Performed by Sylvia [facility] at [facility] ENDO (2ND FLR)    NEPHRECTOMY-LAPAROSCOPIC Right 3/18/2016     Performed by Rosalia Acosta MD at [facility] OR    PLACEMENT, Shaver Catheter   6/16/2015     Performed by Dionte Christian MD at [facility] OR    REMOVAL-STONE-URETERAL   6/16/2015     Performed by Dionte Christian MD at [facility] OR    REPAIR-HERNIA-INCISIONAL N/A 8/19/2016     Performed by Jose Lewis MD at [facility] OR    UPPER GASTROINTESTINAL ENDOSCOPY         7/10/2018,9/24/2010    URETEROSCOPY Right 3/8/2016     Performed by Rosalia Acosta MD at [facility] OR    VASCULAR SURGERY        WASHING-BLADDER   6/16/2015     Performed by Dionte Christian MD at [facility] OR            Family History   Problem Relation Age of Onset    Heart disease Father      Hypertension Mother      Parkinsonism Mother      Celiac disease Neg Hx      Cirrhosis Neg Hx      Colon cancer Neg Hx      Colon polyps Neg Hx      Crohn's disease Neg Hx      Cystic fibrosis Neg Hx      Esophageal cancer Neg Hx      Hemochromatosis Neg Hx      Inflammatory bowel disease Neg Hx      Irritable bowel syndrome Neg Hx      Liver cancer Neg Hx      Liver disease Neg Hx      Rectal cancer  "Neg Hx      Stomach cancer Neg Hx      Ulcerative colitis Neg Hx      Kalia's disease Neg Hx      Lymphoma Neg Hx      Tuberculosis Neg Hx      Scleroderma Neg Hx      Rheum arthritis Neg Hx      Multiple sclerosis Neg Hx      Melanoma Neg Hx      Lupus Neg Hx      Psoriasis Neg Hx      Skin cancer Neg Hx        Social History            Tobacco Use    Smoking status: Never Smoker    Smokeless tobacco: Current User       Types: Snuff    Tobacco comment: 20 plus years   Substance Use Topics    Alcohol use: Yes       Comment: occasional    Drug use: No         Review of Systems:  Review of Systems   Constitutional: Negative.    HENT: Negative.    Eyes: Negative.    Respiratory: Negative.    Cardiovascular: Negative.    Gastrointestinal: Negative.    Endocrine: Negative.    Musculoskeletal: Negative.    Skin: Negative.    Allergic/Immunologic: Negative.    Neurological: Negative.    Hematological: Negative.    Psychiatric/Behavioral: Negative.    All other systems reviewed and are negative.        OBJECTIVE:      Vital Signs (Most Recent)  Pulse: 74 (07/30/19 1501)  BP: (!) 144/78 (07/30/19 1501)  5' 7" (1.702 m)  113.8 kg (250 lb 14.1 oz)      Physical Exam:  Physical Exam   Constitutional: He is oriented to person, place, and time. He appears well-developed and well-nourished.   HENT:   Head: Normocephalic and atraumatic.   Right Ear: External ear normal.   Left Ear: External ear normal.   Nose: Nose normal.   Mouth/Throat: Oropharynx is clear and moist.   Eyes: Pupils are equal, round, and reactive to light. Conjunctivae and EOM are normal.   Neck: Normal range of motion. Neck supple.   Cardiovascular: Normal rate, regular rhythm, normal heart sounds and intact distal pulses.   Pulmonary/Chest: Effort normal and breath sounds normal.   Abdominal: Soft. Bowel sounds are normal. A hernia is present. Hernia confirmed positive in the ventral area.       Musculoskeletal: Normal range of motion. "   Neurological: He is alert and oriented to person, place, and time. He has normal reflexes.   Skin: Skin is warm and dry.   Psychiatric: He has a normal mood and affect. His behavior is normal. Thought content normal.   Vitals reviewed.        Laboratory  none     Diagnostic Results:  none     ASSESSMENT/PLAN:      Recurrent incisional hernia     PLAN:Plan      I will take her to the OR for repair of incisional hernia with the risks explained and he will see cardiology prior to surgery

## 2019-08-28 ENCOUNTER — OFFICE VISIT (OUTPATIENT)
Dept: SURGERY | Facility: CLINIC | Age: 64
End: 2019-08-28
Payer: COMMERCIAL

## 2019-08-28 VITALS
HEIGHT: 67 IN | BODY MASS INDEX: 39.76 KG/M2 | HEART RATE: 94 BPM | SYSTOLIC BLOOD PRESSURE: 161 MMHG | WEIGHT: 253.31 LBS | DIASTOLIC BLOOD PRESSURE: 80 MMHG

## 2019-08-28 DIAGNOSIS — K43.2 RECURRENT INCISIONAL HERNIA: Primary | ICD-10-CM

## 2019-08-28 PROCEDURE — 99024 POSTOP FOLLOW-UP VISIT: CPT | Mod: S$GLB,,, | Performed by: SURGERY

## 2019-08-28 PROCEDURE — 99024 PR POST-OP FOLLOW-UP VISIT: ICD-10-PCS | Mod: S$GLB,,, | Performed by: SURGERY

## 2019-08-28 NOTE — PROGRESS NOTES
Subjective:       Patient ID: Nicolas Flaherty is a 63 y.o. male.    Chief Complaint: Post-op Evaluation    HPI 62 yo male with abdominal incisional hernia repair without complaints  Review of Systems   Constitutional: Negative.    HENT: Negative.    Eyes: Negative.    Respiratory: Negative.    Cardiovascular: Negative.    Gastrointestinal: Negative.    Endocrine: Negative.    Musculoskeletal: Negative.    Skin: Negative.    Allergic/Immunologic: Negative.    Neurological: Negative.    Hematological: Negative.    Psychiatric/Behavioral: Negative.    All other systems reviewed and are negative.      Objective:      Physical Exam   Constitutional: He is oriented to person, place, and time. He appears well-developed and well-nourished.   HENT:   Head: Normocephalic and atraumatic.   Right Ear: External ear normal.   Left Ear: External ear normal.   Nose: Nose normal.   Mouth/Throat: Oropharynx is clear and moist.   Eyes: Pupils are equal, round, and reactive to light. Conjunctivae and EOM are normal.   Neck: Normal range of motion. Neck supple.   Cardiovascular: Normal rate, regular rhythm, normal heart sounds and intact distal pulses.   Pulmonary/Chest: Effort normal and breath sounds normal.   Abdominal: Soft. Bowel sounds are normal.       Musculoskeletal: Normal range of motion.   Neurological: He is alert and oriented to person, place, and time. He has normal reflexes.   Skin: Skin is warm and dry.   Psychiatric: He has a normal mood and affect. His behavior is normal. Thought content normal.   Vitals reviewed.      Assessment:       1. Recurrent incisional hernia      s/p repair doing well  Plan:       I will see him back prn

## 2019-09-24 ENCOUNTER — CLINICAL SUPPORT (OUTPATIENT)
Dept: FAMILY MEDICINE | Facility: CLINIC | Age: 64
End: 2019-09-24
Payer: MEDICARE

## 2019-09-24 DIAGNOSIS — Z23 FLU VACCINE NEED: Primary | ICD-10-CM

## 2019-09-24 DIAGNOSIS — M47.26 OSTEOARTHRITIS OF SPINE WITH RADICULOPATHY, LUMBAR REGION: ICD-10-CM

## 2019-09-24 PROCEDURE — G0008 FLU VACCINE - HIGH DOSE (65+) PRESERVATIVE FREE IM: ICD-10-PCS | Mod: S$GLB,ICN,, | Performed by: FAMILY MEDICINE

## 2019-09-24 PROCEDURE — G0008 ADMIN INFLUENZA VIRUS VAC: HCPCS | Mod: S$GLB,ICN,, | Performed by: FAMILY MEDICINE

## 2019-09-24 PROCEDURE — 90662 IIV NO PRSV INCREASED AG IM: CPT | Mod: S$GLB,ICN,, | Performed by: FAMILY MEDICINE

## 2019-09-24 PROCEDURE — 90662 FLU VACCINE - HIGH DOSE (65+) PRESERVATIVE FREE IM: ICD-10-PCS | Mod: S$GLB,ICN,, | Performed by: FAMILY MEDICINE

## 2019-09-24 RX ORDER — HYDROCODONE BITARTRATE AND ACETAMINOPHEN 10; 325 MG/1; MG/1
1 TABLET ORAL NIGHTLY PRN
Qty: 30 TABLET | Refills: 0 | OUTPATIENT
Start: 2019-09-24

## 2019-10-03 DIAGNOSIS — E11.9 TYPE 2 DIABETES MELLITUS WITHOUT COMPLICATION, UNSPECIFIED WHETHER LONG TERM INSULIN USE: ICD-10-CM

## 2019-10-10 ENCOUNTER — OFFICE VISIT (OUTPATIENT)
Dept: FAMILY MEDICINE | Facility: CLINIC | Age: 64
End: 2019-10-10
Payer: COMMERCIAL

## 2019-10-10 VITALS
HEART RATE: 76 BPM | BODY MASS INDEX: 39.48 KG/M2 | TEMPERATURE: 98 F | WEIGHT: 251.56 LBS | RESPIRATION RATE: 16 BRPM | SYSTOLIC BLOOD PRESSURE: 124 MMHG | OXYGEN SATURATION: 98 % | DIASTOLIC BLOOD PRESSURE: 80 MMHG | HEIGHT: 67 IN

## 2019-10-10 DIAGNOSIS — M79.10 MUSCLE PAIN: Primary | ICD-10-CM

## 2019-10-10 DIAGNOSIS — Z13.6 SCREENING FOR AAA (ABDOMINAL AORTIC ANEURYSM): ICD-10-CM

## 2019-10-10 PROCEDURE — 99214 PR OFFICE/OUTPT VISIT, EST, LEVL IV, 30-39 MIN: ICD-10-PCS | Mod: S$GLB,,, | Performed by: PHYSICIAN ASSISTANT

## 2019-10-10 PROCEDURE — 99214 OFFICE O/P EST MOD 30 MIN: CPT | Mod: S$GLB,,, | Performed by: PHYSICIAN ASSISTANT

## 2019-10-10 PROCEDURE — 99999 PR PBB SHADOW E&M-EST. PATIENT-LVL V: ICD-10-PCS | Mod: PBBFAC,,, | Performed by: PHYSICIAN ASSISTANT

## 2019-10-10 PROCEDURE — 99999 PR PBB SHADOW E&M-EST. PATIENT-LVL V: CPT | Mod: PBBFAC,,, | Performed by: PHYSICIAN ASSISTANT

## 2019-10-10 RX ORDER — DICLOFENAC SODIUM 10 MG/G
2 GEL TOPICAL DAILY
Qty: 100 G | Refills: 0 | Status: SHIPPED | OUTPATIENT
Start: 2019-10-10 | End: 2019-10-14 | Stop reason: SDUPTHER

## 2019-10-10 RX ORDER — TIZANIDINE 4 MG/1
4 TABLET ORAL NIGHTLY PRN
Qty: 30 TABLET | Refills: 0 | Status: SHIPPED | OUTPATIENT
Start: 2019-10-10 | End: 2019-11-01 | Stop reason: SDUPTHER

## 2019-10-10 NOTE — PROGRESS NOTES
Subjective:       Patient ID: Nicolas Flaherty is a 64 y.o. male with multiple medical diagnoses as listed in the medical history and problem list that presents for Muscle Pain (back)  .    Chief Complaint: Muscle Pain (back)      Muscle Pain   This is a new problem. The current episode started 1 to 4 weeks ago (2 weeks ). The problem occurs intermittently. The problem has been gradually worsening. Associated symptoms include myalgias. Pertinent negatives include no chills, fatigue, fever or neck pain. Associated symptoms comments: Pain is right side down the axillary area and across chest  Both worse with cough, sneezing, movement and laying down . Treatments tried: wifes on flexeril, was taking 4 of them he states  The treatment provided no relief.   no heat or ice used.      Review of Systems   Constitutional: Negative for chills, fatigue and fever.   Musculoskeletal: Positive for back pain, gait problem (chronic uses walker ) and myalgias. Negative for neck pain and neck stiffness.         PAST MEDICAL HISTORY:  Past Medical History:   Diagnosis Date    Abdominal hernia     Anticoagulant long-term use     Arthritis     Cancer of kidney, right     Colon polyp     Coronary artery disease     Diabetes mellitus     Difficulty swallowing     food comes back up    Hypertension     Kidney stone     Sleep apnea     Slurred speech     Stroke     MINI STROKE    TIA (transient ischemic attack)     Wears glasses        SOCIAL HISTORY:  Social History     Socioeconomic History    Marital status:      Spouse name: Not on file    Number of children: Not on file    Years of education: Not on file    Highest education level: Not on file   Occupational History    Not on file   Social Needs    Financial resource strain: Not on file    Food insecurity:     Worry: Not on file     Inability: Not on file    Transportation needs:     Medical: Not on file     Non-medical: Not on file   Tobacco Use     Smoking status: Never Smoker    Smokeless tobacco: Current User     Types: Snuff    Tobacco comment: 20 plus years   Substance and Sexual Activity    Alcohol use: Yes     Comment: occasional    Drug use: No    Sexual activity: Yes     Partners: Female   Lifestyle    Physical activity:     Days per week: Not on file     Minutes per session: Not on file    Stress: Not on file   Relationships    Social connections:     Talks on phone: Not on file     Gets together: Not on file     Attends Worship service: Not on file     Active member of club or organization: Not on file     Attends meetings of clubs or organizations: Not on file     Relationship status: Not on file   Other Topics Concern    Not on file   Social History Narrative    Not on file       ALLERGIES AND MEDICATIONS: updated and reviewed.  Review of patient's allergies indicates:  No Known Allergies  Current Outpatient Medications   Medication Sig Dispense Refill    allopurinol (ZYLOPRIM) 100 MG tablet Take 100 mg by mouth once daily.      aspirin (ECOTRIN) 81 MG EC tablet Take 81 mg by mouth once daily. LAST TAKEN 3/2/16      blood sugar diagnostic (ACCU-CHEK REJI PLUS TEST STRP) Strp TEST BLOOD SUGAR TWICE DAILY 200 strip 2    blood-glucose meter (ACCU-CHEK REJI PLUS METER) Misc CHECK TWICE DAILY 1 each 0    clopidogrel (PLAVIX) 75 mg tablet Take 75 mg by mouth every morning. LAST DOSE 3/2/16      diltiaZEM (CARDIZEM) 60 MG tablet Take 1 tablet (60 mg total) by mouth every 8 (eight) hours. Take 4 times a day (Patient taking differently: Take 60 mg by mouth every 8 (eight) hours. ) 270 tablet 3    docusate sodium (STOOL SOFTENER) 100 MG capsule Take 1 capsule (100 mg total) by mouth 2 (two) times daily. 180 capsule 3    dulaglutide (TRULICITY) 1.5 mg/0.5 mL PnIj Inject 1.5 mg into the skin every 7 days. 6 mL 3    finasteride (PROSCAR) 5 mg tablet Take 1 tablet (5 mg total) by mouth every morning. 90 tablet 3    furosemide (LASIX)  40 MG tablet Take 40 mg by mouth 2 (two) times daily. ALTERNATES 1 TABLET ONE DAY AND 2 TABLETS THE NEXT--ALTERNATING DAYS      glimepiride (AMARYL) 4 MG tablet Take 1 tablet (4 mg total) by mouth 2 (two) times daily. (Patient taking differently: Take by mouth daily with breakfast. ) 180 tablet 1    HYDROcodone-acetaminophen (NORCO)  mg per tablet Take 1 tablet by mouth nightly as needed for Pain. 30 tablet 0    lancets (ACCU-CHEK SOFTCLIX LANCETS) Misc 1 lancet by Misc.(Non-Drug; Combo Route) route 2 (two) times daily. 200 each 2    losartan (COZAAR) 50 MG tablet Take 50 mg by mouth once daily.      metoprolol succinate (TOPROL-XL) 100 MG 24 hr tablet Take 100 mg by mouth every morning.       nateglinide (STARLIX) 60 MG tablet TAKE 1 TABLET TWICE DAILY BEFORE MEALS 180 tablet 0    NITROSTAT 0.4 mg SL tablet Place 0.4 mg under the tongue every 5 (five) minutes as needed.       nortriptyline (PAMELOR) 25 MG capsule Take 1 tablet PO (25 mg) nightly for two weeks, then increase to 2 tablets PO (50 mg) nightly. 75 capsule 3    ranitidine (ZANTAC) 150 MG tablet Take 1 tablet (150 mg total) by mouth 2 (two) times daily. 180 tablet 5    rosuvastatin (CRESTOR) 40 MG Tab Take 1 tablet (40 mg total) by mouth every evening. 90 tablet 3    SITagliptin (JANUVIA) 50 MG Tab Take 1 tablet (50 mg total) by mouth once daily. 90 tablet 3    tamsulosin (FLOMAX) 0.4 mg Cap Take 1 capsule (0.4 mg total) by mouth once daily. 90 capsule 3    TRAVATAN Z 0.004 % Drop 1 drop every evening.       diclofenac sodium (VOLTAREN) 1 % Gel Apply 2 g topically once daily. 100 g 0    oxyCODONE-acetaminophen (PERCOCET) 5-325 mg per tablet Take 1 tablet by mouth every 4 (four) hours as needed for Pain. 30 tablet 0    oxyCODONE-acetaminophen (PERCOCET) 5-325 mg per tablet Take 1 tablet by mouth every 4 (four) hours as needed for Pain. 30 tablet 0    tiZANidine (ZANAFLEX) 4 MG tablet Take 1 tablet (4 mg total) by mouth nightly as  "needed. 30 tablet 0     No current facility-administered medications for this visit.          Objective:   /80   Pulse 76   Temp 98.4 °F (36.9 °C) (Oral)   Resp 16   Ht 5' 7" (1.702 m)   Wt 114.1 kg (251 lb 8.7 oz)   SpO2 98%   BMI 39.40 kg/m²      Physical Exam   Constitutional: He is oriented to person, place, and time. No distress.   Walker    HENT:   Head: Normocephalic and atraumatic.   Pulmonary/Chest: Effort normal. No respiratory distress. He exhibits tenderness. Right breast exhibits tenderness. Left breast exhibits tenderness.   Musculoskeletal:        Thoracic back: He exhibits tenderness. He exhibits no swelling, no pain and no spasm.        Back:    Neurological: He is alert and oriented to person, place, and time.   Skin: Skin is warm and dry. No rash noted.           Assessment:       1. Muscle pain    2. Screening for AAA (abdominal aortic aneurysm)        Plan:       Muscle pain  -     diclofenac sodium (VOLTAREN) 1 % Gel; Apply 2 g topically once daily.  Dispense: 100 g; Refill: 0  -     tiZANidine (ZANAFLEX) 4 MG tablet; Take 1 tablet (4 mg total) by mouth nightly as needed.  Dispense: 30 tablet; Refill: 0  Moist heat  Patient refusing therapy right now.     Screening for AAA (abdominal aortic aneurysm)  -     US Abdominal Aorta; Future; Expected date: 10/10/2019            No follow-ups on file.  "

## 2019-10-14 ENCOUNTER — PATIENT OUTREACH (OUTPATIENT)
Dept: ADMINISTRATIVE | Facility: HOSPITAL | Age: 64
End: 2019-10-14

## 2019-10-14 ENCOUNTER — TELEPHONE (OUTPATIENT)
Dept: FAMILY MEDICINE | Facility: CLINIC | Age: 64
End: 2019-10-14

## 2019-10-14 DIAGNOSIS — M79.10 MUSCLE PAIN: ICD-10-CM

## 2019-10-14 NOTE — TELEPHONE ENCOUNTER
Patient was prescribed DICLOFENAC SODIUM 1% GEL , Pharmacy is stating that it needs PA, sent to [facility] for PA. Called patient and told them we sent PA  [facility] for approval, pt verbally understands.

## 2019-10-14 NOTE — TELEPHONE ENCOUNTER
Patient was prescribed Diclofenac sodium (VOLTAREN) 1 % Gel , Pharmacy is stating that it needs PA, sent to [facility] for PA. Called patient and told them we sent PA  [facility] for approval, pt verbally understands.

## 2019-10-15 ENCOUNTER — PATIENT OUTREACH (OUTPATIENT)
Dept: ADMINISTRATIVE | Facility: HOSPITAL | Age: 64
End: 2019-10-15

## 2019-10-15 RX ORDER — DICLOFENAC SODIUM 10 MG/G
2 GEL TOPICAL DAILY
Qty: 100 G | Refills: 0 | Status: SHIPPED | OUTPATIENT
Start: 2019-10-15 | End: 2019-10-28

## 2019-10-22 DIAGNOSIS — N18.30 STAGE 3 CHRONIC KIDNEY DISEASE: ICD-10-CM

## 2019-10-22 RX ORDER — GLIMEPIRIDE 4 MG/1
TABLET ORAL
Qty: 180 TABLET | Refills: 1 | Status: SHIPPED | OUTPATIENT
Start: 2019-10-22 | End: 2020-03-13 | Stop reason: SDUPTHER

## 2019-10-28 ENCOUNTER — OFFICE VISIT (OUTPATIENT)
Dept: FAMILY MEDICINE | Facility: CLINIC | Age: 64
End: 2019-10-28
Payer: MEDICARE

## 2019-10-28 ENCOUNTER — HOSPITAL ENCOUNTER (OUTPATIENT)
Dept: RADIOLOGY | Facility: HOSPITAL | Age: 64
Discharge: HOME | End: 2019-10-28
Attending: FAMILY MEDICINE
Payer: MEDICARE

## 2019-10-28 VITALS
OXYGEN SATURATION: 98 % | BODY MASS INDEX: 39.79 KG/M2 | HEART RATE: 100 BPM | WEIGHT: 253.5 LBS | SYSTOLIC BLOOD PRESSURE: 130 MMHG | HEIGHT: 67 IN | TEMPERATURE: 99 F | DIASTOLIC BLOOD PRESSURE: 88 MMHG

## 2019-10-28 DIAGNOSIS — R05.9 COUGH: ICD-10-CM

## 2019-10-28 DIAGNOSIS — N18.30 STAGE 3 CHRONIC KIDNEY DISEASE: ICD-10-CM

## 2019-10-28 DIAGNOSIS — R07.89 RIGHT-SIDED CHEST WALL PAIN: Primary | ICD-10-CM

## 2019-10-28 DIAGNOSIS — R07.89 RIGHT-SIDED CHEST WALL PAIN: ICD-10-CM

## 2019-10-28 DIAGNOSIS — G25.81 RESTLESS LEG SYNDROME: ICD-10-CM

## 2019-10-28 PROCEDURE — 71046 X-RAY EXAM CHEST 2 VIEWS: CPT | Mod: 26,,, | Performed by: RADIOLOGY

## 2019-10-28 PROCEDURE — 71046 XR CHEST PA AND LATERAL: ICD-10-PCS | Mod: 26,,, | Performed by: RADIOLOGY

## 2019-10-28 PROCEDURE — 99499 UNLISTED E&M SERVICE: CPT | Mod: S$GLB,,, | Performed by: FAMILY MEDICINE

## 2019-10-28 PROCEDURE — 71100 X-RAY EXAM RIBS UNI 2 VIEWS: CPT | Mod: TC,FY,RT

## 2019-10-28 PROCEDURE — 71100 X-RAY EXAM RIBS UNI 2 VIEWS: CPT | Mod: 26,RT,, | Performed by: RADIOLOGY

## 2019-10-28 PROCEDURE — 99999 PR PBB SHADOW E&M-EST. PATIENT-LVL III: ICD-10-PCS | Mod: PBBFAC,,, | Performed by: FAMILY MEDICINE

## 2019-10-28 PROCEDURE — 99999 PR PBB SHADOW E&M-EST. PATIENT-LVL III: CPT | Mod: PBBFAC,,, | Performed by: FAMILY MEDICINE

## 2019-10-28 PROCEDURE — 99499 RISK ADDL DX/OHS AUDIT: ICD-10-PCS | Mod: S$GLB,,, | Performed by: FAMILY MEDICINE

## 2019-10-28 PROCEDURE — 99214 PR OFFICE/OUTPT VISIT, EST, LEVL IV, 30-39 MIN: ICD-10-PCS | Mod: S$GLB,,, | Performed by: FAMILY MEDICINE

## 2019-10-28 PROCEDURE — 71100 XR RIBS 2 VIEW RIGHT: ICD-10-PCS | Mod: 26,RT,, | Performed by: RADIOLOGY

## 2019-10-28 PROCEDURE — 71046 X-RAY EXAM CHEST 2 VIEWS: CPT | Mod: TC,FY

## 2019-10-28 PROCEDURE — 99214 OFFICE O/P EST MOD 30 MIN: CPT | Mod: S$GLB,,, | Performed by: FAMILY MEDICINE

## 2019-10-28 RX ORDER — GLIMEPIRIDE 4 MG/1
4 TABLET ORAL 2 TIMES DAILY
Qty: 180 TABLET | Refills: 1 | Status: CANCELLED | OUTPATIENT
Start: 2019-10-28

## 2019-10-28 RX ORDER — CEPHALEXIN 500 MG/1
500 CAPSULE ORAL EVERY 12 HOURS
Qty: 14 CAPSULE | Refills: 0 | Status: SHIPPED | OUTPATIENT
Start: 2019-10-28 | End: 2019-11-04

## 2019-10-28 RX ORDER — ROPINIROLE 0.5 MG/1
1 TABLET, FILM COATED ORAL NIGHTLY
Qty: 60 TABLET | Refills: 1 | Status: SHIPPED | OUTPATIENT
Start: 2019-10-28 | End: 2019-11-18 | Stop reason: SDUPTHER

## 2019-10-28 NOTE — PROGRESS NOTES
"Subjective:       Patient ID: Nicolas Flaherty is a 64 y.o. male.    Chief Complaint: Follow Up/ Renew Medications and Discuss Muscle Relaxer    64 year old male with diabetes, CAD, hypertension, sleep apnea presents for pains in his legs. He has been having right side chest wall pain. He has this when he sits up, lies on that side, or when he coughs. He has no pains with deep breaths. He is coughing up greenish mucus. He has had this pain for weeks. He has been treated with a muscle relaxer and heating pads. He has been treating this for a months.    He also has pain in his feet. He states they feel warm and he starts kicking his legs. He is taking 1500 mg of tylenol. He states they don't burn. He states they get a little numb. His sugars have been high 90's in the rmonning and 108. He states in the evening they are about 180.     Review of Systems    Objective:       Vitals:    10/28/19 0945   BP: 130/88   Pulse: 100   Temp: 99.3 °F (37.4 °C)   TempSrc: Oral   SpO2: 98%   Weight: 115 kg (253 lb 8.5 oz)   Height: 5' 7" (1.702 m)       Physical Exam    Assessment:       1. Right-sided chest wall pain    2. Uncontrolled type 2 diabetes mellitus with stage 3 chronic kidney disease, without long-term current use of insulin    3. DM (diabetes mellitus), type 2, uncontrolled w/neurologic complication    4. Stage 3 chronic kidney disease    5. Cough    6. Restless leg syndrome        Plan:       Nicolas was seen today for follow up/ renew medications and discuss muscle relaxer.    Diagnoses and all orders for this visit:    Right-sided chest wall pain  -     X-Ray Chest PA And Lateral; Future  -     X-Ray Ribs 2 View Right; Future       -      KEFLEX 500 MG BID X 7 DAYS.     Uncontrolled type 2 diabetes mellitus with stage 3 chronic kidney disease, without long-term current use of insulin  -     dulaglutide (TRULICITY) 1.5 mg/0.5 mL PnIj; Inject 1.5 mg into the skin every 7 days.  -     Hemoglobin A1c; Future  -   "   Comprehensive metabolic panel; Future  -     Lipid panel; Future  -     CBC auto differential; Future    DM (diabetes mellitus), type 2, uncontrolled w/neurologic complication  -     SITagliptin (JANUVIA) 50 MG Tab; Take 1 tablet (50 mg total) by mouth once daily.    Stage 3 chronic kidney disease    Cough  -     X-Ray Chest PA And Lateral; Future  -     X-Ray Ribs 2 View Right; Future  Order repeat chest x-ray to see if he has some type of pneumonia or rib fracture  Given Keflex for infection.    Restless leg syndrome  -     rOPINIRole (REQUIP) 0.5 MG tablet; Take 2 tablets (1 mg total) by mouth every evening.  Starting recruit 1 tablet at night for 2 weeks then increase to 2 tablets to see if this helps with his leg pains and problem sleeping

## 2019-11-01 DIAGNOSIS — M79.10 MUSCLE PAIN: ICD-10-CM

## 2019-11-02 RX ORDER — TIZANIDINE 4 MG/1
4 TABLET ORAL NIGHTLY PRN
Qty: 30 TABLET | Refills: 0 | Status: SHIPPED | OUTPATIENT
Start: 2019-11-02 | End: 2019-11-05

## 2019-11-04 ENCOUNTER — TELEPHONE (OUTPATIENT)
Dept: FAMILY MEDICINE | Facility: CLINIC | Age: 64
End: 2019-11-04

## 2019-11-04 NOTE — TELEPHONE ENCOUNTER
----- Message from Shyann Olmstead sent at 11/4/2019  1:56 PM CST -----  Contact: Wife Ellie  [phone]  Type: Patient Call Back    Who called: Wife Ellie    What is the request in detail: Calling to find out if Dr Alfredo wants her  to continue taking tiZANidine (ZANAFLEX) 4 MG tablet or not. States he was told by Dr Alfredo to stop the medication and then received a call from the pharmacy that the medication was called in again. Need to know what to do. Please call.    Would the patient rather a call back or a response via [facility]? Call back    Best call back number: [phone]

## 2019-11-05 DIAGNOSIS — M79.10 MUSCLE PAIN: ICD-10-CM

## 2019-11-05 RX ORDER — TIZANIDINE 4 MG/1
4 TABLET ORAL NIGHTLY PRN
Qty: 30 TABLET | Refills: 0 | OUTPATIENT
Start: 2019-11-05 | End: 2019-11-15

## 2019-11-15 NOTE — TELEPHONE ENCOUNTER
Last Office Visit Info:   The patient's last visit with Shyanne Alfredo MD was on 10/28/2019.    The patient's last visit in current department was on 10/28/2019.        Last CBC Results:   Lab Results   Component Value Date    WBC 8.11 08/14/2019    HGB 12.9 (L) 08/14/2019    HCT 39.8 (L) 08/14/2019     08/14/2019       Last CMP Results  Lab Results   Component Value Date     08/14/2019    K 4.5 08/14/2019     08/14/2019    CO2 30 (H) 08/14/2019    BUN 22 08/14/2019    CREATININE 1.7 (H) 08/14/2019    CALCIUM 9.6 08/14/2019    ALBUMIN 4.1 08/14/2019    AST 14 08/14/2019    ALT 16 08/14/2019       Last Lipids  Lab Results   Component Value Date    CHOL 169 04/03/2019    TRIG 416 (H) 04/03/2019    HDL 23 (L) 04/03/2019    LDLCALC Invalid, Trig>400.0 04/03/2019       Last A1C  Lab Results   Component Value Date    HGBA1C 6.3 (H) 07/02/2019       Last TSH  Lab Results   Component Value Date    TSH 2.267 12/03/2018         Current Med Refills  Medication List with Changes/Refills   Current Medications    ALLOPURINOL (ZYLOPRIM) 100 MG TABLET    Take 100 mg by mouth once daily.       Start Date: --        End Date: --    ASPIRIN (ECOTRIN) 81 MG EC TABLET    Take 81 mg by mouth once daily. LAST TAKEN 3/2/16       Start Date: --        End Date: --    BLOOD SUGAR DIAGNOSTIC (ACCU-CHEK REJI PLUS TEST STRP) STRP    TEST BLOOD SUGAR TWICE DAILY       Start Date: 1/22/2019 End Date: --    BLOOD-GLUCOSE METER (ACCU-CHEK REJI PLUS METER) MISC    CHECK TWICE DAILY       Start Date: 1/22/2019 End Date: --    CLOPIDOGREL (PLAVIX) 75 MG TABLET    Take 75 mg by mouth every morning. LAST DOSE 3/2/16       Start Date: 7/13/2015 End Date: --    DILTIAZEM (CARDIZEM) 60 MG TABLET    Take 1 tablet (60 mg total) by mouth every 8 (eight) hours. Take 4 times a day       Start Date: 8/15/2019 End Date: 11/13/2019    DOCUSATE SODIUM (STOOL SOFTENER) 100 MG CAPSULE    Take 1 capsule (100 mg total) by mouth 2 (two) times  daily.       Start Date: 8/7/2017  End Date: --    DULAGLUTIDE (TRULICITY) 1.5 MG/0.5 ML PNIJ    Inject 1.5 mg into the skin every 7 days.       Start Date: 10/28/2019End Date: --    FINASTERIDE (PROSCAR) 5 MG TABLET    Take 1 tablet (5 mg total) by mouth every morning.       Start Date: 1/22/2019 End Date: --    FUROSEMIDE (LASIX) 40 MG TABLET    Take 40 mg by mouth 2 (two) times daily. ALTERNATES 1 TABLET ONE DAY AND 2 TABLETS THE NEXT--ALTERNATING DAYS       Start Date: 3/2/2015  End Date: --    GLIMEPIRIDE (AMARYL) 4 MG TABLET    TAKE 1 TABLET TWICE A DAY       Start Date: 10/22/2019End Date: --    HYDROCODONE-ACETAMINOPHEN (NORCO)  MG PER TABLET    Take 1 tablet by mouth nightly as needed for Pain.       Start Date: 8/16/2019 End Date: --    LANCETS (ACCU-CHEK SOFTCLIX LANCETS) MISC    1 lancet by Misc.(Non-Drug; Combo Route) route 2 (two) times daily.       Start Date: 1/22/2019 End Date: --    LOSARTAN (COZAAR) 50 MG TABLET    Take 50 mg by mouth once daily.       Start Date: --        End Date: --    METOPROLOL SUCCINATE (TOPROL-XL) 100 MG 24 HR TABLET    Take 100 mg by mouth every morning.        Start Date: 1/19/2015 End Date: --    NATEGLINIDE (STARLIX) 60 MG TABLET    TAKE 1 TABLET TWICE DAILY BEFORE MEALS       Start Date: 11/30/2018End Date: --    NITROSTAT 0.4 MG SL TABLET    Place 0.4 mg under the tongue every 5 (five) minutes as needed.        Start Date: 3/2/2015  End Date: --    NORTRIPTYLINE (PAMELOR) 25 MG CAPSULE    Take 1 tablet PO (25 mg) nightly for two weeks, then increase to 2 tablets PO (50 mg) nightly.       Start Date: 7/2/2019  End Date: --    OXYCODONE-ACETAMINOPHEN (PERCOCET) 5-325 MG PER TABLET    Take 1 tablet by mouth every 4 (four) hours as needed for Pain.       Start Date: 8/21/2019 End Date: --    OXYCODONE-ACETAMINOPHEN (PERCOCET) 5-325 MG PER TABLET    Take 1 tablet by mouth every 4 (four) hours as needed for Pain.       Start Date: 8/21/2019 End Date: --    RANITIDINE  (ZANTAC) 150 MG TABLET    Take 1 tablet (150 mg total) by mouth 2 (two) times daily.       Start Date: 7/9/2019  End Date: --    ROPINIROLE (REQUIP) 0.5 MG TABLET    Take 2 tablets (1 mg total) by mouth every evening.       Start Date: 10/28/2019End Date: 10/27/2020    ROSUVASTATIN (CRESTOR) 40 MG TAB    Take 1 tablet (40 mg total) by mouth every evening.       Start Date: 7/10/2019 End Date: 7/9/2020    SITAGLIPTIN (JANUVIA) 50 MG TAB    Take 1 tablet (50 mg total) by mouth once daily.       Start Date: 10/28/2019End Date: 10/27/2020    TAMSULOSIN (FLOMAX) 0.4 MG CAP    Take 1 capsule (0.4 mg total) by mouth once daily.       Start Date: 1/22/2019 End Date: --    TRAVATAN Z 0.004 % DROP    1 drop every evening.        Start Date: 7/5/2016  End Date: --

## 2019-11-18 ENCOUNTER — HOSPITAL ENCOUNTER (OUTPATIENT)
Dept: RADIOLOGY | Facility: HOSPITAL | Age: 64
Discharge: HOME | End: 2019-11-18
Attending: UROLOGY
Payer: MEDICARE

## 2019-11-18 ENCOUNTER — HOSPITAL ENCOUNTER (OUTPATIENT)
Dept: RADIOLOGY | Facility: HOSPITAL | Age: 64
Discharge: HOME | End: 2019-11-18
Attending: INTERNAL MEDICINE
Payer: MEDICARE

## 2019-11-18 DIAGNOSIS — C64.1 RENAL CELL CARCINOMA OF RIGHT KIDNEY: ICD-10-CM

## 2019-11-18 DIAGNOSIS — R91.1 LUNG NODULE: ICD-10-CM

## 2019-11-18 PROCEDURE — 71250 CT THORAX DX C-: CPT | Mod: TC

## 2019-11-18 PROCEDURE — 71250 CT THORAX DX C-: CPT | Mod: 26,,, | Performed by: RADIOLOGY

## 2019-11-18 PROCEDURE — 71250 CT CHEST WITHOUT CONTRAST: ICD-10-PCS | Mod: 26,,, | Performed by: RADIOLOGY

## 2019-11-18 PROCEDURE — 74176 CT ABD & PELVIS W/O CONTRAST: CPT | Mod: 26,,, | Performed by: RADIOLOGY

## 2019-11-18 PROCEDURE — 74176 CT ABDOMEN PELVIS WITHOUT CONTRAST: ICD-10-PCS | Mod: 26,,, | Performed by: RADIOLOGY

## 2019-11-18 PROCEDURE — 74176 CT ABD & PELVIS W/O CONTRAST: CPT | Mod: TC

## 2019-11-18 NOTE — TELEPHONE ENCOUNTER
----- Message from Elizabeth Groves sent at 11/18/2019  1:30 PM CST -----  Contact: Pt  Can the clinic reply in [facility]: No    Please refill the medication(s) listed below. The patient can be reached at this phone number [phone] once it is called into the pharmacy. Patient would like a 90 day supply     Medication #1rOPINIRole (REQUIP) 0.5 MG tablet    Preferred Pharmacy:[facility] - [facility] [address] AT PORTAL TO REGISTERED [facility]

## 2019-11-18 NOTE — TELEPHONE ENCOUNTER
----- Message from Elizabeth Groves sent at 11/18/2019  1:33 PM CST -----  Contact: Pt  Can the clinic reply in [facility]: No    Please refill the medication(s) listed below. The patient can be reached at this phone number ([phone]____) once it is called into the pharmacy. Patient would like a 90 day supply Please contact to further discuss and advise    Medication #1nateglinide (STARLIX) 60 MG tablet    Preferred Pharmacy:[facility] - [place], [place] - [address] AT PORTAL TO REGISTERED [facility] S[facility]

## 2019-11-20 RX ORDER — ROPINIROLE 0.5 MG/1
1 TABLET, FILM COATED ORAL NIGHTLY
Qty: 60 TABLET | Refills: 1 | Status: SHIPPED | OUTPATIENT
Start: 2019-11-20 | End: 2020-01-13 | Stop reason: SDUPTHER

## 2019-11-20 RX ORDER — ROPINIROLE 0.5 MG/1
1 TABLET, FILM COATED ORAL NIGHTLY
Qty: 60 TABLET | Refills: 5 | Status: SHIPPED | OUTPATIENT
Start: 2019-11-20 | End: 2020-02-13

## 2019-11-20 RX ORDER — NATEGLINIDE 60 MG/1
60 TABLET ORAL
Qty: 180 TABLET | Refills: 0 | Status: SHIPPED | OUTPATIENT
Start: 2019-11-20 | End: 2020-01-13 | Stop reason: SDUPTHER

## 2019-11-20 RX ORDER — NATEGLINIDE 60 MG/1
60 TABLET ORAL
Qty: 180 TABLET | Refills: 0 | Status: SHIPPED | OUTPATIENT
Start: 2019-11-20 | End: 2020-02-13 | Stop reason: SDUPTHER

## 2019-11-26 ENCOUNTER — TELEPHONE (OUTPATIENT)
Dept: PULMONOLOGY | Facility: CLINIC | Age: 64
End: 2019-11-26

## 2019-11-26 DIAGNOSIS — M79.10 MUSCLE PAIN: ICD-10-CM

## 2019-11-26 NOTE — TELEPHONE ENCOUNTER
----- Message from Hussain Tipton MD sent at 11/26/2019  4:10 PM CST -----  Stable ct when compared to 4/19 ct.  Most likely will recommend 1 year follow up ct.  Please schedule routine clinic follow up with me to discuss ct result.

## 2019-11-27 RX ORDER — TIZANIDINE 4 MG/1
4 TABLET ORAL NIGHTLY PRN
Qty: 30 TABLET | Refills: 0 | Status: SHIPPED | OUTPATIENT
Start: 2019-11-27 | End: 2019-12-07

## 2019-12-05 ENCOUNTER — PATIENT OUTREACH (OUTPATIENT)
Dept: ADMINISTRATIVE | Facility: OTHER | Age: 64
End: 2019-12-05

## 2019-12-09 ENCOUNTER — HOSPITAL ENCOUNTER (OUTPATIENT)
Dept: CARDIOLOGY | Facility: CLINIC | Age: 64
Discharge: HOME | End: 2019-12-09
Payer: COMMERCIAL

## 2019-12-09 ENCOUNTER — OFFICE VISIT (OUTPATIENT)
Dept: GASTROENTEROLOGY | Facility: CLINIC | Age: 64
End: 2019-12-09
Payer: MEDICARE

## 2019-12-09 ENCOUNTER — TELEPHONE (OUTPATIENT)
Dept: GASTROENTEROLOGY | Facility: CLINIC | Age: 64
End: 2019-12-09

## 2019-12-09 VITALS
BODY MASS INDEX: 35.36 KG/M2 | DIASTOLIC BLOOD PRESSURE: 78 MMHG | HEART RATE: 76 BPM | SYSTOLIC BLOOD PRESSURE: 133 MMHG | HEIGHT: 71 IN

## 2019-12-09 DIAGNOSIS — G25.81 RLS (RESTLESS LEGS SYNDROME): ICD-10-CM

## 2019-12-09 DIAGNOSIS — K22.4 ESOPHAGEAL DYSFUNCTION: ICD-10-CM

## 2019-12-09 DIAGNOSIS — G25.81 RESTLESS LEGS SYNDROME (RLS): ICD-10-CM

## 2019-12-09 DIAGNOSIS — K21.9 GASTROESOPHAGEAL REFLUX DISEASE, ESOPHAGITIS PRESENCE NOT SPECIFIED: ICD-10-CM

## 2019-12-09 DIAGNOSIS — Z51.81 THERAPEUTIC DRUG MONITORING: ICD-10-CM

## 2019-12-09 DIAGNOSIS — R13.10 DYSPHAGIA, UNSPECIFIED TYPE: Primary | ICD-10-CM

## 2019-12-09 DIAGNOSIS — R07.89 NON-CARDIAC CHEST PAIN: ICD-10-CM

## 2019-12-09 PROCEDURE — 99215 OFFICE O/P EST HI 40 MIN: CPT | Mod: S$GLB,,, | Performed by: INTERNAL MEDICINE

## 2019-12-09 PROCEDURE — 99999 PR PBB SHADOW E&M-EST. PATIENT-LVL III: CPT | Mod: PBBFAC,,, | Performed by: INTERNAL MEDICINE

## 2019-12-09 PROCEDURE — 3078F DIAST BP <80 MM HG: CPT | Mod: CPTII,S$GLB,, | Performed by: INTERNAL MEDICINE

## 2019-12-09 PROCEDURE — 99999 PR PBB SHADOW E&M-EST. PATIENT-LVL III: ICD-10-PCS | Mod: PBBFAC,,, | Performed by: INTERNAL MEDICINE

## 2019-12-09 PROCEDURE — 1101F PT FALLS ASSESS-DOCD LE1/YR: CPT | Mod: CPTII,S$GLB,, | Performed by: INTERNAL MEDICINE

## 2019-12-09 PROCEDURE — 99215 PR OFFICE/OUTPT VISIT, EST, LEVL V, 40-54 MIN: ICD-10-PCS | Mod: S$GLB,,, | Performed by: INTERNAL MEDICINE

## 2019-12-09 PROCEDURE — 3078F PR MOST RECENT DIASTOLIC BLOOD PRESSURE < 80 MM HG: ICD-10-PCS | Mod: CPTII,S$GLB,, | Performed by: INTERNAL MEDICINE

## 2019-12-09 PROCEDURE — 93005 ELECTROCARDIOGRAM TRACING: CPT | Mod: S$GLB,,, | Performed by: NURSE PRACTITIONER

## 2019-12-09 PROCEDURE — 93010 EKG 12-LEAD: ICD-10-PCS | Mod: S$GLB,,, | Performed by: INTERNAL MEDICINE

## 2019-12-09 PROCEDURE — 93005 EKG 12-LEAD: ICD-10-PCS | Mod: S$GLB,,, | Performed by: NURSE PRACTITIONER

## 2019-12-09 PROCEDURE — 3075F SYST BP GE 130 - 139MM HG: CPT | Mod: CPTII,S$GLB,, | Performed by: INTERNAL MEDICINE

## 2019-12-09 PROCEDURE — 3008F PR BODY MASS INDEX (BMI) DOCUMENTED: ICD-10-PCS | Mod: CPTII,S$GLB,, | Performed by: INTERNAL MEDICINE

## 2019-12-09 PROCEDURE — 3008F BODY MASS INDEX DOCD: CPT | Mod: CPTII,S$GLB,, | Performed by: INTERNAL MEDICINE

## 2019-12-09 PROCEDURE — 93010 ELECTROCARDIOGRAM REPORT: CPT | Mod: S$GLB,,, | Performed by: INTERNAL MEDICINE

## 2019-12-09 PROCEDURE — 3075F PR MOST RECENT SYSTOLIC BLOOD PRESS GE 130-139MM HG: ICD-10-PCS | Mod: CPTII,S$GLB,, | Performed by: INTERNAL MEDICINE

## 2019-12-09 PROCEDURE — 1101F PR PT FALLS ASSESS DOC 0-1 FALLS W/OUT INJ PAST YR: ICD-10-PCS | Mod: CPTII,S$GLB,, | Performed by: INTERNAL MEDICINE

## 2019-12-09 RX ORDER — NORTRIPTYLINE HYDROCHLORIDE 50 MG/1
CAPSULE ORAL
Qty: 90 CAPSULE | Refills: 3 | Status: SHIPPED | OUTPATIENT
Start: 2019-12-09 | End: 2020-03-13 | Stop reason: SDUPTHER

## 2019-12-09 NOTE — PROGRESS NOTES
[facility] Consultation Note    Reason for Consult:    Chief Complaint   Patient presents with    Dysphagia     trouble swallowing         PCP:   Shyanne Alfredo       Referring MD:  Froy Marquez Md  [address] [place] [address] Blvd  [address]  [facility]  [place] [address]    HPI:  Nicolas Flaherty is a 64 y.o. male a PMH of HLD, HTN, h/o MI s/p stents on plavix x 6 yrs, h/o CVA, DM type 2, glaucoma, gout, s/p kidney CA s/p right nephrectomy, BPH, GAUTAM, back problems referred to motility clinic for second opinion regarding the following problems:    Chest pain. No longer having     Dysphagia. Significantly better.   About once per month   On diltiazem 60 mg TID  On nortriptyline 50 mg HS    Achalasia Eckardt Score  Dysphagia  (none (0), occasional (1), daily (2), with every meal (3)): 1  Regurgitation (none (0), occasional (1), daily (2), with every meal (3)): 0   Chest pain (none (0), occasional (1), daily (2), several times per day (3)):0   Weight loss (kg) (0 (0), <5 (1), 5-10 (2), >10 (3)): 0  Total Eckardt Score(the final score is the sum of four components (0-12)):  1/12    Gas and bloating. Not problematic.    GAUTAM. Sleeping with new CPAP.  Getting good sleep every night.  Diagnosed by cardiologist and PCP   Recently found to have RLS. Takes requip w improvement.     Anemia. No overt bleeding.  EGD/colon recently done by primary GI-normal per pt report    Colonoscopy on 7/10/18 with 1.5- 2 cm AC TVA w/ high grade glandular atypia and 2 mm TA with low grade glandular dysplasia. Rpt in 1 year.  Recent  colonoscopy w Dr. Marquez per pt report    Weight loss.  Stable     Periferal neuropathy. Legs.  No longer taking gabapentin 600 mg AM and 1200 mg HS d/t falling.   Followed by PCP. Found to have RLS. Takes requip w improvement.     Chronic back pain. S/p vertebral compression.   Avoids norco     H/o  Kidney CA. S/p right nephrectomy. Followed by  nephrologist.     Denies GERD, nausea, vomiting, early satiety, abdominal pain, bloating, diarrhea, constipation, BRBPR, melena, weight loss, anxiety/depression, insomnia.       Total visit time was 40 minutes, more than 50% of which was spent in face-to-face counseling with patient regarding symptoms, diagnostic results, prognosis, risks and benefits of treatment options, instructions for management, importance of compliance with chosen treatment options, risk factor reduction, stress reduction, coping strategies.      Previous Studies:   EGD 07/01/2019:  Small hiatal hernia.  Tortuous esophagus.    Colonoscopy 07/01/2019:  Good prep to TI.  Moderate diverticulosis.  Grade 2 internal hemorrhoids.  2 mm ileocecal valve polyp (TA, no HGD).  Repeat in 5 years  PET CT 5/16/2019: Multiple subcentimeter solid pulmonary nodules scattered throughout both lungs, similar to most recent CT.  No corresponding radiotracer uptake noting that subcentimeter pulmonary nodules are too small to characterize with PET CT.  Recommend continued close CT surveillance as malignancy not excluded. Right nephrectomy.  No evidence of local recurrence.  Ct chest 4/23/2019: Numerous subcentimeter noncalcified pulmonary nodules identified throughout both lungs with the largest located within the right lower lobe measuring up to 8 mm in size (image 288, series 4).  These are increasing in size and number when compared to prior examinations and metastatic disease should be strongly considered.S/p right nephrectomy.Extensive atherosclerotic calcification within the coronary arteries.Left adrenal adenoma.Fat containing right lateral abdominal wall hernias.  Esophagram 3/8/19: Significant esophageal dysmotility compatible with diffuse esophageal spasm or corkscrew esophagus. 13 mm BT passed normally.  2/19/19: esophageal manometry: high les with borderline relaxation. Normalization of IRP upright. Hypercontractile esophagus (vs type III achalasia,  less likely). Incomplete bolus clearance, abnormal multiple rapid swallow test with loss of inhibition, pan-esophageal pressurization and hypercontractile final contraction. Pt unable to perform MRS correctly. No significant diff w/ maneuvers. Evidence of residual liquid in esophagus at the end of 200cc bolus. IRP normal 14.7, DCI high 69225.4 (highest 17,245.8), Dl normal 6.9.   EGD 2/19/19: abnormal esophageal motility (-). Nl stomach. Nl duodenum. Manometry catheter endoscopically placed.  Esophageal manometry 8/10/18: hyper contractile esophagus, no EGJ outflow obstruction. IRP nl 13.4, DCI high 9311.0, DL nl 6.3, 10% incomplete bolus clearance, 10% rapid contractions  Colon 7/10/18: GPTC. mild tics. 1.5-2cm AC polyp (TVA w/ high grade glandular atypia). 2 mm TC polyp (TA with low grade glandular dysplasia). Grade 2 IH. Rpt 1 yr  EGD 7/10/18: HH. Nl esophagus dilated with 54 fr Brown (-). Erosion, friability, granularity in stomach (mild chr inflamm, reactive glandular changes, and superficial vascular congestion).   MBSS 5/30/18: normal. Recommend GI consult to r/o esophageal dysphagia.   Abd xray 2/25/08: spine degeneration. 4 mm superior left renal calcific focus  CT abd 1/9/08: 3 mm obstructing ureteral stones. Mild left hydroureter and hydronephrosis. Multi rt renal stones. Sm left adrenal nodule c/w adrenal adenoma.  *EGD 9/24/10: ?    Labs:  12/3/18: TSH nl  12/2/18: CMP BUN high 35, creatinine high 1.8, GFR low 39, CBC hgb low 13.1, RDW high 14.8  7/29/18: hgb low 9.9, cr high 1.52, bun high 20 , cl high 109,   4/16/18: HbA1c high 9.2  2/21/08: cmp unremarkable, cbc nl  1/9/18: TSH nl  Hep c ab -  B 12/folate panel nl.     *per referring provider    ROS:  ROS   Constitutional: No fevers, no chills, no night sweats, no weight loss  ENT: No congestion, no rhinorrhea, + chronic sinus problems  CV: no chest pain, no palpitations  Pulm: No cough,no shortness of breath  Ophtho: No blurry vision, no eye  redness  GI: see HPI  Derm: No rash  Heme: No lymphadenopathy, no bruising (on blood thinners)  MSK: No joint pain, no joint swelling, no Raynauds  : No dysuria, no frequent urination, no blood in urine  Endo: no hot or cold intolerance  Neuro: no dizziness, no syncope, no seizure  Psych: No anxiety, no depression        Medical History:   Past Medical History:   Diagnosis Date    Abdominal hernia     Anticoagulant long-term use     Arthritis     Cancer of kidney, right     Colon polyp     Coronary artery disease     Diabetes mellitus     Difficulty swallowing     food comes back up    Hypertension     Kidney stone     Sleep apnea     Slurred speech     Stroke     MINI STROKE    TIA (transient ischemic attack)     Wears glasses         Surgical History:   Past Surgical History:   Procedure Laterality Date    BACK SURGERY      lower back    CARDIAC SURGERY      COLONOSCOPY  07/2018    CYSTOSCOPY  03/2008    ESOPHAGEAL MANOMETRY  08/2018    ESOPHAGEAL MANOMETRY WITH MEASUREMENT OF IMPEDANCE N/A 2/19/2019    Procedure: MANOMETRY, ESOPHAGUS, WITH IMPEDANCE MEASUREMENT;  Surgeon: Sylvia Treviño MD;  Location: [facility] ([address] [place] [address]);  Service: Endoscopy;  Laterality: N/A;  to follow EGD at 1:00pm    ESOPHAGOGASTRODUODENOSCOPY N/A 2/19/2019    Procedure: EGD (ESOPHAGOGASTRODUODENOSCOPY);  Surgeon: Sylvia Treviño MD;  Location: [facility] ([address] [place] [address]);  Service: Endoscopy;  Laterality: N/A;  per Ophelia Ames NP 2nd floor d/t comorbidities and obesity/ Per Dr. Emanuel pt can hold Plavix 5 days prior (see media tab for scanned approval) - sm    excision right thigh mass      heart stents      stents 4 total.  2010, 2011 and 2013    HERNIA REPAIR      incisional    KIDNEY STONE SURGERY  2015    LAPAROSCOPIC NEPHRECTOMY, HAND ASSISTED Right     LITHOTRIPSY      REPAIR OF RECURRENT INCISIONAL HERNIA  8/21/2019    Procedure: REPAIR, HERNIA, INCISIONAL, RECURRENT;  Surgeon: Jose Lewis MD;   Location: [facility] OR;  Service: General;;  RN PREOP 8/14/2019  HAS CARDIAC CLEARANCE---    UPPER GASTROINTESTINAL ENDOSCOPY      7/10/2018,9/24/2010    VASCULAR SURGERY          Family History:   Family History   Problem Relation Age of Onset    Heart disease Father     Hypertension Mother     Parkinsonism Mother     Celiac disease Neg Hx     Cirrhosis Neg Hx     Colon cancer Neg Hx     Colon polyps Neg Hx     Crohn's disease Neg Hx     Cystic fibrosis Neg Hx     Esophageal cancer Neg Hx     Hemochromatosis Neg Hx     Inflammatory bowel disease Neg Hx     Irritable bowel syndrome Neg Hx     Liver cancer Neg Hx     Liver disease Neg Hx     Rectal cancer Neg Hx     Stomach cancer Neg Hx     Ulcerative colitis Neg Hx     Kalia's disease Neg Hx     Lymphoma Neg Hx     Tuberculosis Neg Hx     Scleroderma Neg Hx     Rheum arthritis Neg Hx     Multiple sclerosis Neg Hx     Melanoma Neg Hx     Lupus Neg Hx     Psoriasis Neg Hx     Skin cancer Neg Hx         Social History:   Social History     Socioeconomic History    Marital status:      Spouse name: Not on file    Number of children: Not on file    Years of education: Not on file    Highest education level: Not on file   Occupational History    Not on file   Social Needs    Financial resource strain: Not on file    Food insecurity:     Worry: Not on file     Inability: Not on file    Transportation needs:     Medical: Not on file     Non-medical: Not on file   Tobacco Use    Smoking status: Never Smoker    Smokeless tobacco: Current User     Types: Snuff    Tobacco comment: 20 plus years   Substance and Sexual Activity    Alcohol use: Yes     Comment: occasional    Drug use: No    Sexual activity: Yes     Partners: Female   Lifestyle    Physical activity:     Days per week: Not on file     Minutes per session: Not on file    Stress: Not on file   Relationships    Social connections:     Talks on phone: Not on file      Gets together: Not on file     Attends Judaism service: Not on file     Active member of club or organization: Not on file     Attends meetings of clubs or organizations: Not on file     Relationship status: Not on file   Other Topics Concern    Not on file   Social History Narrative    Not on file        Review of patient's allergies indicates:  No Known Allergies    Current Outpatient Medications   Medication Sig Dispense Refill    allopurinol (ZYLOPRIM) 100 MG tablet Take 100 mg by mouth once daily.      aspirin (ECOTRIN) 81 MG EC tablet Take 81 mg by mouth once daily. LAST TAKEN 3/2/16      blood sugar diagnostic (ACCU-CHEK REJI PLUS TEST STRP) Strp TEST BLOOD SUGAR TWICE DAILY 200 strip 2    blood-glucose meter (ACCU-CHEK REJI PLUS METER) [facility] CHECK TWICE DAILY 1 each 0    clopidogrel (PLAVIX) 75 mg tablet Take 75 mg by mouth every morning. LAST DOSE 3/2/16      docusate sodium (STOOL SOFTENER) 100 MG capsule Take 1 capsule (100 mg total) by mouth 2 (two) times daily. 180 capsule 3    dulaglutide (TRULICITY) 1.5 mg/0.5 mL PnIj Inject 1.5 mg into the skin every 7 days. 6 mL 3    finasteride (PROSCAR) 5 mg tablet Take 1 tablet (5 mg total) by mouth every morning. 90 tablet 3    furosemide (LASIX) 40 MG tablet Take 40 mg by mouth 2 (two) times daily. ALTERNATES 1 TABLET ONE DAY AND 2 TABLETS THE NEXT--ALTERNATING DAYS      glimepiride (AMARYL) 4 MG tablet TAKE 1 TABLET TWICE A  tablet 1    HYDROcodone-acetaminophen (NORCO)  mg per tablet Take 1 tablet by mouth nightly as needed for Pain. 30 tablet 0    lancets (ACCU-CHEK SOFTCLIX LANCETS) Misc 1 lancet by Misc.(Non-Drug; Combo Route) route 2 (two) times daily. 200 each 2    losartan (COZAAR) 50 MG tablet Take 50 mg by mouth once daily.      metoprolol succinate (TOPROL-XL) 100 MG 24 hr tablet Take 100 mg by mouth every morning.       nateglinide (STARLIX) 60 MG tablet Take 1 tablet (60 mg total) by mouth 2 (two) times daily before  "meals. 180 tablet 0    nateglinide (STARLIX) 60 MG tablet Take 1 tablet (60 mg total) by mouth 2 (two) times daily before meals. 180 tablet 0    NITROSTAT 0.4 mg SL tablet Place 0.4 mg under the tongue every 5 (five) minutes as needed.       nortriptyline (PAMELOR) 25 MG capsule Take 1 tablet PO (25 mg) nightly for two weeks, then increase to 2 tablets PO (50 mg) nightly. 75 capsule 3    oxyCODONE-acetaminophen (PERCOCET) 5-325 mg per tablet Take 1 tablet by mouth every 4 (four) hours as needed for Pain. 30 tablet 0    oxyCODONE-acetaminophen (PERCOCET) 5-325 mg per tablet Take 1 tablet by mouth every 4 (four) hours as needed for Pain. 30 tablet 0    ranitidine (ZANTAC) 150 MG tablet Take 1 tablet (150 mg total) by mouth 2 (two) times daily. 180 tablet 5    rOPINIRole (REQUIP) 0.5 MG tablet Take 2 tablets (1 mg total) by mouth every evening. 60 tablet 01    rOPINIRole (REQUIP) 0.5 MG tablet TAKE 2 TABLETS (1 MG TOTAL) BY MOUTH EVERY EVENING. 60 tablet 5    rosuvastatin (CRESTOR) 40 MG Tab Take 1 tablet (40 mg total) by mouth every evening. 90 tablet 3    SITagliptin (JANUVIA) 50 MG Tab Take 1 tablet (50 mg total) by mouth once daily. 90 tablet 3    tamsulosin (FLOMAX) 0.4 mg Cap Take 1 capsule (0.4 mg total) by mouth once daily. 90 capsule 3    TRAVATAN Z 0.004 % Drop 1 drop every evening.       diltiaZEM (CARDIZEM) 60 MG tablet Take 1 tablet (60 mg total) by mouth every 8 (eight) hours. Take 4 times a day (Patient taking differently: Take 60 mg by mouth every 8 (eight) hours. ) 270 tablet 3     No current facility-administered medications for this visit.         Objective Findings:  Vital Signs:  /78   Pulse 76   Ht 5' 11" (1.803 m)   BMI 35.36 kg/m²   Body mass index is 35.36 kg/m².    Physical Exam:  General appearance: alert, cooperative, no distress  HENT: Normocephalic, atraumatic, neck symmetrical, no nasal discharge  Eyes: conjunctivae/corneas clear, PERRL, EOM's intact  Lungs: clear to " auscultation bilaterally, no dullness to percussion bilaterally  Heart: regular rate and rhythm without rub; no displacement of the PMI  Abdomen: soft, non-tender; bowel sounds normoactive; no organomegaly  Extremities: extremities symmetric; no clubbing, cyanosis, or edema  Integument: Skin color, texture, turgor normal; no rashes; hair distrubution normal  Neurologic: Alert and oriented X 3, normal strength, normal coordination and gait  Psychiatric: no pressured speech; normal affect; no evidence of impaired cognition    Labs:  Lab Results   Component Value Date    WBC 8.11 08/14/2019    HGB 12.9 (L) 08/14/2019    HCT 39.8 (L) 08/14/2019    MCV 88 08/14/2019     08/14/2019     No results found for: FERRITIN  Lab Results   Component Value Date     08/14/2019    K 4.5 08/14/2019     08/14/2019    CO2 30 (H) 08/14/2019    GLU 94 08/14/2019    BUN 22 08/14/2019    CREATININE 1.7 (H) 08/14/2019    CALCIUM 9.6 08/14/2019    PROT 7.5 08/14/2019    ALBUMIN 4.1 08/14/2019    BILITOT 0.3 08/14/2019    ALKPHOS 78 08/14/2019    AST 14 08/14/2019    ALT 16 08/14/2019     Lab Results   Component Value Date    TSH 2.267 12/03/2018     No results found for: SEDRATE  No results found for: CRP  Lab Results   Component Value Date    HGBA1C 6.3 (H) 07/02/2019           Assessment and Plan:  Nicolas Flaherty is a 64 y.o. male with a PMH of HLD, HTN, h/o MI s/p stents on plavix x 6 yrs, h/o CVA, DM type 2, glaucoma, gout, s/p kidney CA s/p right nephrectomy, BPH, GAUTAM, back problems referred to motility clinic for second opinion regarding the following problems:    Chest pain. Resolved.  Followed by cards for history of CAD w/ stents 4 years ago. Seen in ED and admitted for obsservation this just pror to s/s onsest. EKG and CHRISTIANO negative. Diagnosed with unstable angina. Followed by cardiology and seen after last motility clinic visit.  On nitro PRN since MI.  Previously on  isosorrbide dinitrate 60 mg daily for  HTN, but switched to diltiazem 60 mg TID with resolution of chest pain    Dysphagia.  Eckardt Score 1/12.  [place] [facility] with borderline relaxation, normalization of IRP upright, iIncomplete bolus clearance. Study suggestive of hypercontractile esophagus (vs type III achalasia, less likely).  Esophagram with hypercontractile esophagus and corkscrewing, 13 mm BT passed normally. EGD with normal esophageal biopsies and evidence of dysmotility.      No improvement while on isosorbide  No improvement with peppermint oil, mints and peppermint tea  Temporary improvement with esophageal dilation (x 1 week relief)  Some improvement with diltiazem. Cont diltiazem 60mg  TID  Significant improvement  with addition nortriptyline 50 mg HS  On nitro PRN since MI.  -Check EKG report from yesterday and refill nortryptyline - 90 pills   -Ref to sleep medicine to help determine if pt can continue nortriptyline.  If unsafe to continue, would attempt to taper off first without adding another agent to see if symptoms reoccur (pt no longer on norco/sleep improved)  -Can consider adding sildenafil   -Can consider EGD w EndoFlip and Botox if medical management fails   -Can consider myotomy if medical management fails     Anemia.  -Egd/colon in 7/2019  w Dr. Marquez unrevealing.   Management as per Dr. Marquez     Colonoscopy on 7/10/18 with 1.5- 2 cm semi pedunculated ascending colon TVA w/ high grade glandular atypia completely resected via hot snare and EMR, and 2 mm transverse colon TA with low grade glandular dysplasia complete resected via cold bx.   Colonoscopy in 7/2019 with 2 mm ileocecal valve polyp (?path?)  -Obtain path report  -Management as per Dr. Marquez    GAUTAM. New diagnosis of RLS  -On CPAP nightly  -RLS controlled w Requip  -Discussed that nortriptyline may be contributing to RLS.   -Ref to sleep medicine to help determine if pt can continue nortriptyline    DM type 2. BS running 145- 200s. Last A1C 6.3 in  10/2018.    On trulicity, amaryl, nateglinide  Followed by PCP.    Periferal neuropathy in legs.  Chronic back pain secondary to disc disease.   No improvement with PT  No improvement with OTC pain relievers and creams  Unable to tolerate gabapentin 600 mg AM and 1200 mg HS due to falling.   No longer on norco  Possibly better on nortriptyline      History of  Kidney CA. Has had right nephrectomy. Followed by nephrologist.     Follow up in about 6 months (around 6/9/2020) for Dr. Treviño .    1. Dysphagia, unspecified type    2. Therapeutic drug monitoring    3. RLS (restless legs syndrome)    4. Non-cardiac chest pain    5. Gastroesophageal reflux disease, esophagitis presence not specified    6. Restless legs syndrome (RLS)          Order summary:  Orders Placed This Encounter    Ambulatory referral to Sleep Disorders    EKG 12-lead         Thank you so much for allowing me to participate in the care of Nicolas Treviño MD

## 2019-12-09 NOTE — LETTER
December 9, 2019      Shyanne Alfredo MD  [address] [address]           [facility]  [address]  [facility] [address]  Phone: [phone]  Fax: [phone]          Patient: Nicolas Flaherty   MR Number: [id number]   YOB: 1955   Date of Visit: 12/9/2019       Dear Dr. Shyanne Alfredo:    Thank you for referring Nicolas Flaherty to me for evaluation. Attached you will find relevant portions of my assessment and plan of care.    If you have questions, please do not hesitate to call me. I look forward to following Nicolas Flaherty along with you.    Sincerely,    Sylvia Treviño MD    Enclosure  CC:  No Recipients    If you would like to receive this communication electronically, please contact [email or web address] or [phone] to request more information on [facility] access.    For providers and/or their staff who would like to refer a patient to [facility], please contact us through our one-stop-shop provider referral line, [facility], at [phone].    If you feel you have received this communication in error or would no longer like to receive these types of communications, please e-mail [email or web address]

## 2019-12-09 NOTE — PATIENT INSTRUCTIONS
-Check EKG today.  We will refill your medication if EKG is ok.  -Return to Dr. Marquez for management of your GI problems.   -Nortriptyline, (which is making your swallowing better) can make restless leg syndrome worse   I am referring you to Dr. Sloan to help decide if it is safe to continue taking nortriptyline longterm.  You should return to motility clinic if she does not want you to continue taking nortriptyline

## 2019-12-11 ENCOUNTER — PATIENT OUTREACH (OUTPATIENT)
Dept: ADMINISTRATIVE | Facility: OTHER | Age: 64
End: 2019-12-11

## 2019-12-12 ENCOUNTER — TELEPHONE (OUTPATIENT)
Dept: UROLOGY | Facility: CLINIC | Age: 64
End: 2019-12-12

## 2019-12-12 ENCOUNTER — OFFICE VISIT (OUTPATIENT)
Dept: PULMONOLOGY | Facility: CLINIC | Age: 64
End: 2019-12-12
Payer: MEDICARE

## 2019-12-12 VITALS
SYSTOLIC BLOOD PRESSURE: 143 MMHG | WEIGHT: 258.06 LBS | HEIGHT: 71 IN | OXYGEN SATURATION: 100 % | BODY MASS INDEX: 36.13 KG/M2 | HEART RATE: 85 BPM | DIASTOLIC BLOOD PRESSURE: 75 MMHG

## 2019-12-12 DIAGNOSIS — R91.1 LUNG NODULE: ICD-10-CM

## 2019-12-12 DIAGNOSIS — R91.1 PULMONARY NODULE: ICD-10-CM

## 2019-12-12 PROCEDURE — 99999 PR PBB SHADOW E&M-EST. PATIENT-LVL III: ICD-10-PCS | Mod: PBBFAC,,, | Performed by: INTERNAL MEDICINE

## 2019-12-12 PROCEDURE — 99214 OFFICE O/P EST MOD 30 MIN: CPT | Mod: S$GLB,,, | Performed by: INTERNAL MEDICINE

## 2019-12-12 PROCEDURE — 99999 PR PBB SHADOW E&M-EST. PATIENT-LVL III: CPT | Mod: PBBFAC,,, | Performed by: INTERNAL MEDICINE

## 2019-12-12 PROCEDURE — 99214 PR OFFICE/OUTPT VISIT, EST, LEVL IV, 30-39 MIN: ICD-10-PCS | Mod: S$GLB,,, | Performed by: INTERNAL MEDICINE

## 2019-12-12 NOTE — TELEPHONE ENCOUNTER
Please inform pt:    CT showed stable findings in the abdomen.     The lung nodules were again noted - he saw pulmonary today for this - recommend to continue follow up with them to monitor that.

## 2019-12-12 NOTE — TELEPHONE ENCOUNTER
----- Message from Coleen Hill MA sent at 12/12/2019  2:45 PM CST -----  Contact: Es Samuel       ----- Message -----  From: Nissa Marroquin  Sent: 12/12/2019  11:47 AM CST  To: Karla Lindsey Staff    Type: Patient Call Back    Who called: Es Samuel     What is the request in detail: WifeKaushik Samuel is requesting a call back in regards to the pt CT results. Wife wants to know if the results could be given over the phone. Please call     Can the clinic reply by [facility]?    Would the patient rather a call back or a response via [facility]? Call back     Best call back number: [phone]

## 2019-12-12 NOTE — PROGRESS NOTES
Nicolas Flaherty  was seen as a follow up.    CHIEF COMPLAINT:  Results (CT scan)      HISTORY OF PRESENT ILLNESS: Nicolas Flaherty is a 64 y.o. male  has a past medical history of Abdominal hernia, Anticoagulant long-term use, Arthritis, Cancer of kidney, right, Colon polyp, Coronary artery disease, Diabetes mellitus, Difficulty swallowing, Hypertension, Kidney stone, Sleep apnea, Slurred speech, Stroke, TIA (transient ischemic attack), and Wears glasses.  Patient history of RCC s/p nephrectomy s/p R lap nephrectomy 3/18/16.  He has been  followed by Urology . He is also diagnosed with  nephrolithiasis and hematuria.  He is followed by nephrology (Dr Short) for CKD.  Follow-up imaging studies including CT a/p and CXR clear 4/17, 10/17, 4/18- negative. CT chest/abdomen/pelvis w/out contrast 4/23/2019 shows numerous subcentimeter noncalcified pulmonary nodules identified throughout both lungs with the largest located within the right lower lobe measuring up to 8 mm in size  increasing in size and number when compared to prior examinations.  S/p pet scan without increase uptake.  Patient was referred to pulmonary for further inputs.      Patient is a lifelong non-smoker.  Does dip tobacco.  Patient with intermittent cough with occasional greenish phlegm.  No hemoptysis.  No chest pain.  +intentional weight loss due to dm2.  ~13 lbs over past 2 months.  Patient is walker dependent due to neuropathy and balance issue.  +slurred speech from CVA.  Patient with limited exertion due to balance.  No dyspnea with limited adl.      During last encounter, the plan was to repeat ct in 6 months.  No new issue since last visit.  Still walker dependent.      PAST MEDICAL HISTORY:    Active Ambulatory Problems     Diagnosis Date Noted    Renal cyst 06/01/2015    Benign non-nodular prostatic hyperplasia with lower urinary tract symptoms     Nephrolithiasis 02/05/2016    Status post nephrectomy 03/18/2016    Essential  hypertension 03/18/2016    DM (diabetes mellitus) type II controlled with renal manifestation 03/18/2016    CKD (chronic kidney disease), stage III 03/18/2016    Coronary artery disease involving native coronary artery 03/18/2016    Coronary artery disease involving native coronary artery of native heart with angina pectoris     History of coronary artery disease     Renal cell carcinoma of right kidney 04/15/2016    Hernia, incisional 08/19/2016    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 03/01/2018    Ataxic dysarthria 06/20/2018    BMI 40.0-44.9, adult 06/26/2018    Neoplasm, uncertain whether benign or malignant 10/04/2018    Unstable angina 12/02/2018    Dysphagia 02/19/2019    Pulmonary nodule 05/22/2019    GAUTAM (obstructive sleep apnea) 05/22/2019    Recurrent incisional hernia 08/21/2019     Resolved Ambulatory Problems     Diagnosis Date Noted    Gross hematuria 03/10/2015    Nocturia 03/10/2015    Incomplete bladder emptying 03/10/2015    Kidney stone 03/10/2015    BPH (benign prostatic hypertrophy) 03/10/2015    Incomplete bladder emptying     Nocturia     Kidney stone     Renal cyst     Renal mass 02/05/2016    Hematuria, gross 02/05/2016    Acute renal failure 03/18/2016    Left sided chest pain 03/26/2016    Mass of thigh, right 03/05/2018    Impaired functional mobility, balance, gait, and endurance 05/11/2018    Muscle weakness of lower extremity 05/11/2018    Decreased coordination 05/11/2018    Chest pain at rest 12/02/2018     Past Medical History:   Diagnosis Date    Abdominal hernia     Anticoagulant long-term use     Arthritis     Cancer of kidney, right     Colon polyp     Coronary artery disease     Diabetes mellitus     Difficulty swallowing     Hypertension     Sleep apnea     Slurred speech     Stroke     TIA (transient ischemic attack)     Wears glasses                 PAST SURGICAL HISTORY:    Past Surgical History:   Procedure Laterality Date     BACK SURGERY      lower back    CARDIAC SURGERY      COLONOSCOPY  07/2018    CYSTOSCOPY  03/2008    ESOPHAGEAL MANOMETRY  08/2018    ESOPHAGEAL MANOMETRY WITH MEASUREMENT OF IMPEDANCE N/A 2/19/2019    Procedure: MANOMETRY, ESOPHAGUS, WITH IMPEDANCE MEASUREMENT;  Surgeon: Sylvia Treviño MD;  Location: [facility] ([address] [place] [address]);  Service: Endoscopy;  Laterality: N/A;  to follow EGD at 1:00pm    ESOPHAGOGASTRODUODENOSCOPY N/A 2/19/2019    Procedure: EGD (ESOPHAGOGASTRODUODENOSCOPY);  Surgeon: Sylvia Treviño MD;  Location: [facility] ([address] [place] [address]);  Service: Endoscopy;  Laterality: N/A;  per Ophelia Ames NP 2nd floor d/t comorbidities and obesity/ Per Dr. Emanuel pt can hold Plavix 5 days prior (see media tab for scanned approval) - sm    excision right thigh mass      heart stents      stents 4 total.  2010, 2011 and 2013    HERNIA REPAIR      incisional    KIDNEY STONE SURGERY  2015    LAPAROSCOPIC NEPHRECTOMY, HAND ASSISTED Right     LITHOTRIPSY      REPAIR OF RECURRENT INCISIONAL HERNIA  8/21/2019    Procedure: REPAIR, HERNIA, INCISIONAL, RECURRENT;  Surgeon: Jose Lewis MD;  Location: [facility];  Service: General;;  RN PREOP 8/14/2019  HAS CARDIAC CLEARANCE---    UPPER GASTROINTESTINAL ENDOSCOPY      7/10/2018,9/24/2010    VASCULAR SURGERY           FAMILY HISTORY:                Family History   Problem Relation Age of Onset    Heart disease Father     Hypertension Mother     Parkinsonism Mother     Celiac disease Neg Hx     Cirrhosis Neg Hx     Colon cancer Neg Hx     Colon polyps Neg Hx     Crohn's disease Neg Hx     Cystic fibrosis Neg Hx     Esophageal cancer Neg Hx     Hemochromatosis Neg Hx     Inflammatory bowel disease Neg Hx     Irritable bowel syndrome Neg Hx     Liver cancer Neg Hx     Liver disease Neg Hx     Rectal cancer Neg Hx     Stomach cancer Neg Hx     Ulcerative colitis Neg Hx     Kalia's disease Neg Hx     Lymphoma Neg Hx     Tuberculosis Neg Hx      Scleroderma Neg Hx     Rheum arthritis Neg Hx     Multiple sclerosis Neg Hx     Melanoma Neg Hx     Lupus Neg Hx     Psoriasis Neg Hx     Skin cancer Neg Hx        SOCIAL HISTORY:          Tobacco:   Social History     Tobacco Use   Smoking Status Never Smoker   Smokeless Tobacco Current User    Types: Snuff   Tobacco Comment    20 plus years     alcohol use:    Social History     Substance and Sexual Activity   Alcohol Use Yes    Comment: occasional               Occupation:  Former [employer]    ALLERGIES:  Review of patient's allergies indicates:  No Known Allergies    CURRENT MEDICATIONS:    Current Outpatient Medications   Medication Sig Dispense Refill    allopurinol (ZYLOPRIM) 100 MG tablet Take 100 mg by mouth once daily.      aspirin (ECOTRIN) 81 MG EC tablet Take 81 mg by mouth once daily. LAST TAKEN 3/2/16      blood sugar diagnostic (ACCU-CHEK REJI PLUS TEST STRP) Strp TEST BLOOD SUGAR TWICE DAILY 200 strip 2    blood-glucose meter (ACCU-CHEK REJI PLUS METER) Misc CHECK TWICE DAILY 1 each 0    clopidogrel (PLAVIX) 75 mg tablet Take 75 mg by mouth every morning. LAST DOSE 3/2/16      docusate sodium (STOOL SOFTENER) 100 MG capsule Take 1 capsule (100 mg total) by mouth 2 (two) times daily. 180 capsule 3    dulaglutide (TRULICITY) 1.5 mg/0.5 mL PnIj Inject 1.5 mg into the skin every 7 days. 6 mL 3    finasteride (PROSCAR) 5 mg tablet Take 1 tablet (5 mg total) by mouth every morning. 90 tablet 3    furosemide (LASIX) 40 MG tablet Take 40 mg by mouth 2 (two) times daily. ALTERNATES 1 TABLET ONE DAY AND 2 TABLETS THE NEXT--ALTERNATING DAYS      glimepiride (AMARYL) 4 MG tablet TAKE 1 TABLET TWICE A  tablet 1    HYDROcodone-acetaminophen (NORCO)  mg per tablet Take 1 tablet by mouth nightly as needed for Pain. 30 tablet 0    lancets (ACCU-CHEK SOFTCLIX LANCETS) Misc 1 lancet by Misc.(Non-Drug; Combo Route) route 2 (two) times daily. 200 each 2    losartan (COZAAR) 50 MG tablet  Take 50 mg by mouth once daily.      metoprolol succinate (TOPROL-XL) 100 MG 24 hr tablet Take 100 mg by mouth every morning.       nateglinide (STARLIX) 60 MG tablet Take 1 tablet (60 mg total) by mouth 2 (two) times daily before meals. 180 tablet 0    nateglinide (STARLIX) 60 MG tablet Take 1 tablet (60 mg total) by mouth 2 (two) times daily before meals. 180 tablet 0    NITROSTAT 0.4 mg SL tablet Place 0.4 mg under the tongue every 5 (five) minutes as needed.       nortriptyline (PAMELOR) 50 MG capsule Take one tablet (50 mg) nightly. 90 capsule 3    oxyCODONE-acetaminophen (PERCOCET) 5-325 mg per tablet Take 1 tablet by mouth every 4 (four) hours as needed for Pain. 30 tablet 0    oxyCODONE-acetaminophen (PERCOCET) 5-325 mg per tablet Take 1 tablet by mouth every 4 (four) hours as needed for Pain. 30 tablet 0    ranitidine (ZANTAC) 150 MG tablet Take 1 tablet (150 mg total) by mouth 2 (two) times daily. 180 tablet 5    rOPINIRole (REQUIP) 0.5 MG tablet Take 2 tablets (1 mg total) by mouth every evening. 60 tablet 01    rOPINIRole (REQUIP) 0.5 MG tablet TAKE 2 TABLETS (1 MG TOTAL) BY MOUTH EVERY EVENING. 60 tablet 5    rosuvastatin (CRESTOR) 40 MG Tab Take 1 tablet (40 mg total) by mouth every evening. 90 tablet 3    SITagliptin (JANUVIA) 50 MG Tab Take 1 tablet (50 mg total) by mouth once daily. 90 tablet 3    tamsulosin (FLOMAX) 0.4 mg Cap Take 1 capsule (0.4 mg total) by mouth once daily. 90 capsule 3    TRAVATAN Z 0.004 % Drop 1 drop every evening.       diltiaZEM (CARDIZEM) 60 MG tablet Take 1 tablet (60 mg total) by mouth every 8 (eight) hours. Take 4 times a day (Patient taking differently: Take 60 mg by mouth every 8 (eight) hours. ) 270 tablet 3     No current facility-administered medications for this visit.                   REVIEW OF SYSTEMS:     Pulmonary related symptoms as per HPI.  Gen:  + weight loss, no fever, no night sweat  HEENT:  no visual changes, no sore throat, no hearing  "loss  CV:  No chest pain, no orthopnea, no PND  GI:  no melena, no hematochezia, no diarhea, no constipation.  :  no dysuria, no hematuria, no hesistancy, no dribbling  Neuro:  no syncope, no vertigo, no tinitus  Psych:  No homocide or suicide ideation; no depression.  Endocrine:  No heat or cold intolerance.  Sleep:  Doing well with cpap  Otherwise, a balance of systems reviewed is negative.          PHYSICAL EXAM:  Vitals:    12/12/19 1039   BP: (!) 143/75   Pulse: 85   SpO2: 100%   Weight: 117 kg (258 lb 0.8 oz)   Height: 5' 11" (1.803 m)   PainSc: 0-No pain     Body mass index is 35.99 kg/m².     GENERAL:  well develop; no apparent distress  HEENT:  no nasal congestion; no discharge noted; class 3 modified mallampatti.   NECK:  supple; no palpable masses.  CARDIO: regular rate and rhythm  PULM:  clear to auscultation bilaterally; no intercostals retractions; no accessory muscle usage   ABDOMEN:  soft nontender/nondistended.  +bowel sound  EXTREMITIES no cce  NEURO:  CN II-XII intact.  5/5 motor in all extremities.  sensation grossly intact   to light touch.  PSYCH:  normal affect.  Alert and oriented x 4    LABS  Pulmonary Functions Testing Results(personally reviewed):  none  ABG (personally reviewed):  none  CT CHEST(personally reviewed):  4/23/18 scattered nodule.  Largest at 5 cm rul image 288  Pet scan 5/19 no increase up take    11/18/19 (personally reviewed) no acute changes.      12/2/18 echo  · Low normal left ventricular systolic function. The estimated ejection fraction is 50%  · Normal LV diastolic function.  · Normal right ventricular systolic function.  · Mild-to-moderate aortic valve stenosis.  · Aortic valve area is 1.29 cm2; peak velocity is 2.31 m/s; mean gradient is 12.32 mmHg.  · Trace mitral regurgitation.  · Trace tricuspid regurgitation.  ASSESSMENT/PLAN  Problem List Items Addressed This Visit     Pulmonary nodule    Overview     Scattered nodule with largest ~5 mm.   Clinically " asymptomatic.  6 months repeat ct is stable.           Current Assessment & Plan     Will repeat ct in 1 year.  If unchanged, then will release result is unchanged.           Relevant Orders    CT Chest Without Contrast      Other Visit Diagnoses     Lung nodule        Relevant Orders    CT Chest Without Contrast          jeffrey - doing well with cpap    Patient will Follow up in about 1 year (around 12/12/2020). with md/np.

## 2019-12-24 ENCOUNTER — PATIENT OUTREACH (OUTPATIENT)
Dept: ADMINISTRATIVE | Facility: OTHER | Age: 64
End: 2019-12-24

## 2019-12-26 ENCOUNTER — OFFICE VISIT (OUTPATIENT)
Dept: UROLOGY | Facility: CLINIC | Age: 64
End: 2019-12-26
Payer: MEDICARE

## 2019-12-26 VITALS
HEIGHT: 71 IN | SYSTOLIC BLOOD PRESSURE: 132 MMHG | WEIGHT: 258 LBS | DIASTOLIC BLOOD PRESSURE: 80 MMHG | BODY MASS INDEX: 36.12 KG/M2

## 2019-12-26 DIAGNOSIS — N40.1 BENIGN NON-NODULAR PROSTATIC HYPERPLASIA WITH LOWER URINARY TRACT SYMPTOMS: ICD-10-CM

## 2019-12-26 DIAGNOSIS — N20.0 NEPHROLITHIASIS: ICD-10-CM

## 2019-12-26 DIAGNOSIS — R31.0 GROSS HEMATURIA: ICD-10-CM

## 2019-12-26 DIAGNOSIS — K43.2 INCISIONAL HERNIA, WITHOUT OBSTRUCTION OR GANGRENE: ICD-10-CM

## 2019-12-26 DIAGNOSIS — C64.1 RENAL CELL CARCINOMA OF RIGHT KIDNEY: Primary | ICD-10-CM

## 2019-12-26 DIAGNOSIS — R91.8 PULMONARY NODULES: ICD-10-CM

## 2019-12-26 PROCEDURE — 99214 OFFICE O/P EST MOD 30 MIN: CPT | Mod: S$GLB,,, | Performed by: UROLOGY

## 2019-12-26 PROCEDURE — 99499 UNLISTED E&M SERVICE: CPT | Mod: S$GLB,,, | Performed by: UROLOGY

## 2019-12-26 PROCEDURE — 99999 PR PBB SHADOW E&M-EST. PATIENT-LVL III: ICD-10-PCS | Mod: PBBFAC,,, | Performed by: UROLOGY

## 2019-12-26 PROCEDURE — 99999 PR PBB SHADOW E&M-EST. PATIENT-LVL III: CPT | Mod: PBBFAC,,, | Performed by: UROLOGY

## 2019-12-26 PROCEDURE — 99214 PR OFFICE/OUTPT VISIT, EST, LEVL IV, 30-39 MIN: ICD-10-PCS | Mod: S$GLB,,, | Performed by: UROLOGY

## 2019-12-26 PROCEDURE — 99499 RISK ADDL DX/OHS AUDIT: ICD-10-PCS | Mod: S$GLB,,, | Performed by: UROLOGY

## 2019-12-26 NOTE — PROGRESS NOTES
Subjective:       Nicolas Flaherty is a 64 y.o. male who is an established patient who was seen for evaluation of nephrolithiasis and hematuria.      Last seen by Dr Christian 7/15. He has a h/o stones and is s/p ESWL and URS in the past. He has had issues with gross hematuria and has been worked up for this. He does take Plavix and ASA regularly. He has significant cardiac issues, especially when off Plavix.     He also has BPH with some LUTS. Nocturia x 4. He takes Flomax and Proscar.     R URS 6/15 for stones. Stone analysis - CaOx mono. 24hr stone risk analysis - hyperoxalaturia. He is taking allopurinol currently. Unsure efficacy of this.    KAREN 4/15 - normal, no hydro. 1.5cm simple cyst RUP. 4mm R MP stone. PVR 51cc.  KAREN 1/16 - no stone seen. 2.9cm lesion in R LP concerning for neoplasm. Not seen on previous study.   CT scan shows 3.2cm enhancing mass in R kidney, endophytic.    He is s/p R URS to r/o UC. No tumor seen therefore s/p R lap radical nephrectomy on 3/18/16 for renal mass. 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 Cr 1.4 at baseline. Post-op Cr was 2.1 at discharge. He is followed by nephrology (Dr Short).  Cr stably elevated around 1.7 since then.     He was noted to have R sided hernia at the area of nephrectomy. He has had this repaired by Dr Lewis in 8/16 with recurrence and repeat repair 8/19. Doing well with this now.     Pulm seeing pt - planned for repeat CT 1 year 12/20. Still with nocturia q1h - using CPAP.     Imaging:  CT 10/16 is clear of recurrence. Hernia repair noted 8/16. Cr 2.1.    CT and CXR 4/17, 10/17, 4/18 - clear of recurrence, Cr 4/18 - 2.0    CT and CXR 10/18 - essentially clear though increase in pulm nodules.     CT c/a/p (noncon 2/2 CKD) 4/19 - no local recurrence, increase in pulm nodules concerning for mets (up to 8mm), R abdominal wall hernia.     CT c/a/p (noncon) 11/19 - multiple stable subcentimeter pulm nodules (likely  "represent mets). No new nodules. No abdominal recurrent. R spigelian hernia (incision hernia repaired).       The following portions of the patient's history were reviewed and updated as appropriate: allergies, current medications, past family history, past medical history, past social history, past surgical history and problem list.    Review of Systems  Constitutional: no fever or chills  ENT: no nasal congestion or sore throat  Respiratory: no cough or shortness of breath  Cardiovascular: no chest pain or palpitations  Gastrointestinal: no nausea or vomiting, tolerating diet  Genitourinary: as per HPI  Hematologic/Lymphatic: no easy bruising or lymphadenopathy  Musculoskeletal: no arthralgias or myalgias  Skin: no rashes or lesions  Neurological: no seizures or tremors  Behavioral/Psych: no auditory or visual hallucinations       Objective:    Vitals:   /80   Ht 5' 11" (1.803 m)   Wt 117 kg (258 lb)   BMI 35.98 kg/m²     Physical Exam   General: well developed, well nourished in no acute distress  Head: normocephalic, atraumatic  Neck: supple, trachea midline, no obvious enlargement of thyroid  HEENT: EOMI, mucus membranes moist, sclera anicteric, no hearing impairment  Lungs: symmetric expansion, non-labored breathing  Cardiovascular: regular rate and rhythm, normal pulses  Abdomen: soft, non tender, non distended, no palpable masses, no hepatosplenomegaly, no hernias, bladder nonpalpable. No CVA tenderness.  Musculoskeletal: no peripheral edema, normal ROM in bilateral upper and lower extremities  Lymphatics: no cervical or inguinal lymphadenopathy  Skin: no rashes or lesions  Neuro: alert and oriented x 3, no gross deficits  Psych: normal judgment and insight, normal mood/affect and non-anxious  Genitourinary: (last visit)  Penis -  circumcised penis without plaques, lesions, or scarring.  Urethra - orthotopic location without stenosis.  Scrotum  - no lesions or rashes, no hydrocele or hernia.  Testes " - descended bilaterally, symmetric without masses, non tender.  Epididymides - no cysts or lesions, no spermatocele, symmetric   No evidence of any testicular/scrotal infection   ILDA: patient declined exam    Inc: well healed, s/p hernia repair.     Lab Review   Urine analysis today in clinic shows - 100 protein    Lab Results   Component Value Date    WBC 8.11 08/14/2019    HGB 12.9 (L) 08/14/2019    HCT 39.8 (L) 08/14/2019    MCV 88 08/14/2019     08/14/2019     Lab Results   Component Value Date    CREATININE 1.7 (H) 08/14/2019    BUN 22 08/14/2019     No results found for: PSA  Lab Results   Component Value Date    PSADIAG 0.23 04/21/2015       Imaging  KAREN/CT reviewed  Reports and images were personally reviewed by me and discussed with the patient today         Assessment/Plan:      1. Renal cell carcinoma    - Baseline Cr 1.4, new baseline 2.2.   - s/p R lap nephrectomy 3/18/16, pT3aNx, FG2, ccRCC   - CT a/p and CXR clear 4/17, 10/17, 4/18   - Follow up plan: CT q6m x 3y (3/19), q1y to year 5 (3/21). CXR q6m x 5yrs   - s/p hernia repair by Dr Lewis at kidney extraction site (8/16) - recurrent, saw Dr Lewis again in 6/19. Needs follow up.   - CT c/a/p 4/19 - increase in pulm nodules, concern for mets, CT 11/19-stable pulm mets   - Seeing Dr Wood (PET did not show uptake due to small size)   - f/u with oncology  - CT c/a/p in 12 months (chest ordered by pulm already)     2. Nephrolithiasis    - Punctate stone in R LP   - CaOx mono stones   - 24hr urine collection - hyperoxalaturia. Recommend Ca intake when oxalate taken PO. Low oxalate diet.    - He is taking allopurinol per RCA. Unsure efficacy of this. Instructed him he can stop this.    - Discussed low oxalate diet   - No stones on CT     3. Hematuria, gross    - Negative cysto 6/15, 3/16   - RCC - suspect reason for hematuria     4. Benign non-nodular prostatic hyperplasia with lower urinary tract symptoms    - Continue Flomax/Proscar   - PVD  - urethral milking           Follow up in 12 months with CT

## 2020-01-13 RX ORDER — ROPINIROLE 0.5 MG/1
1 TABLET, FILM COATED ORAL NIGHTLY
Qty: 60 TABLET | Refills: 1 | Status: SHIPPED | OUTPATIENT
Start: 2020-01-13 | End: 2020-02-13

## 2020-01-13 RX ORDER — NATEGLINIDE 60 MG/1
60 TABLET ORAL
Qty: 180 TABLET | Refills: 0 | Status: SHIPPED | OUTPATIENT
Start: 2020-01-13 | End: 2020-02-13 | Stop reason: SDUPTHER

## 2020-01-13 NOTE — TELEPHONE ENCOUNTER
Last Office Visit Info:   The patient's last visit with Shyanne Alfredo MD was on 10/28/2019.    The patient's last visit in current department was on 10/28/2019.        Last CBC Results:   Lab Results   Component Value Date    WBC 8.11 08/14/2019    HGB 12.9 (L) 08/14/2019    HCT 39.8 (L) 08/14/2019     08/14/2019       Last CMP Results  Lab Results   Component Value Date     08/14/2019    K 4.5 08/14/2019     08/14/2019    CO2 30 (H) 08/14/2019    BUN 22 08/14/2019    CREATININE 1.7 (H) 08/14/2019    CALCIUM 9.6 08/14/2019    ALBUMIN 4.1 08/14/2019    AST 14 08/14/2019    ALT 16 08/14/2019       Last Lipids  Lab Results   Component Value Date    CHOL 169 04/03/2019    TRIG 416 (H) 04/03/2019    HDL 23 (L) 04/03/2019    LDLCALC Invalid, Trig>400.0 04/03/2019       Last A1C  Lab Results   Component Value Date    HGBA1C 6.3 (H) 07/02/2019       Last TSH  Lab Results   Component Value Date    TSH 2.267 12/03/2018         Current Med Refills  Medication List with Changes/Refills   Current Medications    ALLOPURINOL (ZYLOPRIM) 100 MG TABLET    Take 100 mg by mouth once daily.       Start Date: --        End Date: --    ASPIRIN (ECOTRIN) 81 MG EC TABLET    Take 81 mg by mouth once daily. LAST TAKEN 3/2/16       Start Date: --        End Date: --    BLOOD SUGAR DIAGNOSTIC (ACCU-CHEK REJI PLUS TEST STRP) STRP    TEST BLOOD SUGAR TWICE DAILY       Start Date: 1/22/2019 End Date: --    BLOOD-GLUCOSE METER (ACCU-CHEK REJI PLUS METER) MISC    CHECK TWICE DAILY       Start Date: 1/22/2019 End Date: --    CLOPIDOGREL (PLAVIX) 75 MG TABLET    Take 75 mg by mouth every morning. LAST DOSE 3/2/16       Start Date: 7/13/2015 End Date: --    DILTIAZEM (CARDIZEM) 60 MG TABLET    Take 1 tablet (60 mg total) by mouth every 8 (eight) hours. Take 4 times a day       Start Date: 8/15/2019 End Date: 11/13/2019    DOCUSATE SODIUM (STOOL SOFTENER) 100 MG CAPSULE    Take 1 capsule (100 mg total) by mouth 2 (two) times  daily.       Start Date: 8/7/2017  End Date: --    DULAGLUTIDE (TRULICITY) 1.5 MG/0.5 ML PNIJ    Inject 1.5 mg into the skin every 7 days.       Start Date: 10/28/2019End Date: --    FINASTERIDE (PROSCAR) 5 MG TABLET    Take 1 tablet (5 mg total) by mouth every morning.       Start Date: 1/22/2019 End Date: --    FUROSEMIDE (LASIX) 40 MG TABLET    Take 40 mg by mouth 2 (two) times daily. ALTERNATES 1 TABLET ONE DAY AND 2 TABLETS THE NEXT--ALTERNATING DAYS       Start Date: 3/2/2015  End Date: --    GLIMEPIRIDE (AMARYL) 4 MG TABLET    TAKE 1 TABLET TWICE A DAY       Start Date: 10/22/2019End Date: --    HYDROCODONE-ACETAMINOPHEN (NORCO)  MG PER TABLET    Take 1 tablet by mouth nightly as needed for Pain.       Start Date: 8/16/2019 End Date: --    LANCETS (ACCU-CHEK SOFTCLIX LANCETS) MISC    1 lancet by Misc.(Non-Drug; Combo Route) route 2 (two) times daily.       Start Date: 1/22/2019 End Date: --    LOSARTAN (COZAAR) 50 MG TABLET    Take 50 mg by mouth once daily.       Start Date: --        End Date: --    METOPROLOL SUCCINATE (TOPROL-XL) 100 MG 24 HR TABLET    Take 100 mg by mouth every morning.        Start Date: 1/19/2015 End Date: --    NATEGLINIDE (STARLIX) 60 MG TABLET    Take 1 tablet (60 mg total) by mouth 2 (two) times daily before meals.       Start Date: 11/20/2019End Date: --    NATEGLINIDE (STARLIX) 60 MG TABLET    Take 1 tablet (60 mg total) by mouth 2 (two) times daily before meals.       Start Date: 11/20/2019End Date: --    NITROSTAT 0.4 MG SL TABLET    Place 0.4 mg under the tongue every 5 (five) minutes as needed.        Start Date: 3/2/2015  End Date: --    NORTRIPTYLINE (PAMELOR) 50 MG CAPSULE    Take one tablet (50 mg) nightly.       Start Date: 12/9/2019 End Date: --    OXYCODONE-ACETAMINOPHEN (PERCOCET) 5-325 MG PER TABLET    Take 1 tablet by mouth every 4 (four) hours as needed for Pain.       Start Date: 8/21/2019 End Date: --    OXYCODONE-ACETAMINOPHEN (PERCOCET) 5-325 MG PER  TABLET    Take 1 tablet by mouth every 4 (four) hours as needed for Pain.       Start Date: 8/21/2019 End Date: --    RANITIDINE (ZANTAC) 150 MG TABLET    Take 1 tablet (150 mg total) by mouth 2 (two) times daily.       Start Date: 7/9/2019  End Date: --    ROPINIROLE (REQUIP) 0.5 MG TABLET    Take 2 tablets (1 mg total) by mouth every evening.       Start Date: 11/20/2019End Date: 11/19/2020    ROPINIROLE (REQUIP) 0.5 MG TABLET    TAKE 2 TABLETS (1 MG TOTAL) BY MOUTH EVERY EVENING.       Start Date: 11/20/2019End Date: 11/19/2020    ROSUVASTATIN (CRESTOR) 40 MG TAB    Take 1 tablet (40 mg total) by mouth every evening.       Start Date: 7/10/2019 End Date: 7/9/2020    SITAGLIPTIN (JANUVIA) 50 MG TAB    Take 1 tablet (50 mg total) by mouth once daily.       Start Date: 10/28/2019End Date: 10/27/2020    TAMSULOSIN (FLOMAX) 0.4 MG CAP    Take 1 capsule (0.4 mg total) by mouth once daily.       Start Date: 1/22/2019 End Date: --    TRAVATAN Z 0.004 % DROP    1 drop every evening.        Start Date: 7/5/2016  End Date: --       Order(s) placed per written order guidelines:     Please advise.

## 2020-01-14 NOTE — TELEPHONE ENCOUNTER
----- Message from Daisy Santana sent at 1/14/2020 10:22 AM CST -----  Contact: jesus slaughter /[phone] ref# [id number]   Name of Who is Calling:     What is the request in detail: request call back in reference to clarity for medication trulicity and patient also take januvia and want to know if he going to stop one of medication  Please contact to further discuss and advise      Can the clinic reply by [facility]:     What Number to Call Back if not in [facility]:  jesus slaughter /[phone] ref# [id number]

## 2020-02-03 ENCOUNTER — LAB VISIT (OUTPATIENT)
Dept: LAB | Facility: HOSPITAL | Age: 65
End: 2020-02-03
Attending: FAMILY MEDICINE
Payer: MEDICARE

## 2020-02-03 LAB
ALBUMIN SERPL BCP-MCNC: 4.1 G/DL (ref 3.5–5.2)
ALP SERPL-CCNC: 69 U/L (ref 55–135)
ALT SERPL W/O P-5'-P-CCNC: 28 U/L (ref 10–44)
ANION GAP SERPL CALC-SCNC: 13 MMOL/L (ref 8–16)
AST SERPL-CCNC: 23 U/L (ref 10–40)
BASOPHILS # BLD AUTO: 0.12 K/UL (ref 0–0.2)
BASOPHILS NFR BLD: 1.5 % (ref 0–1.9)
BILIRUB SERPL-MCNC: 0.3 MG/DL (ref 0.1–1)
BUN SERPL-MCNC: 23 MG/DL (ref 8–23)
CALCIUM SERPL-MCNC: 9.9 MG/DL (ref 8.7–10.5)
CHLORIDE SERPL-SCNC: 104 MMOL/L (ref 95–110)
CHOLEST SERPL-MCNC: 105 MG/DL (ref 120–199)
CHOLEST/HDLC SERPL: 5 {RATIO} (ref 2–5)
CO2 SERPL-SCNC: 23 MMOL/L (ref 23–29)
CREAT SERPL-MCNC: 1.9 MG/DL (ref 0.5–1.4)
DIFFERENTIAL METHOD: ABNORMAL
EOSINOPHIL # BLD AUTO: 0.3 K/UL (ref 0–0.5)
EOSINOPHIL NFR BLD: 3.7 % (ref 0–8)
ERYTHROCYTE [DISTWIDTH] IN BLOOD BY AUTOMATED COUNT: 14.2 % (ref 11.5–14.5)
EST. GFR  (AFRICAN AMERICAN): 42 ML/MIN/1.73 M^2
EST. GFR  (NON AFRICAN AMERICAN): 36 ML/MIN/1.73 M^2
ESTIMATED AVG GLUCOSE: 217 MG/DL (ref 68–131)
GLUCOSE SERPL-MCNC: 179 MG/DL (ref 70–110)
HBA1C MFR BLD HPLC: 9.2 % (ref 4–5.6)
HCT VFR BLD AUTO: 43.6 % (ref 40–54)
HDLC SERPL-MCNC: 21 MG/DL (ref 40–75)
HDLC SERPL: 20 % (ref 20–50)
HGB BLD-MCNC: 13.7 G/DL (ref 14–18)
IMM GRANULOCYTES # BLD AUTO: 0.11 K/UL (ref 0–0.04)
IMM GRANULOCYTES NFR BLD AUTO: 1.4 % (ref 0–0.5)
LDLC SERPL CALC-MCNC: 7.4 MG/DL (ref 63–159)
LYMPHOCYTES # BLD AUTO: 1.8 K/UL (ref 1–4.8)
LYMPHOCYTES NFR BLD: 22.5 % (ref 18–48)
MCH RBC QN AUTO: 26.1 PG (ref 27–31)
MCHC RBC AUTO-ENTMCNC: 31.4 G/DL (ref 32–36)
MCV RBC AUTO: 83 FL (ref 82–98)
MONOCYTES # BLD AUTO: 0.6 K/UL (ref 0.3–1)
MONOCYTES NFR BLD: 7.2 % (ref 4–15)
NEUTROPHILS # BLD AUTO: 5 K/UL (ref 1.8–7.7)
NEUTROPHILS NFR BLD: 63.7 % (ref 38–73)
NONHDLC SERPL-MCNC: 84 MG/DL
NRBC BLD-RTO: 0 /100 WBC
PLATELET # BLD AUTO: 222 K/UL (ref 150–350)
PMV BLD AUTO: 11.8 FL (ref 9.2–12.9)
POTASSIUM SERPL-SCNC: 4.5 MMOL/L (ref 3.5–5.1)
PROT SERPL-MCNC: 7.6 G/DL (ref 6–8.4)
RBC # BLD AUTO: 5.24 M/UL (ref 4.6–6.2)
SODIUM SERPL-SCNC: 140 MMOL/L (ref 136–145)
TRIGL SERPL-MCNC: 383 MG/DL (ref 30–150)
WBC # BLD AUTO: 7.79 K/UL (ref 3.9–12.7)

## 2020-02-03 PROCEDURE — 83036 HEMOGLOBIN GLYCOSYLATED A1C: CPT

## 2020-02-03 PROCEDURE — 85025 COMPLETE CBC W/AUTO DIFF WBC: CPT

## 2020-02-03 PROCEDURE — 80061 LIPID PANEL: CPT

## 2020-02-03 PROCEDURE — 80053 COMPREHEN METABOLIC PANEL: CPT

## 2020-02-05 ENCOUNTER — TELEPHONE (OUTPATIENT)
Dept: FAMILY MEDICINE | Facility: CLINIC | Age: 65
End: 2020-02-05

## 2020-02-05 NOTE — TELEPHONE ENCOUNTER
Pharmacy ([facility]) is needing clarification, the patient has been prescribed STARLIX TAB 60MG and AMARYL 4 MG, which is possible duplication of therapy. Should the patient take both medications?

## 2020-02-06 NOTE — TELEPHONE ENCOUNTER
----- Message from Coleen gayatridanish sent at 2/6/2020  2:06 PM CST -----  Type:  Test Results    Who Called: pt     Name of Test (Lab/Mammo/Etc):  Lab    Date of Test: 02-03    Ordering Provider: Dr. Alfredo     Where the test was performed: [facility]    Would the patient rather a call back or a response via [facility]? Call back    Best Call Back Number:  [phone]    Additional Information:      For Clinical Team:Has the provider reviewed the results?

## 2020-02-07 NOTE — TELEPHONE ENCOUNTER
His labs are all abnormal. He needs a followup. his A1C is up to 9.2 and his kidney functioning has declined.

## 2020-02-13 ENCOUNTER — OFFICE VISIT (OUTPATIENT)
Dept: FAMILY MEDICINE | Facility: CLINIC | Age: 65
End: 2020-02-13
Payer: COMMERCIAL

## 2020-02-13 VITALS
HEART RATE: 95 BPM | SYSTOLIC BLOOD PRESSURE: 130 MMHG | BODY MASS INDEX: 41.18 KG/M2 | OXYGEN SATURATION: 97 % | TEMPERATURE: 99 F | DIASTOLIC BLOOD PRESSURE: 80 MMHG | WEIGHT: 262.38 LBS | HEIGHT: 67 IN

## 2020-02-13 DIAGNOSIS — G25.81 RESTLESS LEG SYNDROME: Primary | ICD-10-CM

## 2020-02-13 DIAGNOSIS — N18.30 CKD STAGE 3 DUE TO TYPE 2 DIABETES MELLITUS: ICD-10-CM

## 2020-02-13 DIAGNOSIS — E11.22 CKD STAGE 3 DUE TO TYPE 2 DIABETES MELLITUS: ICD-10-CM

## 2020-02-13 PROCEDURE — 99214 OFFICE O/P EST MOD 30 MIN: CPT | Mod: S$GLB,,, | Performed by: FAMILY MEDICINE

## 2020-02-13 PROCEDURE — 99499 UNLISTED E&M SERVICE: CPT | Mod: S$GLB,,, | Performed by: FAMILY MEDICINE

## 2020-02-13 PROCEDURE — 99499 RISK ADDL DX/OHS AUDIT: ICD-10-PCS | Mod: S$GLB,,, | Performed by: FAMILY MEDICINE

## 2020-02-13 PROCEDURE — 99999 PR PBB SHADOW E&M-EST. PATIENT-LVL III: CPT | Mod: PBBFAC,,, | Performed by: FAMILY MEDICINE

## 2020-02-13 PROCEDURE — 99999 PR PBB SHADOW E&M-EST. PATIENT-LVL III: ICD-10-PCS | Mod: PBBFAC,,, | Performed by: FAMILY MEDICINE

## 2020-02-13 PROCEDURE — 99214 PR OFFICE/OUTPT VISIT, EST, LEVL IV, 30-39 MIN: ICD-10-PCS | Mod: S$GLB,,, | Performed by: FAMILY MEDICINE

## 2020-02-13 RX ORDER — ROPINIROLE 1 MG/1
1 TABLET, FILM COATED ORAL 2 TIMES DAILY
Qty: 180 TABLET | Refills: 3 | Status: SHIPPED | OUTPATIENT
Start: 2020-02-13 | End: 2020-03-13 | Stop reason: SDUPTHER

## 2020-02-13 RX ORDER — INSULIN GLARGINE 100 [IU]/ML
15 INJECTION, SOLUTION SUBCUTANEOUS NIGHTLY
Qty: 13.5 ML | Refills: 3 | Status: SHIPPED | OUTPATIENT
Start: 2020-02-13 | End: 2020-08-06

## 2020-02-13 RX ORDER — NATEGLINIDE 60 MG/1
60 TABLET ORAL
Qty: 180 TABLET | Refills: 1 | Status: SHIPPED | OUTPATIENT
Start: 2020-02-13 | End: 2020-03-13 | Stop reason: SDUPTHER

## 2020-02-13 NOTE — PROGRESS NOTES
Subjective:       Patient ID: Nicolas Flaherty is a 64 y.o. male.    Chief Complaint: Follow Up/ Diabetes and Discuss (Meds) Requip/ Leg Pains    64 yescar hansen is her for follow-up on his diabetes. He has gained 12 pounds since July and his A1C has gone up from 6./3 to 9.2. He has decreased his bread and rice intact, but he is drinking smoothies and eating bananas.     Heh has restless leg syndrome and has worsenign fo symptoms. He is knocking things over. He has had an increase in his medication to requip to 1 mg/     Past Medical History:   Diagnosis Date    Abdominal hernia     Anticoagulant long-term use     Arthritis     Cancer of kidney, right     Colon polyp     Coronary artery disease     Diabetes mellitus     Difficulty swallowing     food comes back up    Hypertension     Kidney stone     Sleep apnea     Slurred speech     Stroke     MINI STROKE    TIA (transient ischemic attack)     Wears glasses       Past Surgical History:   Procedure Laterality Date    BACK SURGERY      lower back    CARDIAC SURGERY      COLONOSCOPY  07/2018    CYSTOSCOPY  03/2008    ESOPHAGEAL MANOMETRY  08/2018    ESOPHAGEAL MANOMETRY WITH MEASUREMENT OF IMPEDANCE N/A 2/19/2019    Procedure: MANOMETRY, ESOPHAGUS, WITH IMPEDANCE MEASUREMENT;  Surgeon: Sylvia Treviño MD;  Location: [facility] ([address] [place] [address]);  Service: Endoscopy;  Laterality: N/A;  to follow EGD at 1:00pm    ESOPHAGOGASTRODUODENOSCOPY N/A 2/19/2019    Procedure: EGD (ESOPHAGOGASTRODUODENOSCOPY);  Surgeon: Sylvia Treviño MD;  Location: [facility] ([address] [place] [address]);  Service: Endoscopy;  Laterality: N/A;  per Ophelia Ames NP 2nd floor d/t comorbidities and obesity/ Per Dr. Emanuel pt can hold Plavix 5 days prior (see media tab for scanned approval) - sm    excision right thigh mass      heart stents      stents 4 total.  2010, 2011 and 2013    HERNIA REPAIR      incisional    KIDNEY STONE SURGERY  2015    LAPAROSCOPIC NEPHRECTOMY, HAND ASSISTED  Right     LITHOTRIPSY      REPAIR OF RECURRENT INCISIONAL HERNIA  8/21/2019    Procedure: REPAIR, HERNIA, INCISIONAL, RECURRENT;  Surgeon: Jose Lewis MD;  Location: [facility];  Service: General;;  RN PREOP 8/14/2019  HAS CARDIAC CLEARANCE---    UPPER GASTROINTESTINAL ENDOSCOPY      7/10/2018,9/24/2010    VASCULAR SURGERY       Family History   Problem Relation Age of Onset    Heart disease Father     Hypertension Mother     Parkinsonism Mother     Celiac disease Neg Hx     Cirrhosis Neg Hx     Colon cancer Neg Hx     Colon polyps Neg Hx     Crohn's disease Neg Hx     Cystic fibrosis Neg Hx     Esophageal cancer Neg Hx     Hemochromatosis Neg Hx     Inflammatory bowel disease Neg Hx     Irritable bowel syndrome Neg Hx     Liver cancer Neg Hx     Liver disease Neg Hx     Rectal cancer Neg Hx     Stomach cancer Neg Hx     Ulcerative colitis Neg Hx     Kalia's disease Neg Hx     Lymphoma Neg Hx     Tuberculosis Neg Hx     Scleroderma Neg Hx     Rheum arthritis Neg Hx     Multiple sclerosis Neg Hx     Melanoma Neg Hx     Lupus Neg Hx     Psoriasis Neg Hx     Skin cancer Neg Hx      Social History     Socioeconomic History    Marital status:      Spouse name: Not on file    Number of children: Not on file    Years of education: Not on file    Highest education level: Not on file   Occupational History    Not on file   Social Needs    Financial resource strain: Not on file    Food insecurity:     Worry: Not on file     Inability: Not on file    Transportation needs:     Medical: Not on file     Non-medical: Not on file   Tobacco Use    Smoking status: Never Smoker    Smokeless tobacco: Current User     Types: Snuff    Tobacco comment: 20 plus years   Substance and Sexual Activity    Alcohol use: Yes     Comment: occasional    Drug use: No    Sexual activity: Yes     Partners: Female   Lifestyle    Physical activity:     Days per week: Not on file     Minutes per  "session: Not on file    Stress: Not on file   Relationships    Social connections:     Talks on phone: Not on file     Gets together: Not on file     Attends Anabaptist service: Not on file     Active member of club or organization: Not on file     Attends meetings of clubs or organizations: Not on file     Relationship status: Not on file   Other Topics Concern    Not on file   Social History Narrative    Not on file   ;l  Review of Systems    Objective:       Vitals:    02/13/20 1112   BP: 130/80   Pulse: 95   Temp: 98.5 °F (36.9 °C)   TempSrc: Oral   SpO2: 97%   Weight: 119 kg (262 lb 5.6 oz)   Height: 5' 7" (1.702 m)       Physical Exam   Constitutional: He is oriented to person, place, and time. He appears well-developed and well-nourished. No distress.   HENT:   Head: Normocephalic and atraumatic.   Eyes: Conjunctivae are normal.   Neck: Normal range of motion. Neck supple. No JVD present. Carotid bruit is not present. No thyromegaly present.   Cardiovascular: Normal rate, regular rhythm and normal heart sounds. Exam reveals no gallop and no friction rub.   No murmur heard.  Pulmonary/Chest: Effort normal and breath sounds normal. No stridor. No respiratory distress. He has no wheezes. He has no rales.   Musculoskeletal: He exhibits no edema.   Neurological: He is alert and oriented to person, place, and time.   Skin: He is not diaphoretic.       Assessment:       1. Restless leg syndrome    2. DM (diabetes mellitus), type 2, uncontrolled, with renal complications    3. CKD stage 3 due to type 2 diabetes mellitus        Plan:       Nicolas was seen today for follow up/ diabetes and discuss (meds) requip/ leg pains.    Diagnoses and all orders for this visit:    Restless leg syndrome  -     rOPINIRole (REQUIP) 1 MG tablet; Take 1 tablet (1 mg total) by mouth 2 (two) times daily.  Increased to 1 mg twice a day  DM (diabetes mellitus), type 2, uncontrolled, with renal complications  -     nateglinide " (STARLIX) 60 MG tablet; Take 1 tablet (60 mg total) by mouth 2 (two) times daily before meals.  -     insulin (BASAGLAR KWIKPEN U-100 INSULIN) glargine 100 units/mL (3mL) SubQ pen; Inject 15 Units into the skin every evening.  Adding basaglar 15 units daily to help with daily to help control his blood sugars.          CKD stage 3 due to type 2 diabetes mellitus  He is going to see Dr. Myra Mejia for his kidneys next week.       Address:  [address] [place] [address].  [place] [address]

## 2020-02-17 NOTE — TELEPHONE ENCOUNTER
Ellie/patient's wife called to report that [facility] is delaying refill of patient's medication - Starlix.  Stated the pharmacy stated there is a possible interaction of glipizide and glimepiride.  Needs an okay from Dr. Alfredo that it is okay to dispense medication.  Please advise.

## 2020-02-17 NOTE — TELEPHONE ENCOUNTER
----- Message from Radha Hernandez sent at 2/17/2020 10:58 AM CST -----  Contact: LEVI MARTINS [[id number]]  Name of Who is Calling: LEVI MARTINS [[id number]]     What is the request in detail:LEVI MARTINS [[id number]] is requesting a call back in regards to RXnateglinide (STARLIX) 60 MG tablet ....    Please contact to further discuss and advise      Can the clinic reply by [facility]: No     [facility]  [phone]

## 2020-02-19 RX ORDER — TAMSULOSIN HYDROCHLORIDE 0.4 MG/1
1 CAPSULE ORAL DAILY
Qty: 90 CAPSULE | Refills: 3 | Status: SHIPPED | OUTPATIENT
Start: 2020-02-19 | End: 2020-03-13 | Stop reason: SDUPTHER

## 2020-02-19 RX ORDER — FINASTERIDE 5 MG/1
5 TABLET, FILM COATED ORAL EVERY MORNING
Qty: 90 TABLET | Refills: 3 | Status: SHIPPED | OUTPATIENT
Start: 2020-02-19 | End: 2020-03-13 | Stop reason: SDUPTHER

## 2020-02-24 NOTE — TELEPHONE ENCOUNTER
Call placed to Pt, spoke with his wife. Informed of Provider response. She acknowledged understanding.

## 2020-03-13 ENCOUNTER — OFFICE VISIT (OUTPATIENT)
Dept: FAMILY MEDICINE | Facility: CLINIC | Age: 65
End: 2020-03-13
Payer: MEDICARE

## 2020-03-13 VITALS
HEIGHT: 67 IN | SYSTOLIC BLOOD PRESSURE: 130 MMHG | BODY MASS INDEX: 41.18 KG/M2 | TEMPERATURE: 98 F | DIASTOLIC BLOOD PRESSURE: 84 MMHG | WEIGHT: 262.38 LBS | OXYGEN SATURATION: 99 % | HEART RATE: 81 BPM

## 2020-03-13 DIAGNOSIS — N18.30 STAGE 3 CHRONIC KIDNEY DISEASE: ICD-10-CM

## 2020-03-13 DIAGNOSIS — R07.9 CHEST PAIN, UNSPECIFIED TYPE: ICD-10-CM

## 2020-03-13 DIAGNOSIS — I10 ESSENTIAL HYPERTENSION: Primary | ICD-10-CM

## 2020-03-13 DIAGNOSIS — K21.9 GASTROESOPHAGEAL REFLUX DISEASE, ESOPHAGITIS PRESENCE NOT SPECIFIED: ICD-10-CM

## 2020-03-13 DIAGNOSIS — K22.4 ESOPHAGEAL DYSFUNCTION: ICD-10-CM

## 2020-03-13 DIAGNOSIS — K22.4 ESOPHAGEAL SPASM: ICD-10-CM

## 2020-03-13 DIAGNOSIS — G25.81 RESTLESS LEG SYNDROME: ICD-10-CM

## 2020-03-13 DIAGNOSIS — E78.5 HYPERLIPIDEMIA, UNSPECIFIED HYPERLIPIDEMIA TYPE: ICD-10-CM

## 2020-03-13 DIAGNOSIS — N40.0 BENIGN PROSTATIC HYPERPLASIA, UNSPECIFIED WHETHER LOWER URINARY TRACT SYMPTOMS PRESENT: ICD-10-CM

## 2020-03-13 DIAGNOSIS — R13.19 ESOPHAGEAL DYSPHAGIA: ICD-10-CM

## 2020-03-13 DIAGNOSIS — M10.9 GOUT, UNSPECIFIED CAUSE, UNSPECIFIED CHRONICITY, UNSPECIFIED SITE: ICD-10-CM

## 2020-03-13 PROCEDURE — 99214 OFFICE O/P EST MOD 30 MIN: CPT | Mod: S$GLB,,, | Performed by: FAMILY MEDICINE

## 2020-03-13 PROCEDURE — 99999 PR PBB SHADOW E&M-EST. PATIENT-LVL III: CPT | Mod: PBBFAC,,, | Performed by: FAMILY MEDICINE

## 2020-03-13 PROCEDURE — 99999 PR PBB SHADOW E&M-EST. PATIENT-LVL III: ICD-10-PCS | Mod: PBBFAC,,, | Performed by: FAMILY MEDICINE

## 2020-03-13 PROCEDURE — 99214 PR OFFICE/OUTPT VISIT, EST, LEVL IV, 30-39 MIN: ICD-10-PCS | Mod: S$GLB,,, | Performed by: FAMILY MEDICINE

## 2020-03-13 RX ORDER — PEN NEEDLE, DIABETIC 30 GX3/16"
NEEDLE, DISPOSABLE MISCELLANEOUS
Qty: 90 EACH | Refills: 3 | Status: SHIPPED | OUTPATIENT
Start: 2020-03-13 | End: 2020-10-27 | Stop reason: SDUPTHER

## 2020-03-13 RX ORDER — INSULIN GLARGINE 100 [IU]/ML
15 INJECTION, SOLUTION SUBCUTANEOUS NIGHTLY
Qty: 13.5 ML | Refills: 3 | Status: CANCELLED | OUTPATIENT
Start: 2020-03-13 | End: 2021-03-13

## 2020-03-13 RX ORDER — LOSARTAN POTASSIUM 50 MG/1
50 TABLET ORAL DAILY
Qty: 90 TABLET | Refills: 1 | Status: SHIPPED | OUTPATIENT
Start: 2020-03-13 | End: 2020-07-23

## 2020-03-13 RX ORDER — INSULIN DEGLUDEC 200 U/ML
30 INJECTION, SOLUTION SUBCUTANEOUS NIGHTLY
Qty: 15 ML | Refills: 5 | Status: SHIPPED | OUTPATIENT
Start: 2020-03-13 | End: 2020-06-03 | Stop reason: SDUPTHER

## 2020-03-13 RX ORDER — METOPROLOL SUCCINATE 100 MG/1
100 TABLET, EXTENDED RELEASE ORAL EVERY MORNING
Qty: 90 TABLET | Refills: 1 | Status: SHIPPED | OUTPATIENT
Start: 2020-03-13 | End: 2020-09-09

## 2020-03-13 RX ORDER — NATEGLINIDE 60 MG/1
60 TABLET ORAL
Qty: 180 TABLET | Refills: 1 | Status: SHIPPED | OUTPATIENT
Start: 2020-03-13 | End: 2020-08-19

## 2020-03-13 RX ORDER — NORTRIPTYLINE HYDROCHLORIDE 50 MG/1
CAPSULE ORAL
Qty: 90 CAPSULE | Refills: 3 | Status: SHIPPED | OUTPATIENT
Start: 2020-03-13 | End: 2020-08-06

## 2020-03-13 RX ORDER — GLIMEPIRIDE 4 MG/1
TABLET ORAL
Qty: 180 TABLET | Refills: 1 | Status: SHIPPED | OUTPATIENT
Start: 2020-03-13 | End: 2020-08-08

## 2020-03-13 RX ORDER — ALLOPURINOL 100 MG/1
100 TABLET ORAL DAILY
Qty: 90 TABLET | Refills: 1 | Status: SHIPPED | OUTPATIENT
Start: 2020-03-13

## 2020-03-13 RX ORDER — ROPINIROLE 1 MG/1
1 TABLET, FILM COATED ORAL 2 TIMES DAILY
Qty: 180 TABLET | Refills: 3 | Status: SHIPPED | OUTPATIENT
Start: 2020-03-13 | End: 2020-05-27

## 2020-03-13 RX ORDER — FINASTERIDE 5 MG/1
5 TABLET, FILM COATED ORAL EVERY MORNING
Qty: 90 TABLET | Refills: 3 | Status: SHIPPED | OUTPATIENT
Start: 2020-03-13 | End: 2021-01-16

## 2020-03-13 RX ORDER — TAMSULOSIN HYDROCHLORIDE 0.4 MG/1
1 CAPSULE ORAL DAILY
Qty: 90 CAPSULE | Refills: 3 | Status: SHIPPED | OUTPATIENT
Start: 2020-03-13 | End: 2021-01-16

## 2020-03-13 RX ORDER — NORTRIPTYLINE HYDROCHLORIDE 50 MG/1
CAPSULE ORAL
Qty: 90 CAPSULE | Refills: 3 | Status: CANCELLED | OUTPATIENT
Start: 2020-03-13

## 2020-03-13 RX ORDER — ROSUVASTATIN CALCIUM 40 MG/1
40 TABLET, COATED ORAL NIGHTLY
Qty: 90 TABLET | Refills: 3 | Status: SHIPPED | OUTPATIENT
Start: 2020-03-13 | End: 2020-12-14

## 2020-03-13 RX ORDER — DILTIAZEM HYDROCHLORIDE 60 MG/1
60 TABLET, FILM COATED ORAL EVERY 8 HOURS
Qty: 270 TABLET | Refills: 1 | Status: SHIPPED | OUTPATIENT
Start: 2020-03-13 | End: 2020-09-09

## 2020-03-13 RX ORDER — FUROSEMIDE 40 MG/1
40 TABLET ORAL 2 TIMES DAILY
Qty: 90 TABLET | Refills: 1 | Status: SHIPPED | OUTPATIENT
Start: 2020-03-13 | End: 2020-03-17 | Stop reason: SDUPTHER

## 2020-03-13 NOTE — PROGRESS NOTES
"Subjective:       Patient ID: Nicolas Flaherty is a 64 y.o. male.    Chief Complaint: Diabetes Follow Up and Discuss Basaglar Insulin    Diabetes   He presents for his follow-up diabetic visit. He has type 2 diabetes mellitus. His disease course has been worsening. There are no hypoglycemic associated symptoms. There are no hypoglycemic complications. Symptoms are stable. Risk factors for coronary artery disease include diabetes mellitus, male sex, hypertension and obesity. Current diabetic treatments: BASAGLAR 15 UNITS. His breakfast blood glucose range is generally 180-200 mg/dl. His lunch blood glucose range is generally 180-200 mg/dl. His dinner blood glucose range is generally >200 mg/dl.   HE IS SWITCHING INSURANCE IN April AND I WILL BE OUT OF NETWORK for him.  HE ALSO CAN'T TAKE BASAGLAR DUE TO INSURANCE NOT COVERING THIS.   Review of Systems    Objective:       Vitals:    03/13/20 1117   BP: 130/84   Pulse: 81   Temp: 98.4 °F (36.9 °C)   TempSrc: Oral   SpO2: 99%   Weight: 119 kg (262 lb 5.6 oz)   Height: 5' 7" (1.702 m)       Physical Exam   Constitutional: He is oriented to person, place, and time. He appears well-developed and well-nourished. No distress.   HENT:   Head: Normocephalic and atraumatic.   Eyes: Conjunctivae are normal.   Neck: Normal range of motion. Neck supple. No JVD present. Carotid bruit is not present. No thyromegaly present.   Cardiovascular: Normal rate, regular rhythm and normal heart sounds. Exam reveals no gallop and no friction rub.   No murmur heard.  Pulmonary/Chest: Effort normal and breath sounds normal. No respiratory distress. He has no wheezes. He has no rales.   Musculoskeletal: He exhibits no edema.   AMBULATES WITH A WALKER   Neurological: He is alert and oriented to person, place, and time.   Skin: He is not diaphoretic.       Assessment:       1. Essential hypertension    2. Chest pain, unspecified type    3. Esophageal dysphagia    4. Esophageal spasm    5. " Uncontrolled type 2 diabetes mellitus with stage 3 chronic kidney disease, without long-term current use of insulin    6. Stage 3 chronic kidney disease    7. Esophageal dysfunction    8. Gastroesophageal reflux disease, esophagitis presence not specified    9. Restless leg syndrome    10. Hyperlipidemia, unspecified hyperlipidemia type    11. DM (diabetes mellitus), type 2, uncontrolled w/neurologic complication    12. Gout, unspecified cause, unspecified chronicity, unspecified site    13. Benign prostatic hyperplasia, unspecified whether lower urinary tract symptoms present        Plan:       Nicolas was seen today for diabetes follow up and discuss basaglar insulin.    Diagnoses and all orders for this visit:    Essential hypertension  -     losartan (COZAAR) 50 MG tablet; Take 1 tablet (50 mg total) by mouth once daily.  -     metoprolol succinate (TOPROL-XL) 100 MG 24 hr tablet; Take 1 tablet (100 mg total) by mouth every morning.  Stable. Refilled meds.     Chest pain, unspecified type  -     diltiaZEM (CARDIZEM) 60 MG tablet; Take 1 tablet (60 mg total) by mouth every 8 (eight) hours.    Esophageal dysphagia  -     diltiaZEM (CARDIZEM) 60 MG tablet; Take 1 tablet (60 mg total) by mouth every 8 (eight) hours.    Esophageal spasm  -     diltiaZEM (CARDIZEM) 60 MG tablet; Take 1 tablet (60 mg total) by mouth every 8 (eight) hours.    Uncontrolled type 2 diabetes mellitus with stage 3 chronic kidney disease, without long-term current use of insulin  -     dulaglutide (TRULICITY) 1.5 mg/0.5 mL PnIj; Inject 1.5 mg into the skin every 7 days.  -     glimepiride (AMARYL) 4 MG tablet; TAKE 1 TABLET TWICE A DAY  -     nateglinide (STARLIX) 60 MG tablet; Take 1 tablet (60 mg total) by mouth 2 (two) times daily before meals.  -     insulin degludec (TRESIBA FLEXTOUCH U-200) 200 unit/mL (3 mL) InPn; Inject 30 Units into the skin every evening.  Stable. Refilled meds.     Stage 3 chronic kidney disease  -     glimepiride  "(AMARYL) 4 MG tablet; TAKE 1 TABLET TWICE A DAY    Esophageal dysfunction  -     nortriptyline (PAMELOR) 50 MG capsule; Take one tablet (50 mg) nightly.  Stable. Refilled meds.     Gastroesophageal reflux disease, esophagitis presence not specified  -     ranitidine (ZANTAC) 150 MG tablet; Take 1 tablet (150 mg total) by mouth 2 (two) times daily.    Restless leg syndrome  -     rOPINIRole (REQUIP) 1 MG tablet; Take 1 tablet (1 mg total) by mouth 2 (two) times daily.    Hyperlipidemia, unspecified hyperlipidemia type  -     rosuvastatin (CRESTOR) 40 MG Tab; Take 1 tablet (40 mg total) by mouth every evening.    DM (diabetes mellitus), type 2, uncontrolled w/neurologic complication  -     SITagliptin (JANUVIA) 50 MG Tab; Take 1 tablet (50 mg total) by mouth once daily.  -     insulin degludec (TRESIBA FLEXTOUCH U-200) 200 unit/mL (3 mL) InPn; Inject 30 Units into the skin every evening.    Gout, unspecified cause, unspecified chronicity, unspecified site  -     allopurinoL (ZYLOPRIM) 100 MG tablet; Take 1 tablet (100 mg total) by mouth once daily.    Benign prostatic hyperplasia, unspecified whether lower urinary tract symptoms present  -     finasteride (PROSCAR) 5 mg tablet; Take 1 tablet (5 mg total) by mouth every morning.  -     tamsulosin (FLOMAX) 0.4 mg Cap; Take 1 capsule (0.4 mg total) by mouth once daily.    Other orders  -     furosemide (LASIX) 40 MG tablet; Take 1 tablet (40 mg total) by mouth 2 (two) times daily. ALTERNATES 1 TABLET ONE DAY AND 2 TABLETS THE NEXT--ALTERNATING DAYS  -     Cancel: insulin (BASAGLAR KWIKPEN U-100 INSULIN) glargine 100 units/mL (3mL) SubQ pen; Inject 15 Units into the skin every evening.  -     pen needle, diabetic 31 gauge x 5/16" Ndle; USE 1 INJECTION PER DAY           "

## 2020-03-17 NOTE — TELEPHONE ENCOUNTER
Last Office Visit Info:   The patient's last visit with Shyanne Alfredo MD was on 3/13/2020.    The patient's last visit in current department was on 3/13/2020.        Last CBC Results:   Lab Results   Component Value Date    WBC 7.79 02/03/2020    HGB 13.7 (L) 02/03/2020    HCT 43.6 02/03/2020     02/03/2020       Last CMP Results  Lab Results   Component Value Date     02/03/2020    K 4.5 02/03/2020     02/03/2020    CO2 23 02/03/2020    BUN 23 02/03/2020    CREATININE 1.9 (H) 02/03/2020    CALCIUM 9.9 02/03/2020    ALBUMIN 4.1 02/03/2020    AST 23 02/03/2020    ALT 28 02/03/2020       Last Lipids  Lab Results   Component Value Date    CHOL 105 (L) 02/03/2020    TRIG 383 (H) 02/03/2020    HDL 21 (L) 02/03/2020    LDLCALC 7.4 (L) 02/03/2020       Last A1C  Lab Results   Component Value Date    HGBA1C 9.2 (H) 02/03/2020       Last TSH  Lab Results   Component Value Date    TSH 2.267 12/03/2018         Current Med Refills  Medication List with Changes/Refills   Current Medications    ALLOPURINOL (ZYLOPRIM) 100 MG TABLET    Take 1 tablet (100 mg total) by mouth once daily.       Start Date: 3/13/2020 End Date: --    ASPIRIN (ECOTRIN) 81 MG EC TABLET    Take 81 mg by mouth once daily. LAST TAKEN 3/2/16       Start Date: --        End Date: --    BLOOD SUGAR DIAGNOSTIC (ACCU-CHEK REJI PLUS TEST STRP) STRP    TEST BLOOD SUGAR TWICE DAILY       Start Date: 1/22/2019 End Date: --    BLOOD-GLUCOSE METER (ACCU-CHEK REJI PLUS METER) MISC    CHECK TWICE DAILY       Start Date: 1/22/2019 End Date: --    CLOPIDOGREL (PLAVIX) 75 MG TABLET    Take 75 mg by mouth every morning. LAST DOSE 3/2/16       Start Date: 7/13/2015 End Date: --    DILTIAZEM (CARDIZEM) 60 MG TABLET    Take 1 tablet (60 mg total) by mouth every 8 (eight) hours.       Start Date: 3/13/2020 End Date: --    DOCUSATE SODIUM (STOOL SOFTENER) 100 MG CAPSULE    Take 1 capsule (100 mg total) by mouth 2 (two) times daily.       Start Date:  8/7/2017  End Date: --    DULAGLUTIDE (TRULICITY) 1.5 MG/0.5 ML PNIJ    Inject 1.5 mg into the skin every 7 days.       Start Date: 3/13/2020 End Date: --    FINASTERIDE (PROSCAR) 5 MG TABLET    Take 1 tablet (5 mg total) by mouth every morning.       Start Date: 3/13/2020 End Date: --    FUROSEMIDE (LASIX) 40 MG TABLET    Take 1 tablet (40 mg total) by mouth 2 (two) times daily. ALTERNATES 1 TABLET ONE DAY AND 2 TABLETS THE NEXT--ALTERNATING DAYS       Start Date: 3/13/2020 End Date: --    GLIMEPIRIDE (AMARYL) 4 MG TABLET    TAKE 1 TABLET TWICE A DAY       Start Date: 3/13/2020 End Date: --    HYDROCODONE-ACETAMINOPHEN (NORCO)  MG PER TABLET    Take 1 tablet by mouth nightly as needed for Pain.       Start Date: 8/16/2019 End Date: --    INSULIN (BASAGLAR KWIKPEN U-100 INSULIN) GLARGINE 100 UNITS/ML (3ML) SUBQ PEN    Inject 15 Units into the skin every evening.       Start Date: 2/13/2020 End Date: 2/12/2021    INSULIN DEGLUDEC (TRESIBA FLEXTOUCH U-200) 200 UNIT/ML (3 ML) INPN    Inject 30 Units into the skin every evening.       Start Date: 3/13/2020 End Date: --    LANCETS (ACCU-CHEK SOFTCLIX LANCETS) MISC    1 lancet by Misc.(Non-Drug; Combo Route) route 2 (two) times daily.       Start Date: 1/22/2019 End Date: --    LOSARTAN (COZAAR) 50 MG TABLET    Take 1 tablet (50 mg total) by mouth once daily.       Start Date: 3/13/2020 End Date: --    METOPROLOL SUCCINATE (TOPROL-XL) 100 MG 24 HR TABLET    Take 1 tablet (100 mg total) by mouth every morning.       Start Date: 3/13/2020 End Date: --    NATEGLINIDE (STARLIX) 60 MG TABLET    Take 1 tablet (60 mg total) by mouth 2 (two) times daily before meals.       Start Date: 3/13/2020 End Date: --    NITROSTAT 0.4 MG SL TABLET    Place 0.4 mg under the tongue every 5 (five) minutes as needed.        Start Date: 3/2/2015  End Date: --    NORTRIPTYLINE (PAMELOR) 50 MG CAPSULE    Take one tablet (50 mg) nightly.       Start Date: 3/13/2020 End Date: --     "OXYCODONE-ACETAMINOPHEN (PERCOCET) 5-325 MG PER TABLET    Take 1 tablet by mouth every 4 (four) hours as needed for Pain.       Start Date: 8/21/2019 End Date: --    OXYCODONE-ACETAMINOPHEN (PERCOCET) 5-325 MG PER TABLET    Take 1 tablet by mouth every 4 (four) hours as needed for Pain.       Start Date: 8/21/2019 End Date: --    PEN NEEDLE, DIABETIC 31 GAUGE X 5/16" NDLE    USE 1 INJECTION PER DAY       Start Date: 3/13/2020 End Date: --    RANITIDINE (ZANTAC) 150 MG TABLET    Take 1 tablet (150 mg total) by mouth 2 (two) times daily.       Start Date: 3/13/2020 End Date: --    ROPINIROLE (REQUIP) 1 MG TABLET    Take 1 tablet (1 mg total) by mouth 2 (two) times daily.       Start Date: 3/13/2020 End Date: 3/13/2021    ROSUVASTATIN (CRESTOR) 40 MG TAB    Take 1 tablet (40 mg total) by mouth every evening.       Start Date: 3/13/2020 End Date: 3/13/2021    SITAGLIPTIN (JANUVIA) 50 MG TAB    Take 1 tablet (50 mg total) by mouth once daily.       Start Date: 3/13/2020 End Date: 3/13/2021    TAMSULOSIN (FLOMAX) 0.4 MG CAP    Take 1 capsule (0.4 mg total) by mouth once daily.       Start Date: 3/13/2020 End Date: --    TRAVATAN Z 0.004 % DROP    1 drop every evening.        Start Date: 7/5/2016  End Date: --       Order(s) placed per written order guidelines:     Please advise.              "

## 2020-03-18 RX ORDER — FUROSEMIDE 40 MG/1
40 TABLET ORAL 2 TIMES DAILY
Qty: 90 TABLET | Refills: 1 | Status: SHIPPED | OUTPATIENT
Start: 2020-03-18 | End: 2020-03-18 | Stop reason: SDUPTHER

## 2020-03-18 NOTE — TELEPHONE ENCOUNTER
----- Message from Gricel Ybarra, Patient Care Assistant sent at 3/18/2020  3:53 PM CDT -----  Contact: Joy with [facility]  Name of Who is Calling: Joy with [facility]    What is the request in detail:Requesting  a call back in regards of directions for Rx furosemide (LASIX) 40 MG tablet.  Please contact to further discuss and advise      Can the clinic reply by [facility]: No    [facility]:   [phone] reference [id number]

## 2020-03-19 RX ORDER — FUROSEMIDE 40 MG/1
40 TABLET ORAL 2 TIMES DAILY
Qty: 90 TABLET | Refills: 1 | Status: SHIPPED | OUTPATIENT
Start: 2020-03-19 | End: 2020-03-19 | Stop reason: SDUPTHER

## 2020-03-19 RX ORDER — FUROSEMIDE 40 MG/1
40 TABLET ORAL 2 TIMES DAILY
Qty: 90 TABLET | Refills: 1 | Status: SHIPPED | OUTPATIENT
Start: 2020-03-19 | End: 2020-05-14

## 2020-03-19 NOTE — TELEPHONE ENCOUNTER
[facility] requesting clarification on script.  Please advise.      Script with same unclear directions, per pharmacy, sent two times.

## 2020-03-24 ENCOUNTER — TELEPHONE (OUTPATIENT)
Dept: FAMILY MEDICINE | Facility: CLINIC | Age: 65
End: 2020-03-24

## 2020-03-24 NOTE — TELEPHONE ENCOUNTER
----- Message from Herminia Li LPN sent at 3/24/2020  8:44 AM CDT -----  Contact: Tina-[facility]      ----- Message -----  From: Elizabeth Cheema  Sent: 3/23/2020   4:46 PM CDT  To: Juni TORRES Staff    Type:  Pharmacy Calling to Clarify an RX    Name of Caller: Tina    Pharmacy Name: [facility]    Prescription Name: furosemide     What do they need to clarify? Directions    Can you be contacted via [facility]Call    Best Call Back Number: [phone]

## 2020-04-20 ENCOUNTER — TELEPHONE (OUTPATIENT)
Dept: FAMILY MEDICINE | Facility: CLINIC | Age: 65
End: 2020-04-20

## 2020-04-20 NOTE — TELEPHONE ENCOUNTER
Patient is on Ranitidine, that med is off the market the alternatives are Pantoprazole 20/40MG, Omeprazole 20/40MG, Esomeprazole 20/40MG, or Lansoprazole 15/30MG. Please send in one of the alternatives. Thanks

## 2020-05-14 RX ORDER — FUROSEMIDE 40 MG/1
TABLET ORAL
Qty: 135 TABLET | Refills: 0 | Status: SHIPPED | OUTPATIENT
Start: 2020-05-14 | End: 2020-08-10

## 2020-05-27 ENCOUNTER — OFFICE VISIT (OUTPATIENT)
Dept: FAMILY MEDICINE | Facility: CLINIC | Age: 65
End: 2020-05-27
Payer: MEDICARE

## 2020-05-27 ENCOUNTER — TELEPHONE (OUTPATIENT)
Dept: FAMILY MEDICINE | Facility: CLINIC | Age: 65
End: 2020-05-27

## 2020-05-27 VITALS
SYSTOLIC BLOOD PRESSURE: 132 MMHG | BODY MASS INDEX: 42.56 KG/M2 | HEIGHT: 67 IN | WEIGHT: 271.19 LBS | OXYGEN SATURATION: 96 % | HEART RATE: 110 BPM | DIASTOLIC BLOOD PRESSURE: 70 MMHG | TEMPERATURE: 98 F

## 2020-05-27 DIAGNOSIS — I70.0 ATHEROSCLEROSIS OF AORTA: ICD-10-CM

## 2020-05-27 DIAGNOSIS — E11.8 DM TYPE 2, CONTROLLED, WITH COMPLICATION: Primary | ICD-10-CM

## 2020-05-27 DIAGNOSIS — R51.9 FACE PAIN: ICD-10-CM

## 2020-05-27 DIAGNOSIS — G11.9 ATAXIA DUE TO CEREBELLAR DEGENERATION: ICD-10-CM

## 2020-05-27 DIAGNOSIS — J32.9 CHRONIC SINUSITIS, UNSPECIFIED LOCATION: ICD-10-CM

## 2020-05-27 DIAGNOSIS — G25.81 RESTLESS LEG SYNDROME: ICD-10-CM

## 2020-05-27 DIAGNOSIS — C64.1 RENAL CANCER, RIGHT: ICD-10-CM

## 2020-05-27 PROCEDURE — 99214 OFFICE O/P EST MOD 30 MIN: CPT | Mod: S$GLB,,, | Performed by: FAMILY MEDICINE

## 2020-05-27 PROCEDURE — 99999 PR PBB SHADOW E&M-EST. PATIENT-LVL III: ICD-10-PCS | Mod: PBBFAC,,, | Performed by: FAMILY MEDICINE

## 2020-05-27 PROCEDURE — 99214 PR OFFICE/OUTPT VISIT, EST, LEVL IV, 30-39 MIN: ICD-10-PCS | Mod: S$GLB,,, | Performed by: FAMILY MEDICINE

## 2020-05-27 PROCEDURE — 99999 PR PBB SHADOW E&M-EST. PATIENT-LVL III: CPT | Mod: PBBFAC,,, | Performed by: FAMILY MEDICINE

## 2020-05-27 RX ORDER — ROPINIROLE 2 MG/1
2 TABLET, FILM COATED ORAL NIGHTLY
Qty: 90 TABLET | Refills: 3 | Status: SHIPPED | OUTPATIENT
Start: 2020-05-27 | End: 2020-05-29 | Stop reason: SDUPTHER

## 2020-05-27 RX ORDER — ROPINIROLE 1 MG/1
1 TABLET, FILM COATED ORAL DAILY PRN
Qty: 90 TABLET | Refills: 3 | Status: SHIPPED | OUTPATIENT
Start: 2020-05-27 | End: 2020-05-27 | Stop reason: SDUPTHER

## 2020-05-27 RX ORDER — DOXYCYCLINE HYCLATE 100 MG
100 TABLET ORAL 2 TIMES DAILY
Qty: 14 TABLET | Refills: 0 | Status: SHIPPED | OUTPATIENT
Start: 2020-05-27 | End: 2020-06-03

## 2020-05-27 RX ORDER — ROPINIROLE 1 MG/1
1 TABLET, FILM COATED ORAL DAILY PRN
Qty: 90 TABLET | Refills: 3 | Status: SHIPPED | OUTPATIENT
Start: 2020-05-27 | End: 2020-05-29 | Stop reason: SDUPTHER

## 2020-05-27 NOTE — PROGRESS NOTES
"Subjective:       Patient ID: Nicolas Flaherty is a 64 y.o. male.    Chief Complaint: Diabetes Check Up and Discuss Sleep Concerns    Diabetes   He presents for his follow-up diabetic visit. He has type 2 diabetes mellitus. There are no hypoglycemic associated symptoms. Pertinent negatives for diabetes include no chest pain. There are no hypoglycemic complications. Symptoms are worsening. His breakfast blood glucose range is generally 130-140 mg/dl. His dinner blood glucose range is generally 130-140 mg/dl.   He is also jumping in his sleep and falling out of bed. He has restless leg syndrome. He takes a requip 1 mg around 9-10p.m. He states a couple nights a week he has had to take 2 .   Review of Systems   HENT: Positive for sinus pressure and sinus pain. Negative for postnasal drip, sneezing and sore throat.    Respiratory: Positive for cough. Negative for chest tightness.         +chronic cough, yellow with mucus production   Cardiovascular: Negative for chest pain.   Genitourinary: Positive for penile pain.       Objective:       Vitals:    05/27/20 1125   BP: 132/70   Pulse: 110   Temp: 98.4 °F (36.9 °C)   TempSrc: Oral   SpO2: 96%   Weight: 123 kg (271 lb 2.7 oz)   Height: 5' 7" (1.702 m)       Physical Exam   Constitutional: He is oriented to person, place, and time. He appears well-developed and well-nourished. No distress.   HENT:   Head: Normocephalic and atraumatic.   Mouth/Throat: Oropharynx is clear and moist. No oropharyngeal exudate.   Neck: Normal range of motion. Neck supple.   Cardiovascular: Normal rate, regular rhythm and normal heart sounds. Exam reveals no gallop and no friction rub.   No murmur heard.  Pulmonary/Chest: Effort normal and breath sounds normal. No stridor. No respiratory distress. He has no wheezes. He has no rales. He exhibits no tenderness.   Musculoskeletal:   Ambulates with a walker   Lymphadenopathy:     He has cervical adenopathy.   Neurological: He is alert and " oriented to person, place, and time.   Skin: He is not diaphoretic.       Assessment:       1. DM type 2, controlled, with complication    2. Restless leg syndrome    3. Face pain    4. Chronic sinusitis, unspecified location        Plan:       Nicolas was seen today for diabetes check up and discuss sleep concerns.    Diagnoses and all orders for this visit:    DM type 2, controlled, with complication  -     Hemoglobin A1C; Future    Restless leg syndrome  -     rOPINIRole (REQUIP) 2 MG tablet; Take 1 tablet (2 mg total) by mouth every evening.  -     rOPINIRole (REQUIP) 1 MG tablet; Take 1 tablet (1 mg total) by mouth daily as needed (restless leg for naps during the day).  Increased requip at night from 1 to 2 mg at night.    Face pain  -     CT Maxillofacial Without Contrast; Future    Chronic sinusitis, unspecified location  -     CT Maxillofacial Without Contrast; Future  -     doxycycline (VIBRA-TABS) 100 MG tablet; Take 1 tablet (100 mg total) by mouth 2 (two) times daily. for 7 days  Chronic cough liikely from sinuses. CT lungs is stable and du for repeat in November 2020.

## 2020-05-27 NOTE — TELEPHONE ENCOUNTER
----- Message from Nissa Marroquin sent at 5/27/2020  1:11 PM CDT -----  Contact: Patient   Type: RX Refill Request    Who Called:  Patient     Have you contacted your pharmacy: no     Refill or New Rx: Refill     RX Name and Strength: rOPINIRole (REQUIP) 1 MG tablet    How is the patient currently taking it? (ex. 1XDay):2x    Is this a 30 day or 90 day RX:    Preferred Pharmacy with phone number:     [facility] - [address]  [address]  [facility] [address]  Phone: [phone] Fax: [phone]            Local or Mail Order: Mail     Ordering Provider: Dr. Alfredo    Would the patient rather a call back or a response via [facility]? Call back     Best Call Back Number: [phone]

## 2020-05-27 NOTE — TELEPHONE ENCOUNTER
Last Office Visit Info:   The patient's last visit with Shyanne Alfredo MD was on 5/27/2020.    The patient's last visit in current department was on 5/27/2020.        Last CBC Results:   Lab Results   Component Value Date    WBC 7.79 02/03/2020    HGB 13.7 (L) 02/03/2020    HCT 43.6 02/03/2020     02/03/2020       Last CMP Results  Lab Results   Component Value Date     02/03/2020    K 4.5 02/03/2020     02/03/2020    CO2 23 02/03/2020    BUN 23 02/03/2020    CREATININE 1.9 (H) 02/03/2020    CALCIUM 9.9 02/03/2020    ALBUMIN 4.1 02/03/2020    AST 23 02/03/2020    ALT 28 02/03/2020       Last Lipids  Lab Results   Component Value Date    CHOL 105 (L) 02/03/2020    TRIG 383 (H) 02/03/2020    HDL 21 (L) 02/03/2020    LDLCALC 7.4 (L) 02/03/2020       Last A1C  Lab Results   Component Value Date    HGBA1C 9.2 (H) 02/03/2020       Last TSH  Lab Results   Component Value Date    TSH 2.267 12/03/2018         Current Med Refills  Medication List with Changes/Refills   Current Medications    ALLOPURINOL (ZYLOPRIM) 100 MG TABLET    Take 1 tablet (100 mg total) by mouth once daily.       Start Date: 3/13/2020 End Date: --    ASPIRIN (ECOTRIN) 81 MG EC TABLET    Take 81 mg by mouth once daily. LAST TAKEN 3/2/16       Start Date: --        End Date: --    BLOOD SUGAR DIAGNOSTIC (ACCU-CHEK REJI PLUS TEST STRP) STRP    TEST BLOOD SUGAR TWICE DAILY       Start Date: 1/22/2019 End Date: --    BLOOD-GLUCOSE METER (ACCU-CHEK REJI PLUS METER) MISC    CHECK TWICE DAILY       Start Date: 1/22/2019 End Date: --    CLOPIDOGREL (PLAVIX) 75 MG TABLET    Take 75 mg by mouth every morning. LAST DOSE 3/2/16       Start Date: 7/13/2015 End Date: --    DILTIAZEM (CARDIZEM) 60 MG TABLET    Take 1 tablet (60 mg total) by mouth every 8 (eight) hours.       Start Date: 3/13/2020 End Date: --    DOCUSATE SODIUM (STOOL SOFTENER) 100 MG CAPSULE    Take 1 capsule (100 mg total) by mouth 2 (two) times daily.       Start Date:  8/7/2017  End Date: --    DOXYCYCLINE (VIBRA-TABS) 100 MG TABLET    Take 1 tablet (100 mg total) by mouth 2 (two) times daily. for 7 days       Start Date: 5/27/2020 End Date: 6/3/2020    DULAGLUTIDE (TRULICITY) 1.5 MG/0.5 ML PNIJ    Inject 1.5 mg into the skin every 7 days.       Start Date: 3/13/2020 End Date: --    FINASTERIDE (PROSCAR) 5 MG TABLET    Take 1 tablet (5 mg total) by mouth every morning.       Start Date: 3/13/2020 End Date: --    FUROSEMIDE (LASIX) 40 MG TABLET    TAKE 1 TABLET BY MOUTH  EVERY OTHER DAY ALTERNATING WITH 2 TABLETS EVERY OTHER  DAY       Start Date: 5/14/2020 End Date: --    GLIMEPIRIDE (AMARYL) 4 MG TABLET    TAKE 1 TABLET TWICE A DAY       Start Date: 3/13/2020 End Date: --    HYDROCODONE-ACETAMINOPHEN (NORCO)  MG PER TABLET    Take 1 tablet by mouth nightly as needed for Pain.       Start Date: 8/16/2019 End Date: --    INSULIN (BASAGLAR KWIKPEN U-100 INSULIN) GLARGINE 100 UNITS/ML (3ML) SUBQ PEN    Inject 15 Units into the skin every evening.       Start Date: 2/13/2020 End Date: 2/12/2021    INSULIN DEGLUDEC (TRESIBA FLEXTOUCH U-200) 200 UNIT/ML (3 ML) INPN    Inject 30 Units into the skin every evening.       Start Date: 3/13/2020 End Date: --    LANCETS (ACCU-CHEK SOFTCLIX LANCETS) MISC    1 lancet by Misc.(Non-Drug; Combo Route) route 2 (two) times daily.       Start Date: 1/22/2019 End Date: --    LOSARTAN (COZAAR) 50 MG TABLET    Take 1 tablet (50 mg total) by mouth once daily.       Start Date: 3/13/2020 End Date: --    METOPROLOL SUCCINATE (TOPROL-XL) 100 MG 24 HR TABLET    Take 1 tablet (100 mg total) by mouth every morning.       Start Date: 3/13/2020 End Date: --    NATEGLINIDE (STARLIX) 60 MG TABLET    Take 1 tablet (60 mg total) by mouth 2 (two) times daily before meals.       Start Date: 3/13/2020 End Date: --    NITROSTAT 0.4 MG SL TABLET    Place 0.4 mg under the tongue every 5 (five) minutes as needed.        Start Date: 3/2/2015  End Date: --     "NORTRIPTYLINE (PAMELOR) 50 MG CAPSULE    Take one tablet (50 mg) nightly.       Start Date: 3/13/2020 End Date: --    OXYCODONE-ACETAMINOPHEN (PERCOCET) 5-325 MG PER TABLET    Take 1 tablet by mouth every 4 (four) hours as needed for Pain.       Start Date: 8/21/2019 End Date: --    OXYCODONE-ACETAMINOPHEN (PERCOCET) 5-325 MG PER TABLET    Take 1 tablet by mouth every 4 (four) hours as needed for Pain.       Start Date: 8/21/2019 End Date: --    PEN NEEDLE, DIABETIC 31 GAUGE X 5/16" NDLE    USE 1 INJECTION PER DAY       Start Date: 3/13/2020 End Date: --    RANITIDINE (ZANTAC) 150 MG TABLET    Take 1 tablet (150 mg total) by mouth 2 (two) times daily.       Start Date: 3/13/2020 End Date: --    ROPINIROLE (REQUIP) 1 MG TABLET    Take 1 tablet (1 mg total) by mouth daily as needed (restless leg for naps during the day).       Start Date: 5/27/2020 End Date: 5/27/2021    ROPINIROLE (REQUIP) 2 MG TABLET    Take 1 tablet (2 mg total) by mouth every evening.       Start Date: 5/27/2020 End Date: 5/27/2021    ROSUVASTATIN (CRESTOR) 40 MG TAB    Take 1 tablet (40 mg total) by mouth every evening.       Start Date: 3/13/2020 End Date: 3/13/2021    SITAGLIPTIN (JANUVIA) 50 MG TAB    Take 1 tablet (50 mg total) by mouth once daily.       Start Date: 3/13/2020 End Date: 3/13/2021    TAMSULOSIN (FLOMAX) 0.4 MG CAP    Take 1 capsule (0.4 mg total) by mouth once daily.       Start Date: 3/13/2020 End Date: --    TRAVATAN Z 0.004 % DROP    1 drop every evening.        Start Date: 7/5/2016  End Date: --                   "

## 2020-05-29 RX ORDER — ROPINIROLE 2 MG/1
2 TABLET, FILM COATED ORAL NIGHTLY
Qty: 90 TABLET | Refills: 3 | Status: SHIPPED | OUTPATIENT
Start: 2020-05-29 | End: 2020-12-29

## 2020-05-29 RX ORDER — ROPINIROLE 1 MG/1
1 TABLET, FILM COATED ORAL DAILY PRN
Qty: 90 TABLET | Refills: 3 | Status: SHIPPED | OUTPATIENT
Start: 2020-05-29 | End: 2020-12-29

## 2020-06-03 RX ORDER — INSULIN DEGLUDEC 200 U/ML
30 INJECTION, SOLUTION SUBCUTANEOUS NIGHTLY
Qty: 15 ML | Refills: 5 | Status: SHIPPED | OUTPATIENT
Start: 2020-06-03 | End: 2020-06-15 | Stop reason: SDUPTHER

## 2020-06-03 NOTE — TELEPHONE ENCOUNTER
----- Message from Shyann Olmstead sent at 6/3/2020  7:57 AM CDT -----  Contact: Patient [phone]  Type: RX Refill Request    Who Called: Patient    Have you contacted your pharmacy: No    Refill or New Rx: Refill    RX Name and Strength: insulin degludec (TRESIBA FLEXTOUCH U-200) 200 unit/mL (3 mL) InPn.  Patient states that it was changed to 40 Units to Inject.     Is this a 30 day or 90 day RX: 90 day    Preferred Pharmacy with phone number: .  [facility] - [address]  [address]  [facility] [address]  Phone: [phone] Fax: [phone]    Local or Mail Order: Local    Would the patient rather a call back or a response via [facility]? Call back    Best Call Back Number: [phone]

## 2020-06-08 ENCOUNTER — TELEPHONE (OUTPATIENT)
Dept: GASTROENTEROLOGY | Facility: CLINIC | Age: 65
End: 2020-06-08

## 2020-06-08 NOTE — TELEPHONE ENCOUNTER
Called pt as he was expected to follow up in June. Pt wife states pt is doing fine and has returned to Dr. Marquez.

## 2020-06-12 NOTE — TELEPHONE ENCOUNTER
----- Message from Tayler Hernandez sent at 6/12/2020 12:00 PM CDT -----  Type: Patient Call Back    Who called: anh - Wife     What is the request in detail:patient need a new script for the new dosage of his medication his wife says it should be 40 instead of 30. Please call     Can the clinic reply by [facility]? No     Would the patient rather a call back or a response via [facility]?  Call     Best call back number:[phone]    Additional Information:[facility] - [address] [phone] (Phone)  [phone] (Fax)

## 2020-06-15 RX ORDER — INSULIN DEGLUDEC 200 U/ML
40 INJECTION, SOLUTION SUBCUTANEOUS NIGHTLY
Qty: 15 ML | Refills: 5 | Status: SHIPPED | OUTPATIENT
Start: 2020-06-15 | End: 2020-08-06 | Stop reason: SDUPTHER

## 2020-06-29 ENCOUNTER — TELEPHONE (OUTPATIENT)
Dept: FAMILY MEDICINE | Facility: CLINIC | Age: 65
End: 2020-06-29

## 2020-06-29 DIAGNOSIS — E11.8 DM TYPE 2, CONTROLLED, WITH COMPLICATION: Primary | ICD-10-CM

## 2020-06-29 NOTE — TELEPHONE ENCOUNTER
Patient requesting lab orders.  Please order all necessary labs - to [facility].  Has HgA1c in system already, needs it changed to [facility].  Please advise.

## 2020-06-29 NOTE — TELEPHONE ENCOUNTER
----- Message from Nereyda Abbott sent at 6/29/2020  7:35 AM CDT -----  Regarding: self/  [phone]  Type: Patient Call Back    Who called:  Patient    What is the request in detail:  Patient would like blood work orders sent to [facility] today please.  Thank you  Would the patient rather a call back or a response via [facility]?  Call back    Best call back number:   [phone]

## 2020-07-02 LAB
CHOLEST SERPL-MCNC: 151 MG/DL
CHOLEST/HDLC SERPL: 6.6 (CALC)
HBA1C MFR BLD: 9 % OF TOTAL HGB
HDLC SERPL-MCNC: 23 MG/DL
LDLC SERPL CALC-MCNC: ABNORMAL MG/DL (CALC)
NONHDLC SERPL-MCNC: 128 MG/DL (CALC)
TRIGL SERPL-MCNC: 662 MG/DL

## 2020-07-03 ENCOUNTER — PATIENT MESSAGE (OUTPATIENT)
Dept: FAMILY MEDICINE | Facility: CLINIC | Age: 65
End: 2020-07-03

## 2020-08-06 ENCOUNTER — OFFICE VISIT (OUTPATIENT)
Dept: FAMILY MEDICINE | Facility: CLINIC | Age: 65
End: 2020-08-06
Payer: MEDICARE

## 2020-08-06 VITALS
HEIGHT: 67 IN | TEMPERATURE: 98 F | SYSTOLIC BLOOD PRESSURE: 110 MMHG | BODY MASS INDEX: 42.21 KG/M2 | HEART RATE: 100 BPM | DIASTOLIC BLOOD PRESSURE: 80 MMHG | WEIGHT: 268.94 LBS | OXYGEN SATURATION: 98 %

## 2020-08-06 DIAGNOSIS — G47.9 SLEEP DISTURBANCE: ICD-10-CM

## 2020-08-06 DIAGNOSIS — R13.10 DYSPHAGIA, UNSPECIFIED TYPE: ICD-10-CM

## 2020-08-06 DIAGNOSIS — G25.81 RESTLESS LEG SYNDROME: Primary | ICD-10-CM

## 2020-08-06 PROCEDURE — 3008F PR BODY MASS INDEX (BMI) DOCUMENTED: ICD-10-PCS | Mod: CPTII,S$GLB,, | Performed by: FAMILY MEDICINE

## 2020-08-06 PROCEDURE — 99999 PR PBB SHADOW E&M-EST. PATIENT-LVL IV: CPT | Mod: PBBFAC,,, | Performed by: FAMILY MEDICINE

## 2020-08-06 PROCEDURE — 99999 PR PBB SHADOW E&M-EST. PATIENT-LVL IV: ICD-10-PCS | Mod: PBBFAC,,, | Performed by: FAMILY MEDICINE

## 2020-08-06 PROCEDURE — 1101F PR PT FALLS ASSESS DOC 0-1 FALLS W/OUT INJ PAST YR: ICD-10-PCS | Mod: CPTII,S$GLB,, | Performed by: FAMILY MEDICINE

## 2020-08-06 PROCEDURE — 3079F DIAST BP 80-89 MM HG: CPT | Mod: CPTII,S$GLB,, | Performed by: FAMILY MEDICINE

## 2020-08-06 PROCEDURE — 99214 OFFICE O/P EST MOD 30 MIN: CPT | Mod: S$GLB,,, | Performed by: FAMILY MEDICINE

## 2020-08-06 PROCEDURE — 3074F SYST BP LT 130 MM HG: CPT | Mod: CPTII,S$GLB,, | Performed by: FAMILY MEDICINE

## 2020-08-06 PROCEDURE — 99214 PR OFFICE/OUTPT VISIT, EST, LEVL IV, 30-39 MIN: ICD-10-PCS | Mod: S$GLB,,, | Performed by: FAMILY MEDICINE

## 2020-08-06 PROCEDURE — 1101F PT FALLS ASSESS-DOCD LE1/YR: CPT | Mod: CPTII,S$GLB,, | Performed by: FAMILY MEDICINE

## 2020-08-06 PROCEDURE — 3008F BODY MASS INDEX DOCD: CPT | Mod: CPTII,S$GLB,, | Performed by: FAMILY MEDICINE

## 2020-08-06 PROCEDURE — 3079F PR MOST RECENT DIASTOLIC BLOOD PRESSURE 80-89 MM HG: ICD-10-PCS | Mod: CPTII,S$GLB,, | Performed by: FAMILY MEDICINE

## 2020-08-06 PROCEDURE — 3052F PR MOST RECENT HEMOGLOBIN A1C LEVEL 8.0 - < 9.0%: ICD-10-PCS | Mod: CPTII,S$GLB,, | Performed by: FAMILY MEDICINE

## 2020-08-06 PROCEDURE — 3052F HG A1C>EQUAL 8.0%<EQUAL 9.0%: CPT | Mod: CPTII,S$GLB,, | Performed by: FAMILY MEDICINE

## 2020-08-06 PROCEDURE — 3074F PR MOST RECENT SYSTOLIC BLOOD PRESSURE < 130 MM HG: ICD-10-PCS | Mod: CPTII,S$GLB,, | Performed by: FAMILY MEDICINE

## 2020-08-06 RX ORDER — INSULIN DEGLUDEC 200 U/ML
45 INJECTION, SOLUTION SUBCUTANEOUS NIGHTLY
Qty: 15 ML | Refills: 5 | Status: SHIPPED | OUTPATIENT
Start: 2020-08-06 | End: 2020-12-10

## 2020-08-06 RX ORDER — NORTRIPTYLINE HYDROCHLORIDE 10 MG/1
20 CAPSULE ORAL NIGHTLY
Qty: 60 CAPSULE | Refills: 0 | Status: SHIPPED | OUTPATIENT
Start: 2020-08-06 | End: 2020-08-24 | Stop reason: SDUPTHER

## 2020-08-06 RX ORDER — LANCETS
EACH MISCELLANEOUS
Qty: 300 EACH | Refills: 5 | Status: SHIPPED | OUTPATIENT
Start: 2020-08-06

## 2020-08-06 RX ORDER — INSULIN PUMP SYRINGE, 3 ML
EACH MISCELLANEOUS
Qty: 1 EACH | Refills: 0 | Status: SHIPPED | OUTPATIENT
Start: 2020-08-06 | End: 2021-08-06

## 2020-08-06 NOTE — PROGRESS NOTES
Answers for HPI/ROS submitted by the patient on 8/4/2020   Diabetes problem  Diabetes type: type 2  MedicAlert ID: No  Disease duration: 10 years  blurred vision: No  chest pain: No  fatigue: No  foot paresthesias: No  foot ulcerations: No  polydipsia: No  polyphagia: No  polyuria: Yes  visual change: No  weakness: No  weight loss: No  Symptom course: worsening  confusion: No  dizziness: No  headaches: No  hunger: No  mood changes: Yes  nervous/anxious: No  pallor: No  seizures: No  sleepiness: No  speech difficulty: Yes  sweats: No  tremors: No  blackouts: No  hospitalization: No  nocturnal hypoglycemia: No  required assistance: No  required glucagon: No  CVA: Yes  impotence: No  nephropathy: Yes  peripheral neuropathy: No  PVD: No  retinopathy: No  autonomic neuropathy: Yes  CAD risks: dyslipidemia, family history, hypertension, obesity, sedentary lifestyle, stress, tobacco exposure, diabetes mellitus  Current treatments: diet, insulin injections, oral agent (triple therapy)  Treatment compliance: most of the time  Dose schedule: at bedtime  Given by: patient  Home blood tests: 1-2 x per day  Monitoring compliance: adequate  Blood glucose trend: increasing rapidly  Weight trend: increasing steadily  Current diet: generally healthy  Meal planning: avoidance of concentrated sweets  Exercise: never  Dietitian visit: No  Eye exam current: Yes  Sees podiatrist: Yes

## 2020-08-06 NOTE — PROGRESS NOTES
Subjective:       Patient ID: Nicolas Flaherty is a 64 y.o. male.    Chief Complaint: Diabetes Care Plan Follow-up and Orders needed for diabetic supplies and catheter    64  Year old male presents for follow up on the jumpiness of his entire body. He is jumping so much that he is falling out of bed and injuring himself. He was titrated up to 2 mg of requip and it is not effective. He started this due to leg cramps. He is on nortriptyline for dysphagia/regurgitation, but I am concerned that it  may be causing EPS.   He also has been having a bloody nose. He flushed his nose with water and he states he can still feel it draining. Once the blood clots he manually removes the plug and he starts bleeding again. He states if he sneezes or blows his nose he starts bleeding.       Past Medical History:  No date: Abdominal hernia  No date: Anticoagulant long-term use  No date: Arthritis  No date: Atherosclerosis of aorta  No date: Cancer of kidney, right  No date: Colon polyp  No date: Coronary artery disease  No date: Diabetes mellitus  No date: Difficulty swallowing      Comment:  food comes back up  No date: Hypertension  No date: Kidney stone  No date: Sleep apnea  No date: Slurred speech  No date: Stroke      Comment:  MINI STROKE  No date: TIA (transient ischemic attack)  No date: Wears glasses   Past Surgical History:  No date: BACK SURGERY      Comment:  lower back  No date: CARDIAC SURGERY  07/2018: COLONOSCOPY  03/2008: CYSTOSCOPY  08/2018: ESOPHAGEAL MANOMETRY  2/19/2019: ESOPHAGEAL MANOMETRY WITH MEASUREMENT OF IMPEDANCE; N/A      Comment:  Procedure: MANOMETRY, ESOPHAGUS, WITH IMPEDANCE                MEASUREMENT;  Surgeon: Sylvia Treviño MD;  Location:                [address]);  Service: Endoscopy;  Laterality:                N/A;  to follow EGD at 1:00pm  2/19/2019: ESOPHAGOGASTRODUODENOSCOPY; N/A      Comment:  Procedure: EGD (ESOPHAGOGASTRODUODENOSCOPY);  Surgeon:                Sylvia BEAUCHAMP  MD [place];  Location: [facility] ENDO (2ND FLR);                 Service: Endoscopy;  Laterality: N/A;  per Ophelia Ames NP 2nd floor d/t comorbidities and obesity/ Per                Dr. Emanuel pt can hold Plavix 5 days prior (see media                tab for scanned approval) - sm  No date: excision right thigh mass  No date: heart stents      Comment:  stents 4 total.  2010, 2011 and 2013  No date: HERNIA REPAIR      Comment:  incisional  2015: KIDNEY STONE SURGERY  No date: LAPAROSCOPIC NEPHRECTOMY, HAND ASSISTED; Right  No date: LITHOTRIPSY  8/21/2019: REPAIR OF RECURRENT INCISIONAL HERNIA      Comment:  Procedure: REPAIR, HERNIA, INCISIONAL, RECURRENT;                 Surgeon: Jose Lewis MD;  Location: [facility];                 Service: General;;  RN PREOP 8/14/2019HAS CARDIAC                CLEARANCE---  No date: UPPER GASTROINTESTINAL ENDOSCOPY      Comment:  7/10/2018,9/24/2010  No date: VASCULAR SURGERY  Review of patient's family history indicates:  Problem: Heart disease      Relation: Father          Age of Onset: (Not Specified)  Problem: Hypertension      Relation: Mother          Age of Onset: (Not Specified)  Problem: Parkinsonism      Relation: Mother          Age of Onset: (Not Specified)  Problem: Celiac disease      Relation: Neg Hx          Age of Onset: (Not Specified)  Problem: Cirrhosis      Relation: Neg Hx          Age of Onset: (Not Specified)  Problem: Colon cancer      Relation: Neg Hx          Age of Onset: (Not Specified)  Problem: Colon polyps      Relation: Neg Hx          Age of Onset: (Not Specified)  Problem: Crohn's disease      Relation: Neg Hx          Age of Onset: (Not Specified)  Problem: Cystic fibrosis      Relation: Neg Hx          Age of Onset: (Not Specified)  Problem: Esophageal cancer      Relation: Neg Hx          Age of Onset: (Not Specified)  Problem: Hemochromatosis      Relation: Neg Hx          Age of Onset: (Not Specified)  Problem:  Inflammatory bowel disease      Relation: Neg Hx          Age of Onset: (Not Specified)  Problem: Irritable bowel syndrome      Relation: Neg Hx          Age of Onset: (Not Specified)  Problem: Liver cancer      Relation: Neg Hx          Age of Onset: (Not Specified)  Problem: Liver disease      Relation: Neg Hx          Age of Onset: (Not Specified)  Problem: Rectal cancer      Relation: Neg Hx          Age of Onset: (Not Specified)  Problem: Stomach cancer      Relation: Neg Hx          Age of Onset: (Not Specified)  Problem: Ulcerative colitis      Relation: Neg Hx          Age of Onset: (Not Specified)  Problem: Kalia's disease      Relation: Neg Hx          Age of Onset: (Not Specified)  Problem: Lymphoma      Relation: Neg Hx          Age of Onset: (Not Specified)  Problem: Tuberculosis      Relation: Neg Hx          Age of Onset: (Not Specified)  Problem: Scleroderma      Relation: Neg Hx          Age of Onset: (Not Specified)  Problem: Rheum arthritis      Relation: Neg Hx          Age of Onset: (Not Specified)  Problem: Multiple sclerosis      Relation: Neg Hx          Age of Onset: (Not Specified)  Problem: Melanoma      Relation: Neg Hx          Age of Onset: (Not Specified)  Problem: Lupus      Relation: Neg Hx          Age of Onset: (Not Specified)  Problem: Psoriasis      Relation: Neg Hx          Age of Onset: (Not Specified)  Problem: Skin cancer      Relation: Neg Hx          Age of Onset: (Not Specified)    Social History    Socioeconomic History      Marital status:       Spouse name: Not on file      Number of children: Not on file      Years of education: Not on file      Highest education level: Not on file    Occupational History      Not on file    Social Needs      Financial resource strain: Somewhat hard      Food insecurity        Worry: Never true        Inability: Patient refused      Transportation needs        Medical: No        Non-medical: No    Tobacco Use      Smoking  status: Never Smoker      Smokeless tobacco: Current User        Types: Snuff      Tobacco comment: 20 plus years    Substance and Sexual Activity      Alcohol use: Yes        Comment: occasional      Drug use: No      Sexual activity: Yes        Partners: Female    Lifestyle      Physical activity        Days per week: 0 days        Minutes per session: Not on file      Stress: Only a little    Relationships      Social connections        Talks on phone: Patient refused        Gets together: Never        Attends Spiritism service: Not on file        Active member of club or organization: No        Attends meetings of clubs or organizations: Never        Relationship status:     Other Topics      Concerns:        Not on file    Social History Narrative      Not on file        Diabetes  He has type 2 diabetes mellitus. No MedicAlert identification noted. The initial diagnosis of diabetes was made 10 years ago. Hypoglycemia symptoms include mood changes and speech difficulty. Pertinent negatives for hypoglycemia include no confusion, dizziness, headaches, hunger, nervousness/anxiousness, pallor, seizures, sleepiness, sweats or tremors. Associated symptoms include polyuria. Pertinent negatives for diabetes include no blurred vision, no chest pain, no fatigue, no foot paresthesias, no foot ulcerations, no polydipsia, no polyphagia, no visual change, no weakness and no weight loss. Pertinent negatives for hypoglycemia complications include no blackouts, no hospitalization, no nocturnal hypoglycemia, no required assistance and no required glucagon injection. Symptoms are worsening. Diabetic complications include autonomic neuropathy, a CVA and nephropathy. Pertinent negatives for diabetic complications include no impotence, peripheral neuropathy, PVD or retinopathy. Risk factors for coronary artery disease include dyslipidemia, family history, hypertension, obesity, sedentary lifestyle, stress, tobacco exposure and  "diabetes mellitus. Current diabetic treatment includes diet, insulin injections and oral agent (triple therapy). He is compliant with treatment most of the time. He is currently taking insulin at bedtime. Insulin injections are given by patient. His weight is increasing steadily. He is following a generally healthy diet. Meal planning includes avoidance of concentrated sweets. He has not had a previous visit with a dietitian. He never participates in exercise. He monitors blood glucose at home 1-2 x per day. Blood glucose monitoring compliance is adequate. His home blood glucose trend is increasing rapidly. He sees a podiatrist.Eye exam is current.     Review of Systems   Constitutional: Negative for fatigue and weight loss.   Eyes: Negative for blurred vision.   Cardiovascular: Negative for chest pain.   Endocrine: Positive for polyuria. Negative for polydipsia and polyphagia.   Genitourinary: Negative for impotence.   Integumentary:  Negative for pallor.   Neurological: Positive for speech difficulty. Negative for dizziness, tremors, seizures, weakness and headaches.   Psychiatric/Behavioral: Negative for confusion. The patient is not nervous/anxious.          Objective:       Vitals:    08/06/20 0755   BP: 110/80   Pulse: 100   Temp: 98.3 °F (36.8 °C)   TempSrc: Oral   SpO2: 98%   Weight: 122 kg (268 lb 15.4 oz)   Height: 5' 7" (1.702 m)       Physical Exam  Constitutional:       Appearance: Normal appearance. He is obese.   Musculoskeletal:      Comments: Walking with a walker   Neurological:      Mental Status: He is alert. He is disoriented.         Assessment:       1. Restless leg syndrome    2. Dysphagia, unspecified type    3. Sleep disturbance    4. Uncontrolled type 2 diabetes mellitus with stage 3 chronic kidney disease, without long-term current use of insulin    5. DM (diabetes mellitus), type 2, uncontrolled w/neurologic complication        Plan:       Nicolas was seen today for diabetes care plan " follow-up and orders needed for diabetic supplies and catheter.    Diagnoses and all orders for this visit:    Restless leg syndrome  Continue requip 2 mg right now    Dysphagia, unspecified type  -     nortriptyline (PAMELOR) 10 MG capsule; Take 2 capsules (20 mg total) by mouth every evening.  titrating down from 50 to 20 mg of amitryptiline to see if this helps with his jumpiness    Sleep disturbance  -     Ambulatory referral/consult to Sleep Disorders; Future    Uncontrolled type 2 diabetes mellitus with stage 3 chronic kidney disease, without long-term current use of insulin  -     insulin degludec (TRESIBA FLEXTOUCH U-200) 200 unit/mL (3 mL) InPn; Inject 45 Units into the skin every evening.  Increasing Tresiba from 40-45 units in the evening.  This is likely result of noncompliance with diet.  He reports following his diet, but then subsequently admits to blueberry cobbler, red beans and rice, and meatballs and spaghetti.    DM (diabetes mellitus), type 2, uncontrolled w/neurologic complication  -     insulin degludec (TRESIBA FLEXTOUCH U-200) 200 unit/mL (3 mL) InPn; Inject 45 Units into the skin every evening.  -     blood sugar diagnostic Strp; CHECK SUGAR TID  -     lancets Misc; CHECK SUGAR TID  -     blood-glucose meter kit; Use as instructed

## 2020-10-02 ENCOUNTER — PATIENT MESSAGE (OUTPATIENT)
Dept: FAMILY MEDICINE | Facility: CLINIC | Age: 65
End: 2020-10-02

## 2020-10-05 ENCOUNTER — OFFICE VISIT (OUTPATIENT)
Dept: FAMILY MEDICINE | Facility: CLINIC | Age: 65
End: 2020-10-05
Payer: MEDICARE

## 2020-10-05 VITALS
HEART RATE: 103 BPM | DIASTOLIC BLOOD PRESSURE: 80 MMHG | WEIGHT: 268.94 LBS | SYSTOLIC BLOOD PRESSURE: 138 MMHG | TEMPERATURE: 98 F | HEIGHT: 67 IN | BODY MASS INDEX: 42.21 KG/M2 | OXYGEN SATURATION: 99 %

## 2020-10-05 DIAGNOSIS — R31.29 MICROSCOPIC HEMATURIA: Primary | ICD-10-CM

## 2020-10-05 DIAGNOSIS — Z23 FLU VACCINE NEED: ICD-10-CM

## 2020-10-05 DIAGNOSIS — M62.838 MUSCLE SPASM: ICD-10-CM

## 2020-10-05 PROCEDURE — 99214 PR OFFICE/OUTPT VISIT, EST, LEVL IV, 30-39 MIN: ICD-10-PCS | Mod: 25,S$GLB,, | Performed by: FAMILY MEDICINE

## 2020-10-05 PROCEDURE — 90694 FLU VACCINE - QUADRIVALENT - ADJUVANTED: ICD-10-PCS | Mod: S$GLB,,, | Performed by: FAMILY MEDICINE

## 2020-10-05 PROCEDURE — 3008F BODY MASS INDEX DOCD: CPT | Mod: CPTII,S$GLB,, | Performed by: FAMILY MEDICINE

## 2020-10-05 PROCEDURE — G0008 ADMIN INFLUENZA VIRUS VAC: HCPCS | Mod: S$GLB,,, | Performed by: FAMILY MEDICINE

## 2020-10-05 PROCEDURE — 3008F PR BODY MASS INDEX (BMI) DOCUMENTED: ICD-10-PCS | Mod: CPTII,S$GLB,, | Performed by: FAMILY MEDICINE

## 2020-10-05 PROCEDURE — 1159F PR MEDICATION LIST DOCUMENTED IN MEDICAL RECORD: ICD-10-PCS | Mod: S$GLB,,, | Performed by: FAMILY MEDICINE

## 2020-10-05 PROCEDURE — 3079F DIAST BP 80-89 MM HG: CPT | Mod: CPTII,S$GLB,, | Performed by: FAMILY MEDICINE

## 2020-10-05 PROCEDURE — G0008 FLU VACCINE - QUADRIVALENT - ADJUVANTED: ICD-10-PCS | Mod: S$GLB,,, | Performed by: FAMILY MEDICINE

## 2020-10-05 PROCEDURE — 3075F PR MOST RECENT SYSTOLIC BLOOD PRESS GE 130-139MM HG: ICD-10-PCS | Mod: CPTII,S$GLB,, | Performed by: FAMILY MEDICINE

## 2020-10-05 PROCEDURE — 1159F MED LIST DOCD IN RCRD: CPT | Mod: S$GLB,,, | Performed by: FAMILY MEDICINE

## 2020-10-05 PROCEDURE — 99999 PR PBB SHADOW E&M-EST. PATIENT-LVL IV: ICD-10-PCS | Mod: PBBFAC,,, | Performed by: FAMILY MEDICINE

## 2020-10-05 PROCEDURE — 90694 VACC AIIV4 NO PRSRV 0.5ML IM: CPT | Mod: S$GLB,,, | Performed by: FAMILY MEDICINE

## 2020-10-05 PROCEDURE — 99214 OFFICE O/P EST MOD 30 MIN: CPT | Mod: 25,S$GLB,, | Performed by: FAMILY MEDICINE

## 2020-10-05 PROCEDURE — 1101F PT FALLS ASSESS-DOCD LE1/YR: CPT | Mod: CPTII,S$GLB,, | Performed by: FAMILY MEDICINE

## 2020-10-05 PROCEDURE — 3079F PR MOST RECENT DIASTOLIC BLOOD PRESSURE 80-89 MM HG: ICD-10-PCS | Mod: CPTII,S$GLB,, | Performed by: FAMILY MEDICINE

## 2020-10-05 PROCEDURE — 99999 PR PBB SHADOW E&M-EST. PATIENT-LVL IV: CPT | Mod: PBBFAC,,, | Performed by: FAMILY MEDICINE

## 2020-10-05 PROCEDURE — 1101F PR PT FALLS ASSESS DOC 0-1 FALLS W/OUT INJ PAST YR: ICD-10-PCS | Mod: CPTII,S$GLB,, | Performed by: FAMILY MEDICINE

## 2020-10-05 PROCEDURE — 3075F SYST BP GE 130 - 139MM HG: CPT | Mod: CPTII,S$GLB,, | Performed by: FAMILY MEDICINE

## 2020-10-05 RX ORDER — METHOCARBAMOL 500 MG/1
500 TABLET, FILM COATED ORAL 2 TIMES DAILY PRN
Qty: 60 TABLET | Refills: 0 | Status: SHIPPED | OUTPATIENT
Start: 2020-10-05 | End: 2020-10-21 | Stop reason: SDUPTHER

## 2020-10-05 NOTE — PROGRESS NOTES
Administered High Dose Flu vaccine IM to left deltoid.  Patient tolerated injection well.  Patient advised to wait in lobby for 15 minutes for observation and to report any adverse reactions immediately.  Patient verbalized understanding.
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
## 2020-10-21 ENCOUNTER — OFFICE VISIT (OUTPATIENT)
Dept: FAMILY MEDICINE | Facility: CLINIC | Age: 65
End: 2020-10-21
Payer: MEDICARE

## 2020-10-21 VITALS
SYSTOLIC BLOOD PRESSURE: 140 MMHG | TEMPERATURE: 98 F | HEIGHT: 67 IN | HEART RATE: 98 BPM | OXYGEN SATURATION: 97 % | BODY MASS INDEX: 40.69 KG/M2 | WEIGHT: 259.25 LBS | DIASTOLIC BLOOD PRESSURE: 78 MMHG

## 2020-10-21 DIAGNOSIS — R10.9 SIDE PAIN: Primary | ICD-10-CM

## 2020-10-21 DIAGNOSIS — M62.838 MUSCLE SPASM: ICD-10-CM

## 2020-10-21 DIAGNOSIS — M79.622 LEFT UPPER ARM PAIN: ICD-10-CM

## 2020-10-21 LAB
BILIRUB SERPL-MCNC: NORMAL MG/DL
BLOOD URINE, POC: NORMAL
COLOR, POC UA: YELLOW
GLUCOSE UR QL STRIP: 250
KETONES UR QL STRIP: NORMAL
LEUKOCYTE ESTERASE URINE, POC: NORMAL
NITRITE, POC UA: NORMAL
PH, POC UA: 5
PROTEIN, POC: NORMAL
SPECIFIC GRAVITY, POC UA: 1.01
UROBILINOGEN, POC UA: NORMAL

## 2020-10-21 PROCEDURE — 81001 URINALYSIS AUTO W/SCOPE: CPT | Mod: S$GLB,,, | Performed by: PHYSICIAN ASSISTANT

## 2020-10-21 PROCEDURE — 99999 PR PBB SHADOW E&M-EST. PATIENT-LVL V: ICD-10-PCS | Mod: PBBFAC,,, | Performed by: PHYSICIAN ASSISTANT

## 2020-10-21 PROCEDURE — 1159F PR MEDICATION LIST DOCUMENTED IN MEDICAL RECORD: ICD-10-PCS | Mod: S$GLB,,, | Performed by: PHYSICIAN ASSISTANT

## 2020-10-21 PROCEDURE — 1101F PT FALLS ASSESS-DOCD LE1/YR: CPT | Mod: CPTII,S$GLB,, | Performed by: PHYSICIAN ASSISTANT

## 2020-10-21 PROCEDURE — 3077F SYST BP >= 140 MM HG: CPT | Mod: CPTII,S$GLB,, | Performed by: PHYSICIAN ASSISTANT

## 2020-10-21 PROCEDURE — 3008F PR BODY MASS INDEX (BMI) DOCUMENTED: ICD-10-PCS | Mod: CPTII,S$GLB,, | Performed by: PHYSICIAN ASSISTANT

## 2020-10-21 PROCEDURE — 3077F PR MOST RECENT SYSTOLIC BLOOD PRESSURE >= 140 MM HG: ICD-10-PCS | Mod: CPTII,S$GLB,, | Performed by: PHYSICIAN ASSISTANT

## 2020-10-21 PROCEDURE — 99213 PR OFFICE/OUTPT VISIT, EST, LEVL III, 20-29 MIN: ICD-10-PCS | Mod: 25,S$GLB,, | Performed by: PHYSICIAN ASSISTANT

## 2020-10-21 PROCEDURE — 1159F MED LIST DOCD IN RCRD: CPT | Mod: S$GLB,,, | Performed by: PHYSICIAN ASSISTANT

## 2020-10-21 PROCEDURE — 3008F BODY MASS INDEX DOCD: CPT | Mod: CPTII,S$GLB,, | Performed by: PHYSICIAN ASSISTANT

## 2020-10-21 PROCEDURE — 3078F DIAST BP <80 MM HG: CPT | Mod: CPTII,S$GLB,, | Performed by: PHYSICIAN ASSISTANT

## 2020-10-21 PROCEDURE — 81001 POCT URINALYSIS, DIPSTICK OR TABLET REAGENT, AUTOMATED, WITH MICROSCOP: ICD-10-PCS | Mod: S$GLB,,, | Performed by: PHYSICIAN ASSISTANT

## 2020-10-21 PROCEDURE — 1125F PR PAIN SEVERITY QUANTIFIED, PAIN PRESENT: ICD-10-PCS | Mod: S$GLB,,, | Performed by: PHYSICIAN ASSISTANT

## 2020-10-21 PROCEDURE — 99213 OFFICE O/P EST LOW 20 MIN: CPT | Mod: 25,S$GLB,, | Performed by: PHYSICIAN ASSISTANT

## 2020-10-21 PROCEDURE — 3078F PR MOST RECENT DIASTOLIC BLOOD PRESSURE < 80 MM HG: ICD-10-PCS | Mod: CPTII,S$GLB,, | Performed by: PHYSICIAN ASSISTANT

## 2020-10-21 PROCEDURE — 1125F AMNT PAIN NOTED PAIN PRSNT: CPT | Mod: S$GLB,,, | Performed by: PHYSICIAN ASSISTANT

## 2020-10-21 PROCEDURE — 1101F PR PT FALLS ASSESS DOC 0-1 FALLS W/OUT INJ PAST YR: ICD-10-PCS | Mod: CPTII,S$GLB,, | Performed by: PHYSICIAN ASSISTANT

## 2020-10-21 PROCEDURE — 99999 PR PBB SHADOW E&M-EST. PATIENT-LVL V: CPT | Mod: PBBFAC,,, | Performed by: PHYSICIAN ASSISTANT

## 2020-10-21 RX ORDER — METHOCARBAMOL 500 MG/1
500 TABLET, FILM COATED ORAL 2 TIMES DAILY PRN
Qty: 60 TABLET | Refills: 0 | Status: SHIPPED | OUTPATIENT
Start: 2020-10-21 | End: 2020-11-20

## 2020-10-21 RX ORDER — NORTRIPTYLINE HYDROCHLORIDE 50 MG/1
20 CAPSULE ORAL
COMMUNITY
Start: 2020-03-13 | End: 2021-02-24

## 2020-10-21 RX ORDER — DICLOFENAC SODIUM 10 MG/G
2 GEL TOPICAL 4 TIMES DAILY
Qty: 100 G | Refills: 0 | Status: SHIPPED | OUTPATIENT
Start: 2020-10-21

## 2020-10-21 NOTE — PROGRESS NOTES
Subjective:       Patient ID: Nicolas Flaherty is a 65 y.o. male with multiple medical diagnoses as listed in the medical history and problem list that presents for Flank Pain and Arm Pain (Left)  .    Chief Complaint: Flank Pain and Arm Pain (Left)      Arm Pain   The incident occurred 5 to 7 days ago. The incident occurred at home. There was no injury mechanism. The pain is present in the upper left arm. The quality of the pain is described as aching. The pain does not radiate. The pain is at a severity of 5/10. Pertinent negatives include no numbness or tingling. The symptoms are aggravated by palpation, lifting and movement. He has tried nothing (has robaxin for back spasm ) for the symptoms.   Pain  This is a new problem. The current episode started in the past 7 days. The problem occurs intermittently (worse at night ). The problem has been unchanged. Pertinent negatives include no chills, nausea or numbness. Exacerbated by: cpap on on right side at night and so he has to sleep on his right or back bc if he turns to left it will pull his mask off  He has tried position changes (robaxin ) for the symptoms. The treatment provided mild relief.     Review of Systems   Constitutional: Negative for chills.   Gastrointestinal: Negative for nausea.   Neurological: Negative for tingling and numbness.         PAST MEDICAL HISTORY:  Past Medical History:   Diagnosis Date    Abdominal hernia     Anticoagulant long-term use     Arthritis     Atherosclerosis of aorta     Cancer of kidney, right     Colon polyp     Coronary artery disease     Diabetes mellitus     Difficulty swallowing     food comes back up    Hypertension     Kidney stone     Sleep apnea     Slurred speech     Stroke     MINI STROKE    TIA (transient ischemic attack)     Wears glasses        SOCIAL HISTORY:  Social History     Socioeconomic History    Marital status:      Spouse name: Not on file    Number of children: Not  on file    Years of education: Not on file    Highest education level: Not on file   Occupational History    Not on file   Social Needs    Financial resource strain: Somewhat hard    Food insecurity     Worry: Never true     Inability: Patient refused    Transportation needs     Medical: No     Non-medical: No   Tobacco Use    Smoking status: Never Smoker    Smokeless tobacco: Current User     Types: Snuff    Tobacco comment: 20 plus years   Substance and Sexual Activity    Alcohol use: Yes     Comment: occasional    Drug use: No    Sexual activity: Yes     Partners: Female   Lifestyle    Physical activity     Days per week: 0 days     Minutes per session: Not on file    Stress: Only a little   Relationships    Social connections     Talks on phone: Patient refused     Gets together: Never     Attends Catholic service: Not on file     Active member of club or organization: No     Attends meetings of clubs or organizations: Never     Relationship status:    Other Topics Concern    Not on file   Social History Narrative    Not on file       ALLERGIES AND MEDICATIONS: updated and reviewed.  Review of patient's allergies indicates:  No Known Allergies  Current Outpatient Medications   Medication Sig Dispense Refill    allopurinoL (ZYLOPRIM) 100 MG tablet Take 1 tablet (100 mg total) by mouth once daily. 90 tablet 1    aspirin (ECOTRIN) 81 MG EC tablet Take 81 mg by mouth once daily. LAST TAKEN 3/2/16      blood sugar diagnostic Strp CHECK SUGAR  strip 5    blood-glucose meter kit Use as instructed 1 each 0    clopidogrel (PLAVIX) 75 mg tablet Take 75 mg by mouth every morning. LAST DOSE 3/2/16      diltiaZEM (CARDIZEM) 60 MG tablet TAKE 1 TABLET BY MOUTH  EVERY 8 HOURS 270 tablet 3    docusate sodium (STOOL SOFTENER) 100 MG capsule Take 1 capsule (100 mg total) by mouth 2 (two) times daily. 180 capsule 3    dulaglutide (TRULICITY) 1.5 mg/0.5 mL Frank Inject 1.5 mg into the skin  "every 7 days. 6 mL 3    finasteride (PROSCAR) 5 mg tablet Take 1 tablet (5 mg total) by mouth every morning. 90 tablet 3    furosemide (LASIX) 40 MG tablet TAKE 1 TABLET BY MOUTH  EVERY OTHER DAY ALTERNATING WITH 2 TABLETS EVERY OTHER   tablet 3    glimepiride (AMARYL) 4 MG tablet TAKE 1 TABLET BY MOUTH  TWICE DAILY 180 tablet 3    insulin degludec (TRESIBA FLEXTOUCH U-200) 200 unit/mL (3 mL) InPn Inject 45 Units into the skin every evening. 15 mL 5    lancets Misc CHECK SUGAR  each 5    losartan (COZAAR) 50 MG tablet TAKE 1 TABLET BY MOUTH ONCE DAILY 90 tablet 1    methocarbamoL (ROBAXIN) 500 MG Tab Take 1 tablet (500 mg total) by mouth 2 (two) times daily as needed. 60 tablet 0    metoprolol succinate (TOPROL-XL) 100 MG 24 hr tablet TAKE 1 TABLET BY MOUTH IN  THE MORNING 90 tablet 3    nateglinide (STARLIX) 60 MG tablet TAKE 1 TABLET BY MOUTH  TWICE DAILY BEFORE MEALS 180 tablet 3    NITROSTAT 0.4 mg SL tablet Place 0.4 mg under the tongue every 5 (five) minutes as needed.       nortriptyline (PAMELOR) 10 MG capsule Take 2 capsules (20 mg total) by mouth every evening. 180 capsule 3    nortriptyline (PAMELOR) 50 MG capsule 20 mg.      pen needle, diabetic 31 gauge x 5/16" Ndle USE 1 INJECTION PER DAY 90 each 3    rOPINIRole (REQUIP) 1 MG tablet Take 1 tablet (1 mg total) by mouth daily as needed (restless leg for naps during the day). 90 tablet 3    rOPINIRole (REQUIP) 2 MG tablet Take 1 tablet (2 mg total) by mouth every evening. 90 tablet 3    rosuvastatin (CRESTOR) 40 MG Tab Take 1 tablet (40 mg total) by mouth every evening. 90 tablet 3    SITagliptin (JANUVIA) 50 MG Tab Take 1 tablet (50 mg total) by mouth once daily. 90 tablet 3    tamsulosin (FLOMAX) 0.4 mg Cap Take 1 capsule (0.4 mg total) by mouth once daily. 90 capsule 3    TRAVATAN Z 0.004 % Drop 1 drop every evening.       diclofenac sodium (VOLTAREN) 1 % Gel Apply 2 g topically 4 (four) times daily. 100 g 0     No " "current facility-administered medications for this visit.          Objective:   BP (!) 140/78 (BP Location: Left arm, Patient Position: Sitting, BP Method: Large (Manual))   Pulse 98   Temp 98.3 °F (36.8 °C) (Oral)   Ht 5' 7" (1.702 m)   Wt 117.6 kg (259 lb 4.2 oz)   SpO2 97%   BMI 40.61 kg/m²      Physical Exam  Constitutional:       Comments: walker   HENT:      Head: Normocephalic and atraumatic.   Musculoskeletal:        Back:       Left upper arm: He exhibits tenderness. He exhibits no bony tenderness, no swelling, no edema, no deformity and no laceration.      Comments: Back and arm with normal range of motion    Neurological:      Mental Status: He is alert and oriented to person, place, and time.             Assessment:       1. Side pain    2. Left upper arm pain    3. Muscle spasm        Plan:       Side pain  -     diclofenac sodium (VOLTAREN) 1 % Gel; Apply 2 g topically 4 (four) times daily.  Dispense: 100 g; Refill: 0  -     POCT URINE DIPSTICK WITH MICROSCOPE, AUTOMATED    Left upper arm pain  -     diclofenac sodium (VOLTAREN) 1 % Gel; Apply 2 g topically 4 (four) times daily.  Dispense: 100 g; Refill: 0    Muscle spasm  -     methocarbamoL (ROBAXIN) 500 MG Tab; Take 1 tablet (500 mg total) by mouth 2 (two) times daily as needed.  Dispense: 60 tablet; Refill: 0            No follow-ups on file.    "

## 2020-10-27 RX ORDER — PEN NEEDLE, DIABETIC 30 GX3/16"
NEEDLE, DISPOSABLE MISCELLANEOUS
Qty: 90 EACH | Refills: 3 | Status: SHIPPED | OUTPATIENT
Start: 2020-10-27

## 2020-10-27 NOTE — TELEPHONE ENCOUNTER
"----- Message from Shyann Olmstead sent at 10/26/2020  2:26 PM CDT -----  Contact: leonard Galeas [phone]  Type: RX Refill Request    Who Called: Leonard Galeas    Have you contacted your pharmacy: No    Refill or New Rx: Refill    RX Name and Strength: pen needle, diabetic 31 gauge x 5/16" Ndle    Is this a 30 day or 90 day RX: 90 day    Preferred Pharmacy with phone number: .    [facility] - [address]  [address]  [facility] [address]  Phone: [phone] Fax: [phone]    Local or Mail Order: Mail Order    Would the patient rather a call back or a response via [facility]? Call back    Best Call Back Number: [phone]          "

## 2020-10-30 ENCOUNTER — PATIENT MESSAGE (OUTPATIENT)
Dept: FAMILY MEDICINE | Facility: CLINIC | Age: 65
End: 2020-10-30

## 2020-11-05 ENCOUNTER — TELEPHONE (OUTPATIENT)
Dept: PULMONOLOGY | Facility: CLINIC | Age: 65
End: 2020-11-05

## 2020-11-05 NOTE — TELEPHONE ENCOUNTER
----- Message from [place] sent at 11/5/2020 10:05 AM CST -----  Regarding: self  Type: Patient Call Back    Who called: self    What is the request in detail: please call the patient about his appointment    Can the clinic reply by [facility]? no    Would the patient rather a call back or a response via [facility]? Call     Best call back number:[phone]

## 2020-11-09 ENCOUNTER — TELEPHONE (OUTPATIENT)
Dept: PULMONOLOGY | Facility: CLINIC | Age: 65
End: 2020-11-09

## 2020-11-09 NOTE — TELEPHONE ENCOUNTER
----- Message from Tresa Ricardo sent at 11/6/2020  1:40 PM CST -----  Type: Patient Call Back       What is the request in detail:  pt return call. Please call 2x if pt does not answer      Can the clinic reply by [facility]? No       Would the patient rather a call back or a response via [facility]? Call back       Best call back number: [phone] (home)      Thank you.

## 2020-11-11 ENCOUNTER — PATIENT MESSAGE (OUTPATIENT)
Dept: FAMILY MEDICINE | Facility: CLINIC | Age: 65
End: 2020-11-11

## 2020-11-23 ENCOUNTER — TELEPHONE (OUTPATIENT)
Dept: FAMILY MEDICINE | Facility: CLINIC | Age: 65
End: 2020-11-23

## 2020-11-23 NOTE — TELEPHONE ENCOUNTER
----- Message from Nissa Read sent at 11/23/2020 10:30 AM CST -----  Contact: LEVI MARTINS [[id number]]  Type: Patient Call Back    Who called:LEVI MARTINS [[id number]]    What is the request in detail: Patient is requesting a call back. LEVI MARTINS [[id number]] states he pulled a muscle on his right side and would like to know if he can be seen before 11/30/2020  Please advise.    Can the clinic reply by [facility]? No    Best call back number: ..[phone]    Additional Information: N/A

## 2020-11-27 DIAGNOSIS — E11.9 TYPE 2 DIABETES MELLITUS WITHOUT COMPLICATION, UNSPECIFIED WHETHER LONG TERM INSULIN USE: ICD-10-CM

## 2020-11-30 ENCOUNTER — OFFICE VISIT (OUTPATIENT)
Dept: FAMILY MEDICINE | Facility: CLINIC | Age: 65
End: 2020-11-30
Payer: MEDICARE

## 2020-11-30 VITALS
HEIGHT: 67 IN | OXYGEN SATURATION: 98 % | HEART RATE: 88 BPM | DIASTOLIC BLOOD PRESSURE: 86 MMHG | SYSTOLIC BLOOD PRESSURE: 126 MMHG | BODY MASS INDEX: 42.56 KG/M2 | WEIGHT: 271.19 LBS

## 2020-11-30 DIAGNOSIS — M54.41 ACUTE RIGHT-SIDED LOW BACK PAIN WITH RIGHT-SIDED SCIATICA: ICD-10-CM

## 2020-11-30 DIAGNOSIS — M62.838 MUSCLE SPASM: Primary | ICD-10-CM

## 2020-11-30 PROCEDURE — 99214 PR OFFICE/OUTPT VISIT, EST, LEVL IV, 30-39 MIN: ICD-10-PCS | Mod: S$GLB,,, | Performed by: FAMILY MEDICINE

## 2020-11-30 PROCEDURE — 3008F BODY MASS INDEX DOCD: CPT | Mod: CPTII,S$GLB,, | Performed by: FAMILY MEDICINE

## 2020-11-30 PROCEDURE — 3074F SYST BP LT 130 MM HG: CPT | Mod: CPTII,S$GLB,, | Performed by: FAMILY MEDICINE

## 2020-11-30 PROCEDURE — 3288F FALL RISK ASSESSMENT DOCD: CPT | Mod: CPTII,S$GLB,, | Performed by: FAMILY MEDICINE

## 2020-11-30 PROCEDURE — 3079F PR MOST RECENT DIASTOLIC BLOOD PRESSURE 80-89 MM HG: ICD-10-PCS | Mod: CPTII,S$GLB,, | Performed by: FAMILY MEDICINE

## 2020-11-30 PROCEDURE — 3008F PR BODY MASS INDEX (BMI) DOCUMENTED: ICD-10-PCS | Mod: CPTII,S$GLB,, | Performed by: FAMILY MEDICINE

## 2020-11-30 PROCEDURE — 1125F PR PAIN SEVERITY QUANTIFIED, PAIN PRESENT: ICD-10-PCS | Mod: S$GLB,,, | Performed by: FAMILY MEDICINE

## 2020-11-30 PROCEDURE — 1101F PR PT FALLS ASSESS DOC 0-1 FALLS W/OUT INJ PAST YR: ICD-10-PCS | Mod: CPTII,S$GLB,, | Performed by: FAMILY MEDICINE

## 2020-11-30 PROCEDURE — 3074F PR MOST RECENT SYSTOLIC BLOOD PRESSURE < 130 MM HG: ICD-10-PCS | Mod: CPTII,S$GLB,, | Performed by: FAMILY MEDICINE

## 2020-11-30 PROCEDURE — 3288F PR FALLS RISK ASSESSMENT DOCUMENTED: ICD-10-PCS | Mod: CPTII,S$GLB,, | Performed by: FAMILY MEDICINE

## 2020-11-30 PROCEDURE — 1125F AMNT PAIN NOTED PAIN PRSNT: CPT | Mod: S$GLB,,, | Performed by: FAMILY MEDICINE

## 2020-11-30 PROCEDURE — 1159F MED LIST DOCD IN RCRD: CPT | Mod: S$GLB,,, | Performed by: FAMILY MEDICINE

## 2020-11-30 PROCEDURE — 1101F PT FALLS ASSESS-DOCD LE1/YR: CPT | Mod: CPTII,S$GLB,, | Performed by: FAMILY MEDICINE

## 2020-11-30 PROCEDURE — 3079F DIAST BP 80-89 MM HG: CPT | Mod: CPTII,S$GLB,, | Performed by: FAMILY MEDICINE

## 2020-11-30 PROCEDURE — 1159F PR MEDICATION LIST DOCUMENTED IN MEDICAL RECORD: ICD-10-PCS | Mod: S$GLB,,, | Performed by: FAMILY MEDICINE

## 2020-11-30 PROCEDURE — 99999 PR PBB SHADOW E&M-EST. PATIENT-LVL IV: CPT | Mod: PBBFAC,,, | Performed by: FAMILY MEDICINE

## 2020-11-30 PROCEDURE — 99999 PR PBB SHADOW E&M-EST. PATIENT-LVL IV: ICD-10-PCS | Mod: PBBFAC,,, | Performed by: FAMILY MEDICINE

## 2020-11-30 PROCEDURE — 99214 OFFICE O/P EST MOD 30 MIN: CPT | Mod: S$GLB,,, | Performed by: FAMILY MEDICINE

## 2020-11-30 RX ORDER — METHOCARBAMOL 500 MG/1
500 TABLET, FILM COATED ORAL 2 TIMES DAILY
Qty: 20 TABLET | Refills: 0 | Status: SHIPPED | OUTPATIENT
Start: 2020-11-30 | End: 2020-12-10

## 2020-11-30 RX ORDER — METHYLPREDNISOLONE 4 MG/1
TABLET ORAL
Qty: 1 PACKAGE | Refills: 0 | Status: SHIPPED | OUTPATIENT
Start: 2020-11-30 | End: 2021-02-01

## 2020-11-30 RX ORDER — METHYLPREDNISOLONE 4 MG/1
TABLET ORAL
Qty: 1 PACKAGE | Refills: 0 | Status: SHIPPED | OUTPATIENT
Start: 2020-11-30 | End: 2020-11-30 | Stop reason: SDUPTHER

## 2020-11-30 NOTE — PROGRESS NOTES
"Subjective:       Patient ID: Nicolas Flaherty is a 65 y.o. male.    Chief Complaint: Back Pain (right side)    65 year old male with right lower back pain. He state she has been  Having spasms. He has pain that radiates to his right thigh. He state he has sen 2 providers and was given robaxin and tramadol. Neither have helped. He had normal xrays.it can be a dull pain. It is worse with standing up and going to sit back down.     Review of Systems      Objective:       Vitals:    11/30/20 0849   BP: 126/86   Pulse: 88   SpO2: 98%   Weight: 123 kg (271 lb 2.7 oz)   Height: 5' 7" (1.702 m)       Physical Exam  Vitals signs reviewed.   Musculoskeletal:      Lumbar back: He exhibits decreased range of motion, tenderness, pain and spasm. He exhibits no bony tenderness, no deformity and no laceration.         Assessment:       1. Muscle spasm    2. Acute right-sided low back pain with right-sided sciatica        Plan:       Nicolas was seen today for back pain (right side).    Diagnoses and all orders for this visit:    Muscle spasm  -     methocarbamoL (ROBAXIN) 500 MG Tab; Take 1 tablet (500 mg total) by mouth 2 (two) times a day. for 10 days    Acute right-sided low back pain with right-sided sciatica  -     Discontinue: methylPREDNISolone (MEDROL DOSEPACK) 4 mg tablet; use as directed  -     methylPREDNISolone (MEDROL DOSEPACK) 4 mg tablet; use as directed  -     methocarbamoL (ROBAXIN) 500 MG Tab; Take 1 tablet (500 mg total) by mouth 2 (two) times a day. for 10 days           "

## 2020-12-08 ENCOUNTER — PATIENT OUTREACH (OUTPATIENT)
Dept: ADMINISTRATIVE | Facility: OTHER | Age: 65
End: 2020-12-08

## 2020-12-08 ENCOUNTER — PATIENT MESSAGE (OUTPATIENT)
Dept: FAMILY MEDICINE | Facility: CLINIC | Age: 65
End: 2020-12-08

## 2020-12-08 RX ORDER — TRAMADOL HYDROCHLORIDE 50 MG/1
50 TABLET ORAL EVERY 12 HOURS PRN
Qty: 20 TABLET | Refills: 0 | Status: SHIPPED | OUTPATIENT
Start: 2020-12-08 | End: 2021-01-08 | Stop reason: SDUPTHER

## 2020-12-08 NOTE — PROGRESS NOTES
Health Maintenance Due   Topic Date Due    TETANUS VACCINE  09/09/1973    Shingles Vaccine (1 of 2) 09/09/2004    Abdominal Aortic Aneurysm Screening  09/09/2019    Foot Exam  05/16/2020    Eye Exam  11/18/2020     Updates were requested from care everywhere.  Chart was reviewed for overdue Proactive [facility] Encounters ([facility]) topics (CRS, Breast Cancer Screening, Eye exam)  Health Maintenance has been updated.  LINKS immunization registry triggered.  Immunizations were reconciled.

## 2020-12-10 ENCOUNTER — HOSPITAL ENCOUNTER (OUTPATIENT)
Dept: RADIOLOGY | Facility: HOSPITAL | Age: 65
Discharge: HOME | End: 2020-12-10
Attending: INTERNAL MEDICINE
Payer: MEDICARE

## 2020-12-10 DIAGNOSIS — R91.1 PULMONARY NODULE: ICD-10-CM

## 2020-12-10 DIAGNOSIS — R91.1 LUNG NODULE: ICD-10-CM

## 2020-12-10 PROCEDURE — 71250 CT THORAX DX C-: CPT | Mod: TC

## 2020-12-10 PROCEDURE — 71250 CT CHEST WITHOUT CONTRAST: ICD-10-PCS | Mod: 26,,, | Performed by: RADIOLOGY

## 2020-12-10 PROCEDURE — 71250 CT THORAX DX C-: CPT | Mod: 26,,, | Performed by: RADIOLOGY

## 2020-12-11 ENCOUNTER — PATIENT MESSAGE (OUTPATIENT)
Dept: FAMILY MEDICINE | Facility: CLINIC | Age: 65
End: 2020-12-11

## 2020-12-11 DIAGNOSIS — R93.89 ABNORMAL CT OF THE CHEST: Primary | ICD-10-CM

## 2020-12-11 DIAGNOSIS — R91.1 PULMONARY NODULE: ICD-10-CM

## 2020-12-11 NOTE — TELEPHONE ENCOUNTER
discussed implications  Reassuring not fracturing.   rda calcium and vit D  Follow over time  discussed indications for offering therapy       Patient notify

## 2020-12-16 ENCOUNTER — PATIENT MESSAGE (OUTPATIENT)
Dept: FAMILY MEDICINE | Facility: CLINIC | Age: 65
End: 2020-12-16

## 2020-12-22 ENCOUNTER — TELEPHONE (OUTPATIENT)
Dept: PULMONOLOGY | Facility: CLINIC | Age: 65
End: 2020-12-22

## 2020-12-22 DIAGNOSIS — R91.1 SOLITARY PULMONARY NODULE: ICD-10-CM

## 2020-12-22 NOTE — TELEPHONE ENCOUNTER
Please advise patient that repeat ct of chest is unchanged when compared to prior ct 11/18/19 and 4/23/19.  I am recommending a repeat ct in 1 year.  If patient would like a more detailed discussion, please schedule clinic appointment.      Otherwise, please schedule ct in 1 year (12/21)

## 2020-12-30 ENCOUNTER — PATIENT MESSAGE (OUTPATIENT)
Dept: FAMILY MEDICINE | Facility: CLINIC | Age: 65
End: 2020-12-30

## 2020-12-30 ENCOUNTER — TELEPHONE (OUTPATIENT)
Dept: FAMILY MEDICINE | Facility: CLINIC | Age: 65
End: 2020-12-30

## 2020-12-31 ENCOUNTER — PATIENT MESSAGE (OUTPATIENT)
Dept: FAMILY MEDICINE | Facility: CLINIC | Age: 65
End: 2020-12-31

## 2021-01-05 ENCOUNTER — PATIENT MESSAGE (OUTPATIENT)
Dept: ADMINISTRATIVE | Facility: HOSPITAL | Age: 66
End: 2021-01-05

## 2021-01-06 ENCOUNTER — TELEPHONE (OUTPATIENT)
Dept: FAMILY MEDICINE | Facility: CLINIC | Age: 66
End: 2021-01-06

## 2021-01-07 LAB
ALBUMIN SERPL-MCNC: 4.4 G/DL (ref 3.6–5.1)
ALBUMIN/GLOB SERPL: 1.7 (CALC) (ref 1–2.5)
ALP SERPL-CCNC: 67 U/L (ref 35–144)
ALT SERPL-CCNC: 9 U/L (ref 9–46)
AST SERPL-CCNC: 14 U/L (ref 10–35)
BASOPHILS # BLD AUTO: 141 CELLS/UL (ref 0–200)
BASOPHILS NFR BLD AUTO: 1.7 %
BILIRUB SERPL-MCNC: 0.4 MG/DL (ref 0.2–1.2)
BUN SERPL-MCNC: 38 MG/DL (ref 7–25)
BUN/CREAT SERPL: 20 (CALC) (ref 6–22)
CALCIUM SERPL-MCNC: 9.9 MG/DL (ref 8.6–10.3)
CHLORIDE SERPL-SCNC: 103 MMOL/L (ref 98–110)
CHOLEST SERPL-MCNC: 109 MG/DL
CHOLEST/HDLC SERPL: 4.7 (CALC)
CO2 SERPL-SCNC: 27 MMOL/L (ref 20–32)
CREAT SERPL-MCNC: 1.94 MG/DL (ref 0.7–1.25)
EOSINOPHIL # BLD AUTO: 598 CELLS/UL (ref 15–500)
EOSINOPHIL NFR BLD AUTO: 7.2 %
ERYTHROCYTE [DISTWIDTH] IN BLOOD BY AUTOMATED COUNT: 15 % (ref 11–15)
GFRSERPLBLD MDRD-ARVRAT: 35 ML/MIN/1.73M2
GLOBULIN SER CALC-MCNC: 2.6 G/DL (CALC) (ref 1.9–3.7)
GLUCOSE SERPL-MCNC: 121 MG/DL (ref 65–99)
HBA1C MFR BLD: 7.8 % OF TOTAL HGB
HCT VFR BLD AUTO: 40.6 % (ref 38.5–50)
HDLC SERPL-MCNC: 23 MG/DL
HGB BLD-MCNC: 13.5 G/DL (ref 13.2–17.1)
LDLC SERPL CALC-MCNC: ABNORMAL MG/DL (CALC)
LYMPHOCYTES # BLD AUTO: 1486 CELLS/UL (ref 850–3900)
LYMPHOCYTES NFR BLD AUTO: 17.9 %
MCH RBC QN AUTO: 27.1 PG (ref 27–33)
MCHC RBC AUTO-ENTMCNC: 33.3 G/DL (ref 32–36)
MCV RBC AUTO: 81.5 FL (ref 80–100)
MONOCYTES # BLD AUTO: 548 CELLS/UL (ref 200–950)
MONOCYTES NFR BLD AUTO: 6.6 %
NEUTROPHILS # BLD AUTO: 5528 CELLS/UL (ref 1500–7800)
NEUTROPHILS NFR BLD AUTO: 66.6 %
NONHDLC SERPL-MCNC: 86 MG/DL (CALC)
PLATELET # BLD AUTO: 244 THOUSAND/UL (ref 140–400)
PMV BLD REES-ECKER: 11.6 FL (ref 7.5–12.5)
POTASSIUM SERPL-SCNC: 4.9 MMOL/L (ref 3.5–5.3)
PROT SERPL-MCNC: 7 G/DL (ref 6.1–8.1)
RBC # BLD AUTO: 4.98 MILLION/UL (ref 4.2–5.8)
SODIUM SERPL-SCNC: 139 MMOL/L (ref 135–146)
TRIGL SERPL-MCNC: 442 MG/DL
WBC # BLD AUTO: 8.3 THOUSAND/UL (ref 3.8–10.8)

## 2021-01-08 ENCOUNTER — PATIENT MESSAGE (OUTPATIENT)
Dept: FAMILY MEDICINE | Facility: CLINIC | Age: 66
End: 2021-01-08

## 2021-01-08 RX ORDER — TRAMADOL HYDROCHLORIDE 50 MG/1
50 TABLET ORAL EVERY 12 HOURS PRN
Qty: 20 TABLET | Refills: 0 | Status: SHIPPED | OUTPATIENT
Start: 2021-01-08 | End: 2021-01-21 | Stop reason: SDUPTHER

## 2021-01-11 ENCOUNTER — TELEPHONE (OUTPATIENT)
Dept: FAMILY MEDICINE | Facility: CLINIC | Age: 66
End: 2021-01-11

## 2021-01-11 RX ORDER — INSULIN DEGLUDEC 200 U/ML
58 INJECTION, SOLUTION SUBCUTANEOUS NIGHTLY
Qty: 27 ML | Refills: 3 | Status: SHIPPED | OUTPATIENT
Start: 2021-01-11

## 2021-01-20 ENCOUNTER — PATIENT MESSAGE (OUTPATIENT)
Dept: FAMILY MEDICINE | Facility: CLINIC | Age: 66
End: 2021-01-20

## 2021-01-21 ENCOUNTER — PATIENT MESSAGE (OUTPATIENT)
Dept: FAMILY MEDICINE | Facility: CLINIC | Age: 66
End: 2021-01-21

## 2021-01-21 RX ORDER — DULAGLUTIDE 1.5 MG/.5ML
INJECTION, SOLUTION SUBCUTANEOUS
Qty: 6 ML | Refills: 3 | Status: SHIPPED | OUTPATIENT
Start: 2021-01-21

## 2021-01-22 RX ORDER — TRAMADOL HYDROCHLORIDE 50 MG/1
50 TABLET ORAL EVERY 12 HOURS PRN
Qty: 20 TABLET | Refills: 0 | Status: SHIPPED | OUTPATIENT
Start: 2021-01-22 | End: 2021-02-17 | Stop reason: SDUPTHER

## 2021-02-01 ENCOUNTER — OFFICE VISIT (OUTPATIENT)
Dept: UROLOGY | Facility: CLINIC | Age: 66
End: 2021-02-01
Payer: MEDICARE

## 2021-02-01 VITALS
BODY MASS INDEX: 42.53 KG/M2 | DIASTOLIC BLOOD PRESSURE: 80 MMHG | SYSTOLIC BLOOD PRESSURE: 130 MMHG | WEIGHT: 271 LBS | HEIGHT: 67 IN

## 2021-02-01 DIAGNOSIS — C64.1 RENAL CELL CARCINOMA OF RIGHT KIDNEY: Primary | ICD-10-CM

## 2021-02-01 DIAGNOSIS — R82.90 MALODOROUS URINE: ICD-10-CM

## 2021-02-01 DIAGNOSIS — R31.0 GROSS HEMATURIA: ICD-10-CM

## 2021-02-01 DIAGNOSIS — R91.8 PULMONARY NODULES: ICD-10-CM

## 2021-02-01 PROCEDURE — 3079F DIAST BP 80-89 MM HG: CPT | Mod: CPTII,S$GLB,, | Performed by: UROLOGY

## 2021-02-01 PROCEDURE — 3075F PR MOST RECENT SYSTOLIC BLOOD PRESS GE 130-139MM HG: ICD-10-PCS | Mod: CPTII,S$GLB,, | Performed by: UROLOGY

## 2021-02-01 PROCEDURE — 3008F PR BODY MASS INDEX (BMI) DOCUMENTED: ICD-10-PCS | Mod: CPTII,S$GLB,, | Performed by: UROLOGY

## 2021-02-01 PROCEDURE — 3288F PR FALLS RISK ASSESSMENT DOCUMENTED: ICD-10-PCS | Mod: CPTII,S$GLB,, | Performed by: UROLOGY

## 2021-02-01 PROCEDURE — 3075F SYST BP GE 130 - 139MM HG: CPT | Mod: CPTII,S$GLB,, | Performed by: UROLOGY

## 2021-02-01 PROCEDURE — 1126F AMNT PAIN NOTED NONE PRSNT: CPT | Mod: S$GLB,,, | Performed by: UROLOGY

## 2021-02-01 PROCEDURE — 1159F PR MEDICATION LIST DOCUMENTED IN MEDICAL RECORD: ICD-10-PCS | Mod: S$GLB,,, | Performed by: UROLOGY

## 2021-02-01 PROCEDURE — 1101F PT FALLS ASSESS-DOCD LE1/YR: CPT | Mod: CPTII,S$GLB,, | Performed by: UROLOGY

## 2021-02-01 PROCEDURE — 1101F PR PT FALLS ASSESS DOC 0-1 FALLS W/OUT INJ PAST YR: ICD-10-PCS | Mod: CPTII,S$GLB,, | Performed by: UROLOGY

## 2021-02-01 PROCEDURE — 3288F FALL RISK ASSESSMENT DOCD: CPT | Mod: CPTII,S$GLB,, | Performed by: UROLOGY

## 2021-02-01 PROCEDURE — 1126F PR PAIN SEVERITY QUANTIFIED, NO PAIN PRESENT: ICD-10-PCS | Mod: S$GLB,,, | Performed by: UROLOGY

## 2021-02-01 PROCEDURE — 3008F BODY MASS INDEX DOCD: CPT | Mod: CPTII,S$GLB,, | Performed by: UROLOGY

## 2021-02-01 PROCEDURE — 99213 OFFICE O/P EST LOW 20 MIN: CPT | Mod: S$GLB,,, | Performed by: UROLOGY

## 2021-02-01 PROCEDURE — 99999 PR PBB SHADOW E&M-EST. PATIENT-LVL III: CPT | Mod: PBBFAC,,, | Performed by: UROLOGY

## 2021-02-01 PROCEDURE — 87086 URINE CULTURE/COLONY COUNT: CPT

## 2021-02-01 PROCEDURE — 99999 PR PBB SHADOW E&M-EST. PATIENT-LVL III: ICD-10-PCS | Mod: PBBFAC,,, | Performed by: UROLOGY

## 2021-02-01 PROCEDURE — 3079F PR MOST RECENT DIASTOLIC BLOOD PRESSURE 80-89 MM HG: ICD-10-PCS | Mod: CPTII,S$GLB,, | Performed by: UROLOGY

## 2021-02-01 PROCEDURE — 1159F MED LIST DOCD IN RCRD: CPT | Mod: S$GLB,,, | Performed by: UROLOGY

## 2021-02-01 PROCEDURE — 99213 PR OFFICE/OUTPT VISIT, EST, LEVL III, 20-29 MIN: ICD-10-PCS | Mod: S$GLB,,, | Performed by: UROLOGY

## 2021-02-03 ENCOUNTER — TELEPHONE (OUTPATIENT)
Dept: UROLOGY | Facility: CLINIC | Age: 66
End: 2021-02-03

## 2021-02-03 ENCOUNTER — TELEPHONE (OUTPATIENT)
Dept: UROLOGY | Facility: HOSPITAL | Age: 66
End: 2021-02-03

## 2021-02-03 LAB — BACTERIA UR CULT: NORMAL

## 2021-02-15 ENCOUNTER — PATIENT MESSAGE (OUTPATIENT)
Dept: FAMILY MEDICINE | Facility: CLINIC | Age: 66
End: 2021-02-15

## 2021-02-15 RX ORDER — TRAMADOL HYDROCHLORIDE 50 MG/1
50 TABLET ORAL EVERY 12 HOURS PRN
Qty: 20 TABLET | Refills: 0 | Status: CANCELLED | OUTPATIENT
Start: 2021-02-15

## 2021-02-17 ENCOUNTER — PATIENT MESSAGE (OUTPATIENT)
Dept: FAMILY MEDICINE | Facility: CLINIC | Age: 66
End: 2021-02-17

## 2021-02-18 RX ORDER — TRAMADOL HYDROCHLORIDE 50 MG/1
50 TABLET ORAL EVERY 12 HOURS PRN
Qty: 20 TABLET | Refills: 0 | Status: SHIPPED | OUTPATIENT
Start: 2021-02-18

## 2021-02-24 ENCOUNTER — OFFICE VISIT (OUTPATIENT)
Dept: FAMILY MEDICINE | Facility: CLINIC | Age: 66
End: 2021-02-24
Payer: MEDICARE

## 2021-02-24 ENCOUNTER — HOSPITAL ENCOUNTER (OUTPATIENT)
Dept: RADIOLOGY | Facility: HOSPITAL | Age: 66
Discharge: HOME | End: 2021-02-24
Attending: UROLOGY
Payer: MEDICARE

## 2021-02-24 VITALS
HEIGHT: 67 IN | TEMPERATURE: 98 F | HEART RATE: 90 BPM | OXYGEN SATURATION: 97 % | DIASTOLIC BLOOD PRESSURE: 78 MMHG | BODY MASS INDEX: 44.29 KG/M2 | WEIGHT: 282.19 LBS | SYSTOLIC BLOOD PRESSURE: 120 MMHG

## 2021-02-24 DIAGNOSIS — G25.81 RESTLESS LEG SYNDROME: Primary | ICD-10-CM

## 2021-02-24 DIAGNOSIS — G62.9 NEUROPATHY: ICD-10-CM

## 2021-02-24 DIAGNOSIS — C64.1 RENAL CELL CARCINOMA OF RIGHT KIDNEY: ICD-10-CM

## 2021-02-24 DIAGNOSIS — B37.2 YEAST DERMATITIS: ICD-10-CM

## 2021-02-24 DIAGNOSIS — M62.838 MUSCLE SPASM: ICD-10-CM

## 2021-02-24 PROCEDURE — 3008F PR BODY MASS INDEX (BMI) DOCUMENTED: ICD-10-PCS | Mod: CPTII,S$GLB,, | Performed by: FAMILY MEDICINE

## 2021-02-24 PROCEDURE — 74176 CT ABD & PELVIS W/O CONTRAST: CPT | Mod: TC

## 2021-02-24 PROCEDURE — 99999 PR PBB SHADOW E&M-EST. PATIENT-LVL III: CPT | Mod: PBBFAC,,, | Performed by: FAMILY MEDICINE

## 2021-02-24 PROCEDURE — 99214 PR OFFICE/OUTPT VISIT, EST, LEVL IV, 30-39 MIN: ICD-10-PCS | Mod: S$GLB,,, | Performed by: FAMILY MEDICINE

## 2021-02-24 PROCEDURE — 74176 CT RENAL STONE STUDY ABD PELVIS WO: ICD-10-PCS | Mod: 26,,, | Performed by: RADIOLOGY

## 2021-02-24 PROCEDURE — 3074F PR MOST RECENT SYSTOLIC BLOOD PRESSURE < 130 MM HG: ICD-10-PCS | Mod: CPTII,S$GLB,, | Performed by: FAMILY MEDICINE

## 2021-02-24 PROCEDURE — 74176 CT ABD & PELVIS W/O CONTRAST: CPT | Mod: 26,,, | Performed by: RADIOLOGY

## 2021-02-24 PROCEDURE — 3288F PR FALLS RISK ASSESSMENT DOCUMENTED: ICD-10-PCS | Mod: CPTII,S$GLB,, | Performed by: FAMILY MEDICINE

## 2021-02-24 PROCEDURE — 3078F DIAST BP <80 MM HG: CPT | Mod: CPTII,S$GLB,, | Performed by: FAMILY MEDICINE

## 2021-02-24 PROCEDURE — 3074F SYST BP LT 130 MM HG: CPT | Mod: CPTII,S$GLB,, | Performed by: FAMILY MEDICINE

## 2021-02-24 PROCEDURE — 1125F AMNT PAIN NOTED PAIN PRSNT: CPT | Mod: S$GLB,,, | Performed by: FAMILY MEDICINE

## 2021-02-24 PROCEDURE — 1101F PT FALLS ASSESS-DOCD LE1/YR: CPT | Mod: CPTII,S$GLB,, | Performed by: FAMILY MEDICINE

## 2021-02-24 PROCEDURE — 99214 OFFICE O/P EST MOD 30 MIN: CPT | Mod: S$GLB,,, | Performed by: FAMILY MEDICINE

## 2021-02-24 PROCEDURE — 1159F MED LIST DOCD IN RCRD: CPT | Mod: S$GLB,,, | Performed by: FAMILY MEDICINE

## 2021-02-24 PROCEDURE — 1159F PR MEDICATION LIST DOCUMENTED IN MEDICAL RECORD: ICD-10-PCS | Mod: S$GLB,,, | Performed by: FAMILY MEDICINE

## 2021-02-24 PROCEDURE — 1125F PR PAIN SEVERITY QUANTIFIED, PAIN PRESENT: ICD-10-PCS | Mod: S$GLB,,, | Performed by: FAMILY MEDICINE

## 2021-02-24 PROCEDURE — 3288F FALL RISK ASSESSMENT DOCD: CPT | Mod: CPTII,S$GLB,, | Performed by: FAMILY MEDICINE

## 2021-02-24 PROCEDURE — 3078F PR MOST RECENT DIASTOLIC BLOOD PRESSURE < 80 MM HG: ICD-10-PCS | Mod: CPTII,S$GLB,, | Performed by: FAMILY MEDICINE

## 2021-02-24 PROCEDURE — 99999 PR PBB SHADOW E&M-EST. PATIENT-LVL III: ICD-10-PCS | Mod: PBBFAC,,, | Performed by: FAMILY MEDICINE

## 2021-02-24 PROCEDURE — 1101F PR PT FALLS ASSESS DOC 0-1 FALLS W/OUT INJ PAST YR: ICD-10-PCS | Mod: CPTII,S$GLB,, | Performed by: FAMILY MEDICINE

## 2021-02-24 PROCEDURE — 3008F BODY MASS INDEX DOCD: CPT | Mod: CPTII,S$GLB,, | Performed by: FAMILY MEDICINE

## 2021-02-24 RX ORDER — NORTRIPTYLINE HYDROCHLORIDE 25 MG/1
25 CAPSULE ORAL NIGHTLY
Qty: 90 CAPSULE | Refills: 3 | Status: SHIPPED | OUTPATIENT
Start: 2021-02-24 | End: 2022-02-24

## 2021-02-24 RX ORDER — NORTRIPTYLINE HYDROCHLORIDE 25 MG/1
25 CAPSULE ORAL NIGHTLY
Qty: 30 CAPSULE | Refills: 11 | Status: SHIPPED | OUTPATIENT
Start: 2021-02-24 | End: 2021-02-24 | Stop reason: SDUPTHER

## 2021-02-24 RX ORDER — METHOCARBAMOL 500 MG/1
500 TABLET, FILM COATED ORAL DAILY PRN
Qty: 20 TABLET | Refills: 0 | Status: SHIPPED | OUTPATIENT
Start: 2021-02-24 | End: 2021-03-06

## 2021-03-04 ENCOUNTER — TELEPHONE (OUTPATIENT)
Dept: UROLOGY | Facility: HOSPITAL | Age: 66
End: 2021-03-04

## 2021-03-04 DIAGNOSIS — C64.1 RENAL CELL CARCINOMA OF RIGHT KIDNEY: Primary | ICD-10-CM

## 2021-03-05 ENCOUNTER — TELEPHONE (OUTPATIENT)
Dept: UROLOGY | Facility: CLINIC | Age: 66
End: 2021-03-05

## 2021-03-09 ENCOUNTER — TELEPHONE (OUTPATIENT)
Dept: UROLOGY | Facility: CLINIC | Age: 66
End: 2021-03-09

## 2021-03-11 ENCOUNTER — HOSPITAL ENCOUNTER (OUTPATIENT)
Dept: RADIOLOGY | Facility: HOSPITAL | Age: 66
Discharge: HOME | End: 2021-03-11
Attending: UROLOGY
Payer: MEDICARE

## 2021-03-11 DIAGNOSIS — C64.1 RENAL CELL CARCINOMA OF RIGHT KIDNEY: ICD-10-CM

## 2021-03-11 PROCEDURE — 76770 US EXAM ABDO BACK WALL COMP: CPT | Mod: 26,,, | Performed by: RADIOLOGY

## 2021-03-11 PROCEDURE — 76770 US RETROPERITONEAL COMPLETE: ICD-10-PCS | Mod: 26,,, | Performed by: RADIOLOGY

## 2021-03-11 PROCEDURE — 76770 US EXAM ABDO BACK WALL COMP: CPT | Mod: TC

## 2021-03-16 ENCOUNTER — PATIENT MESSAGE (OUTPATIENT)
Dept: UROLOGY | Facility: CLINIC | Age: 66
End: 2021-03-16

## 2021-03-17 ENCOUNTER — TELEPHONE (OUTPATIENT)
Dept: UROLOGY | Facility: CLINIC | Age: 66
End: 2021-03-17

## 2021-03-17 DIAGNOSIS — N28.89 LEFT RENAL MASS: Primary | ICD-10-CM

## 2021-03-18 ENCOUNTER — TELEPHONE (OUTPATIENT)
Dept: HEMATOLOGY/ONCOLOGY | Facility: CLINIC | Age: 66
End: 2021-03-18

## 2021-03-22 ENCOUNTER — TELEPHONE (OUTPATIENT)
Dept: UROLOGY | Facility: CLINIC | Age: 66
End: 2021-03-22

## 2021-03-24 ENCOUNTER — TELEPHONE (OUTPATIENT)
Dept: HEMATOLOGY/ONCOLOGY | Facility: CLINIC | Age: 66
End: 2021-03-24

## 2021-03-30 ENCOUNTER — TELEPHONE (OUTPATIENT)
Dept: UROLOGY | Facility: CLINIC | Age: 66
End: 2021-03-30

## 2021-03-30 ENCOUNTER — HOSPITAL ENCOUNTER (OUTPATIENT)
Dept: RADIOLOGY | Facility: HOSPITAL | Age: 66
Discharge: HOME | End: 2021-03-30
Attending: UROLOGY
Payer: MEDICARE

## 2021-03-30 ENCOUNTER — TELEPHONE (OUTPATIENT)
Dept: HEMATOLOGY/ONCOLOGY | Facility: CLINIC | Age: 66
End: 2021-03-30

## 2021-03-30 DIAGNOSIS — N28.89 LEFT RENAL MASS: ICD-10-CM

## 2021-03-30 DIAGNOSIS — N28.89 LEFT RENAL MASS: Primary | ICD-10-CM

## 2021-03-30 DIAGNOSIS — R91.8 PULMONARY NODULES: ICD-10-CM

## 2021-03-30 DIAGNOSIS — C64.1 RENAL CELL CARCINOMA OF RIGHT KIDNEY: ICD-10-CM

## 2021-03-30 PROCEDURE — 74181 MRI ABDOMEN W/O CONTRAST: CPT | Mod: 26,,, | Performed by: RADIOLOGY

## 2021-03-30 PROCEDURE — 74181 MRI ABDOMEN WITHOUT CONTRAST: ICD-10-PCS | Mod: 26,,, | Performed by: RADIOLOGY

## 2021-03-30 PROCEDURE — 74181 MRI ABDOMEN W/O CONTRAST: CPT | Mod: TC

## 2021-04-02 ENCOUNTER — PATIENT MESSAGE (OUTPATIENT)
Dept: UROLOGY | Facility: CLINIC | Age: 66
End: 2021-04-02

## 2021-04-05 ENCOUNTER — PATIENT OUTREACH (OUTPATIENT)
Dept: ADMINISTRATIVE | Facility: OTHER | Age: 66
End: 2021-04-05

## 2021-04-05 ENCOUNTER — OFFICE VISIT (OUTPATIENT)
Dept: UROLOGY | Facility: CLINIC | Age: 66
End: 2021-04-05
Payer: MEDICARE

## 2021-04-05 ENCOUNTER — TELEPHONE (OUTPATIENT)
Dept: UROLOGY | Facility: CLINIC | Age: 66
End: 2021-04-05

## 2021-04-05 VITALS — WEIGHT: 282 LBS | BODY MASS INDEX: 44.26 KG/M2 | HEIGHT: 67 IN

## 2021-04-05 DIAGNOSIS — N28.89 LEFT RENAL MASS: Primary | ICD-10-CM

## 2021-04-05 DIAGNOSIS — R91.8 PULMONARY NODULES: ICD-10-CM

## 2021-04-05 DIAGNOSIS — C64.1 RENAL CELL CARCINOMA OF RIGHT KIDNEY: ICD-10-CM

## 2021-04-05 DIAGNOSIS — R31.0 GROSS HEMATURIA: ICD-10-CM

## 2021-04-05 PROCEDURE — 1126F PR PAIN SEVERITY QUANTIFIED, NO PAIN PRESENT: ICD-10-PCS | Mod: S$GLB,,, | Performed by: UROLOGY

## 2021-04-05 PROCEDURE — 99999 PR PBB SHADOW E&M-EST. PATIENT-LVL III: ICD-10-PCS | Mod: PBBFAC,,, | Performed by: UROLOGY

## 2021-04-05 PROCEDURE — 81002 POCT URINE DIPSTICK WITHOUT MICROSCOPE: ICD-10-PCS | Mod: S$GLB,,, | Performed by: UROLOGY

## 2021-04-05 PROCEDURE — 3008F BODY MASS INDEX DOCD: CPT | Mod: CPTII,S$GLB,, | Performed by: UROLOGY

## 2021-04-05 PROCEDURE — 99214 PR OFFICE/OUTPT VISIT, EST, LEVL IV, 30-39 MIN: ICD-10-PCS | Mod: 25,S$GLB,, | Performed by: UROLOGY

## 2021-04-05 PROCEDURE — 3008F PR BODY MASS INDEX (BMI) DOCUMENTED: ICD-10-PCS | Mod: CPTII,S$GLB,, | Performed by: UROLOGY

## 2021-04-05 PROCEDURE — 1126F AMNT PAIN NOTED NONE PRSNT: CPT | Mod: S$GLB,,, | Performed by: UROLOGY

## 2021-04-05 PROCEDURE — 1101F PT FALLS ASSESS-DOCD LE1/YR: CPT | Mod: CPTII,S$GLB,, | Performed by: UROLOGY

## 2021-04-05 PROCEDURE — 1159F PR MEDICATION LIST DOCUMENTED IN MEDICAL RECORD: ICD-10-PCS | Mod: S$GLB,,, | Performed by: UROLOGY

## 2021-04-05 PROCEDURE — 1101F PR PT FALLS ASSESS DOC 0-1 FALLS W/OUT INJ PAST YR: ICD-10-PCS | Mod: CPTII,S$GLB,, | Performed by: UROLOGY

## 2021-04-05 PROCEDURE — 3288F PR FALLS RISK ASSESSMENT DOCUMENTED: ICD-10-PCS | Mod: CPTII,S$GLB,, | Performed by: UROLOGY

## 2021-04-05 PROCEDURE — 1159F MED LIST DOCD IN RCRD: CPT | Mod: S$GLB,,, | Performed by: UROLOGY

## 2021-04-05 PROCEDURE — 81002 URINALYSIS NONAUTO W/O SCOPE: CPT | Mod: S$GLB,,, | Performed by: UROLOGY

## 2021-04-05 PROCEDURE — 99214 OFFICE O/P EST MOD 30 MIN: CPT | Mod: 25,S$GLB,, | Performed by: UROLOGY

## 2021-04-05 PROCEDURE — 3288F FALL RISK ASSESSMENT DOCD: CPT | Mod: CPTII,S$GLB,, | Performed by: UROLOGY

## 2021-04-05 PROCEDURE — 99999 PR PBB SHADOW E&M-EST. PATIENT-LVL III: CPT | Mod: PBBFAC,,, | Performed by: UROLOGY

## 2021-04-06 ENCOUNTER — PATIENT MESSAGE (OUTPATIENT)
Dept: ADMINISTRATIVE | Facility: HOSPITAL | Age: 66
End: 2021-04-06

## 2021-04-07 ENCOUNTER — TELEPHONE (OUTPATIENT)
Dept: UROLOGY | Facility: CLINIC | Age: 66
End: 2021-04-07

## 2021-04-07 LAB
BILIRUB SERPL-MCNC: ABNORMAL MG/DL
BLOOD URINE, POC: ABNORMAL
CLARITY, POC UA: ABNORMAL
COLOR, POC UA: YELLOW
GLUCOSE UR QL STRIP: 500
KETONES UR QL STRIP: ABNORMAL
LEUKOCYTE ESTERASE URINE, POC: ABNORMAL
NITRITE, POC UA: ABNORMAL
PH, POC UA: 6
PROTEIN, POC: ABNORMAL
SPECIFIC GRAVITY, POC UA: ABNORMAL
UROBILINOGEN, POC UA: ABNORMAL

## 2021-07-06 ENCOUNTER — PATIENT MESSAGE (OUTPATIENT)
Dept: ADMINISTRATIVE | Facility: HOSPITAL | Age: 66
End: 2021-07-06

## 2021-08-04 ENCOUNTER — PATIENT MESSAGE (OUTPATIENT)
Dept: ADMINISTRATIVE | Facility: HOSPITAL | Age: 66
End: 2021-08-04

## 2021-10-04 ENCOUNTER — PATIENT MESSAGE (OUTPATIENT)
Dept: ADMINISTRATIVE | Facility: HOSPITAL | Age: 66
End: 2021-10-04

## 2021-11-03 ENCOUNTER — PATIENT MESSAGE (OUTPATIENT)
Dept: ADMINISTRATIVE | Facility: HOSPITAL | Age: 66
End: 2021-11-03
Payer: MEDICARE

## 2022-02-02 ENCOUNTER — PATIENT MESSAGE (OUTPATIENT)
Dept: ADMINISTRATIVE | Facility: HOSPITAL | Age: 67
End: 2022-02-02
Payer: MEDICARE

## 2022-05-05 ENCOUNTER — TELEPHONE (OUTPATIENT)
Dept: FAMILY MEDICINE | Facility: CLINIC | Age: 67
End: 2022-05-05
Payer: MEDICARE

## 2022-05-05 DIAGNOSIS — E11.29 TYPE 2 DIABETES MELLITUS WITH OTHER KIDNEY COMPLICATION, UNSPECIFIED WHETHER LONG TERM INSULIN USE: Primary | ICD-10-CM

## 2022-09-01 NOTE — TELEPHONE ENCOUNTER
Chronic low back pain  Previous evaluation with x-rays  Negative connective tissue disease workup  Follow-up with spine Center and chiropractor  Patient brought to office to scheduled procedures, however patient needs cardiac clearance prior to getting scheduled.  Spoke with patient and wife regarding this, they have an appt with Cardio Dr. Emanuel this Thursday 12/6/18 at 2:30pm for follow-up and clearance for EGD.  Dr. Emanuel is based out of [facility]. Patient is also currently taking Plavix, and I explained to patient and wife that I would need written approval also from Dr. Emanuel with his approval to allow patient to hold Plavix 5 days prior to procedure.  Both verbalized understanding and I gave them a card with my office number and explained that I would call to get him scheduled once I had all the documentation I needed.

## (undated) DEVICE — NDL HYPO REG 25G X 1 1/2

## (undated) DEVICE — GAUZE SPONGE 4'X4 12 PLY

## (undated) DEVICE — SYR 10CC LUER LOCK

## (undated) DEVICE — Device

## (undated) DEVICE — CLOSURE SKIN STERI STRIP 1/2X4

## (undated) DEVICE — PACK ENDOSCOPY GENERAL

## (undated) DEVICE — ELECTRODE REM PLYHSV RETURN 9

## (undated) DEVICE — CANISTER SUCTION 2 LTR

## (undated) DEVICE — GLOVE SURG BIOGEL LATEX SZ 7.5

## (undated) DEVICE — GLOVE SURGICAL LATEX SZ 6.5

## (undated) DEVICE — SUT VICRYL PLUS 3-0 SH 18IN

## (undated) DEVICE — CHLORAPREP W TINT 26ML APPL

## (undated) DEVICE — SEE MEDLINE ITEM 146417

## (undated) DEVICE — SOL 9P NACL IRR PIC IL

## (undated) DEVICE — UNDERGLOVE BIOGEL PI SZ 6.5 LF

## (undated) DEVICE — SEE MEDLINE ITEM 157117

## (undated) DEVICE — GLOVE BIOGEL PI MICRO SZ 7

## (undated) DEVICE — GAUZE SPONGE 4X4 12PLY

## (undated) DEVICE — SEE MEDLINE ITEM 152622

## (undated) DEVICE — GLOVE SURG ULTRA TOUCH 7.5

## (undated) DEVICE — APPLICATOR CHLORAPREP ORN 26ML

## (undated) DEVICE — SUT ETHIBOND EXCEL 1 CTX 18

## (undated) DEVICE — SYS CLSR DERMABOND PRINEO 22CM

## (undated) DEVICE — STAPLER SKIN ROTATING HEAD

## (undated) DEVICE — SUT STRATAFIX 4-0 30CM PS-2

## (undated) DEVICE — BLANKET UPPER BODY 78.7X29.9IN

## (undated) DEVICE — GLOVE BIOGEL 7.5

## (undated) DEVICE — BINDER ABDOMINAL 9 46-62

## (undated) DEVICE — SUT CTD VICRYL 3-0 CR/SH

## (undated) DEVICE — COVER OVERHEAD SURG LT BLUE

## (undated) DEVICE — UNDERGLOVES BIOGEL PI SIZE 8